# Patient Record
Sex: FEMALE | Race: WHITE | HISPANIC OR LATINO | Employment: FULL TIME | URBAN - METROPOLITAN AREA
[De-identification: names, ages, dates, MRNs, and addresses within clinical notes are randomized per-mention and may not be internally consistent; named-entity substitution may affect disease eponyms.]

---

## 2017-01-04 ENCOUNTER — TELEPHONE (OUTPATIENT)
Dept: INTERNAL MEDICINE | Facility: CLINIC | Age: 42
End: 2017-01-04

## 2017-01-04 NOTE — TELEPHONE ENCOUNTER
Panel Management Review      Patient has the following on her problem list:     Asthma review     ACT Total Scores 8/31/2016   ACT TOTAL SCORE -   ASTHMA ER VISITS -   ASTHMA HOSPITALIZATIONS -   ACT TOTAL SCORE (Goal Greater than or Equal to 20) 15   In the past 12 months, how many times did you visit the emergency room for your asthma without being admitted to the hospital? 0   In the past 12 months, how many times were you hospitalized overnight because of your asthma? 0      1. Is Asthma diagnosis on the Problem List? Yes    2. Is Asthma listed on Health Maintenance? Yes    3. Patient is due for:  ACT next month  Diabetes    ASA:     Last A1C  A1C      5.7   8/31/2016  A1C      5.6   2/25/2016  A1C      5.7   2/23/2016  A1C      6.0   7/14/2015  A1C      6.0   2/18/2015  A1C tested: Passed    Last LDL:    CHOL      134   2/23/2016  HDL       59   2/23/2016  LDL       51   2/23/2016  TRIG      122   2/23/2016  CHOLHDLRATIO      2.4   7/14/2015  NHDL       75   2/23/2016    Is the patient on a Statin? YES             Is the patient on Aspirin? NO    Medications     HMG CoA Reductase Inhibitors    atorvastatin (LIPITOR) 10 MG tablet          Last three blood pressure readings:  BP Readings from Last 3 Encounters:   08/31/16 152/88   02/26/16 149/85   02/23/16 130/80       Date of last diabetes office visit: 2/2016     Tobacco History:     History   Smoking status     Never Smoker    Smokeless tobacco     Not on file             Composite cancer screening  Chart review shows that this patient is due/due soon for the following None  Summary:    Patient is due/failing the following:   Office visit    Action needed:   Patient needs office visit for DM, Asthma.    Type of outreach:    Phone, left message for patient to call back.    Left msg to call for appt.     Questions for provider review:    None                                                                                                                                       KVNG Louise       Chart routed to None .

## 2017-03-03 ENCOUNTER — HOSPITAL ENCOUNTER (OUTPATIENT)
Dept: MAMMOGRAPHY | Facility: CLINIC | Age: 42
Discharge: HOME | End: 2017-03-03
Attending: NURSE PRACTITIONER | Admitting: NURSE PRACTITIONER
Payer: COMMERCIAL

## 2017-03-03 ENCOUNTER — OFFICE VISIT (OUTPATIENT)
Dept: INTERNAL MEDICINE | Facility: CLINIC | Age: 42
End: 2017-03-03
Payer: COMMERCIAL

## 2017-03-03 VITALS
TEMPERATURE: 98.1 F | OXYGEN SATURATION: 98 % | HEIGHT: 63 IN | WEIGHT: 283 LBS | SYSTOLIC BLOOD PRESSURE: 108 MMHG | BODY MASS INDEX: 50.14 KG/M2 | DIASTOLIC BLOOD PRESSURE: 60 MMHG | RESPIRATION RATE: 12 BRPM

## 2017-03-03 DIAGNOSIS — E78.5 HYPERLIPIDEMIA WITH TARGET LDL LESS THAN 70: ICD-10-CM

## 2017-03-03 DIAGNOSIS — I10 ESSENTIAL HYPERTENSION WITH GOAL BLOOD PRESSURE LESS THAN 140/90: ICD-10-CM

## 2017-03-03 DIAGNOSIS — E66.01 MORBID OBESITY, UNSPECIFIED OBESITY TYPE (H): ICD-10-CM

## 2017-03-03 DIAGNOSIS — Z31.9 DESIRE FOR PREGNANCY: ICD-10-CM

## 2017-03-03 DIAGNOSIS — E11.9 TYPE 2 DIABETES MELLITUS WITHOUT COMPLICATION, WITHOUT LONG-TERM CURRENT USE OF INSULIN (H): Primary | ICD-10-CM

## 2017-03-03 DIAGNOSIS — N91.2 AMENORRHEA: ICD-10-CM

## 2017-03-03 DIAGNOSIS — J45.30 MILD PERSISTENT ASTHMA WITHOUT COMPLICATION: ICD-10-CM

## 2017-03-03 DIAGNOSIS — Z12.31 VISIT FOR SCREENING MAMMOGRAM: ICD-10-CM

## 2017-03-03 LAB
BASOPHILS # BLD AUTO: 0 10E9/L (ref 0–0.2)
BASOPHILS NFR BLD AUTO: 0.5 %
BETA HCG QUAL IFA URINE: NEGATIVE
DIFFERENTIAL METHOD BLD: ABNORMAL
EOSINOPHIL # BLD AUTO: 0.3 10E9/L (ref 0–0.7)
EOSINOPHIL NFR BLD AUTO: 4.3 %
ERYTHROCYTE [DISTWIDTH] IN BLOOD BY AUTOMATED COUNT: 15.1 % (ref 10–15)
HBA1C MFR BLD: 5.8 % (ref 4.3–6)
HCT VFR BLD AUTO: 39.6 % (ref 35–47)
HGB BLD-MCNC: 12.5 G/DL (ref 11.7–15.7)
LYMPHOCYTES # BLD AUTO: 1.7 10E9/L (ref 0.8–5.3)
LYMPHOCYTES NFR BLD AUTO: 22.6 %
MCH RBC QN AUTO: 25.8 PG (ref 26.5–33)
MCHC RBC AUTO-ENTMCNC: 31.6 G/DL (ref 31.5–36.5)
MCV RBC AUTO: 82 FL (ref 78–100)
MONOCYTES # BLD AUTO: 0.5 10E9/L (ref 0–1.3)
MONOCYTES NFR BLD AUTO: 6.3 %
NEUTROPHILS # BLD AUTO: 5.1 10E9/L (ref 1.6–8.3)
NEUTROPHILS NFR BLD AUTO: 66.3 %
PLATELET # BLD AUTO: 206 10E9/L (ref 150–450)
RBC # BLD AUTO: 4.85 10E12/L (ref 3.8–5.2)
WBC # BLD AUTO: 7.7 10E9/L (ref 4–11)

## 2017-03-03 PROCEDURE — 84703 CHORIONIC GONADOTROPIN ASSAY: CPT | Performed by: NURSE PRACTITIONER

## 2017-03-03 PROCEDURE — 99207 C FOOT EXAM  NO CHARGE: CPT | Mod: 25 | Performed by: NURSE PRACTITIONER

## 2017-03-03 PROCEDURE — 99396 PREV VISIT EST AGE 40-64: CPT | Performed by: NURSE PRACTITIONER

## 2017-03-03 PROCEDURE — 83036 HEMOGLOBIN GLYCOSYLATED A1C: CPT | Performed by: NURSE PRACTITIONER

## 2017-03-03 PROCEDURE — G0202 SCR MAMMO BI INCL CAD: HCPCS

## 2017-03-03 PROCEDURE — 82043 UR ALBUMIN QUANTITATIVE: CPT | Performed by: NURSE PRACTITIONER

## 2017-03-03 PROCEDURE — 80061 LIPID PANEL: CPT | Performed by: NURSE PRACTITIONER

## 2017-03-03 PROCEDURE — 80050 GENERAL HEALTH PANEL: CPT | Performed by: NURSE PRACTITIONER

## 2017-03-03 PROCEDURE — 36415 COLL VENOUS BLD VENIPUNCTURE: CPT | Performed by: NURSE PRACTITIONER

## 2017-03-03 NOTE — MR AVS SNAPSHOT
After Visit Summary   3/3/2017    Coni Newberry    MRN: [id number]           Patient Information     Date Of Birth          1975        Visit Information        Provider Department      3/3/2017 11:00 AM Farida Palencia APRN [facility]        Today's Diagnoses     Type 2 diabetes mellitus without complication, without long-term current use of insulin (H)    -  1    Amenorrhea        Mild persistent asthma without complication        Hyperlipidemia with target LDL less than 70        Morbid obesity, unspecified obesity type (H)        Essential hypertension with goal blood pressure less than 140/90        Desire for pregnancy          Care Instructions      Labs in [address]    Stop lisinopril and lipitor while trying for pregnancy     Take a prenatal vitamin daily       FMG: [facility] [phone]   [email or web address]        Preventive Health Recommendations  Female Ages 40 to 49    Yearly exam:     See your health care provider every year in order to  1. Review health changes.   2. Discuss preventive care.    3. Review your medicines if your doctor prescribed any.      Get a Pap test every three years (unless you have an abnormal result and your provider advises testing more often).      If you get Pap tests with HPV test, you only need to test every 5 years, unless you have an abnormal result. You do not need a Pap test if your uterus was removed (hysterectomy) and you have not had cancer.      You should be tested each year for STDs (sexually transmitted diseases), if you're at risk.       Ask your doctor if you should have a mammogram.      Have a colonoscopy (test for colon cancer) if someone in your family has had colon cancer or polyps before age 50.       Have a cholesterol test every 5 years.       Have a diabetes test (fasting glucose) after age 45. If you are at risk for diabetes, you should have this  test every 3 years.    Shots: Get a flu shot each year. Get a tetanus shot every 10 years.     Nutrition:     Eat at least 5 servings of fruits and vegetables each day.    Eat whole-grain bread, whole-wheat pasta and brown rice instead of white grains and rice.    Talk to your provider about Calcium and Vitamin D.     Lifestyle    Exercise at least 150 minutes a week (an average of 30 minutes a day, 5 days a week). This will help you control your weight and prevent disease.    Limit alcohol to one drink per day.    No smoking.     Wear sunscreen to prevent skin cancer.    See your dentist every six months for an exam and cleaning.        Follow-ups after your visit        Additional Services     OB/GYN REFERRAL       Your provider has referred you to:  FMG: [facility] [phone]   [email or web address]      Please be aware that coverage of these services is subject to the terms and limitations of your health insurance plan.  Call member services at your health plan with any benefit or coverage questions.      Please bring the following with you to your appointment:    (1) Any X-Rays, CTs or MRIs which have been performed.  Contact the facility where they were done to arrange for  prior to your scheduled appointment.   (2) List of current medications   (3) This referral request   (4) Any documents/labs given to you for this referral                  Who to contact     If you have questions or need follow up information about today's clinic visit or your schedule please contact [facility] directly at [phone].  Normal or non-critical lab and imaging results will be communicated to you by MyChart, letter or phone within 4 business days after the clinic has received the results. If you do not hear from us within 7 days, please contact the clinic through MyChart or phone. If you have a critical or abnormal lab result, we will notify you by  "phone as soon as possible.  Submit refill requests through [employer] or call your pharmacy and they will forward the refill request to us. Please allow 3 business days for your refill to be completed.          Additional Information About Your Visit        [employer] Information     [employer] gives you secure access to your electronic health record. If you see a primary care provider, you can also send messages to your care team and make appointments. If you have questions, please call your primary care clinic.  If you do not have a primary care provider, please call [phone] and they will assist you.        Care EveryWhere ID     This is your Care EveryWhere ID. This could be used by other organizations to access your [facility] medical records  [id number]        Your Vitals Were     Temperature Respirations Height Last Period Pulse Oximetry BMI (Body Mass Index)    98.1  F (36.7  C) (Oral) 12 5' 3\" (1.6 m) 01/18/2017 98% 50.13 kg/m2       Blood Pressure from Last 3 Encounters:   03/03/17 108/60   08/31/16 152/88   02/26/16 149/85    Weight from Last 3 Encounters:   03/03/17 283 lb (128.4 kg)   08/31/16 277 lb 9.6 oz (125.9 kg)   02/23/16 280 lb (127 kg)              We Performed the Following     Albumin Random Urine Quantitative     Beta HCG qual IFA urine     CBC with platelets differential     Comprehensive metabolic panel     Hemoglobin A1c     Lipid panel reflex to direct LDL     OB/GYN REFERRAL     TSH with free T4 reflex          Today's Medication Changes          These changes are accurate as of: 3/3/17 12:19 PM.  If you have any questions, ask your nurse or doctor.               Start taking these medicines.        Dose/Directions    ACE/ARB NOT PRESCRIBED (INTENTIONAL)   Used for:  Type 2 diabetes mellitus without complication, without long-term current use of insulin (H), Essential hypertension with goal blood pressure less than 140/90   Started by:  Farida Palencia APRN CNP        Please choose " reason not prescribed, below   Refills:  0         Stop taking these medicines if you haven't already. Please contact your care team if you have questions.     atorvastatin 10 MG tablet   Commonly known as:  LIPITOR   Stopped by:  Farida Palencia APRN CNP           budesonide 0.5 MG/2ML neb solution   Commonly known as:  PULMICORT   Stopped by:  Farida Palencia APRN CNP           levalbuterol 0.63 MG/3ML neb solution   Commonly known as:  XOPENEX   Stopped by:  Farida Palencia APRN CNP           lisinopril 2.5 MG tablet   Commonly known as:  PRINIVIL/Zestril   Stopped by:  Farida Palencia APRN CNP                Where to get your medicines      Some of these will need a paper prescription and others can be bought over the counter.  Ask your nurse if you have questions.     You don't need a prescription for these medications     ACE/ARB NOT PRESCRIBED (INTENTIONAL)                Primary Care Provider Office Phone # Fax #    DANYELL Spencer [phone]       [facility] [address] [place] [address]        Thank you!     Thank you for choosing [facility]  for your care. Our goal is always to provide you with excellent care. Hearing back from our patients is one way we can continue to improve our services. Please take a few minutes to complete the written survey that you may receive in the mail after your visit with us. Thank you!             Your Updated Medication List - Protect others around you: Learn how to safely use, store and throw away your medicines at www.disposemymeds.org.          This list is accurate as of: 3/3/17 12:19 PM.  Always use your most recent med list.                   Brand Name Dispense Instructions for use    ACE/ARB NOT PRESCRIBED (INTENTIONAL)      Please choose reason not prescribed, below       albuterol 108 (90 BASE) MCG/ACT Inhaler    albuterol    2 Inhaler    Inhale 2 puffs into the lungs every 6  hours       beclomethasone 80 MCG/ACT Inhaler    QVAR    3 Inhaler    Inhale 2 puffs into the lungs 2 times daily       blood glucose monitoring meter device kit     1 kit    Use to test blood sugars 1 times daily or as directed.       * blood glucose monitoring test strip    no brand specified    600 strip    Use to test blood sugar 6 times daily or as directed.       * blood glucose monitoring test strip    ONE TOUCH ULTRA    3 Box    Use to test blood sugars 1 times daily or as directed.       cetirizine-psuedoePHEDrine 5-120 MG per 12 hr tablet    zyrTEC-D     Take 1 tablet by mouth daily       fluticasone 50 MCG/ACT spray    FLONASE    3 Package    Spray 2 sprays into both nostrils daily       ibuprofen 200 MG tablet    ADVIL/MOTRIN     Take 200 mg by mouth every 4 hours as needed for mild pain or fever       ipratropium - albuterol 0.5 mg/2.5 mg/3 mL 0.5-2.5 (3) MG/3ML neb solution    DUONEB    90 vial    Take 1 vial (3 mLs) by nebulization every 6 hours as needed for shortness of breath / dyspnea or wheezing       JOLIVETTE 0.35 MG per tablet   Generic drug:  norethindrone     84 tablet    Take 1 tablet (0.35 mg) by mouth daily       LANCETS ULTRA FINE Misc     300 each    1 each 6 times daily       metFORMIN 500 MG tablet    GLUCOPHAGE    360 tablet    Take 2 tablets (1,000 mg) by mouth 2 times daily (with meals)       montelukast 10 MG tablet    SINGULAIR    90 tablet    Take 1 tablet (10 mg) by mouth At Bedtime       nystatin ointment    MYCOSTATIN    60 g    Apply topically as needed       omeprazole 20 MG tablet     90 tablet    Take 1 tablet (20 mg) by mouth daily Take 30-60 minutes before a meal.       * order for DME     1 Device    Equipment being ordered: Nebulizer       * order for DME     2 Device    Equipment being ordered: neb tubing and cup       triamcinolone 0.1 % ointment    KENALOG    90 g    Apply sparingly to affected area three times daily AS NEEDED FOR ECZEMA RASH       * Notice:  This  list has 4 medication(s) that are the same as other medications prescribed for you. Read the directions carefully, and ask your doctor or other care provider to review them with you.

## 2017-03-03 NOTE — PROGRESS NOTES
SUBJECTIVE:     CC: Coni Newberry is an 42 year old woman who presents for preventive health visit.     Healthy Habits:    Do you get at least three servings of calcium containing foods daily (dairy, green leafy vegetables, etc.)? No    Amount of exercise or daily activities, outside of work: 1 day(s) per week    Problems taking medications regularly No    Medication side effects: No    Have you had an eye exam in the past two years? no    Do you see a dentist twice per year? no    Do you have sleep apnea, excessive snoring or daytime drowsiness?no            Today's PHQ-2 Score:   PHQ-2 ( 1999 [employer]) 3/3/2017 8/31/2016   Q1: Little interest or pleasure in doing things 0 0   Q2: Feeling down, depressed or hopeless 0 0   PHQ-2 Score 0 0   Little interest or pleasure in doing things - -   Feeling down, depressed or hopeless - -   PHQ-2 Score - -       Abuse: Current or Past(Physical, Sexual or Emotional)- No  Do you feel safe in your environment - Yes    Social History   Substance Use Topics     Smoking status: Never Smoker     Smokeless tobacco: Not on file     Alcohol use 0.0 oz/week     0 Standard drinks or equivalent per week      Comment: occassionally     The patient does not drink >3 drinks per day nor >7 drinks per week.    Recent Labs   Lab Test  02/23/16   0821  07/14/15   0820  02/18/15   1441   CHOL  134  130  135   HDL  59  55  59   LDL  51  57  53   TRIG  122  88  114   CHOLHDLRATIO   --   2.4  2.3   NHDL  75   --    --        Reviewed orders with patient.  Reviewed health maintenance and updated orders accordingly - Yes    Mammo Decision Support:      Pertinent mammograms are reviewed under the imaging tab.  History of abnormal Pap smear: NO - age 30- 65 PAP every 3 years recommended    Reviewed and updated as needed this visit by clinical staff  Tobacco  Allergies  Meds  Soc Hx        Reviewed and updated as needed this visit by Provider        HPI  Left foot and ankle pain- arthritis pain she  "thinks   Discussed tylenol if needed     Diabetes well controlled   Tolerating medication     Tolerating blood pressure and cholesterol medication   Think about pregnancy so will stop these medication for now     Absent period   Negative pregnancy test   She is thinking she wants to be pregnant once   She is made at her partner that he made her wait so long  We discussed at her age and with diabetes she may not have easy time   Will have her see OB GYN to discuss how to maximize her potential    Asthma well controlled     ROS:  C: NEGATIVE for fever, chills, change in weight  I: NEGATIVE for worrisome rashes, moles or lesions  E: NEGATIVE for vision changes or irritation  ENT: NEGATIVE for ear, mouth and throat problems  R: NEGATIVE for significant cough or SOB  B: NEGATIVE for masses, tenderness or discharge  CV: NEGATIVE for chest pain, palpitations or peripheral edema  GI: NEGATIVE for nausea, abdominal pain, heartburn, or change in bowel habits  : NEGATIVE for unusual urinary or vaginal symptoms. Periods are regular.  M: NEGATIVE for significant arthralgias or myalgia  N: NEGATIVE for weakness, dizziness or paresthesias  P: NEGATIVE for changes in mood or affect    Problem list, Medication list, Allergies, and Medical/Social/Surgical histories reviewed in [facility] and updated as appropriate.  OBJECTIVE:     /60 (BP Location: Left arm, Patient Position: Chair, Cuff Size: Adult Large)  Temp 98.1  F (36.7  C) (Oral)  Resp 12  Ht 5' 3\" (1.6 m)  Wt 283 lb (128.4 kg)  LMP 01/18/2017  SpO2 98%  BMI 50.13 kg/m2  EXAM:  GENERAL:  alert and emotionally distressed about pregnancy desire   EYES: Eyes grossly normal to inspection, and conjunctivae and sclerae normal  HENT: ear canals and TM's normal, nose and mouth without ulcers or lesions  NECK: no adenopathy, no asymmetry, masses, or scars and thyroid normal to palpation  RESP: lungs clear to auscultation - no rales, rhonchi or wheezes  BREAST: declined exam   CV: " "regular rate and rhythm  ABDOMEN: soft, nontender,and bowel sounds normal  MS: no gross musculoskeletal defects noted, no edema  Foot exam - normal CMS and no open areas   SKIN: no suspicious lesions or rashes  NEURO: Normal strength and tone, mentation intact and speech normal  PSYCH: mentation appears normal, affect normal/bright    ASSESSMENT/PLAN:         ICD-10-CM    1. Type 2 diabetes mellitus without complication, without long-term current use of insulin (H) E11.9 Albumin Random Urine Quantitative     Lipid panel reflex to direct LDL     Comprehensive metabolic panel     TSH with free T4 reflex     CBC with platelets differential     ACE/ARB NOT PRESCRIBED, INTENTIONAL,     Hemoglobin A1c   2. Amenorrhea N91.2 Beta HCG qual IFA urine   3. Mild persistent asthma without complication J45.30    4. Hyperlipidemia with target LDL less than 70 E78.5 Lipid panel reflex to direct LDL     Comprehensive metabolic panel   5. Morbid obesity, unspecified obesity type (H) E66.01 OB/GYN REFERRAL     Hemoglobin A1c   6. Essential hypertension with goal blood pressure less than 140/90 I10 Lipid panel reflex to direct LDL     Comprehensive metabolic panel     TSH with free T4 reflex     CBC with platelets differential     ACE/ARB NOT PRESCRIBED, INTENTIONAL,   7. Desire for pregnancy Z31.9 OB/GYN REFERRAL       COUNSELING:   Reviewed preventive health counseling, as reflected in patient instructions       Regular exercise       Healthy diet/nutrition       Family planning       Folic Acid Counseling       Osteoporosis Prevention/Bone Health         reports that she has never smoked. She does not have any smokeless tobacco history on file.    Estimated body mass index is 50.13 kg/(m^2) as calculated from the following:    Height as of this encounter: 5' 3\" (1.6 m).    Weight as of this encounter: 283 lb (128.4 kg).   Weight management plan: Discussed healthy diet and exercise guidelines and patient will follow up in 12 months " in clinic to re-evaluate.    Counseling Resources:  ATP IV Guidelines  Pooled Cohorts Equation Calculator  Breast Cancer Risk Calculator  FRAX Risk Assessment  ICSI Preventive Guidelines  Dietary Guidelines for Americans, 2010  USDA's MyPlate  ASA Prophylaxis  Lung CA Screening    DANYELL Spencer CNP  [facility]

## 2017-03-03 NOTE — NURSING NOTE
"Chief Complaint   Patient presents with     Physical       Initial /60 (BP Location: Left arm, Patient Position: Chair, Cuff Size: Adult Large)  Temp 98.1  F (36.7  C) (Oral)  Resp 12  Ht 5' 3\" (1.6 m)  Wt 283 lb (128.4 kg)  LMP 01/18/2017  SpO2 98%  BMI 50.13 kg/m2 Estimated body mass index is 50.13 kg/(m^2) as calculated from the following:    Height as of this encounter: 5' 3\" (1.6 m).    Weight as of this encounter: 283 lb (128.4 kg).  Medication Reconciliation: complete     KVNG Louise      "

## 2017-03-03 NOTE — LETTER
My Asthma Action Plan  Name: Coni Newberry   YOB: 1975  Date: 3/3/2017   My doctor: DANYELL Spencer CNP   My clinic: [facility]        My Control Medicine: flonase singulair   My Rescue Medicine: albuterol    My Asthma Severity: mild persistent  Avoid your asthma triggers:                GREEN ZONE     Good Control    I feel good    No cough or wheeze    Can work, sleep and play without asthma symptoms       Take your asthma control medicine every day.     1. If exercise triggers your asthma, take your rescue medication    15 minutes before exercise or sports, and    During exercise if you have asthma symptoms  2. Spacer to use with inhaler: If you have a spacer, make sure to use it with your inhaler             YELLOW ZONE     Getting Worse  I have ANY of these:    I do not feel good    Cough or wheeze    Chest feels tight    Wake up at night   1. Keep taking your Green Zone medications  2. Start taking your rescue medicine:    every 20 minutes for up to 1 hour. Then every 4 hours for 24-48 hours.  3. If you stay in the Yellow Zone for more than 12-24 hours, contact your doctor.  4. If you do not return to the Green Zone in 12-24 hours or you get worse, start taking your oral steroid medicine if prescribed by your provider.           RED ZONE     Medical Alert - Get Help  I have ANY of these:    I feel awful    Medicine is not helping    Breathing getting harder    Trouble walking or talking    Nose opens wide to breathe       1. Take your rescue medicine NOW  2. If your provider has prescribed an oral steroid medicine, start taking it NOW  3. Call your doctor NOW  4. If you are still in the Red Zone after 20 minutes and you have not reached your doctor:    Take your rescue medicine again and    Call 911 or go to the emergency room right away    See your regular doctor within 2 weeks of an Emergency Room or Urgent Care visit for follow-up treatment.        Electronically  signed by: Farida Palencia, March 3, 2017    Annual Reminders:  Meet with Asthma Educator,  Flu Shot in the Fall, consider Pneumonia Vaccination for patients with asthma (aged 19 and older).    Pharmacy:    [facility] [place], [place] - [address] AT PORTAL TO REGISTERED [facility] SITES  [id number] - [place], [place] - [address] [facility] [address] [place], [place] - [address] [facility]                    Asthma Triggers  How To Control Things That Make Your Asthma Worse    Triggers are things that make your asthma worse.  Look at the list below to help you find your triggers and what you can do about them.  You can help prevent asthma flare-ups by staying away from your triggers.      Trigger                                                          What you can do   Cigarette Smoke  Tobacco smoke can make asthma worse. Do not allow smoking in your home, car or around you.  Be sure no one smokes at a child s day care or school.  If you smoke, ask your health care provider for ways to help you quit.  Ask family members to quit too.  Ask your health care provider for a referral to Quit Plan to help you quit smoking, or call 5-649-810-PLAN.     Colds, Flu, Bronchitis  These are common triggers of asthma. Wash your hands often.  Don t touch your eyes, nose or mouth.  Get a flu shot every year.     Dust Mites  These are tiny bugs that live in cloth or carpet. They are too small to see. Wash sheets and blankets in hot water every week.   Encase pillows and mattress in dust mite proof covers.  Avoid having carpet if you can. If you have carpet, vacuum weekly.   Use a dust mask and HEPA vacuum.   Pollen and Outdoor Mold  Some people are allergic to trees, grass, or weed pollen, or molds. Try to keep your windows closed.  Limit time out doors when pollen count is high.   Ask you health care provider about taking medicine during allergy season.     Animal Dander  Some  people are allergic to skin flakes, urine or saliva from pets with fur or feathers. Keep pets with fur or feathers out of your home.    If you can t keep the pet outdoors, then keep the pet out of your bedroom.  Keep the bedroom door closed.  Keep pets off cloth furniture and away from stuffed toys.     Mice, Rats, and Cockroaches  Some people are allergic to the waste from these pests.   Cover food and garbage.  Clean up spills and food crumbs.  Store grease in the refrigerator.   Keep food out of the bedroom.   Indoor Mold  This can be a trigger if your home has high moisture. Fix leaking faucets, pipes, or other sources of water.   Clean moldy surfaces.  Dehumidify basement if it is damp and smelly.   Smoke, Strong Odors, and Sprays  These can reduce air quality. Stay away from strong odors and sprays, such as perfume, powder, hair spray, paints, smoke incense, paint, cleaning products, candles and new carpet.   Exercise or Sports  Some people with asthma have this trigger. Be active!  Ask your doctor about taking medicine before sports or exercise to prevent symptoms.    Warm up for 5-10 minutes before and after sports or exercise.     Other Triggers of Asthma  Cold air:  Cover your nose and mouth with a scarf.  Sometimes laughing or crying can be a trigger.  Some medicines and food can trigger asthma.

## 2017-03-03 NOTE — PATIENT INSTRUCTIONS
Labs in [address]    Stop lisinopril and lipitor while trying for pregnancy     Take a prenatal vitamin daily       FMG: [facility] [phone]   [email or web address]        Preventive Health Recommendations  Female Ages 40 to 49    Yearly exam:     See your health care provider every year in order to  1. Review health changes.   2. Discuss preventive care.    3. Review your medicines if your doctor prescribed any.      Get a Pap test every three years (unless you have an abnormal result and your provider advises testing more often).      If you get Pap tests with HPV test, you only need to test every 5 years, unless you have an abnormal result. You do not need a Pap test if your uterus was removed (hysterectomy) and you have not had cancer.      You should be tested each year for STDs (sexually transmitted diseases), if you're at risk.       Ask your doctor if you should have a mammogram.      Have a colonoscopy (test for colon cancer) if someone in your family has had colon cancer or polyps before age 50.       Have a cholesterol test every 5 years.       Have a diabetes test (fasting glucose) after age 45. If you are at risk for diabetes, you should have this test every 3 years.    Shots: Get a flu shot each year. Get a tetanus shot every 10 years.     Nutrition:     Eat at least 5 servings of fruits and vegetables each day.    Eat whole-grain bread, whole-wheat pasta and brown rice instead of white grains and rice.    Talk to your provider about Calcium and Vitamin D.     Lifestyle    Exercise at least 150 minutes a week (an average of 30 minutes a day, 5 days a week). This will help you control your weight and prevent disease.    Limit alcohol to one drink per day.    No smoking.     Wear sunscreen to prevent skin cancer.    See your dentist every six months for an exam and cleaning.

## 2017-03-04 LAB
ALBUMIN SERPL-MCNC: 3.8 G/DL (ref 3.4–5)
ALP SERPL-CCNC: 88 U/L (ref 40–150)
ALT SERPL W P-5'-P-CCNC: 17 U/L (ref 0–50)
ANION GAP SERPL CALCULATED.3IONS-SCNC: 11 MMOL/L (ref 3–14)
AST SERPL W P-5'-P-CCNC: 13 U/L (ref 0–45)
BILIRUB SERPL-MCNC: 0.5 MG/DL (ref 0.2–1.3)
BUN SERPL-MCNC: 18 MG/DL (ref 7–30)
CALCIUM SERPL-MCNC: 8.9 MG/DL (ref 8.5–10.1)
CHLORIDE SERPL-SCNC: 107 MMOL/L (ref 94–109)
CHOLEST SERPL-MCNC: 129 MG/DL
CO2 SERPL-SCNC: 25 MMOL/L (ref 20–32)
CREAT SERPL-MCNC: 0.64 MG/DL (ref 0.52–1.04)
CREAT UR-MCNC: 192 MG/DL
GFR SERPL CREATININE-BSD FRML MDRD: ABNORMAL ML/MIN/1.7M2
GLUCOSE SERPL-MCNC: 115 MG/DL (ref 70–99)
HDLC SERPL-MCNC: 50 MG/DL
LDLC SERPL CALC-MCNC: 66 MG/DL
MICROALBUMIN UR-MCNC: 24 MG/L
MICROALBUMIN/CREAT UR: 12.24 MG/G CR (ref 0–25)
NONHDLC SERPL-MCNC: 79 MG/DL
POTASSIUM SERPL-SCNC: 4.2 MMOL/L (ref 3.4–5.3)
PROT SERPL-MCNC: 7.5 G/DL (ref 6.8–8.8)
SODIUM SERPL-SCNC: 143 MMOL/L (ref 133–144)
TRIGL SERPL-MCNC: 67 MG/DL
TSH SERPL DL<=0.005 MIU/L-ACNC: 1 MU/L (ref 0.4–4)

## 2017-03-04 ASSESSMENT — ASTHMA QUESTIONNAIRES: ACT_TOTALSCORE: 21

## 2017-03-10 ENCOUNTER — HOSPITAL ENCOUNTER (OUTPATIENT)
Dept: ULTRASOUND IMAGING | Facility: CLINIC | Age: 42
Discharge: HOME | End: 2017-03-10
Attending: NURSE PRACTITIONER | Admitting: NURSE PRACTITIONER
Payer: COMMERCIAL

## 2017-03-10 DIAGNOSIS — R92.8 ABNORMAL MAMMOGRAM: ICD-10-CM

## 2017-03-10 PROCEDURE — 76642 ULTRASOUND BREAST LIMITED: CPT | Mod: LT

## 2017-03-14 DIAGNOSIS — B37.2 YEAST INFECTION OF THE SKIN: ICD-10-CM

## 2017-03-14 DIAGNOSIS — L30.0 NUMMULAR ECZEMA: ICD-10-CM

## 2017-03-14 DIAGNOSIS — E11.9 TYPE 2 DIABETES MELLITUS WITHOUT COMPLICATION (H): ICD-10-CM

## 2017-03-14 RX ORDER — TRIAMCINOLONE ACETONIDE 1 MG/G
OINTMENT TOPICAL
Qty: 90 G | Refills: 1 | Status: SHIPPED | OUTPATIENT
Start: 2017-03-14 | End: 2017-12-05

## 2017-03-14 RX ORDER — NYSTATIN 100000 U/G
OINTMENT TOPICAL PRN
Qty: 60 G | Refills: 3 | Status: SHIPPED | OUTPATIENT
Start: 2017-03-14 | End: 2018-04-27

## 2017-03-14 NOTE — TELEPHONE ENCOUNTER
Traimcinolone 0.1% ointment      Last Written Prescription Date: 3/3/16  Last Fill Quantity: 90 g,  # refills: 1   Last Office Visit with [facility], [facility] or  Health prescribing provider: 3/3/17                                         Next 5 appointments (look out 90 days)     Apr 03, 2017  6:15 PM CDT   [facility] with Edmund [facility]ajas MD   [facility]    [address]  [facility] [address]   [phone]                Nystatin ointment      Last Written Prescription Date: 11/12/15  Last Fill Quantity: 60 g,  # refills: 3   Last Office Visit with [facility], [facility] or  Health prescribing provider: 3/3/17                                         Next 5 appointments (look out 90 days)     Apr 03, 2017  6:15 PM CDT   Office Visit with Edmund Barajas MD   [facility]    [address]  [facility] [address]   [phone]

## 2017-03-30 ENCOUNTER — OFFICE VISIT (OUTPATIENT)
Dept: INTERNAL MEDICINE | Facility: CLINIC | Age: 42
End: 2017-03-30
Payer: COMMERCIAL

## 2017-03-30 VITALS
HEART RATE: 72 BPM | RESPIRATION RATE: 12 BRPM | BODY MASS INDEX: 50.14 KG/M2 | OXYGEN SATURATION: 99 % | WEIGHT: 283 LBS | SYSTOLIC BLOOD PRESSURE: 108 MMHG | TEMPERATURE: 98.5 F | DIASTOLIC BLOOD PRESSURE: 62 MMHG | HEIGHT: 63 IN

## 2017-03-30 DIAGNOSIS — E78.5 HYPERLIPIDEMIA WITH TARGET LDL LESS THAN 70: ICD-10-CM

## 2017-03-30 DIAGNOSIS — Z30.41 ENCOUNTER FOR SURVEILLANCE OF CONTRACEPTIVE PILLS: ICD-10-CM

## 2017-03-30 DIAGNOSIS — I10 ESSENTIAL HYPERTENSION WITH GOAL BLOOD PRESSURE LESS THAN 140/90: ICD-10-CM

## 2017-03-30 DIAGNOSIS — M76.60 ACHILLES TENDON PAIN: Primary | ICD-10-CM

## 2017-03-30 PROCEDURE — 99213 OFFICE O/P EST LOW 20 MIN: CPT | Performed by: NURSE PRACTITIONER

## 2017-03-30 RX ORDER — LISINOPRIL 2.5 MG/1
2.5 TABLET ORAL DAILY
Qty: 90 TABLET | Refills: 1 | Status: SHIPPED | OUTPATIENT
Start: 2017-03-30 | End: 2017-07-19

## 2017-03-30 RX ORDER — ATORVASTATIN CALCIUM 10 MG/1
10 TABLET, FILM COATED ORAL DAILY
Qty: 90 TABLET | Refills: 1 | Status: SHIPPED | OUTPATIENT
Start: 2017-03-30 | End: 2017-07-19

## 2017-03-30 RX ORDER — HYDROCODONE BITARTRATE AND ACETAMINOPHEN 5; 325 MG/1; MG/1
1 TABLET ORAL EVERY 8 HOURS PRN
Qty: 20 TABLET | Refills: 0 | Status: SHIPPED | OUTPATIENT
Start: 2017-03-30 | End: 2017-04-28

## 2017-03-30 NOTE — LETTER
[facility]  [address]  [facility] [address]  [phone]          March 30, 2017    RE:  Coni Newberry                                                                                                                                                       [address]  [facility] [address]            To whom it may concern:    Coni Newberry will need to wear tennis shoes, or support recommended by ortho,  for support of her foot until her achilles area is improved.       Sincerely,        Farida Palencia RN, CNP

## 2017-03-30 NOTE — NURSING NOTE
"Chief Complaint   Patient presents with     Pain     Left ankle pain x a week.        Initial /62 (BP Location: Left arm, Patient Position: Chair, Cuff Size: Adult Large)  Pulse 72  Temp 98.5  F (36.9  C) (Oral)  Resp 12  Ht 5' 3\" (1.6 m)  Wt 283 lb (128.4 kg)  LMP 01/18/2017  SpO2 99%  BMI 50.13 kg/m2 Estimated body mass index is 50.13 kg/(m^2) as calculated from the following:    Height as of this encounter: 5' 3\" (1.6 m).    Weight as of this encounter: 283 lb (128.4 kg).  Medication Reconciliation: complete     KVNG Louise      "

## 2017-03-30 NOTE — MR AVS SNAPSHOT
After Visit Summary   3/30/2017    Coni Newberry    MRN: [id number]           Patient Information     Date Of Birth          1975        Visit Information        Provider Department      3/30/2017 1:00 PM Farida Palencia APRN [facility]        Today's Diagnoses     Achilles tendon pain    -  1    Encounter for surveillance of contraceptive pills        Hyperlipidemia with target LDL less than 70        Essential hypertension with goal blood pressure less than 140/90          Care Instructions    Ortho referral   Dr Pantoja   9 am   Friday   4/3/2017  Specialty building [address]    Refill on medication     Ice to area     Elevation of foot     Feldene daily for pain - anti inflammatory     Vicodin for severe pain - bedtime                         Follow-ups after your visit        Additional Services     ORTHO  REFERRAL       [facility] is referring you to the Orthopedic  Services at [facility].       The  Representative will assist you in the coordination of your Orthopedic and Musculoskeletal Care as prescribed by your physician.    The  Representative will call you within 1 business day to help schedule your appointment, or you may contact the  Representative at:    All areas ~ [phone]     Type of Referral : Non Surgical   Dr Pantoja      Timeframe requested: 3 - 5 days     Coverage of these services is subject to the terms and limitations of your health insurance plan.  Please call member services at your health plan with any benefit or coverage questions.      If X-rays, CT or MRI's have been performed, please contact the facility where they were done to arrange for , prior to your scheduled appointment.  Please bring this referral request to your appointment and present it to your specialist.                  Your next 10 appointments already scheduled     Mar 31, 2017   9:00 AM CDT   New Visit with Jae Pantoja MD   [facility]THOPEDIC [facility]GERY ([facility])    [address]  [address]  [facility] [address]   [phone]            Apr 03, 2017  6:15 PM CDT   Office Visit with Edmund Barajas MD   [facility]    [address]  [facility] [address] [phone]           Bring a current list of meds and any records pertaining to this visit.  For Physicals, please bring immunization records and any forms needing to be filled out.  Please arrive 10 minutes early to complete paperwork.              Who to contact     If you have questions or need follow up information about today's clinic visit or your schedule please contact [facility] directly at [phone].  Normal or non-critical lab and imaging results will be communicated to you by MyChart, letter or phone within 4 business days after the clinic has received the results. If you do not hear from us within 7 days, please contact the clinic through [employer] or phone. If you have a critical or abnormal lab result, we will notify you by phone as soon as possible.  Submit refill requests through [facility] or call your pharmacy and they will forward the refill request to us. Please allow 3 business days for your refill to be completed.          Additional Information About Your Visit        MyChart Information     [facility] gives you secure access to your electronic health record. If you see a primary care provider, you can also send messages to your care team and make appointments. If you have questions, please call your primary care clinic.  If you do not have a primary care provider, please call [phone] and they will assist you.        Care EveryWhere ID     This is your Care EveryWhere ID. This could be used by other organizations to access your [facility] records  [id number]        Your Vitals Were  "    Pulse Temperature Respirations Height Last Period Pulse Oximetry    72 98.5  F (36.9  C) (Oral) 12 5' 3\" (1.6 m) 01/18/2017 99%    BMI (Body Mass Index)                   50.13 kg/m2            Blood Pressure from Last 3 Encounters:   03/30/17 108/62   03/03/17 108/60   08/31/16 152/88    Weight from Last 3 Encounters:   03/30/17 283 lb (128.4 kg)   03/03/17 283 lb (128.4 kg)   08/31/16 277 lb 9.6 oz (125.9 kg)              We Performed the Following     ORTHO  REFERRAL          Today's Medication Changes          These changes are accurate as of: 3/30/17  2:07 PM.  If you have any questions, ask your nurse or doctor.               Start taking these medicines.        Dose/Directions    atorvastatin 10 MG tablet   Commonly known as:  LIPITOR   Used for:  Hyperlipidemia with target LDL less than 70   Started by:  Farida Palencia APRN CNP        Dose:  10 mg   Take 1 tablet (10 mg) by mouth daily   Quantity:  90 tablet   Refills:  1       HYDROcodone-acetaminophen 5-325 MG per tablet   Commonly known as:  NORCO   Used for:  Achilles tendon pain   Started by:  Farida Palencia APRN CNP        Dose:  1 tablet   Take 1 tablet by mouth every 8 hours as needed for moderate to severe pain   Quantity:  20 tablet   Refills:  0       lisinopril 2.5 MG tablet   Commonly known as:  PRINIVIL/Zestril   Used for:  Essential hypertension with goal blood pressure less than 140/90   Started by:  Farida Palencia APRN CNP        Dose:  2.5 mg   Take 1 tablet (2.5 mg) by mouth daily   Quantity:  90 tablet   Refills:  1       piroxicam 20 MG capsule   Commonly known as:  FELDENE   Used for:  Achilles tendon pain   Started by:  Farida Palencia APRN CNP        Dose:  20 mg   Take 1 capsule (20 mg) by mouth daily (with breakfast)   Quantity:  30 capsule   Refills:  1            Where to get your medicines      These medications were sent to [facility]/pharmacy #[id number] [facility] [address] [address] [address], [place] [address]     Phone:  [phone]     JOLIVETTE 0.35 MG per tablet         These medications were sent to [facility] [address] [place], [place] - [address] [place] [address]  [address] [address]     Phone:  [phone]     atorvastatin 10 MG tablet    lisinopril 2.5 MG tablet    piroxicam 20 MG capsule         Some of these will need a paper prescription and others can be bought over the counter.  Ask your nurse if you have questions.     Bring a paper prescription for each of these medications     HYDROcodone-acetaminophen 5-325 MG per tablet                Primary Care Provider Office Phone # Fax #    DANYELL Spencer [facility] [phone]       [facility] [address] [place] [address]        Thank you!     Thank you for choosing [facility]  for your care. Our goal is always to provide you with excellent care. Hearing back from our patients is one way we can continue to improve our services. Please take a few minutes to complete the written survey that you may receive in the mail after your visit with us. Thank you!             Your Updated Medication List - Protect others around you: Learn how to safely use, store and throw away your medicines at www.disposemymeds.org.          This list is accurate as of: 3/30/17  2:07 PM.  Always use your most recent med list.                   Brand Name Dispense Instructions for use    ACE/ARB NOT PRESCRIBED (INTENTIONAL)      Please choose reason not prescribed, below       albuterol 108 (90 BASE) MCG/ACT Inhaler    albuterol    2 Inhaler    Inhale 2 puffs into the lungs every 6 hours       atorvastatin 10 MG tablet    LIPITOR    90 tablet    Take 1 tablet (10 mg) by mouth daily       beclomethasone 80 MCG/ACT Inhaler    QVAR    3 Inhaler    Inhale 2 puffs into the lungs 2 times daily       blood glucose monitoring meter device kit     1 kit    Use to test blood sugars 1  times daily or as directed.       * blood glucose monitoring test strip    no brand specified    600 strip    Use to test blood sugar 6 times daily or as directed.       * blood glucose monitoring test strip    ONE TOUCH ULTRA    3 Box    Use to test blood sugars 1 times daily or as directed.       cetirizine-psuedoePHEDrine 5-120 MG per 12 hr tablet    zyrTEC-D     Take 1 tablet by mouth daily       fluticasone 50 MCG/ACT spray    FLONASE    3 Package    Spray 2 sprays into both nostrils daily       HYDROcodone-acetaminophen 5-325 MG per tablet    NORCO    20 tablet    Take 1 tablet by mouth every 8 hours as needed for moderate to severe pain       ibuprofen 200 MG tablet    ADVIL/MOTRIN     Take 200 mg by mouth every 4 hours as needed for mild pain or fever       ipratropium - albuterol 0.5 mg/2.5 mg/3 mL 0.5-2.5 (3) MG/3ML neb solution    DUONEB    90 vial    Take 1 vial (3 mLs) by nebulization every 6 hours as needed for shortness of breath / dyspnea or wheezing       JOLIVETTE 0.35 MG per tablet   Generic drug:  norethindrone     84 tablet    Take 1 tablet (0.35 mg) by mouth daily       LANCETS ULTRA FINE Misc     300 each    1 each 6 times daily       lisinopril 2.5 MG tablet    PRINIVIL/Zestril    90 tablet    Take 1 tablet (2.5 mg) by mouth daily       metFORMIN 500 MG tablet    GLUCOPHAGE    360 tablet    Take 2 tablets (1,000 mg) by mouth 2 times daily (with meals)       montelukast 10 MG tablet    SINGULAIR    90 tablet    Take 1 tablet (10 mg) by mouth At Bedtime       nystatin ointment    MYCOSTATIN    60 g    Apply topically as needed       omeprazole 20 MG tablet     90 tablet    Take 1 tablet (20 mg) by mouth daily Take 30-60 minutes before a meal.       * order for DME     1 Device    Equipment being ordered: Nebulizer       * order for DME     2 Device    Equipment being ordered: neb tubing and cup       piroxicam 20 MG capsule    FELDENE    30 capsule    Take 1 capsule (20 mg) by mouth daily (with  breakfast)       triamcinolone 0.1 % ointment    KENALOG    90 g    Apply sparingly to affected area three times daily AS NEEDED FOR ECZEMA RASH       * Notice:  This list has 4 medication(s) that are the same as other medications prescribed for you. Read the directions carefully, and ask your doctor or other care provider to review them with you.

## 2017-03-30 NOTE — PATIENT INSTRUCTIONS
Ortho referral   Dr Pantoja   9 am   Friday   4/3/2017  Specialty building [address]    Refill on medication     Ice to area     Elevation of foot     Feldene daily for pain - anti inflammatory     Vicodin for severe pain - bedtime

## 2017-03-30 NOTE — PROGRESS NOTES
"  SUBJECTIVE:                                                    Coni Newberry is a 42 year old female who presents to clinic today for the following health issues:  Left ankle pain x one week  Refill birth control    Pain and swelling in left ankle in area of Achilles tendon for past one month, no trauma or injury triggering swelling and pain. Bearing weight is painful. Worst after gym last week, went on treadmill and stationary bike. When inflamed, also erythematous. Icing helps, taking 600mg ibuprofen 2-3 times a day and that is helping. Feels it is worsening over course of month, feeling sharp stabbing pain in back of heel \"feels like someone is constantly wacking my heel with an axe.\" Ankles and knees are also ache-y in general. Has had no imaging on ankle. Painful to stand on toes.    Wants refill on BC, BP meds, statin.     Not going to try for pregnancy so will restart lisinopril and lipitor.  Her  said no baby   She feels she may be going into menopause as periods still absent     Problem list and histories reviewed & adjusted, as indicated.  Additional history: as documented      Reviewed and updated as needed this visit by clinical staff  Tobacco  Allergies  Meds  Soc Hx      Reviewed and updated as needed this visit by Provider         ROS:  C: NEGATIVE for fever, chills, change in weight  MUSCULOSKELETAL: POSITIVE  for achilles tendon pain in left ankle.    OBJECTIVE:                                                    /62 (BP Location: Left arm, Patient Position: Chair, Cuff Size: Adult Large)  Pulse 72  Temp 98.5  F (36.9  C) (Oral)  Resp 12  Ht 5' 3\" (1.6 m)  Wt 283 lb (128.4 kg)  LMP 01/18/2017  SpO2 99%  BMI 50.13 kg/m2  Body mass index is 50.13 kg/(m^2).  GENERAL: alert and no distress  MS: tenderness to palpation of left achilles tendon and LLE exam shows no deformities and no erythema, induration, or nodules  Diabetic foot exam: no trophic changes or ulcerative lesions " and normal sensory exam of left foot    Diagnostic Test Results:  none      ASSESSMENT/PLAN:                                                      1. Achilles tendon pain    - ORTHO  REFERRAL  - piroxicam (FELDENE) 20 MG capsule; Take 1 capsule (20 mg) by mouth daily (with breakfast)  Dispense: 30 capsule; Refill: 1  - HYDROcodone-acetaminophen (NORCO) 5-325 MG per tablet; Take 1 tablet by mouth every 8 hours as needed for moderate to severe pain  Dispense: 20 tablet; Refill: 0    2. Encounter for surveillance of contraceptive pills    - JOLIVETTE 0.35 MG per tablet; Take 1 tablet (0.35 mg) by mouth daily  Dispense: 84 tablet; Refill: 1    3. Hyperlipidemia with target LDL less than 70    - atorvastatin (LIPITOR) 10 MG tablet; Take 1 tablet (10 mg) by mouth daily  Dispense: 90 tablet; Refill: 1    4. Essential hypertension with goal blood pressure less than 140/90    - lisinopril (PRINIVIL/ZESTRIL) 2.5 MG tablet; Take 1 tablet (2.5 mg) by mouth daily  Dispense: 90 tablet; Refill: 1    Patient Instructions   Ortho referral   Dr Pantoja   9 am   Friday   4/3/2017  Specialty building [address]    Refill on medication     Ice to area     Elevation of foot     Feldene daily for pain - anti inflammatory     Vicodin for severe pain - bedtime                   Ice the heel 15-20 minutes 2-3 times a day, wear comfortable supportive shoes that have a cushion in the heel. Avoid activities that cause pain. Recommended trying arm pedal bike and stretching at gym to replace treadmill and bike.     Belén DUBOSE, am serving as a scribe; to document services personally performed by DANYELL Jacobs CNP based on data collection and the provider's statements to me.     Belén Marshall, MPH RN [place]    I was present for entire exam,  and reviewed history. Additions have been made for clarification,  and I agree with above note   DANYELL Morales CNP, CNP  [facility]  [place]

## 2017-03-31 ENCOUNTER — OFFICE VISIT (OUTPATIENT)
Dept: ORTHOPEDICS | Facility: CLINIC | Age: 42
End: 2017-03-31
Payer: COMMERCIAL

## 2017-03-31 VITALS
DIASTOLIC BLOOD PRESSURE: 64 MMHG | WEIGHT: 283 LBS | SYSTOLIC BLOOD PRESSURE: 110 MMHG | HEIGHT: 63 IN | BODY MASS INDEX: 50.14 KG/M2

## 2017-03-31 DIAGNOSIS — S86.002A ACHILLES TENDON INJURY, LEFT, INITIAL ENCOUNTER: Primary | ICD-10-CM

## 2017-03-31 PROCEDURE — 99203 OFFICE O/P NEW LOW 30 MIN: CPT | Performed by: ORTHOPAEDIC SURGERY

## 2017-03-31 NOTE — LETTER
3/31/2017       RE: Coin Newberry  [address]  [place] [facility] [address]           Dear Colleague,    Thank you for referring your patient, Coni Newberry, to the [facility] ORTHOPEDIC SURGERY. Please see a copy of my visit note below.    HISTORY OF PRESENT ILLNESS:    Coni Newberry is a 42 year old female who is seen in consultation at the request of Dr. Palencia for left achilles pain    Present symptoms: Pt states pain has been present for about a month.  Pain is near the insertion point of the achilles, describes pain as stabbing.  Pt states rest will improve pain.  Pt states wearing dress shoes is worse than athletic shoes.  Pt states walking, going up and down ladders increase pain.  Treatments tried to this point: ankle braces, switching shoes, rest, ibuprofen, ice, epsom salt bath  Orthopedic PMH: no ortho surgeries     Past Medical History:   Diagnosis Date     HTN (hypertension)      Hyperlipidaemia LDL goal <100      Mild persistent asthma 7/29/2014     Nummular eczema        Past Surgical History:   Procedure Laterality Date     CYSTECTOMY OVARIAN BENIGN Left 2/23/2016    Procedure: CYSTECTOMY OVARIAN BENIGN;  Surgeon: Marisela Lopez MD;  Location: [facility] OR     NONE OF THE ABOVE CORE MEASURE DIAGNOSES       SALPINGO-OOPHORECTOMY, COMBINED Left 2/23/2016    Procedure: COMBINED SALPINGO-OOPHORECTOMY;  Surgeon: Marisela Lopez MD;  Location: [facility] OR       Family History   Problem Relation Age of Onset     DIABETES Mother      DIABETES Father      DIABETES Sister      Hypertension Father      CANCER Paternal Grandfather        Social History     Social History     Marital status:      Spouse name: N/A     Number of children: N/A     Years of education: N/A     Occupational History     Not on file.     Social History Main Topics     Smoking status: Never Smoker     Smokeless tobacco: Not on file     Alcohol use 0.0 oz/week     0 Standard drinks or equivalent per week      Comment:  occassionally     Drug use: No     Sexual activity: Yes     Partners: Male     Other Topics Concern     Not on file     Social History Narrative       Current Outpatient Prescriptions   Medication Sig Dispense Refill     JOLIVETTE 0.35 MG per tablet Take 1 tablet (0.35 mg) by mouth daily 84 tablet 1     piroxicam (FELDENE) 20 MG capsule Take 1 capsule (20 mg) by mouth daily (with breakfast) 30 capsule 1     HYDROcodone-acetaminophen (NORCO) 5-325 MG per tablet Take 1 tablet by mouth every 8 hours as needed for moderate to severe pain 20 tablet 0     nystatin (MYCOSTATIN) ointment Apply topically as needed 60 g 3     triamcinolone (KENALOG) 0.1 % ointment Apply sparingly to affected area three times daily AS NEEDED FOR ECZEMA RASH 90 g 1     metFORMIN (GLUCOPHAGE) 500 MG tablet Take 2 tablets (1,000 mg) by mouth 2 times daily (with meals) 360 tablet 1     montelukast (SINGULAIR) 10 MG tablet Take 1 tablet (10 mg) by mouth At Bedtime 90 tablet 3     atorvastatin (LIPITOR) 10 MG tablet Take 1 tablet (10 mg) by mouth daily (Patient not taking: Reported on 3/31/2017) 90 tablet 1     lisinopril (PRINIVIL/ZESTRIL) 2.5 MG tablet Take 1 tablet (2.5 mg) by mouth daily (Patient not taking: Reported on 3/31/2017) 90 tablet 1     ACE/ARB NOT PRESCRIBED, INTENTIONAL, Please choose reason not prescribed, below       omeprazole 20 MG tablet Take 1 tablet (20 mg) by mouth daily Take 30-60 minutes before a meal. (Patient not taking: Reported on 3/31/2017) 90 tablet 2     ipratropium - albuterol 0.5 mg/2.5 mg/3 mL (DUONEB) 0.5-2.5 (3) MG/3ML nebulization Take 1 vial (3 mLs) by nebulization every 6 hours as needed for shortness of breath / dyspnea or wheezing (Patient not taking: Reported on 3/31/2017) 90 vial 3     beclomethasone (QVAR) 80 MCG/ACT Inhaler Inhale 2 puffs into the lungs 2 times daily (Patient not taking: Reported on 3/31/2017) 3 Inhaler 3     order for DME Equipment being ordered: neb tubing and cup 2 Device prn      albuterol (ALBUTEROL) 108 (90 BASE) MCG/ACT inhaler Inhale 2 puffs into the lungs every 6 hours (Patient not taking: Reported on 3/31/2017) 2 Inhaler 3     blood glucose monitoring (ONE TOUCH ULTRA 2) meter device kit Use to test blood sugars 1 times daily or as directed. 1 kit 0     blood glucose monitoring (ONE TOUCH ULTRA) test strip Use to test blood sugars 1 times daily or as directed. 3 Box 1     ORDER FOR DME Equipment being ordered: Nebulizer 1 Device prn     blood glucose test strip Use to test blood sugar 6 times daily or as directed. 600 strip 3     LANCETS ULTRA FINE MISC 1 each 6 times daily 300 each 2     cetirizine-psuedoePHEDrine (ZYRTEC-D) 5-120 MG per tablet Take 1 tablet by mouth daily Reported on 3/31/2017       ibuprofen (ADVIL,MOTRIN) 200 MG tablet Take 200 mg by mouth every 4 hours as needed for mild pain or fever Reported on 3/31/2017       fluticasone (FLONASE) 50 MCG/ACT nasal spray Spray 2 sprays into both nostrils daily (Patient not taking: Reported on 3/31/2017) 3 Package 3       Allergies   Allergen Reactions     Seasonal Allergies        REVIEW OF SYSTEMS:  CONSTITUTIONAL:  NEGATIVE for fever, chills, change in weight  INTEGUMENTARY/SKIN:  NEGATIVE for worrisome rashes, moles or lesions  EYES:  NEGATIVE for vision changes or irritation  ENT/MOUTH:  NEGATIVE for ear, mouth and throat problems  RESP:  NEGATIVE for significant cough or SOB  BREAST:  NEGATIVE for masses, tenderness or discharge  CV:  NEGATIVE for chest pain, palpitations or peripheral edema  GI:  NEGATIVE for nausea, abdominal pain, heartburn, or change in bowel habits  :  Negative   MUSCULOSKELETAL:  See HPI above  NEURO:  NEGATIVE for weakness, dizziness or paresthesias  ENDOCRINE:  NEGATIVE for temperature intolerance, skin/hair changes  HEME/ALLERGY/IMMUNE:  NEGATIVE for bleeding problems  PSYCHIATRIC:  NEGATIVE for changes in mood or affect      PHYSICAL EXAM:  /64 (BP Location: Right arm, Patient Position:  "Chair, Cuff Size: Adult Large)  Ht 5' 3\" (1.6 m)  Wt 283 lb (128.4 kg)  LMP 01/18/2017  BMI 50.13 kg/m2  Body mass index is 50.13 kg/(m^2).   GENERAL APPEARANCE: healthy, alert and no distress   SKIN: no suspicious lesions or rashes  NEURO: Normal strength and tone, mentation intact and speech normal  VASCULAR: Good pulses, and capillary refill   LYMPH: no lymphadenopathy   PSYCH:  mentation appears normal and affect normal/bright    MSK:  Tenderness to palpation at the Achilles calcaneal insertion.     ASSESSMENT / PLAN: Enthesopathy tendo Achilles. She was placed in a cam walking boot with 15  of plantarflexion. She'll be on that for 4-6 weeks and then in a neutral position for another 4-6 weeks.      Imaging Interpretation:         Perico Pantoja MD  Department of Orthopedic Surgery        Disclaimer: This note consists of symbols derived from keyboarding, dictation and/or voice recognition software. As a result, there may be errors in the script that have gone undetected. Please consider this when interpreting information found in this chart.      Again, thank you for allowing me to participate in the care of your patient.        Sincerely,              Jae Pantoja MD    "

## 2017-03-31 NOTE — PROGRESS NOTES
HISTORY OF PRESENT ILLNESS:    Coni Newberry is a 42 year old female who is seen in consultation at the request of Dr. Palencia for left achilles pain    Present symptoms: Pt states pain has been present for about a month.  Pain is near the insertion point of the achilles, describes pain as stabbing.  Pt states rest will improve pain.  Pt states wearing dress shoes is worse than athletic shoes.  Pt states walking, going up and down ladders increase pain.  Treatments tried to this point: ankle braces, switching shoes, rest, ibuprofen, ice, epsom salt bath  Orthopedic PMH: no ortho surgeries     Past Medical History:   Diagnosis Date     HTN (hypertension)      Hyperlipidaemia LDL goal <100      Mild persistent asthma 7/29/2014     Nummular eczema        Past Surgical History:   Procedure Laterality Date     CYSTECTOMY OVARIAN BENIGN Left 2/23/2016    Procedure: CYSTECTOMY OVARIAN BENIGN;  Surgeon: Marisela Lopez MD;  Location: [facility] OR     NONE OF THE ABOVE CORE MEASURE DIAGNOSES       SALPINGO-OOPHORECTOMY, COMBINED Left 2/23/2016    Procedure: COMBINED SALPINGO-OOPHORECTOMY;  Surgeon: Marisela Lopez MD;  Location: [facility] OR       Family History   Problem Relation Age of Onset     DIABETES Mother      DIABETES Father      DIABETES Sister      Hypertension Father      CANCER Paternal Grandfather        Social History     Social History     Marital status:      Spouse name: N/A     Number of children: N/A     Years of education: N/A     Occupational History     Not on file.     Social History Main Topics     Smoking status: Never Smoker     Smokeless tobacco: Not on file     Alcohol use 0.0 oz/week     0 Standard drinks or equivalent per week      Comment: occassionally     Drug use: No     Sexual activity: Yes     Partners: Male     Other Topics Concern     Not on file     Social History Narrative       Current Outpatient Prescriptions   Medication Sig Dispense Refill     JOLIVETTE 0.35 MG per tablet Take 1  tablet (0.35 mg) by mouth daily 84 tablet 1     piroxicam (FELDENE) 20 MG capsule Take 1 capsule (20 mg) by mouth daily (with breakfast) 30 capsule 1     HYDROcodone-acetaminophen (NORCO) 5-325 MG per tablet Take 1 tablet by mouth every 8 hours as needed for moderate to severe pain 20 tablet 0     nystatin (MYCOSTATIN) ointment Apply topically as needed 60 g 3     triamcinolone (KENALOG) 0.1 % ointment Apply sparingly to affected area three times daily AS NEEDED FOR ECZEMA RASH 90 g 1     metFORMIN (GLUCOPHAGE) 500 MG tablet Take 2 tablets (1,000 mg) by mouth 2 times daily (with meals) 360 tablet 1     montelukast (SINGULAIR) 10 MG tablet Take 1 tablet (10 mg) by mouth At Bedtime 90 tablet 3     atorvastatin (LIPITOR) 10 MG tablet Take 1 tablet (10 mg) by mouth daily (Patient not taking: Reported on 3/31/2017) 90 tablet 1     lisinopril (PRINIVIL/ZESTRIL) 2.5 MG tablet Take 1 tablet (2.5 mg) by mouth daily (Patient not taking: Reported on 3/31/2017) 90 tablet 1     ACE/ARB NOT PRESCRIBED, INTENTIONAL, Please choose reason not prescribed, below       omeprazole 20 MG tablet Take 1 tablet (20 mg) by mouth daily Take 30-60 minutes before a meal. (Patient not taking: Reported on 3/31/2017) 90 tablet 2     ipratropium - albuterol 0.5 mg/2.5 mg/3 mL (DUONEB) 0.5-2.5 (3) MG/3ML nebulization Take 1 vial (3 mLs) by nebulization every 6 hours as needed for shortness of breath / dyspnea or wheezing (Patient not taking: Reported on 3/31/2017) 90 vial 3     beclomethasone (QVAR) 80 MCG/ACT Inhaler Inhale 2 puffs into the lungs 2 times daily (Patient not taking: Reported on 3/31/2017) 3 Inhaler 3     order for DME Equipment being ordered: neb tubing and cup 2 Device prn     albuterol (ALBUTEROL) 108 (90 BASE) MCG/ACT inhaler Inhale 2 puffs into the lungs every 6 hours (Patient not taking: Reported on 3/31/2017) 2 Inhaler 3     blood glucose monitoring (ONE TOUCH ULTRA 2) meter device kit Use to test blood sugars 1 times daily or  "as directed. 1 kit 0     blood glucose monitoring (ONE TOUCH ULTRA) test strip Use to test blood sugars 1 times daily or as directed. 3 Box 1     ORDER FOR DME Equipment being ordered: Nebulizer 1 Device prn     blood glucose test strip Use to test blood sugar 6 times daily or as directed. 600 strip 3     LANCETS ULTRA FINE MISC 1 each 6 times daily 300 each 2     cetirizine-psuedoePHEDrine (ZYRTEC-D) 5-120 MG per tablet Take 1 tablet by mouth daily Reported on 3/31/2017       ibuprofen (ADVIL,MOTRIN) 200 MG tablet Take 200 mg by mouth every 4 hours as needed for mild pain or fever Reported on 3/31/2017       fluticasone (FLONASE) 50 MCG/ACT nasal spray Spray 2 sprays into both nostrils daily (Patient not taking: Reported on 3/31/2017) 3 Package 3       Allergies   Allergen Reactions     Seasonal Allergies        REVIEW OF SYSTEMS:  CONSTITUTIONAL:  NEGATIVE for fever, chills, change in weight  INTEGUMENTARY/SKIN:  NEGATIVE for worrisome rashes, moles or lesions  EYES:  NEGATIVE for vision changes or irritation  ENT/MOUTH:  NEGATIVE for ear, mouth and throat problems  RESP:  NEGATIVE for significant cough or SOB  BREAST:  NEGATIVE for masses, tenderness or discharge  CV:  NEGATIVE for chest pain, palpitations or peripheral edema  GI:  NEGATIVE for nausea, abdominal pain, heartburn, or change in bowel habits  :  Negative   MUSCULOSKELETAL:  See HPI above  NEURO:  NEGATIVE for weakness, dizziness or paresthesias  ENDOCRINE:  NEGATIVE for temperature intolerance, skin/hair changes  HEME/ALLERGY/IMMUNE:  NEGATIVE for bleeding problems  PSYCHIATRIC:  NEGATIVE for changes in mood or affect      PHYSICAL EXAM:  /64 (BP Location: Right arm, Patient Position: Chair, Cuff Size: Adult Large)  Ht 5' 3\" (1.6 m)  Wt 283 lb (128.4 kg)  LMP 01/18/2017  BMI 50.13 kg/m2  Body mass index is 50.13 kg/(m^2).   GENERAL APPEARANCE: healthy, alert and no distress   SKIN: no suspicious lesions or rashes  NEURO: Normal strength " and tone, mentation intact and speech normal  VASCULAR: Good pulses, and capillary refill   LYMPH: no lymphadenopathy   PSYCH:  mentation appears normal and affect normal/bright    MSK:  Tenderness to palpation at the Achilles calcaneal insertion.     ASSESSMENT / PLAN: Enthesopathy tendo Achilles. She was placed in a cam walking boot with 15  of plantarflexion. She'll be on that for 4-6 weeks and then in a neutral position for another 4-6 weeks.      Imaging Interpretation:         Perico Pantoja MD  Department of Orthopedic Surgery        Disclaimer: This note consists of symbols derived from keyboarding, dictation and/or voice recognition software. As a result, there may be errors in the script that have gone undetected. Please consider this when interpreting information found in this chart.

## 2017-03-31 NOTE — NURSING NOTE
"Chief Complaint   Patient presents with     Ankle Pain     Left achilles pain       Initial /64 (BP Location: Right arm, Patient Position: Chair, Cuff Size: Adult Large)  Ht 5' 3\" (1.6 m)  Wt 283 lb (128.4 kg)  LMP 01/18/2017  BMI 50.13 kg/m2 Estimated body mass index is 50.13 kg/(m^2) as calculated from the following:    Height as of this encounter: 5' 3\" (1.6 m).    Weight as of this encounter: 283 lb (128.4 kg).  Medication Reconciliation: complete    "

## 2017-03-31 NOTE — MR AVS SNAPSHOT
After Visit Summary   3/31/2017    Coni Newberry    MRN: [id number]           Patient Information     Date Of Birth          1975        Visit Information        Provider Department      3/31/2017 9:00 AM Jae Pantoja MD [facility] ORTHOPEDIC SURGERY        Today's Diagnoses     Achilles tendon injury, left, initial encounter    -  1       Follow-ups after your visit        Your next 10 appointments already scheduled     May 01, 2017  2:00 PM CDT   Return Visit with Jae Pantoja MD   [facility] ORTHOPEDIC SURGERY ([facility])    [address] [address] [address]   [phone]            May 12, 2017  1:00 PM CDT   Return Visit with Jae Pantoja MD   [facility] ORTHOPEDIC SURGERY ([facility])    [address] [address] [address]   [phone]              Who to contact     If you have questions or need follow up information about today's clinic visit or your schedule please contact [facility] ORTHOPEDIC SURGERY directly at [phone].  Normal or non-critical lab and imaging results will be communicated to you by [employer], letter or phone within 4 business days after the clinic has received the results. If you do not hear from us within 7 days, please contact the clinic through [employer] or phone. If you have a critical or abnormal lab result, we will notify you by phone as soon as possible.  Submit refill requests through [employer] or call your pharmacy and they will forward the refill request to us. Please allow 3 business days for your refill to be completed.          Additional Information About Your Visit        [employer] Information     [employer] gives you secure access to your electronic health record. If you see a primary care provider, you can also send messages to your care team and make appointments. If you have questions, please call your primary care clinic.   "If you do not have a primary care provider, please call [phone] and they will assist you.        Care EveryWhere ID     This is your Care EveryWhere ID. This could be used by other organizations to access your [facility] medical records  [id number]        Your Vitals Were     Height Last Period BMI (Body Mass Index)             5' 3\" (1.6 m) 01/18/2017 50.13 kg/m2          Blood Pressure from Last 3 Encounters:   04/10/17 112/85   04/08/17 110/89   04/03/17 104/68    Weight from Last 3 Encounters:   04/20/17 288 lb (130.6 kg)   04/10/17 288 lb (130.6 kg)   04/03/17 288 lb 9.6 oz (130.9 kg)              Today, you had the following     No orders found for display         Today's Medication Changes          These changes are accurate as of: 3/31/17 11:59 PM.  If you have any questions, ask your nurse or doctor.               These medicines have changed or have updated prescriptions.        Dose/Directions    * order for DME   This may have changed:  Another medication with the same name was added. Make sure you understand how and when to take each.   Used for:  Mild persistent asthma   Changed by:  Farida Palencia APRN CNP        Equipment being ordered: Nebulizer   Quantity:  1 Device   Refills:  prn       * order for DME   This may have changed:  Another medication with the same name was added. Make sure you understand how and when to take each.   Used for:  Mild persistent asthma without complication   Changed by:  Farida Palencia APRN CNP        Equipment being ordered: neb tubing and cup   Quantity:  2 Device   Refills:  prn       * order for DME   This may have changed:  You were already taking a medication with the same name, and this prescription was added. Make sure you understand how and when to take each.   Used for:  Achilles tendon injury, left, initial encounter   Changed by:  Jae Pantjoa MD        Equipment being ordered: CAM boot   Quantity:  1 Device   Refills:  0       * " order for DME   This may have changed:  You were already taking a medication with the same name, and this prescription was added. Make sure you understand how and when to take each.   Used for:  Achilles tendon injury, left, initial encounter   Changed by:  Jae Pantoja MD        Equipment being ordered: crutches   Quantity:  1 Device   Refills:  0       * Notice:  This list has 4 medication(s) that are the same as other medications prescribed for you. Read the directions carefully, and ask your doctor or other care provider to review them with you.         Where to get your medicines      Some of these will need a paper prescription and others can be bought over the counter.  Ask your nurse if you have questions.     Bring a paper prescription for each of these medications     order for DME    order for DME                Primary Care Provider Office Phone # Fax #    DANYELL Spencer [facility] [phone]       [facility] [address] [place] [address]        Thank you!     Thank you for choosing [facility] ORTHOPEDIC SURGERY  for your care. Our goal is always to provide you with excellent care. Hearing back from our patients is one way we can continue to improve our services. Please take a few minutes to complete the written survey that you may receive in the mail after your visit with us. Thank you!             Your Updated Medication List - Protect others around you: Learn how to safely use, store and throw away your medicines at www.disposemymeds.org.          This list is accurate as of: 3/31/17 11:59 PM.  Always use your most recent med list.                   Brand Name Dispense Instructions for use    ACE/ARB NOT PRESCRIBED (INTENTIONAL)      Please choose reason not prescribed, below       albuterol 108 (90 BASE) MCG/ACT Inhaler    albuterol    2 Inhaler    Inhale 2 puffs into the lungs every 6 hours       atorvastatin 10 MG tablet    LIPITOR    90  tablet    Take 1 tablet (10 mg) by mouth daily       beclomethasone 80 MCG/ACT Inhaler    QVAR    3 Inhaler    Inhale 2 puffs into the lungs 2 times daily       blood glucose monitoring meter device kit     1 kit    Use to test blood sugars 1 times daily or as directed.       * blood glucose monitoring test strip    no brand specified    600 strip    Use to test blood sugar 6 times daily or as directed.       * blood glucose monitoring test strip    ONE TOUCH ULTRA    3 Box    Use to test blood sugars 1 times daily or as directed.       cetirizine-psuedoePHEDrine 5-120 MG per 12 hr tablet    zyrTEC-D     Take 1 tablet by mouth daily Reported on 3/31/2017       fluticasone 50 MCG/ACT spray    FLONASE    3 Package    Spray 2 sprays into both nostrils daily       HYDROcodone-acetaminophen 5-325 MG per tablet    NORCO    20 tablet    Take 1 tablet by mouth every 8 hours as needed for moderate to severe pain       ibuprofen 200 MG tablet    ADVIL/MOTRIN     Take 200 mg by mouth every 4 hours as needed for mild pain or fever Reported on 3/31/2017       ipratropium - albuterol 0.5 mg/2.5 mg/3 mL 0.5-2.5 (3) MG/3ML neb solution    DUONEB    90 vial    Take 1 vial (3 mLs) by nebulization every 6 hours as needed for shortness of breath / dyspnea or wheezing       JOLIVETTE 0.35 MG per tablet   Generic drug:  norethindrone     84 tablet    Take 1 tablet (0.35 mg) by mouth daily       LANCETS ULTRA FINE Misc     300 each    1 each 6 times daily       lisinopril 2.5 MG tablet    PRINIVIL/Zestril    90 tablet    Take 1 tablet (2.5 mg) by mouth daily       metFORMIN 500 MG tablet    GLUCOPHAGE    360 tablet    Take 2 tablets (1,000 mg) by mouth 2 times daily (with meals)       montelukast 10 MG tablet    SINGULAIR    90 tablet    Take 1 tablet (10 mg) by mouth At Bedtime       nystatin ointment    MYCOSTATIN    60 g    Apply topically as needed       omeprazole 20 MG tablet     90 tablet    Take 1 tablet (20 mg) by mouth daily  Take 30-60 minutes before a meal.       * order for DME     1 Device    Equipment being ordered: Nebulizer       * order for DME     2 Device    Equipment being ordered: neb tubing and cup       * order for DME     1 Device    Equipment being ordered: CAM boot       * order for DME     1 Device    Equipment being ordered: crutches       piroxicam 20 MG capsule    FELDENE    30 capsule    Take 1 capsule (20 mg) by mouth daily (with breakfast)       triamcinolone 0.1 % ointment    KENALOG    90 g    Apply sparingly to affected area three times daily AS NEEDED FOR ECZEMA RASH       * Notice:  This list has 6 medication(s) that are the same as other medications prescribed for you. Read the directions carefully, and ask your doctor or other care provider to review them with you.

## 2017-04-03 ENCOUNTER — OFFICE VISIT (OUTPATIENT)
Dept: OBGYN | Facility: CLINIC | Age: 42
End: 2017-04-03
Payer: COMMERCIAL

## 2017-04-03 VITALS
SYSTOLIC BLOOD PRESSURE: 104 MMHG | WEIGHT: 288.6 LBS | BODY MASS INDEX: 51.12 KG/M2 | DIASTOLIC BLOOD PRESSURE: 68 MMHG | TEMPERATURE: 97.8 F

## 2017-04-03 DIAGNOSIS — N91.5 OLIGOMENORRHEA: Primary | ICD-10-CM

## 2017-04-03 LAB — HCG, QUAL URINE: NEGATIVE

## 2017-04-03 PROCEDURE — 83001 ASSAY OF GONADOTROPIN (FSH): CPT | Performed by: OBSTETRICS & GYNECOLOGY

## 2017-04-03 PROCEDURE — 84703 CHORIONIC GONADOTROPIN ASSAY: CPT | Performed by: OBSTETRICS & GYNECOLOGY

## 2017-04-03 PROCEDURE — 99203 OFFICE O/P NEW LOW 30 MIN: CPT | Performed by: OBSTETRICS & GYNECOLOGY

## 2017-04-03 PROCEDURE — 84146 ASSAY OF PROLACTIN: CPT | Performed by: OBSTETRICS & GYNECOLOGY

## 2017-04-03 PROCEDURE — 84443 ASSAY THYROID STIM HORMONE: CPT | Performed by: OBSTETRICS & GYNECOLOGY

## 2017-04-03 PROCEDURE — 36415 COLL VENOUS BLD VENIPUNCTURE: CPT | Performed by: OBSTETRICS & GYNECOLOGY

## 2017-04-03 NOTE — MR AVS SNAPSHOT
After Visit Summary   4/3/2017    Coni Newberry    MRN: [id number]           Patient Information     Date Of Birth          1975        Visit Information        Provider Department      4/3/2017 6:15 PM Edmund Barajas MD [facility]        Today's Diagnoses     Oligomenorrhea    -  1       Follow-ups after your visit        Your next 10 appointments already scheduled     May 01, 2017  2:00 PM CDT   Return Visit with Jae Pantoja MD   [facility] ORTHOPEDIC SURGERY ([facility])    [address] [address] [address]   [phone]            May 12, 2017  1:00 PM CDT   Return Visit with Jae Pantoja MD   [facility] ORTHOPEDIC SURGERY ([facility] [place])    [address] [address] [address]   [phone]              Who to contact     If you have questions or need follow up information about today's clinic visit or your schedule please contact [facility] directly at [phone].  Normal or non-critical lab and imaging results will be communicated to you by [employer], letter or phone within 4 business days after the clinic has received the results. If you do not hear from us within 7 days, please contact the clinic through [employer] or phone. If you have a critical or abnormal lab result, we will notify you by phone as soon as possible.  Submit refill requests through [employer] or call your pharmacy and they will forward the refill request to us. Please allow 3 business days for your refill to be completed.          Additional Information About Your Visit        [employer] Information     [employer] gives you secure access to your electronic health record. If you see a primary care provider, you can also send messages to your care team and make appointments. If you have questions, please call your primary care clinic.  If you do not have a primary care provider, please  call [phone] and they will assist you.        Care EveryWhere ID     This is your Care EveryWhere ID. This could be used by other organizations to access your [facility] medical records  [id number]        Your Vitals Were     Temperature Last Period BMI (Body Mass Index)             97.8  F (36.6  C) (Oral) 01/18/2017 51.12 kg/m2          Blood Pressure from Last 3 Encounters:   04/10/17 112/85   04/08/17 110/89   04/03/17 104/68    Weight from Last 3 Encounters:   04/20/17 288 lb (130.6 kg)   04/10/17 288 lb (130.6 kg)   04/03/17 288 lb 9.6 oz (130.9 kg)              We Performed the Following     Follicle stimulating hormone     HCL HCG, URINE, NURSE BACKOFFICE     Prolactin     TSH with free T4 reflex        Primary Care Provider Office Phone # Fax #    DANYELL Spencer [facility] [phone]       [facility] [address] [place] [address]        Thank you!     Thank you for choosing [facility]  for your care. Our goal is always to provide you with excellent care. Hearing back from our patients is one way we can continue to improve our services. Please take a few minutes to complete the written survey that you may receive in the mail after your visit with us. Thank you!             Your Updated Medication List - Protect others around you: Learn how to safely use, store and throw away your medicines at www.disposemymeds.org.          This list is accurate as of: 4/3/17 11:59 PM.  Always use your most recent med list.                   Brand Name Dispense Instructions for use    ACE/ARB NOT PRESCRIBED (INTENTIONAL)      Please choose reason not prescribed, below       albuterol 108 (90 BASE) MCG/ACT Inhaler    albuterol    2 Inhaler    Inhale 2 puffs into the lungs every 6 hours       atorvastatin 10 MG tablet    LIPITOR    90 tablet    Take 1 tablet (10 mg) by mouth daily       beclomethasone 80 MCG/ACT Inhaler    QVAR    3 Inhaler    Inhale 2 puffs into  the lungs 2 times daily       blood glucose monitoring meter device kit     1 kit    Use to test blood sugars 1 times daily or as directed.       * blood glucose monitoring test strip    no brand specified    600 strip    Use to test blood sugar 6 times daily or as directed.       * blood glucose monitoring test strip    ONE TOUCH ULTRA    3 Box    Use to test blood sugars 1 times daily or as directed.       cetirizine-psuedoePHEDrine 5-120 MG per 12 hr tablet    zyrTEC-D     Take 1 tablet by mouth daily Reported on 3/31/2017       fluticasone 50 MCG/ACT spray    FLONASE    3 Package    Spray 2 sprays into both nostrils daily       HYDROcodone-acetaminophen 5-325 MG per tablet    NORCO    20 tablet    Take 1 tablet by mouth every 8 hours as needed for moderate to severe pain       ibuprofen 200 MG tablet    ADVIL/MOTRIN     Take 200 mg by mouth every 4 hours as needed for mild pain or fever Reported on 3/31/2017       ipratropium - albuterol 0.5 mg/2.5 mg/3 mL 0.5-2.5 (3) MG/3ML neb solution    DUONEB    90 vial    Take 1 vial (3 mLs) by nebulization every 6 hours as needed for shortness of breath / dyspnea or wheezing       JOLIVETTE 0.35 MG per tablet   Generic drug:  norethindrone     84 tablet    Take 1 tablet (0.35 mg) by mouth daily       LANCETS ULTRA FINE Misc     300 each    1 each 6 times daily       lisinopril 2.5 MG tablet    PRINIVIL/Zestril    90 tablet    Take 1 tablet (2.5 mg) by mouth daily       metFORMIN 500 MG tablet    GLUCOPHAGE    360 tablet    Take 2 tablets (1,000 mg) by mouth 2 times daily (with meals)       montelukast 10 MG tablet    SINGULAIR    90 tablet    Take 1 tablet (10 mg) by mouth At Bedtime       nystatin ointment    MYCOSTATIN    60 g    Apply topically as needed       omeprazole 20 MG tablet     90 tablet    Take 1 tablet (20 mg) by mouth daily Take 30-60 minutes before a meal.       * order for DME     1 Device    Equipment being ordered: Nebulizer       * order for DME     2  Device    Equipment being ordered: neb tubing and cup       piroxicam 20 MG capsule    FELDENE    30 capsule    Take 1 capsule (20 mg) by mouth daily (with breakfast)       triamcinolone 0.1 % ointment    KENALOG    90 g    Apply sparingly to affected area three times daily AS NEEDED FOR ECZEMA RASH       * Notice:  This list has 4 medication(s) that are the same as other medications prescribed for you. Read the directions carefully, and ask your doctor or other care provider to review them with you.

## 2017-04-03 NOTE — PROGRESS NOTES
SUBJECTIVE:  Coni Newberry is a 42 year old,  female,  P0  who presents for oligomenorhea. Periods are regular until 2017. No menses since that time. LSO 2016 for ruptured ovarian cyst. No vasomotor symptoms.  Current contraception: oral contraceptives        Past Medical History:   Diagnosis Date     HTN (hypertension)      Hyperlipidaemia LDL goal <100      Mild persistent asthma 2014     Nummular eczema                                           Past Surgical History:   Procedure Laterality Date     CYSTECTOMY OVARIAN BENIGN Left 2016    Procedure: CYSTECTOMY OVARIAN BENIGN;  Surgeon: Marisela Lopez MD;  Location:  OR     NONE OF THE ABOVE CORE MEASURE DIAGNOSES       SALPINGO-OOPHORECTOMY, COMBINED Left 2016    Procedure: COMBINED SALPINGO-OOPHORECTOMY;  Surgeon: Marisela Lopez MD;  Location:  OR       Current Outpatient Prescriptions   Medication     JOLIVETTE 0.35 MG per tablet     atorvastatin (LIPITOR) 10 MG tablet     lisinopril (PRINIVIL/ZESTRIL) 2.5 MG tablet     piroxicam (FELDENE) 20 MG capsule     HYDROcodone-acetaminophen (NORCO) 5-325 MG per tablet     nystatin (MYCOSTATIN) ointment     triamcinolone (KENALOG) 0.1 % ointment     metFORMIN (GLUCOPHAGE) 500 MG tablet     ACE/ARB NOT PRESCRIBED, INTENTIONAL,     omeprazole 20 MG tablet     ipratropium - albuterol 0.5 mg/2.5 mg/3 mL (DUONEB) 0.5-2.5 (3) MG/3ML nebulization     beclomethasone (QVAR) 80 MCG/ACT Inhaler     montelukast (SINGULAIR) 10 MG tablet     order for DME     albuterol (ALBUTEROL) 108 (90 BASE) MCG/ACT inhaler     blood glucose monitoring (ONE TOUCH ULTRA 2) meter device kit     blood glucose monitoring (ONE TOUCH ULTRA) test strip     ORDER FOR DME     blood glucose test strip     LANCETS ULTRA FINE MISC     cetirizine-psuedoePHEDrine (ZYRTEC-D) 5-120 MG per tablet     ibuprofen (ADVIL,MOTRIN) 200 MG tablet     fluticasone (FLONASE) 50 MCG/ACT nasal spray     No current facility-administered  medications for this visit.          Allergies   Allergen Reactions     Seasonal Allergies                                                    Social History   Substance Use Topics     Smoking status: Never Smoker     Smokeless tobacco: Not on file     Alcohol use 0.0 oz/week     0 Standard drinks or equivalent per week      Comment: occassionally                      Review of Systems    CONSTITUTIONAL:NEGATIVE  EYES: NEGATIVE  ENT/MOUTH: NEGATIVE  RESP: NEGATIVE  CV: NEGATIVE  GI: NEGATIVE  : NEGATIVE  MUSCULOSKELATAL: NEGATIVE  INTEGUMENTARY/SKIN: NEGATIVE  BREAST: NEGATIVE  NEURO: NEGATIVE.      OBJECTIVE:  /68 (BP Location: Other (Comment), Patient Position: Chair, Cuff Size: Adult Large)  Temp 97.8  F (36.6  C) (Oral)  Wt 288 lb 9.6 oz (130.9 kg)  LMP 01/18/2017  BMI 51.12 kg/m2  General appearance: Healthy.  Skin: Normal.  Mental Status: cooperative, normal affect, no gross thought process defects.  Thyroid: Normal to palpation, no enlargement or nodules noted.  Abdomen: BS active. Soft, non-tender, no masses or organomegaly.    Pelvis: deferred  Extremities: Normal          ASSESSMENT:  Recent onset of oligomenorrhea    PLAN:    1) See labs. If normal then will try progestin withdrawal. Total time spent was 20 minutes. 20 minutes of face to face time spent counseling and or coordination of care regarding Recent onset of oligomenorrhea .

## 2017-04-03 NOTE — NURSING NOTE
"Chief Complaint   Patient presents with     Amenorrhea       Initial /68 (BP Location: Other (Comment), Patient Position: Chair, Cuff Size: Adult Large)  Temp 97.8  F (36.6  C) (Oral)  Wt 288 lb 9.6 oz (130.9 kg)  LMP 2017  BMI 51.12 kg/m2 Estimated body mass index is 51.12 kg/(m^2) as calculated from the following:    Height as of 3/31/17: 5' 3\" (1.6 m).    Weight as of this encounter: 288 lb 9.6 oz (130.9 kg).  BP completed using cuff size: large        The following HM Due: NONE      The following patient reported/Care Every where data was sent to:  P ABSTRACT QUALITY INITIATIVES [[id number]]       n/a             "

## 2017-04-04 LAB
FSH SERPL-ACNC: 6.4 IU/L
PROLACTIN SERPL-MCNC: 11 UG/L (ref 3–27)

## 2017-04-05 LAB — TSH SERPL DL<=0.005 MIU/L-ACNC: 1.07 MU/L (ref 0.4–4)

## 2017-04-08 ENCOUNTER — OFFICE VISIT (OUTPATIENT)
Dept: URGENT CARE | Facility: URGENT CARE | Age: 42
End: 2017-04-08
Payer: COMMERCIAL

## 2017-04-08 VITALS
HEART RATE: 70 BPM | SYSTOLIC BLOOD PRESSURE: 110 MMHG | TEMPERATURE: 98.4 F | DIASTOLIC BLOOD PRESSURE: 89 MMHG | OXYGEN SATURATION: 98 %

## 2017-04-08 DIAGNOSIS — J20.9 ACUTE BRONCHITIS WITH SYMPTOMS > 10 DAYS: Primary | ICD-10-CM

## 2017-04-08 PROCEDURE — 99213 OFFICE O/P EST LOW 20 MIN: CPT | Performed by: FAMILY MEDICINE

## 2017-04-08 RX ORDER — AZITHROMYCIN 250 MG/1
TABLET, FILM COATED ORAL
Qty: 6 TABLET | Refills: 0 | Status: SHIPPED | OUTPATIENT
Start: 2017-04-08 | End: 2017-04-20

## 2017-04-08 NOTE — MR AVS SNAPSHOT
After Visit Summary   4/8/2017    Coni Newberry    MRN: [id number]           Patient Information     Date Of Birth          1975        Visit Information        Provider Department      4/8/2017 1:45 PM Kwesi Lancaster MD [facility] URGENT CARE        Today's Diagnoses     Acute bronchitis with symptoms > 10 days    -  1       Follow-ups after your visit        Your next 10 appointments already scheduled     May 12, 2017  1:00 PM CDT   Return Visit with Jae Pantoja MD   [facility] ([facility])    [address] [address] [facility] [address] [facility] [address]  [address] [place] [address]   [phone]              Who to contact     If you have questions or need follow up information about today's clinic visit or your schedule please contact [facility] URGENT CARE directly at [phone].  Normal or non-critical lab and imaging results will be communicated to you by MyChart, letter or phone within 4 business days after the clinic has received the results. If you do not hear from us within 7 days, please contact the clinic through MyChart or phone. If you have a critical or abnormal lab result, we will notify you by phone as soon as possible.  Submit refill requests through Sagoon or call your pharmacy and they will forward the refill request to us. Please allow 3 business days for your refill to be completed.          Additional Information About Your Visit        MyChart Information     Sagoon gives you secure access to your electronic health record. If you see a primary care provider, you can also send messages to your care team and make appointments. If you have questions, please call your primary care clinic.  If you do not have a primary care provider, please call [phone] and they will assist you.        Care EveryWhere ID     This is your Care EveryWhere ID. This could be used by other organizations to access your [facility]  records  [id number]        Your Vitals Were     Pulse Temperature Last Period Pulse Oximetry          70 98.4  F (36.9  C) (Oral) 01/18/2017 98%         Blood Pressure from Last 3 Encounters:   04/08/17 110/89   04/03/17 104/68   03/31/17 110/64    Weight from Last 3 Encounters:   04/03/17 288 lb 9.6 oz (130.9 kg)   03/31/17 283 lb (128.4 kg)   03/30/17 283 lb (128.4 kg)              Today, you had the following     No orders found for display         Today's Medication Changes          These changes are accurate as of: 4/8/17  3:06 PM.  If you have any questions, ask your nurse or doctor.               Start taking these medicines.        Dose/Directions    azithromycin 250 MG tablet   Commonly known as:  ZITHROMAX   Used for:  Acute bronchitis with symptoms > 10 days        Two tablets first day, then one tablet daily for four days.   Quantity:  6 tablet   Refills:  0            Where to get your medicines      These medications were sent to [facility] [address] [place], [place] [address]     Phone:  [phone]     azithromycin 250 MG tablet                Primary Care Provider Office Phone # Fax #    DANYELL Spencer [facility] [phone]       [facility] [address] [place] [address]        Thank you!     Thank you for choosing [facility] URGENT CARE  for your care. Our goal is always to provide you with excellent care. Hearing back from our patients is one way we can continue to improve our services. Please take a few minutes to complete the written survey that you may receive in the mail after your visit with us. Thank you!             Your Updated Medication List - Protect others around you: Learn how to safely use, store and throw away your medicines at www.disposemymeds.org.          This list is accurate as of: 4/8/17  3:06 PM.  Always use your most recent med list.                   Brand Name  Dispense Instructions for use    ACE/ARB NOT PRESCRIBED (INTENTIONAL)      Please choose reason not prescribed, below       albuterol 108 (90 BASE) MCG/ACT Inhaler    albuterol    2 Inhaler    Inhale 2 puffs into the lungs every 6 hours       atorvastatin 10 MG tablet    LIPITOR    90 tablet    Take 1 tablet (10 mg) by mouth daily       azithromycin 250 MG tablet    ZITHROMAX    6 tablet    Two tablets first day, then one tablet daily for four days.       beclomethasone 80 MCG/ACT Inhaler    QVAR    3 Inhaler    Inhale 2 puffs into the lungs 2 times daily       blood glucose monitoring meter device kit     1 kit    Use to test blood sugars 1 times daily or as directed.       * blood glucose monitoring test strip    no brand specified    600 strip    Use to test blood sugar 6 times daily or as directed.       * blood glucose monitoring test strip    ONE TOUCH ULTRA    3 Box    Use to test blood sugars 1 times daily or as directed.       cetirizine-psuedoePHEDrine 5-120 MG per 12 hr tablet    zyrTEC-D     Take 1 tablet by mouth daily Reported on 3/31/2017       fluticasone 50 MCG/ACT spray    FLONASE    3 Package    Spray 2 sprays into both nostrils daily       HYDROcodone-acetaminophen 5-325 MG per tablet    NORCO    20 tablet    Take 1 tablet by mouth every 8 hours as needed for moderate to severe pain       ibuprofen 200 MG tablet    ADVIL/MOTRIN     Take 200 mg by mouth every 4 hours as needed for mild pain or fever Reported on 3/31/2017       ipratropium - albuterol 0.5 mg/2.5 mg/3 mL 0.5-2.5 (3) MG/3ML neb solution    DUONEB    90 vial    Take 1 vial (3 mLs) by nebulization every 6 hours as needed for shortness of breath / dyspnea or wheezing       JOLIVETTE 0.35 MG per tablet   Generic drug:  norethindrone     84 tablet    Take 1 tablet (0.35 mg) by mouth daily       LANCETS ULTRA FINE Misc     300 each    1 each 6 times daily       lisinopril 2.5 MG tablet    PRINIVIL/Zestril    90 tablet    Take 1 tablet (2.5  mg) by mouth daily       metFORMIN 500 MG tablet    GLUCOPHAGE    360 tablet    Take 2 tablets (1,000 mg) by mouth 2 times daily (with meals)       montelukast 10 MG tablet    SINGULAIR    90 tablet    Take 1 tablet (10 mg) by mouth At Bedtime       nystatin ointment    MYCOSTATIN    60 g    Apply topically as needed       omeprazole 20 MG tablet     90 tablet    Take 1 tablet (20 mg) by mouth daily Take 30-60 minutes before a meal.       * order for DME     1 Device    Equipment being ordered: Nebulizer       * order for DME     2 Device    Equipment being ordered: neb tubing and cup       piroxicam 20 MG capsule    FELDENE    30 capsule    Take 1 capsule (20 mg) by mouth daily (with breakfast)       triamcinolone 0.1 % ointment    KENALOG    90 g    Apply sparingly to affected area three times daily AS NEEDED FOR ECZEMA RASH       * Notice:  This list has 4 medication(s) that are the same as other medications prescribed for you. Read the directions carefully, and ask your doctor or other care provider to review them with you.

## 2017-04-08 NOTE — NURSING NOTE
"Chief Complaint   Patient presents with     URI     Sore throat, Cough x1 day       Initial /89 (BP Location: Right arm, Patient Position: Chair, Cuff Size: Adult Regular)  Pulse 70  Temp 98.4  F (36.9  C) (Oral)  LMP 01/18/2017  SpO2 98% Estimated body mass index is 51.12 kg/(m^2) as calculated from the following:    Height as of 3/31/17: 5' 3\" (1.6 m).    Weight as of 4/3/17: 288 lb 9.6 oz (130.9 kg).  Medication Reconciliation: complete     Belkys Graham CMA (AAMA)        "

## 2017-04-08 NOTE — PROGRESS NOTES
SUBJECTIVE:  Coni Newberry is a 42 year old female who presents to the clinic today with a chief complaint of cough  for 2 week(s).  Her cough is described as persistent and productive of yellow sputum.    The patient's symptoms are moderate and worsening.  Associated symptoms include nasal congestion and headache. The patient's symptoms are exacerbated by no particular triggers  Patient has been using OTC cough suppressants  to improve symptoms.    Past Medical History:   Diagnosis Date     HTN (hypertension)      Hyperlipidaemia LDL goal <100      Mild persistent asthma 7/29/2014     Nummular eczema        Current Outpatient Prescriptions   Medication Sig Dispense Refill     azithromycin (ZITHROMAX) 250 MG tablet Two tablets first day, then one tablet daily for four days. 6 tablet 0     JOLIVETTE 0.35 MG per tablet Take 1 tablet (0.35 mg) by mouth daily 84 tablet 1     atorvastatin (LIPITOR) 10 MG tablet Take 1 tablet (10 mg) by mouth daily 90 tablet 1     lisinopril (PRINIVIL/ZESTRIL) 2.5 MG tablet Take 1 tablet (2.5 mg) by mouth daily 90 tablet 1     piroxicam (FELDENE) 20 MG capsule Take 1 capsule (20 mg) by mouth daily (with breakfast) 30 capsule 1     HYDROcodone-acetaminophen (NORCO) 5-325 MG per tablet Take 1 tablet by mouth every 8 hours as needed for moderate to severe pain 20 tablet 0     nystatin (MYCOSTATIN) ointment Apply topically as needed 60 g 3     triamcinolone (KENALOG) 0.1 % ointment Apply sparingly to affected area three times daily AS NEEDED FOR ECZEMA RASH 90 g 1     metFORMIN (GLUCOPHAGE) 500 MG tablet Take 2 tablets (1,000 mg) by mouth 2 times daily (with meals) 360 tablet 1     ACE/ARB NOT PRESCRIBED, INTENTIONAL, Please choose reason not prescribed, below       omeprazole 20 MG tablet Take 1 tablet (20 mg) by mouth daily Take 30-60 minutes before a meal. 90 tablet 2     ipratropium - albuterol 0.5 mg/2.5 mg/3 mL (DUONEB) 0.5-2.5 (3) MG/3ML nebulization Take 1 vial (3 mLs) by  nebulization every 6 hours as needed for shortness of breath / dyspnea or wheezing 90 vial 3     beclomethasone (QVAR) 80 MCG/ACT Inhaler Inhale 2 puffs into the lungs 2 times daily 3 Inhaler 3     montelukast (SINGULAIR) 10 MG tablet Take 1 tablet (10 mg) by mouth At Bedtime 90 tablet 3     order for DME Equipment being ordered: neb tubing and cup 2 Device prn     albuterol (ALBUTEROL) 108 (90 BASE) MCG/ACT inhaler Inhale 2 puffs into the lungs every 6 hours 2 Inhaler 3     blood glucose monitoring (ONE TOUCH ULTRA 2) meter device kit Use to test blood sugars 1 times daily or as directed. 1 kit 0     blood glucose monitoring (ONE TOUCH ULTRA) test strip Use to test blood sugars 1 times daily or as directed. 3 Box 1     ORDER FOR DME Equipment being ordered: Nebulizer 1 Device prn     blood glucose test strip Use to test blood sugar 6 times daily or as directed. 600 strip 3     LANCETS ULTRA FINE MISC 1 each 6 times daily 300 each 2     cetirizine-psuedoePHEDrine (ZYRTEC-D) 5-120 MG per tablet Take 1 tablet by mouth daily Reported on 3/31/2017       ibuprofen (ADVIL,MOTRIN) 200 MG tablet Take 200 mg by mouth every 4 hours as needed for mild pain or fever Reported on 3/31/2017       fluticasone (FLONASE) 50 MCG/ACT nasal spray Spray 2 sprays into both nostrils daily 3 Package 3       Social History   Substance Use Topics     Smoking status: Never Smoker     Smokeless tobacco: Not on file     Alcohol use 0.0 oz/week     0 Standard drinks or equivalent per week      Comment: occassionally       ROS  CONSTITUTIONAL:NEGATIVE for fever, chills, change in weight  INTEGUMENTARY/SKIN: NEGATIVE for worrisome rashes, moles or lesions    OBJECTIVE:  /89 (BP Location: Right arm, Patient Position: Chair, Cuff Size: Adult Regular)  Pulse 70  Temp 98.4  F (36.9  C) (Oral)  LMP 01/18/2017  SpO2 98%  GENERAL APPEARANCE: over weight  EYES: EOMI,  PERRL, conjunctiva clear  HENT: ear canals and TM's normal.  Nose and mouth  without ulcers, erythema or lesions  NECK: supple, nontender, no lymphadenopathy  RESP: lungs clear to auscultation - no rales, rhonchi or wheezes  CV: regular rates and rhythm, normal S1 S2, no murmur noted  NEURO: Normal strength and tone, sensory exam grossly normal,  normal speech and mentation  SKIN: no suspicious lesions or rashes    ASSESSMENT:  Acute Bronchitis  1. Acute bronchitis with symptoms > 10 days  Symptomatic cares were discussed in detail.   Pt instructed to come back to the clinic for worsening sx    - azithromycin (ZITHROMAX) 250 MG tablet; Two tablets first day, then one tablet daily for four days.  Dispense: 6 tablet; Refill: 0

## 2017-04-10 ENCOUNTER — OFFICE VISIT (OUTPATIENT)
Dept: ORTHOPEDICS | Facility: CLINIC | Age: 42
End: 2017-04-10
Payer: COMMERCIAL

## 2017-04-10 VITALS
WEIGHT: 288 LBS | DIASTOLIC BLOOD PRESSURE: 85 MMHG | SYSTOLIC BLOOD PRESSURE: 112 MMHG | BODY MASS INDEX: 51.03 KG/M2 | HEIGHT: 63 IN

## 2017-04-10 DIAGNOSIS — M77.8 ENTHESOPATHY OF HINDFOOT: Primary | ICD-10-CM

## 2017-04-10 PROCEDURE — 99213 OFFICE O/P EST LOW 20 MIN: CPT | Performed by: ORTHOPAEDIC SURGERY

## 2017-04-10 NOTE — PROGRESS NOTES
"HISTORY OF PRESENT ILLNESS:    Coni Newberry is a 42 year old female who is seen in follow up for left achilles tendon pain over the last 6 weeks.  Patient reports worsening pain over the last 10 days after working and wearing CAM boot.  Desiring work restrictions at this time - works as .    Treatments tried to this point: ankle brace, footwear change, CAM boot, WB as tolerated on crutches, IBU    PHYSICAL EXAM:  /85  Ht 5' 3\" (1.6 m)  Wt 288 lb (130.6 kg)  LMP 01/18/2017  BMI 51.02 kg/m2  Body mass index is 51.02 kg/(m^2).   GENERAL APPEARANCE: healthy, alert and no distress   SKIN: no suspicious lesions or rashes  NEURO: Normal strength and tone, mentation intact and speech normal  VASCULAR:  good pulses, and cappillary refill   LYMPH: no lymphadenopathy   PSYCH:  mentation appears normal and affect normal/bright    MSK:  Slight prominence to the Achilles tendon insertion into the calcaneus by palpation. She is  to palpation in the vicinity. There is no erythema or ecchymosis. Tendon and tendo calcaneal junction are intact.    IMAGING INTERPRETATION:       ASSESSMENT / PLAN: Enthesopathy left Achilles. She'll continue in a Cam Walker boot with plantarflexion for another 3-4 weeks and then switch the Cam Walker to neutral position for another 4-6 weeks.      Perico Pantoja MD  Dept. Orthopedic Surgery  [facility]       Disclaimer: This note consists of symbols derived from keyboarding, dictation and/or voice recognition software. As a result, there may be errors in the script that have gone undetected. Please consider this when interpreting information found in this chart.      "

## 2017-04-10 NOTE — NURSING NOTE
"Chief Complaint   Patient presents with     Musculoskeletal Problem     f/u left achilles tendon, ankle pain > 1 month       Initial /85  Ht 5' 3\" (1.6 m)  Wt 288 lb (130.6 kg)  LMP 01/18/2017  BMI 51.02 kg/m2 Estimated body mass index is 51.02 kg/(m^2) as calculated from the following:    Height as of this encounter: 5' 3\" (1.6 m).    Weight as of this encounter: 288 lb (130.6 kg).  Medication Reconciliation: complete     Froylan Black, ATC  "

## 2017-04-10 NOTE — MR AVS SNAPSHOT
After Visit Summary   4/10/2017    Coni Newberry    MRN: [id number]           Patient Information     Date Of Birth          1975        Visit Information        Provider Department      4/10/2017 1:20 PM Jae Pantoja MD [facility] ORTHOPEDIC SURGERY        Today's Diagnoses     Enthesopathy of hindfoot    -  1       Follow-ups after your visit        Your next 10 appointments already scheduled     May 01, 2017  2:00 PM CDT   Return Visit with Jae Pantoja MD   [facility] ORTHOPEDIC SURGERY ([facility])    [address] [address] [address]   [phone]            May 12, 2017  1:00 PM CDT   Return Visit with Jae Pantoja MD   [facility] ORTHOPEDIC SURGERY ([facility])    [address] [address] [address]   [phone]              Who to contact     If you have questions or need follow up information about today's clinic visit or your schedule please contact [facility] ORTHOPEDIC SURGERY directly at [phone].  Normal or non-critical lab and imaging results will be communicated to you by PureLiFihart, letter or phone within 4 business days after the clinic has received the results. If you do not hear from us within 7 days, please contact the clinic through SPEEDELOt or phone. If you have a critical or abnormal lab result, we will notify you by phone as soon as possible.  Submit refill requests through Ad Venture or call your pharmacy and they will forward the refill request to us. Please allow 3 business days for your refill to be completed.          Additional Information About Your Visit        PureLiFihart Information     [employer] gives you secure access to your electronic health record. If you see a primary care provider, you can also send messages to your care team and make appointments. If you have questions, please call your primary care clinic.  If you do not have a  "primary care provider, please call [phone] and they will assist you.        Care EveryWhere ID     This is your Care EveryWhere ID. This could be used by other organizations to access your [place] medical records  [id number]        Your Vitals Were     Height Last Period BMI (Body Mass Index)             5' 3\" (1.6 m) 01/18/2017 51.02 kg/m2          Blood Pressure from Last 3 Encounters:   04/10/17 112/85   04/08/17 110/89   04/03/17 104/68    Weight from Last 3 Encounters:   04/20/17 288 lb (130.6 kg)   04/10/17 288 lb (130.6 kg)   04/03/17 288 lb 9.6 oz (130.9 kg)              Today, you had the following     No orders found for display       Primary Care Provider Office Phone # Fax #    DANYELL Spencer [facility] [phone]       [facility] [address] [place] [address]        Thank you!     Thank you for choosing [facility] ORTHOPEDIC SURGERY  for your care. Our goal is always to provide you with excellent care. Hearing back from our patients is one way we can continue to improve our services. Please take a few minutes to complete the written survey that you may receive in the mail after your visit with us. Thank you!             Your Updated Medication List - Protect others around you: Learn how to safely use, store and throw away your medicines at www.disposemymeds.org.          This list is accurate as of: 4/10/17 11:59 PM.  Always use your most recent med list.                   Brand Name Dispense Instructions for use    ACE/ARB NOT PRESCRIBED (INTENTIONAL)      Please choose reason not prescribed, below       albuterol 108 (90 BASE) MCG/ACT Inhaler    albuterol    2 Inhaler    Inhale 2 puffs into the lungs every 6 hours       atorvastatin 10 MG tablet    LIPITOR    90 tablet    Take 1 tablet (10 mg) by mouth daily       beclomethasone 80 MCG/ACT Inhaler    QVAR    3 Inhaler    Inhale 2 puffs into the lungs 2 times daily       blood glucose monitoring " meter device kit     1 kit    Use to test blood sugars 1 times daily or as directed.       * blood glucose monitoring test strip    no brand specified    600 strip    Use to test blood sugar 6 times daily or as directed.       * blood glucose monitoring test strip    ONE TOUCH ULTRA    3 Box    Use to test blood sugars 1 times daily or as directed.       cetirizine-psuedoePHEDrine 5-120 MG per 12 hr tablet    zyrTEC-D     Take 1 tablet by mouth daily Reported on 3/31/2017       fluticasone 50 MCG/ACT spray    FLONASE    3 Package    Spray 2 sprays into both nostrils daily       HYDROcodone-acetaminophen 5-325 MG per tablet    NORCO    20 tablet    Take 1 tablet by mouth every 8 hours as needed for moderate to severe pain       ibuprofen 200 MG tablet    ADVIL/MOTRIN     Take 200 mg by mouth every 4 hours as needed for mild pain or fever Reported on 3/31/2017       ipratropium - albuterol 0.5 mg/2.5 mg/3 mL 0.5-2.5 (3) MG/3ML neb solution    DUONEB    90 vial    Take 1 vial (3 mLs) by nebulization every 6 hours as needed for shortness of breath / dyspnea or wheezing       JOLIVETTE 0.35 MG per tablet   Generic drug:  norethindrone     84 tablet    Take 1 tablet (0.35 mg) by mouth daily       LANCETS ULTRA FINE Misc     300 each    1 each 6 times daily       lisinopril 2.5 MG tablet    PRINIVIL/Zestril    90 tablet    Take 1 tablet (2.5 mg) by mouth daily       metFORMIN 500 MG tablet    GLUCOPHAGE    360 tablet    Take 2 tablets (1,000 mg) by mouth 2 times daily (with meals)       montelukast 10 MG tablet    SINGULAIR    90 tablet    Take 1 tablet (10 mg) by mouth At Bedtime       nystatin ointment    MYCOSTATIN    60 g    Apply topically as needed       omeprazole 20 MG tablet     90 tablet    Take 1 tablet (20 mg) by mouth daily Take 30-60 minutes before a meal.       * order for DME     1 Device    Equipment being ordered: Nebulizer       * order for DME     2 Device    Equipment being ordered: neb tubing and cup        * order for DME     1 Device    Equipment being ordered: CAM boot       * order for DME     1 Device    Equipment being ordered: crutches       piroxicam 20 MG capsule    FELDENE    30 capsule    Take 1 capsule (20 mg) by mouth daily (with breakfast)       triamcinolone 0.1 % ointment    KENALOG    90 g    Apply sparingly to affected area three times daily AS NEEDED FOR ECZEMA RASH       * Notice:  This list has 6 medication(s) that are the same as other medications prescribed for you. Read the directions carefully, and ask your doctor or other care provider to review them with you.

## 2017-04-10 NOTE — LETTER
[facility]  [address] [address]  [address]  [facility] [place] [address]  [phone]      April 10, 2017    Coni Newberry  [address]  [place] [address]      To Whom It May Concern,    Coni is currently under my care for a left achilles tendon injury.  She UNABLE to return to work for the next two weeks beginning today.  Following that time she will have the following restrictions for the following two weeks.    1.  Limit shift to no longer than 4 hours as tolerated by pain.  2.  She will need to wear CAM boot and use crutches as needed.  3.  All to alternate standing and sitting as needed for pain.    Please contact my clinic with any other questions or concerns that you may have.  She will follow up with me in clinic in ~ 4 weeks, updated restrictions will be provided at that time.      Sincerely,            Jae Pantoja MD  Orthopedic Surgeon

## 2017-04-20 ENCOUNTER — OFFICE VISIT (OUTPATIENT)
Dept: ORTHOPEDICS | Facility: CLINIC | Age: 42
End: 2017-04-20
Payer: COMMERCIAL

## 2017-04-20 VITALS — BODY MASS INDEX: 51.03 KG/M2 | WEIGHT: 288 LBS | HEIGHT: 63 IN

## 2017-04-20 DIAGNOSIS — M77.50 ENTHESOPATHY OF ANKLE AND TARSUS: Primary | ICD-10-CM

## 2017-04-20 PROCEDURE — 99213 OFFICE O/P EST LOW 20 MIN: CPT | Performed by: ORTHOPAEDIC SURGERY

## 2017-04-20 NOTE — PROGRESS NOTES
"HISTORY OF PRESENT ILLNESS:    Coni Newberry is a 42 year old female who is seen in follow up for left achilles tendon pain.  Present symptoms: Pt states ankle pain is improving, is wondering how long she will need to continue with the CAM boot.  Pt is requesting forms for time off work.  Treatments tried to this point: CAM boot, crutches    PHYSICAL EXAM:  Ht 5' 3\" (1.6 m)  Wt 288 lb (130.6 kg)  LMP 01/18/2017  BMI 51.02 kg/m2  Body mass index is 51.02 kg/(m^2).   GENERAL APPEARANCE: healthy, alert and no distress   SKIN: no suspicious lesions or rashes  NEURO: Normal strength and tone, mentation intact and speech normal  VASCULAR:  good pulses, and cappillary refill   LYMPH: no lymphadenopathy   PSYCH:  mentation appears normal and affect normal/bright    MSK:  Left lower extremity no erythema ecchymosis nor edema. CMS is intact to her toes. She is still slightly tender to palpation about the insertion of the tendo Achilles.    IMAGING INTERPRETATION:       ASSESSMENT / PLAN: Enthesopathy left tendo Achilles insertion into the calcaneus. We'll continue with the plantarflexed Cam Walker boot for 3 weeks and have her return for neutralization of the      Perico Pantoja MD  Dept. Orthopedic Surgery  [facility]       Disclaimer: This note consists of symbols derived from keyboarding, dictation and/or voice recognition software. As a result, there may be errors in the script that have gone undetected. Please consider this when interpreting information found in this chart.      "

## 2017-04-20 NOTE — LETTER
"  4/20/2017       RE: Coni Newberry  [address]  [facility] [address]           Dear Colleague,    Thank you for referring your patient, Coni Newberry, to the [facility] ORTHOPEDIC SURGERY. Please see a copy of my visit note below.    HISTORY OF PRESENT ILLNESS:    Coni Newberry is a 42 year old female who is seen in follow up for left achilles tendon pain.  Present symptoms: Pt states ankle pain is improving, is wondering how long she will need to continue with the CAM boot.  Pt is requesting forms for time off work.  Treatments tried to this point: CAM boot, crutches    PHYSICAL EXAM:  Ht 5' 3\" (1.6 m)  Wt 288 lb (130.6 kg)  LMP 01/18/2017  BMI 51.02 kg/m2  Body mass index is 51.02 kg/(m^2).   GENERAL APPEARANCE: healthy, alert and no distress   SKIN: no suspicious lesions or rashes  NEURO: Normal strength and tone, mentation intact and speech normal  VASCULAR:  good pulses, and cappillary refill   LYMPH: no lymphadenopathy   PSYCH:  mentation appears normal and affect normal/bright    MSK:  Left lower extremity no erythema ecchymosis nor edema. CMS is intact to her toes. She is still slightly tender to palpation about the insertion of the tendo Achilles.    IMAGING INTERPRETATION:       ASSESSMENT / PLAN: Enthesopathy left tendo Achilles insertion into the calcaneus. We'll continue with the plantarflexed Cam Walker boot for 3 weeks and have her return for neutralization of the      Perico Pantoja MD  Dept. Orthopedic Surgery  [facility]       Disclaimer: This note consists of symbols derived from keyboarding, dictation and/or voice recognition software. As a result, there may be errors in the script that have gone undetected. Please consider this when interpreting information found in this chart.        Again, thank you for allowing me to participate in the care of your patient.        Sincerely,              Jae Pantoja MD    "

## 2017-04-20 NOTE — MR AVS SNAPSHOT
After Visit Summary   4/20/2017    Coni Newberry    MRN: [id number]           Patient Information     Date Of Birth          1975        Visit Information        Provider Department      4/20/2017 2:00 PM Jae Pantoja MD [facility] ORTHOPEDIC SURGERY        Today's Diagnoses     Enthesopathy of ankle and tarsus    -  1       Follow-ups after your visit        Your next 10 appointments already scheduled     Jun 01, 2017  2:00 PM CDT   Return Visit with Jae Pantoja MD   [facility] ORTHOPEDIC SURGERY ([facility] [place])    [address] [facility]  [address] [place] [address]   [phone]              Who to contact     If you have questions or need follow up information about today's clinic visit or your schedule please contact [facility] ORTHOPEDIC SURGERY directly at [phone].  Normal or non-critical lab and imaging results will be communicated to you by [employer], letter or phone within 4 business days after the clinic has received the results. If you do not hear from us within 7 days, please contact the clinic through [employer] or phone. If you have a critical or abnormal lab result, we will notify you by phone as soon as possible.  Submit refill requests through [employer] or call your pharmacy and they will forward the refill request to us. Please allow 3 business days for your refill to be completed.          Additional Information About Your Visit        MyChart Information     [employer] gives you secure access to your electronic health record. If you see a primary care provider, you can also send messages to your care team and make appointments. If you have questions, please call your primary care clinic.  If you do not have a primary care provider, please call [phone] and they will assist you.        Care EveryWhere ID     This is your Care EveryWhere ID. This could be used by other organizations to access your [facility]  "records  [id number]        Your Vitals Were     Height Last Period BMI (Body Mass Index)             5' 3\" (1.6 m) 01/18/2017 51.02 kg/m2          Blood Pressure from Last 3 Encounters:   05/01/17 115/80   04/10/17 112/85   04/08/17 110/89    Weight from Last 3 Encounters:   05/01/17 288 lb (130.6 kg)   04/20/17 288 lb (130.6 kg)   04/10/17 288 lb (130.6 kg)              Today, you had the following     No orders found for display       Primary Care Provider Office Phone # Fax #    Farida RubioDANYELL Campos [facility] [phone]       [facility] [address] [place] [address]        Thank you!     Thank you for choosing [facility] ORTHOPEDIC SURGERY  for your care. Our goal is always to provide you with excellent care. Hearing back from our patients is one way we can continue to improve our services. Please take a few minutes to complete the written survey that you may receive in the mail after your visit with us. Thank you!             Your Updated Medication List - Protect others around you: Learn how to safely use, store and throw away your medicines at www.disposemymeds.org.          This list is accurate as of: 4/20/17 11:59 PM.  Always use your most recent med list.                   Brand Name Dispense Instructions for use    ACE/ARB NOT PRESCRIBED (INTENTIONAL)      Please choose reason not prescribed, below       albuterol 108 (90 BASE) MCG/ACT Inhaler    albuterol    2 Inhaler    Inhale 2 puffs into the lungs every 6 hours       atorvastatin 10 MG tablet    LIPITOR    90 tablet    Take 1 tablet (10 mg) by mouth daily       beclomethasone 80 MCG/ACT Inhaler    QVAR    3 Inhaler    Inhale 2 puffs into the lungs 2 times daily       blood glucose monitoring meter device kit     1 kit    Use to test blood sugars 1 times daily or as directed.       * blood glucose monitoring test strip    no brand specified    600 strip    Use to test blood sugar 6 times daily or as directed. "       * blood glucose monitoring test strip    ONE TOUCH ULTRA    3 Box    Use to test blood sugars 1 times daily or as directed.       cetirizine-psuedoePHEDrine 5-120 MG per 12 hr tablet    zyrTEC-D     Take 1 tablet by mouth daily Reported on 3/31/2017       fluticasone 50 MCG/ACT spray    FLONASE    3 Package    Spray 2 sprays into both nostrils daily       ibuprofen 200 MG tablet    ADVIL/MOTRIN     Take 200 mg by mouth every 4 hours as needed for mild pain or fever Reported on 3/31/2017       ipratropium - albuterol 0.5 mg/2.5 mg/3 mL 0.5-2.5 (3) MG/3ML neb solution    DUONEB    90 vial    Take 1 vial (3 mLs) by nebulization every 6 hours as needed for shortness of breath / dyspnea or wheezing       JOLIVETTE 0.35 MG per tablet   Generic drug:  norethindrone     84 tablet    Take 1 tablet (0.35 mg) by mouth daily       LANCETS ULTRA FINE Misc     300 each    1 each 6 times daily       lisinopril 2.5 MG tablet    PRINIVIL/Zestril    90 tablet    Take 1 tablet (2.5 mg) by mouth daily       metFORMIN 500 MG tablet    GLUCOPHAGE    360 tablet    Take 2 tablets (1,000 mg) by mouth 2 times daily (with meals)       montelukast 10 MG tablet    SINGULAIR    90 tablet    Take 1 tablet (10 mg) by mouth At Bedtime       nystatin ointment    MYCOSTATIN    60 g    Apply topically as needed       omeprazole 20 MG tablet     90 tablet    Take 1 tablet (20 mg) by mouth daily Take 30-60 minutes before a meal.       * order for DME     1 Device    Equipment being ordered: Nebulizer       * order for DME     2 Device    Equipment being ordered: neb tubing and cup       * order for DME     1 Device    Equipment being ordered: CAM boot       * order for DME     1 Device    Equipment being ordered: crutches       piroxicam 20 MG capsule    FELDENE    30 capsule    Take 1 capsule (20 mg) by mouth daily (with breakfast)       triamcinolone 0.1 % ointment    KENALOG    90 g    Apply sparingly to affected area three times daily AS  NEEDED FOR ECZEMA RASH       * Notice:  This list has 6 medication(s) that are the same as other medications prescribed for you. Read the directions carefully, and ask your doctor or other care provider to review them with you.

## 2017-04-20 NOTE — NURSING NOTE
"Chief Complaint   Patient presents with     RECHECK     Left achilles pain       Initial Ht 5' 3\" (1.6 m)  Wt 288 lb (130.6 kg)  LMP 01/18/2017  BMI 51.02 kg/m2 Estimated body mass index is 51.02 kg/(m^2) as calculated from the following:    Height as of this encounter: 5' 3\" (1.6 m).    Weight as of this encounter: 288 lb (130.6 kg).  Medication Reconciliation: complete    "

## 2017-04-20 NOTE — LETTER
[facility] [place] [facility]  [address] [address]  [address]  [facility] [address]  [phone]        2017    RE:Coni Newberry  [address]  [facility] [address]    1975              To whom it may concern:      Coni Newberry is under my professional care for left achilles tendon injury.  Patient is to be off work 4/10/2017 - 2017.  She may return to work on or about 2017 with the followin.  Limit shift to no longer than 4 hours as tolerated by pain.  2.  She will need to wear CAM boot at all times, use of crutches as needed.  3.  Allow patient to alternate standing and sitting as needed for pain.    Patient will be following up in clinic prior to return to work.        Sincerely,        Jae Pantoja MD

## 2017-04-27 ENCOUNTER — TELEPHONE (OUTPATIENT)
Dept: INTERNAL MEDICINE | Facility: CLINIC | Age: 42
End: 2017-04-27

## 2017-04-27 DIAGNOSIS — M76.60 ACHILLES TENDON PAIN: ICD-10-CM

## 2017-04-27 NOTE — TELEPHONE ENCOUNTER
Reason for Call:  Medication or medication refill:Med    Do you use a [facility]?  Name of the pharmacy and phone number for the current request:   Pharmacy or call with action plan to have script sent directly to [facility]    Name of the medication requested: Vicodin     Other request: 1 pill left    Can we leave a detailed message on this number? YES    Phone number patient can be reached at: Cell number on file:    Telephone Information:   Mobile [phone]       Best Time: anytime    Call taken on 4/27/2017 at 5:37 PM by YAMILEX CARRILLO

## 2017-04-28 RX ORDER — HYDROCODONE BITARTRATE AND ACETAMINOPHEN 5; 325 MG/1; MG/1
1 TABLET ORAL EVERY 8 HOURS PRN
Qty: 30 TABLET | Refills: 0 | Status: SHIPPED | OUTPATIENT
Start: 2017-04-28 | End: 2017-07-17

## 2017-04-28 NOTE — TELEPHONE ENCOUNTER
Spoke with ptEddi Contreras the rx be taken [facility].    Rx done per PCP and will be hand carried to [facility] after 1:00p today.

## 2017-04-28 NOTE — TELEPHONE ENCOUNTER
Vicodin--1 pill left      Last Written Prescription Date:  3/30/17  Last Fill Quantity: 20,   # refills: 0  Last Office Visit with [facility], [facility] or [facility] prescribing provider: 3/3/17  Future Office visit:    Next 5 appointments (look out 90 days)     May 01, 2017  2:00 PM CDT   Return Visit with Jae Pantoja MD   [facility] ORTHOPEDIC SURGERY ([facility] [place])    [address] [address]   [phone]            May 12, 2017  1:00 PM CDT   Return Visit with Jae Pantoja MD   [facility] ORTHOPEDIC SURGERY ([facility])    [address] [facility]  [address] [place] [address]   [phone]                   Routing refill request to provider for review/approval because:  Drug not on the [facility], [facility] or [facility] refill protocol or controlled substance    Spoke with patient, she wants to use [facility], will  rx Mon. 5/1/17.

## 2017-05-01 ENCOUNTER — OFFICE VISIT (OUTPATIENT)
Dept: ORTHOPEDICS | Facility: CLINIC | Age: 42
End: 2017-05-01
Payer: COMMERCIAL

## 2017-05-01 VITALS
HEIGHT: 63 IN | BODY MASS INDEX: 51.03 KG/M2 | SYSTOLIC BLOOD PRESSURE: 115 MMHG | DIASTOLIC BLOOD PRESSURE: 80 MMHG | WEIGHT: 288 LBS

## 2017-05-01 DIAGNOSIS — M77.8 ENTHESOPATHY OF HINDFOOT: Primary | ICD-10-CM

## 2017-05-01 PROCEDURE — 99213 OFFICE O/P EST LOW 20 MIN: CPT | Performed by: ORTHOPAEDIC SURGERY

## 2017-05-01 NOTE — LETTER
[facility] [place] [facility]  [address] [address]  [address]  [facility] [facility] [address]  [phone]        17    RE:Coni Newberry  [address]  [facility] [address]    1975      To whom it may concern:      Coni Newberry is under my professional care for left achilles tendon injury.  Patient is to be off work 4/10/2017 - 2017.  She may return to work on or about 2017 with the followin.  Limit shift to no longer than 4 hours as tolerated by pain.  2.  She will need to wear CAM boot at all times, use of cane as needed.  3.  Allow patient to alternate standing and sitting as needed for pain.    Patient will be following up in clinic prior to return to work.        Sincerely,        Jae Pantoja MD

## 2017-05-01 NOTE — MR AVS SNAPSHOT
After Visit Summary   5/1/2017    Coni Newberry    MRN: [id number]           Patient Information     Date Of Birth          1975        Visit Information        Provider Department      5/1/2017 2:00 PM Jae Pantoja MD [facility] ORTHOPEDIC SURGERY        Today's Diagnoses     Enthesopathy of hindfoot    -  1       Follow-ups after your visit        Your next 10 appointments already scheduled     Jun 01, 2017  2:00 PM CDT   Return Visit with Jae Pantoja MD   [facility] ORTHOPEDIC SURGERY ([facility] [place])    [address] [address] [address]   [phone]              Who to contact     If you have questions or need follow up information about today's clinic visit or your schedule please contact [facility] ORTHOPEDIC SURGERY directly at [phone].  Normal or non-critical lab and imaging results will be communicated to you by MyChart, letter or phone within 4 business days after the clinic has received the results. If you do not hear from us within 7 days, please contact the clinic through MyChart or phone. If you have a critical or abnormal lab result, we will notify you by phone as soon as possible.  Submit refill requests through [employer] or call your pharmacy and they will forward the refill request to us. Please allow 3 business days for your refill to be completed.          Additional Information About Your Visit        [employer] Information     [employer] gives you secure access to your electronic health record. If you see a primary care provider, you can also send messages to your care team and make appointments. If you have questions, please call your primary care clinic.  If you do not have a primary care provider, please call [phone] and they will assist you.        Care EveryWhere ID     This is your Care EveryWhere ID. This could be used by other organizations to access your [facility]  "records  [id number]        Your Vitals Were     Height Last Period BMI (Body Mass Index)             5' 3\" (1.6 m) 01/18/2017 51.02 kg/m2          Blood Pressure from Last 3 Encounters:   05/01/17 115/80   04/10/17 112/85   04/08/17 110/89    Weight from Last 3 Encounters:   05/01/17 288 lb (130.6 kg)   04/20/17 288 lb (130.6 kg)   04/10/17 288 lb (130.6 kg)              Today, you had the following     No orders found for display       Primary Care Provider Office Phone # Fax #    Farida RubioDANYELL Campos [facility] [phone]       [facility] [address] [place] [address]        Thank you!     Thank you for choosing [facility] ORTHOPEDIC SURGERY  for your care. Our goal is always to provide you with excellent care. Hearing back from our patients is one way we can continue to improve our services. Please take a few minutes to complete the written survey that you may receive in the mail after your visit with us. Thank you!             Your Updated Medication List - Protect others around you: Learn how to safely use, store and throw away your medicines at www.disposemymeds.org.          This list is accurate as of: 5/1/17 11:59 PM.  Always use your most recent med list.                   Brand Name Dispense Instructions for use    ACE/ARB NOT PRESCRIBED (INTENTIONAL)      Please choose reason not prescribed, below       albuterol 108 (90 BASE) MCG/ACT Inhaler    albuterol    2 Inhaler    Inhale 2 puffs into the lungs every 6 hours       atorvastatin 10 MG tablet    LIPITOR    90 tablet    Take 1 tablet (10 mg) by mouth daily       beclomethasone 80 MCG/ACT Inhaler    QVAR    3 Inhaler    Inhale 2 puffs into the lungs 2 times daily       blood glucose monitoring meter device kit     1 kit    Use to test blood sugars 1 times daily or as directed.       * blood glucose monitoring test strip    no brand specified    600 strip    Use to test blood sugar 6 times daily or as directed. "       * blood glucose monitoring test strip    ONE TOUCH ULTRA    3 Box    Use to test blood sugars 1 times daily or as directed.       cetirizine-psuedoePHEDrine 5-120 MG per 12 hr tablet    zyrTEC-D     Take 1 tablet by mouth daily Reported on 3/31/2017       fluticasone 50 MCG/ACT spray    FLONASE    3 Package    Spray 2 sprays into both nostrils daily       HYDROcodone-acetaminophen 5-325 MG per tablet    NORCO    30 tablet    Take 1 tablet by mouth every 8 hours as needed for moderate to severe pain       ibuprofen 200 MG tablet    ADVIL/MOTRIN     Take 200 mg by mouth every 4 hours as needed for mild pain or fever Reported on 3/31/2017       ipratropium - albuterol 0.5 mg/2.5 mg/3 mL 0.5-2.5 (3) MG/3ML neb solution    DUONEB    90 vial    Take 1 vial (3 mLs) by nebulization every 6 hours as needed for shortness of breath / dyspnea or wheezing       JOLIVETTE 0.35 MG per tablet   Generic drug:  norethindrone     84 tablet    Take 1 tablet (0.35 mg) by mouth daily       LANCETS ULTRA FINE Misc     300 each    1 each 6 times daily       lisinopril 2.5 MG tablet    PRINIVIL/Zestril    90 tablet    Take 1 tablet (2.5 mg) by mouth daily       metFORMIN 500 MG tablet    GLUCOPHAGE    360 tablet    Take 2 tablets (1,000 mg) by mouth 2 times daily (with meals)       nystatin ointment    MYCOSTATIN    60 g    Apply topically as needed       omeprazole 20 MG tablet     90 tablet    Take 1 tablet (20 mg) by mouth daily Take 30-60 minutes before a meal.       * order for DME     1 Device    Equipment being ordered: Nebulizer       * order for DME     2 Device    Equipment being ordered: neb tubing and cup       * order for DME     1 Device    Equipment being ordered: CAM boot       * order for DME     1 Device    Equipment being ordered: crutches       piroxicam 20 MG capsule    FELDENE    30 capsule    Take 1 capsule (20 mg) by mouth daily (with breakfast)       triamcinolone 0.1 % ointment    KENALOG    90 g    Apply  sparingly to affected area three times daily AS NEEDED FOR ECZEMA RASH       * Notice:  This list has 6 medication(s) that are the same as other medications prescribed for you. Read the directions carefully, and ask your doctor or other care provider to review them with you.

## 2017-05-01 NOTE — LETTER
[facility] [place] [facility]  [address] [address]  [address]  [facility] [address]  [phone]        17    RE:Coni Newberry  [address] [place] [address]  [facility] [address]    1975      To whom it may concern:      Coni Newberry is under my professional care for left achilles tendon injury.  Patient is to be off work 4/10/2017 - 2017.  She may return to work on or about 2017 with the followin.  Limit shift to no longer than 4 - 5 hours as tolerated by pain.  2.  She will need to wear CAM boot at all times, use of cane as needed.  3.  Allow patient to alternate standing and sitting as needed for pain.    Patient will be following up in clinic prior to return to work.        Sincerely,        Jae Pantoja MD

## 2017-05-01 NOTE — NURSING NOTE
"Chief Complaint   Patient presents with     Musculoskeletal Problem     f/u left achilles tendon pain > 2 months       Initial /80  Ht 5' 3\" (1.6 m)  Wt 288 lb (130.6 kg)  LMP 01/18/2017  BMI 51.02 kg/m2 Estimated body mass index is 51.02 kg/(m^2) as calculated from the following:    Height as of this encounter: 5' 3\" (1.6 m).    Weight as of this encounter: 288 lb (130.6 kg).  Medication Reconciliation: complete     Froylan Black, ATC  "

## 2017-05-01 NOTE — PROGRESS NOTES
"HISTORY OF PRESENT ILLNESS:    Coni Newberry is a 42 year old female who is seen in follow up for left achilles tendon pain.  Patient reports moderate improvement over the last 2 weeks.  She having only mild discomfort with walking in CAM boot.  Anxious to return to work.  Treatments tried to this point: CAM boot, pain medication    PHYSICAL EXAM:  /80  Ht 5' 3\" (1.6 m)  Wt 288 lb (130.6 kg)  LMP 01/18/2017  BMI 51.02 kg/m2  Body mass index is 51.02 kg/(m^2).   GENERAL APPEARANCE: healthy, alert and no distress   SKIN: no suspicious lesions or rashes  NEURO: Normal strength and tone, mentation intact and speech normal  VASCULAR:  good pulses, and cappillary refill   LYMPH: no lymphadenopathy   PSYCH:  mentation appears normal and affect normal/bright    MSK:  Examination of the left ankle reveals no erythema, ecchymosis nor edema. She is nontender to palpation about the tendo Achilles insertion. Homans sign is negative.    IMAGING INTERPRETATION:  None today     ASSESSMENT / PLAN: Resolving left tendo Achilles enthesopathy. Her cam boot was adjusted to 90 . Will allow her to weight-bear as tolerated and will wean her out of the boot in 4-6 weeks.      Perico Pantoja MD  Dept. Orthopedic Surgery  [facility]       Disclaimer: This note consists of symbols derived from keyboarding, dictation and/or voice recognition software. As a result, there may be errors in the script that have gone undetected. Please consider this when interpreting information found in this chart.      "

## 2017-05-01 NOTE — LETTER
"  5/1/2017       RE: Coni Newberry  [address]  [facility] [address]           Dear Colleague,    Thank you for referring your patient, Coni Newberry, to the [facility] ORTHOPEDIC SURGERY. Please see a copy of my visit note below.    HISTORY OF PRESENT ILLNESS:    Cnoi Newberry is a 42 year old female who is seen in follow up for left achilles tendon pain.  Patient reports moderate improvement over the last 2 weeks.  She having only mild discomfort with walking in CAM boot.  Anxious to return to work.  Treatments tried to this point: CAM boot, pain medication    PHYSICAL EXAM:  /80  Ht 5' 3\" (1.6 m)  Wt 288 lb (130.6 kg)  LMP 01/18/2017  BMI 51.02 kg/m2  Body mass index is 51.02 kg/(m^2).   GENERAL APPEARANCE: healthy, alert and no distress   SKIN: no suspicious lesions or rashes  NEURO: Normal strength and tone, mentation intact and speech normal  VASCULAR:  good pulses, and cappillary refill   LYMPH: no lymphadenopathy   PSYCH:  mentation appears normal and affect normal/bright    MSK:  Examination of the left ankle reveals no erythema, ecchymosis nor edema. She is nontender to palpation about the tendo Achilles insertion. Homans sign is negative.    IMAGING INTERPRETATION:  None today     ASSESSMENT / PLAN: Resolving left tendo Achilles enthesopathy. Her cam boot was adjusted to 90 . Will allow her to weight-bear as tolerated and will wean her out of the boot in 4-6 weeks.      Perico Pantoja MD  Dept. Orthopedic Surgery  Health system       Disclaimer: This note consists of symbols derived from keyboarding, dictation and/or voice recognition software. As a result, there may be errors in the script that have gone undetected. Please consider this when interpreting information found in this chart.        Again, thank you for allowing me to participate in the care of your patient.        Sincerely,              Jae Pantoja MD    "

## 2017-05-02 ENCOUNTER — TELEPHONE (OUTPATIENT)
Dept: INTERNAL MEDICINE | Facility: CLINIC | Age: 42
End: 2017-05-02

## 2017-05-02 DIAGNOSIS — I10 ESSENTIAL HYPERTENSION WITH GOAL BLOOD PRESSURE LESS THAN 140/90: ICD-10-CM

## 2017-05-02 DIAGNOSIS — E11.9 TYPE 2 DIABETES MELLITUS WITHOUT COMPLICATION, WITHOUT LONG-TERM CURRENT USE OF INSULIN (H): Primary | ICD-10-CM

## 2017-05-02 DIAGNOSIS — L30.0 NUMMULAR ECZEMA: ICD-10-CM

## 2017-05-02 DIAGNOSIS — J45.30 MILD PERSISTENT ASTHMA WITHOUT COMPLICATION: ICD-10-CM

## 2017-05-02 DIAGNOSIS — E66.01 MORBID OBESITY, UNSPECIFIED OBESITY TYPE (H): ICD-10-CM

## 2017-05-04 ENCOUNTER — TELEPHONE (OUTPATIENT)
Dept: ORTHOPEDICS | Facility: CLINIC | Age: 42
End: 2017-05-04

## 2017-05-04 NOTE — TELEPHONE ENCOUNTER
"Received call from [employer], stating patient was seen recently by Dr. Pantoja on 5/1/17. They received the work restriction letter and need more details before forwarding to patient's employer.     They want to know how long restrictions are to be for or when patient is going to see Dr. Pantoja again. Patient has an appointment 6/1/17.     She also states the employer is very particular regarding getting more details of what is meant \"to allow patient to alternate standing and sitting as needed for pain\". Informed it is as needed. Every person is different and there are no further specifics to be given.     She verbalized understanding.     BECK Rene RN        "

## 2017-05-04 NOTE — TELEPHONE ENCOUNTER
Consulted with Dr. Pantoja /Plan : follow up at appointment on 6/1/17. Restrictions in place until re-evaluated.     Left message for Shannon with plan as above and to call back for further questions.     BECK Rene RN

## 2017-05-12 DIAGNOSIS — J45.909 ASTHMA: ICD-10-CM

## 2017-05-12 RX ORDER — MONTELUKAST SODIUM 10 MG/1
TABLET ORAL
Qty: 90 TABLET | Refills: 0 | Status: SHIPPED | OUTPATIENT
Start: 2017-05-12 | End: 2017-07-19

## 2017-05-12 NOTE — TELEPHONE ENCOUNTER
Singulair       Last Written Prescription Date: 2-23-16  Last Fill Quantity: 90, # refills: 3    Last Office Visit with [facility], P or [facility] prescribing provider:  3-3-17   Future Office Visit:    Next 5 appointments (look out 90 days)     Jun 01, 2017  2:00 PM CDT   Return Visit with Jae Pantoja MD   [facility] ORTHOPEDIC SURGERY ([facility])    [address] [address] [address]   [phone]                   Date of Last Asthma Action Plan Letter:   Asthma Action Plan Q1 Year    Topic Date Due     Asthma Action Plan - yearly  03/03/2018      Asthma Control Test:   ACT Total Scores 3/3/2017   ACT TOTAL SCORE (Goal Greater than or Equal to 20) 21   In the past 12 months, how many times did you visit the emergency room for your asthma without being admitted to the hospital? 0   In the past 12 months, how many times were you hospitalized overnight because of your asthma? 0       Date of Last Spirometry Test:   No results found for this or any previous visit.    Prescription approved per [facility] Refill Protocol.

## 2017-05-26 ENCOUNTER — OFFICE VISIT (OUTPATIENT)
Dept: ORTHOPEDICS | Facility: CLINIC | Age: 42
End: 2017-05-26
Payer: COMMERCIAL

## 2017-05-26 VITALS — BODY MASS INDEX: 51.03 KG/M2 | HEIGHT: 63 IN | WEIGHT: 288 LBS

## 2017-05-26 DIAGNOSIS — M76.62 ACHILLES TENDINITIS OF LEFT LOWER EXTREMITY: Primary | ICD-10-CM

## 2017-05-26 PROCEDURE — 99213 OFFICE O/P EST LOW 20 MIN: CPT | Performed by: ORTHOPAEDIC SURGERY

## 2017-05-26 NOTE — MR AVS SNAPSHOT
"              After Visit Summary   5/26/2017    Coni Newberry    MRN: [id number]           Patient Information     Date Of Birth          1975        Visit Information        Provider Department      5/26/2017 3:40 PM Jae Pantoja MD [facility] ORTHOPEDIC SURGERY        Today's Diagnoses     Achilles tendinitis of left lower extremity    -  1       Follow-ups after your visit        Who to contact     If you have questions or need follow up information about today's clinic visit or your schedule please contact [facility] ORTHOPEDIC SURGERY directly at [phone].  Normal or non-critical lab and imaging results will be communicated to you by LC [email or web address], letter or phone within 4 business days after the clinic has received the results. If you do not hear from us within 7 days, please contact the clinic through EndoStimt or phone. If you have a critical or abnormal lab result, we will notify you by phone as soon as possible.  Submit refill requests through Angle or call your pharmacy and they will forward the refill request to us. Please allow 3 business days for your refill to be completed.          Additional Information About Your Visit        MyChart Information     Angle gives you secure access to your electronic health record. If you see a primary care provider, you can also send messages to your care team and make appointments. If you have questions, please call your primary care clinic.  If you do not have a primary care provider, please call [phone] and they will assist you.        Care EveryWhere ID     This is your Care EveryWhere ID. This could be used by other organizations to access your [facility] medical records  [id number]        Your Vitals Were     Height BMI (Body Mass Index)                5' 3\" (1.6 m) 51.02 kg/m2           Blood Pressure from Last 3 Encounters:   05/01/17 115/80   04/10/17 112/85   04/08/17 110/89    Weight from Last 3 Encounters:   06/22/17 284 " lb (128.8 kg)   05/26/17 288 lb (130.6 kg)   05/01/17 288 lb (130.6 kg)              Today, you had the following     No orders found for display       Primary Care Provider Office Phone # Fax #    DANYELL Spencer [phone]       [facility] [address] [facility] [address]        Equal Access to Services     SARAH BETH SALMON : Hadii aad ku hadasho Soomaali, waaxda luqadaha, qaybta kaalmada adeegyada, waxay idiin hayaan adeeg kharash labecka . So [facility] [phone].    ATENCIÓN: Si habla español, tiene a remy disposición servicios gratuitos de asistencia lingüística. Llame al [phone].    We comply with applicable federal civil rights laws and [place] laws. We do not discriminate on the basis of race, color, national origin, age, disability sex, sexual orientation or gender identity.            Thank you!     Thank you for choosing [facility] ORTHOPEDIC SURGERY  for your care. Our goal is always to provide you with excellent care. Hearing back from our patients is one way we can continue to improve our services. Please take a few minutes to complete the written survey that you may receive in the mail after your visit with us. Thank you!             Your Updated Medication List - Protect others around you: Learn how to safely use, store and throw away your medicines at www.disposemymeds.org.          This list is accurate as of: 5/26/17 11:59 PM.  Always use your most recent med list.                   Brand Name Dispense Instructions for use Diagnosis    ACE/ARB NOT PRESCRIBED (INTENTIONAL)      Please choose reason not prescribed, below    Type 2 diabetes mellitus without complication, without long-term current use of insulin (H), Essential hypertension with goal blood pressure less than 140/90       albuterol 108 (90 BASE) MCG/ACT Inhaler    albuterol    2 Inhaler    Inhale 2 puffs into the lungs every 6 hours    Moderate persistent asthma without complication        atorvastatin 10 MG tablet    LIPITOR    90 tablet    Take 1 tablet (10 mg) by mouth daily    Hyperlipidemia with target LDL less than 70       beclomethasone 80 MCG/ACT Inhaler    QVAR    3 Inhaler    Inhale 2 puffs into the lungs 2 times daily    Mild persistent asthma without complication       blood glucose monitoring meter device kit     1 kit    Use to test blood sugars 1 times daily or as directed.    DM type 2, goal A1C below 8.0       * blood glucose monitoring test strip    no brand specified    600 strip    Use to test blood sugar 6 times daily or as directed.    Diabetes mellitus, new onset (H)       * blood glucose monitoring test strip    ONE TOUCH ULTRA    3 Box    Use to test blood sugars 1 times daily or as directed.    DM type 2, goal A1C below 8.0       cetirizine-psuedoePHEDrine 5-120 MG per 12 hr tablet    zyrTEC-D     Take 1 tablet by mouth daily Reported on 3/31/2017        fluticasone 50 MCG/ACT spray    FLONASE    3 Package    Spray 2 sprays into both nostrils daily    Asthma       HYDROcodone-acetaminophen 5-325 MG per tablet    NORCO    30 tablet    Take 1 tablet by mouth every 8 hours as needed for moderate to severe pain    Achilles tendon pain       ibuprofen 200 MG tablet    ADVIL/MOTRIN     Take 200 mg by mouth every 4 hours as needed for mild pain or fever Reported on 3/31/2017        ipratropium - albuterol 0.5 mg/2.5 mg/3 mL 0.5-2.5 (3) MG/3ML neb solution    DUONEB    90 vial    Take 1 vial (3 mLs) by nebulization every 6 hours as needed for shortness of breath / dyspnea or wheezing    Mild persistent asthma without complication       JOLIVETTE 0.35 MG per tablet   Generic drug:  norethindrone     84 tablet    Take 1 tablet (0.35 mg) by mouth daily    Encounter for surveillance of contraceptive pills       LANCETS ULTRA FINE Misc     300 each    1 each 6 times daily    Diabetes mellitus, new onset (H)       lisinopril 2.5 MG tablet    PRINIVIL/Zestril    90 tablet    Take 1  tablet (2.5 mg) by mouth daily    Essential hypertension with goal blood pressure less than 140/90       metFORMIN 500 MG tablet    GLUCOPHAGE    360 tablet    Take 2 tablets (1,000 mg) by mouth 2 times daily (with meals)    Type 2 diabetes mellitus without complication (H)       montelukast 10 MG tablet    SINGULAIR    90 tablet    TAKE ONE TABLET BY MOUTH AT BEDTIME    Asthma       nystatin ointment    MYCOSTATIN    60 g    Apply topically as needed    Yeast infection of the skin       omeprazole 20 MG tablet     90 tablet    Take 1 tablet (20 mg) by mouth daily Take 30-60 minutes before a meal.    Abdominal pain, epigastric       * order for DME     1 Device    Equipment being ordered: Nebulizer    Mild persistent asthma       * order for DME     2 Device    Equipment being ordered: neb tubing and cup    Mild persistent asthma without complication       * order for DME     1 Device    Equipment being ordered: CAM boot    Achilles tendon injury, left, initial encounter       * order for DME     1 Device    Equipment being ordered: crutches    Achilles tendon injury, left, initial encounter       piroxicam 20 MG capsule    FELDENE    30 capsule    Take 1 capsule (20 mg) by mouth daily (with breakfast)    Achilles tendon pain       triamcinolone 0.1 % ointment    KENALOG    90 g    Apply sparingly to affected area three times daily AS NEEDED FOR ECZEMA RASH    Nummular eczema       * Notice:  This list has 6 medication(s) that are the same as other medications prescribed for you. Read the directions carefully, and ask your doctor or other care provider to review them with you.

## 2017-05-26 NOTE — LETTER
"      5/26/2017         RE: Coni Newberry  [address]  [facility] [address]        Dear Colleague,    Thank you for referring your patient, Coni Newberry, to the [facility] ORTHOPEDIC SURGERY. Please see a copy of my visit note below.    HISTORY OF PRESENT ILLNESS:    Coni Newberry is a 42 year old female who is seen in follow up for left achilles tendon.  Present symptoms: Pt states ankle will feel weak at times but states she does not have pain.  Pt is asking to d/c boot at this time.  Pt is ~ 12 weeks post injury.  Treatments tried to this point: walking boot    PHYSICAL EXAM:  Ht 5' 3\" (1.6 m)  Wt 288 lb (130.6 kg)  BMI 51.02 kg/m2  Body mass index is 51.02 kg/(m^2).   GENERAL APPEARANCE: healthy, alert and no distress   SKIN: no suspicious lesions or rashes  NEURO: Normal strength and tone, mentation intact and speech normal  VASCULAR:  good pulses, and cappillary refill   LYMPH: no lymphadenopathy   PSYCH:  mentation appears normal and affect normal/bright    MSK:  Examination of her left tendo Achilles area reveals no erythema, ecchymosis, nor edema. There is no defect. CMS is intact to her toes.    IMAGING INTERPRETATION:       ASSESSMENT / PLAN: Healed enthesopathy to her left tendo Achilles region. She is going to herself out of the boot over the next 2 weeks. She'll return to see me on an as-needed basis.      Perico Pantoja MD  Dept. Orthopedic Surgery  [facility]       Disclaimer: This note consists of symbols derived from keyboarding, dictation and/or voice recognition software. As a result, there may be errors in the script that have gone undetected. Please consider this when interpreting information found in this chart.        Again, thank you for allowing me to participate in the care of your patient.        Sincerely,        Jae Pantoja MD    "

## 2017-05-26 NOTE — PROGRESS NOTES
"HISTORY OF PRESENT ILLNESS:    Coni Newberry is a 42 year old female who is seen in follow up for left achilles tendon.  Present symptoms: Pt states ankle will feel weak at times but states she does not have pain.  Pt is asking to d/c boot at this time.  Pt is ~ 12 weeks post injury.  Treatments tried to this point: walking boot    PHYSICAL EXAM:  Ht 5' 3\" (1.6 m)  Wt 288 lb (130.6 kg)  BMI 51.02 kg/m2  Body mass index is 51.02 kg/(m^2).   GENERAL APPEARANCE: healthy, alert and no distress   SKIN: no suspicious lesions or rashes  NEURO: Normal strength and tone, mentation intact and speech normal  VASCULAR:  good pulses, and cappillary refill   LYMPH: no lymphadenopathy   PSYCH:  mentation appears normal and affect normal/bright    MSK:  Examination of her left tendo Achilles area reveals no erythema, ecchymosis, nor edema. There is no defect. CMS is intact to her toes.    IMAGING INTERPRETATION:       ASSESSMENT / PLAN: Healed enthesopathy to her left tendo Achilles region. She is going to herself out of the boot over the next 2 weeks. She'll return to see me on an as-needed basis.      Perico Pantoja MD  Dept. Orthopedic Surgery  [facility]       Disclaimer: This note consists of symbols derived from keyboarding, dictation and/or voice recognition software. As a result, there may be errors in the script that have gone undetected. Please consider this when interpreting information found in this chart.      "

## 2017-05-26 NOTE — NURSING NOTE
"Chief Complaint   Patient presents with     RECHECK     Left ankle       Initial Ht 5' 3\" (1.6 m)  Wt 288 lb (130.6 kg)  BMI 51.02 kg/m2 Estimated body mass index is 51.02 kg/(m^2) as calculated from the following:    Height as of this encounter: 5' 3\" (1.6 m).    Weight as of this encounter: 288 lb (130.6 kg).  Medication Reconciliation: complete    "

## 2017-06-22 ENCOUNTER — OFFICE VISIT (OUTPATIENT)
Dept: INTERNAL MEDICINE | Facility: CLINIC | Age: 42
End: 2017-06-22
Payer: COMMERCIAL

## 2017-06-22 VITALS
WEIGHT: 284 LBS | HEART RATE: 118 BPM | OXYGEN SATURATION: 96 % | HEIGHT: 64 IN | BODY MASS INDEX: 48.49 KG/M2 | TEMPERATURE: 98.5 F

## 2017-06-22 DIAGNOSIS — J01.90 ACUTE SINUSITIS WITH SYMPTOMS > 10 DAYS: Primary | ICD-10-CM

## 2017-06-22 PROCEDURE — 99213 OFFICE O/P EST LOW 20 MIN: CPT | Performed by: FAMILY MEDICINE

## 2017-06-22 NOTE — PROGRESS NOTES
"SUBJECTIVE:  Coni Newberry is a 42 year old female here with concerns about sinus infection.  She states onset of symptoms were 2 week(s) ago.  She has had maxillary pressure. Course of illness is worsening. Severity moderate  Current and Associated symptoms: chills and ear pain  Predisposing factors include HX of recurrent sinusitis and recent illness. Recent treatment has included: Antihistamine, Decongestants and OTC meds and steroid nasal spray    Past Medical History:   Diagnosis Date     HTN (hypertension)      Hyperlipidaemia LDL goal <100      Mild persistent asthma 7/29/2014     Nummular eczema      Social History   Substance Use Topics     Smoking status: Never Smoker     Smokeless tobacco: Not on file     Alcohol use 0.0 oz/week     0 Standard drinks or equivalent per week      Comment: occassionally       ROS:  INTEGUMENTARY/SKIN: NEGATIVE for worrisome rashes, moles or lesions  EYES: NEGATIVE for vision changes or irritation    OBJECTIVE:  Pulse 118  Temp 98.5  F (36.9  C) (Oral)  Ht 5' 4\" (1.626 m)  Wt 284 lb (128.8 kg)  SpO2 96%  BMI 48.75 kg/m2  Exam:GENERAL APPEARANCE: healthy, alert and no distress  EYES: EOMI,  PERRL, conjunctiva clear  HENT: ear canals and TM's normal.  Nose and mouth without ulcers, erythema or lesions  NECK: supple, nontender, no lymphadenopathy  RESP: lungs clear to auscultation - no rales, rhonchi or wheezes  CV: regular rates and rhythm, normal S1 S2, no murmur noted  NEURO: Normal strength and tone, sensory exam grossly normal,  normal speech and mentation  SKIN: no suspicious lesions or rashes    ASSESSMENT:  1. Acute sinusitis with symptoms > 10 days  Symptomatic cares were discussed in detail.   Pt instructed to come back to the clinic for worsening sx      - amoxicillin-clavulanate (AUGMENTIN) 875-125 MG per tablet; Take 1 tablet by mouth 2 times daily  Dispense: 28 tablet; Refill: 0      "

## 2017-06-22 NOTE — MR AVS SNAPSHOT
"              After Visit Summary   6/22/2017    Coni Newberry    MRN: [id number]           Patient Information     Date Of Birth          1975        Visit Information        Provider Department      6/22/2017 11:40 AM Kwesi Lancaster MD [facility]        Today's Diagnoses     Acute sinusitis with symptoms > 10 days    -  1       Follow-ups after your visit        Who to contact     If you have questions or need follow up information about today's clinic visit or your schedule please contact [facility] directly at [phone].  Normal or non-critical lab and imaging results will be communicated to you by BlackDuckhart, letter or phone within 4 business days after the clinic has received the results. If you do not hear from us within 7 days, please contact the clinic through Globoforcet or phone. If you have a critical or abnormal lab result, we will notify you by phone as soon as possible.  Submit refill requests through ThrowMotion or call your pharmacy and they will forward the refill request to us. Please allow 3 business days for your refill to be completed.          Additional Information About Your Visit        MyChart Information     ThrowMotion gives you secure access to your electronic health record. If you see a primary care provider, you can also send messages to your care team and make appointments. If you have questions, please call your primary care clinic.  If you do not have a primary care provider, please call [phone] and they will assist you.        Care EveryWhere ID     This is your Care EveryWhere ID. This could be used by other organizations to access your [facility] medical records  [id number]        Your Vitals Were     Pulse Temperature Height Pulse Oximetry BMI (Body Mass Index)       118 98.5  F (36.9  C) (Oral) 5' 4\" (1.626 m) 96% 48.75 kg/m2        Blood Pressure from Last 3 Encounters:   05/01/17 115/80   04/10/17 112/85   04/08/17 110/89    Weight from " Last 3 Encounters:   06/22/17 284 lb (128.8 kg)   05/26/17 288 lb (130.6 kg)   05/01/17 288 lb (130.6 kg)              Today, you had the following     No orders found for display         Today's Medication Changes          These changes are accurate as of: 6/22/17 11:44 AM.  If you have any questions, ask your nurse or doctor.               Start taking these medicines.        Dose/Directions    amoxicillin-clavulanate 875-125 MG per tablet   Commonly known as:  AUGMENTIN   Used for:  Acute sinusitis with symptoms > 10 days   Started by:  Kwesi Lancaster MD        Dose:  1 tablet   Take 1 tablet by mouth 2 times daily   Quantity:  28 tablet   Refills:  0            Where to get your medicines      These medications were sent to [facility] [address] [place] [address] [id number] [place] [address]     Phone:  [phone]     amoxicillin-clavulanate 875-125 MG per tablet                Primary Care Provider Office Phone # Fax #    DANYELL Spencer [facility] [phone]       [facility] [address] [place] [address]        Equal Access to Services     SARAH BETH SALMON AH: Hadii nelli church hadasho Soomaali, waaxda luqadaha, qaybta kaalmada adeegyada, benoit forrest hayjuliana dias. So [facility] [phone].    ATENCIÓN: Si habla español, tiene a remy disposición servicios gratuitos de asistencia lingüística. [facility] [phone].    We comply with applicable federal civil rights laws and [place] laws. We do not discriminate on the basis of race, color, national origin, age, disability sex, sexual orientation or gender identity.            Thank you!     Thank you for choosing [facility]  for your care. Our goal is always to provide you with excellent care. Hearing back from our patients is one way we can continue to improve our services. Please take a few minutes to complete the written survey that you may receive in the  mail after your visit with us. Thank you!             Your Updated Medication List - Protect others around you: Learn how to safely use, store and throw away your medicines at www.disposemymeds.org.          This list is accurate as of: 6/22/17 11:44 AM.  Always use your most recent med list.                   Brand Name Dispense Instructions for use Diagnosis    ACE/ARB NOT PRESCRIBED (INTENTIONAL)      Please choose reason not prescribed, below    Type 2 diabetes mellitus without complication, without long-term current use of insulin (H), Essential hypertension with goal blood pressure less than 140/90       albuterol 108 (90 BASE) MCG/ACT Inhaler    albuterol    2 Inhaler    Inhale 2 puffs into the lungs every 6 hours    Moderate persistent asthma without complication       amoxicillin-clavulanate 875-125 MG per tablet    AUGMENTIN    28 tablet    Take 1 tablet by mouth 2 times daily    Acute sinusitis with symptoms > 10 days       atorvastatin 10 MG tablet    LIPITOR    90 tablet    Take 1 tablet (10 mg) by mouth daily    Hyperlipidemia with target LDL less than 70       beclomethasone 80 MCG/ACT Inhaler    QVAR    3 Inhaler    Inhale 2 puffs into the lungs 2 times daily    Mild persistent asthma without complication       blood glucose monitoring meter device kit     1 kit    Use to test blood sugars 1 times daily or as directed.    DM type 2, goal A1C below 8.0       * blood glucose monitoring test strip    no brand specified    600 strip    Use to test blood sugar 6 times daily or as directed.    Diabetes mellitus, new onset (H)       * blood glucose monitoring test strip    ONE TOUCH ULTRA    3 Box    Use to test blood sugars 1 times daily or as directed.    DM type 2, goal A1C below 8.0       cetirizine-psuedoePHEDrine 5-120 MG per 12 hr tablet    zyrTEC-D     Take 1 tablet by mouth daily Reported on 3/31/2017        fluticasone 50 MCG/ACT spray    FLONASE    3 Package    Spray 2 sprays into both nostrils  daily    Asthma       HYDROcodone-acetaminophen 5-325 MG per tablet    NORCO    30 tablet    Take 1 tablet by mouth every 8 hours as needed for moderate to severe pain    Achilles tendon pain       ibuprofen 200 MG tablet    ADVIL/MOTRIN     Take 200 mg by mouth every 4 hours as needed for mild pain or fever Reported on 3/31/2017        ipratropium - albuterol 0.5 mg/2.5 mg/3 mL 0.5-2.5 (3) MG/3ML neb solution    DUONEB    90 vial    Take 1 vial (3 mLs) by nebulization every 6 hours as needed for shortness of breath / dyspnea or wheezing    Mild persistent asthma without complication       JOLIVETTE 0.35 MG per tablet   Generic drug:  norethindrone     84 tablet    Take 1 tablet (0.35 mg) by mouth daily    Encounter for surveillance of contraceptive pills       LANCETS ULTRA FINE Misc     300 each    1 each 6 times daily    Diabetes mellitus, new onset (H)       lisinopril 2.5 MG tablet    PRINIVIL/Zestril    90 tablet    Take 1 tablet (2.5 mg) by mouth daily    Essential hypertension with goal blood pressure less than 140/90       metFORMIN 500 MG tablet    GLUCOPHAGE    360 tablet    Take 2 tablets (1,000 mg) by mouth 2 times daily (with meals)    Type 2 diabetes mellitus without complication (H)       montelukast 10 MG tablet    SINGULAIR    90 tablet    TAKE ONE TABLET BY MOUTH AT BEDTIME    Asthma       nystatin ointment    MYCOSTATIN    60 g    Apply topically as needed    Yeast infection of the skin       omeprazole 20 MG tablet     90 tablet    Take 1 tablet (20 mg) by mouth daily Take 30-60 minutes before a meal.    Abdominal pain, epigastric       * order for DME     1 Device    Equipment being ordered: Nebulizer    Mild persistent asthma       * order for DME     2 Device    Equipment being ordered: neb tubing and cup    Mild persistent asthma without complication       * order for DME     1 Device    Equipment being ordered: CAM boot    Achilles tendon injury, left, initial encounter       * order for  DME     1 Device    Equipment being ordered: crutches    Achilles tendon injury, left, initial encounter       piroxicam 20 MG capsule    FELDENE    30 capsule    Take 1 capsule (20 mg) by mouth daily (with breakfast)    Achilles tendon pain       triamcinolone 0.1 % ointment    KENALOG    90 g    Apply sparingly to affected area three times daily AS NEEDED FOR ECZEMA RASH    Nummular eczema       * Notice:  This list has 6 medication(s) that are the same as other medications prescribed for you. Read the directions carefully, and ask your doctor or other care provider to review them with you.

## 2017-06-30 ENCOUNTER — TELEPHONE (OUTPATIENT)
Dept: ORTHOPEDICS | Facility: CLINIC | Age: 42
End: 2017-06-30

## 2017-06-30 NOTE — TELEPHONE ENCOUNTER
Received staff message from central scheduling that patient needs a refill of vicodin.     Last office visit 5/26/17 with Dr. Pantoja. Appointment needed.     Left message (consent to communicate on file) for patient that  Appointment is needed to be re-evaluated to see if it was appropriate to refill vicodin. Asked that she call central scheduling to make an appointment or to call the nurse line if more questions for the nurse. Numbers provided.     Shaista Rene RN

## 2017-07-05 ENCOUNTER — OFFICE VISIT (OUTPATIENT)
Dept: ORTHOPEDICS | Facility: CLINIC | Age: 42
End: 2017-07-05
Payer: COMMERCIAL

## 2017-07-05 VITALS — BODY MASS INDEX: 48.49 KG/M2 | WEIGHT: 284 LBS | HEIGHT: 64 IN

## 2017-07-05 DIAGNOSIS — M76.62 ACHILLES TENDINITIS, LEFT LEG: Primary | ICD-10-CM

## 2017-07-05 PROCEDURE — 99212 OFFICE O/P EST SF 10 MIN: CPT | Performed by: ORTHOPAEDIC SURGERY

## 2017-07-05 NOTE — PROGRESS NOTES
"HISTORY OF PRESENT ILLNESS:    Coni Newberry is a 42 year old female who is seen in follow up for left achilles tendon pain.  Present symptoms: Pt states she started wearing the boot last week again because she has been having increased pain.  Pt states pain is in the same location as before, pain is near the insertion.  Pt states she does not do a lot of walking for her job.    Pt is ~ 17.5 weeks post injury.  Treatments tried to this point: walking boot, ibuprofen    PHYSICAL EXAM:  Ht 5' 4\" (1.626 m)  Wt 284 lb (128.8 kg)  BMI 48.75 kg/m2  Body mass index is 48.75 kg/(m^2).   GENERAL APPEARANCE: healthy, alert and no distress   SKIN: no suspicious lesions or rashes  NEURO: Normal strength and tone, mentation intact and speech normal  VASCULAR:  good pulses, and cappillary refill   LYMPH: no lymphadenopathy   PSYCH:  mentation appears normal and affect normal/bright    MSK:  Examination of her left ankle and heel revealed no erythema, ecchymosis, nor edema. She is slightly tender to palpation at the insertion of the tendo Achilles on the calcaneus. CMS is intact to her toes.    IMAGING INTERPRETATION:       ASSESSMENT / PLAN: Enthesopathy tendo Achilles. We'll continue having her wear the boot until she is painless.      Perico Pantoja MD  Dept. Orthopedic Surgery  [facility]       Disclaimer: This note consists of symbols derived from keyboarding, dictation and/or voice recognition software. As a result, there may be errors in the script that have gone undetected. Please consider this when interpreting information found in this chart.      "

## 2017-07-05 NOTE — LETTER
"    7/5/2017         RE: Coni Newberry  [address]  [facility] [address]        Dear Colleague,    Thank you for referring your patient, Coni Newberry, to the [facility] ORTHOPEDIC SURGERY. Please see a copy of my visit note below.    HISTORY OF PRESENT ILLNESS:    Coni Newberry is a 42 year old female who is seen in follow up for left achilles tendon pain.  Present symptoms: Pt states she started wearing the boot last week again because she has been having increased pain.  Pt states pain is in the same location as before, pain is near the insertion.  Pt states she does not do a lot of walking for her job.    Pt is ~ 17.5 weeks post injury.  Treatments tried to this point: walking boot, ibuprofen    PHYSICAL EXAM:  Ht 5' 4\" (1.626 m)  Wt 284 lb (128.8 kg)  BMI 48.75 kg/m2  Body mass index is 48.75 kg/(m^2).   GENERAL APPEARANCE: healthy, alert and no distress   SKIN: no suspicious lesions or rashes  NEURO: Normal strength and tone, mentation intact and speech normal  VASCULAR:  good pulses, and cappillary refill   LYMPH: no lymphadenopathy   PSYCH:  mentation appears normal and affect normal/bright    MSK:  Examination of her left ankle and heel revealed no erythema, ecchymosis, nor edema. She is slightly tender to palpation at the insertion of the tendo Achilles on the calcaneus. CMS is intact to her toes.    IMAGING INTERPRETATION:       ASSESSMENT / PLAN: Enthesopathy tendo Achilles. We'll continue having her wear the boot until she is painless.      Perico Pantoja MD  Dept. Orthopedic Surgery  [facility]       Disclaimer: This note consists of symbols derived from keyboarding, dictation and/or voice recognition software. As a result, there may be errors in the script that have gone undetected. Please consider this when interpreting information found in this chart.        Again, thank you for allowing me to participate in the care of your patient.  "       Sincerely,        Jae Pantoja MD

## 2017-07-05 NOTE — NURSING NOTE
"Chief Complaint   Patient presents with     RECHECK     Left achilles tendon pain       Initial Ht 5' 4\" (1.626 m)  Wt 284 lb (128.8 kg)  BMI 48.75 kg/m2 Estimated body mass index is 48.75 kg/(m^2) as calculated from the following:    Height as of this encounter: 5' 4\" (1.626 m).    Weight as of this encounter: 284 lb (128.8 kg).  Medication Reconciliation: complete    "

## 2017-07-05 NOTE — MR AVS SNAPSHOT
After Visit Summary   7/5/2017    Coni Newberry    MRN: [id number]           Patient Information     Date Of Birth          1975        Visit Information        Provider Department      7/5/2017 1:40 PM Jae Pantoja MD [facility] ORTHOPEDIC SURGERY        Today's Diagnoses     Achilles tendinitis, left leg    -  1       Follow-ups after your visit        Your next 10 appointments already scheduled     Aug 03, 2017  7:00 AM CDT   ISABELLA Extremity with Fifi Paulino, PTA   [facility]LLA [facility] ([facility]  )    [address] [place] [address]   [phone]            Aug 03, 2017  8:30 AM CDT   Return Visit with Jae Pantoja MD   [facility] ORTHOPEDIC SURGERY ([facility])    [address] [place] [address]   [phone]            Aug 09, 2017 10:50 AM CDT   ISABELLA Extremity with Andrade Titus, PT   [facility]LL[facility] ([facility]  )    [address] [place] [address]   [phone]            Aug 11, 2017  1:40 PM CDT   Return Visit with Jae Pantoja MD   [facility] ORTHOPEDIC SURGERY ([facility])    [address] [place] [address]   [phone]              Who to contact     If you have questions or need follow up information about today's clinic visit or your schedule please contact [facility] ORTHOPEDIC SURGERY directly at [phone].  Normal or non-critical lab and imaging results will be communicated to you by MyChart, letter or phone within 4 business days after the clinic has received the results. If you do not hear from us within 7 days, please contact the clinic through [employer] or phone. If you have a critical or abnormal lab result, we will notify you by phone as soon as possible.  Submit refill requests through [employer] or call your pharmacy and they will forward the refill request to us. Please allow 3  "business days for your refill to be completed.          Additional Information About Your Visit        MyChart Information     [employer] gives you secure access to your electronic health record. If you see a primary care provider, you can also send messages to your care team and make appointments. If you have questions, please call your primary care clinic.  If you do not have a primary care provider, please call [phone] and they will assist you.        Care EveryWhere ID     This is your Care EveryWhere ID. This could be used by other organizations to access your [facility] medical records  [id number]        Your Vitals Were     Height BMI (Body Mass Index)                5' 4\" (1.626 m) 48.75 kg/m2           Blood Pressure from Last 3 Encounters:   07/19/17 112/72   05/01/17 115/80   04/10/17 112/85    Weight from Last 3 Encounters:   07/19/17 284 lb (128.8 kg)   07/05/17 284 lb (128.8 kg)   06/22/17 284 lb (128.8 kg)              Today, you had the following     No orders found for display       Primary Care Provider Office Phone # Fax #    DANYELL Spencer [facility] [phone]       [facility] [address] [facility] [address]        Equal Access to Services     SARAH BETH SALMON : Hadii aad ku hadasho Soomaali, waaxda luqadaha, qaybta kaalmada adeegyada, waxay idiin hayolivian dorothy khhuma labecka dias. So [facility] [phone].    ATENCIÓN: Si habla español, tiene a remy disposición servicios gratuitos de asistencia lingüística. Llame al [phone].    We comply with applicable federal civil rights laws and [place] laws. We do not discriminate on the basis of race, color, national origin, age, disability sex, sexual orientation or gender identity.            Thank you!     Thank you for choosing [facility] ORTHOPEDIC SURGERY  for your care. Our goal is always to provide you with excellent care. Hearing back from our patients is one way we can continue to improve our services. " Please take a few minutes to complete the written survey that you may receive in the mail after your visit with us. Thank you!             Your Updated Medication List - Protect others around you: Learn how to safely use, store and throw away your medicines at www.disposemymeds.org.          This list is accurate as of: 7/5/17 11:59 PM.  Always use your most recent med list.                   Brand Name Dispense Instructions for use Diagnosis    ACE/ARB NOT PRESCRIBED (INTENTIONAL)      Please choose reason not prescribed, below    Type 2 diabetes mellitus without complication, without long-term current use of insulin (H), Essential hypertension with goal blood pressure less than 140/90       beclomethasone 80 MCG/ACT Inhaler    QVAR    3 Inhaler    Inhale 2 puffs into the lungs 2 times daily    Mild persistent asthma without complication       blood glucose monitoring meter device kit     1 kit    Use to test blood sugars 1 times daily or as directed.    DM type 2, goal A1C below 8.0       * blood glucose monitoring test strip    no brand specified    600 strip    Use to test blood sugar 6 times daily or as directed.    Diabetes mellitus, new onset (H)       * blood glucose monitoring test strip    ONE TOUCH ULTRA    3 Box    Use to test blood sugars 1 times daily or as directed.    DM type 2, goal A1C below 8.0       cetirizine-psuedoePHEDrine 5-120 MG per 12 hr tablet    zyrTEC-D     Take 1 tablet by mouth daily Reported on 3/31/2017        fluticasone 50 MCG/ACT spray    FLONASE    3 Package    Spray 2 sprays into both nostrils daily    Asthma       ibuprofen 200 MG tablet    ADVIL/MOTRIN     Take 200 mg by mouth every 4 hours as needed for mild pain or fever Reported on 3/31/2017        ipratropium - albuterol 0.5 mg/2.5 mg/3 mL 0.5-2.5 (3) MG/3ML neb solution    DUONEB    90 vial    Take 1 vial (3 mLs) by nebulization every 6 hours as needed for shortness of breath / dyspnea or wheezing    Mild persistent  asthma without complication       JOLIVETTE 0.35 MG per tablet   Generic drug:  norethindrone     84 tablet    Take 1 tablet (0.35 mg) by mouth daily    Encounter for surveillance of contraceptive pills       LANCETS ULTRA FINE Misc     300 each    1 each 6 times daily    Diabetes mellitus, new onset (H)       metFORMIN 500 MG tablet    GLUCOPHAGE    360 tablet    Take 2 tablets (1,000 mg) by mouth 2 times daily (with meals)    Type 2 diabetes mellitus without complication (H)       nystatin ointment    MYCOSTATIN    60 g    Apply topically as needed    Yeast infection of the skin       omeprazole 20 MG tablet     90 tablet    Take 1 tablet (20 mg) by mouth daily Take 30-60 minutes before a meal.    Abdominal pain, epigastric       * order for DME     1 Device    Equipment being ordered: Nebulizer    Mild persistent asthma       * order for DME     2 Device    Equipment being ordered: neb tubing and cup    Mild persistent asthma without complication       * order for DME     1 Device    Equipment being ordered: CAM boot    Achilles tendon injury, left, initial encounter       piroxicam 20 MG capsule    FELDENE    30 capsule    Take 1 capsule (20 mg) by mouth daily (with breakfast)    Achilles tendon pain       triamcinolone 0.1 % ointment    KENALOG    90 g    Apply sparingly to affected area three times daily AS NEEDED FOR ECZEMA RASH    Nummular eczema       * Notice:  This list has 5 medication(s) that are the same as other medications prescribed for you. Read the directions carefully, and ask your doctor or other care provider to review them with you.

## 2017-07-17 ENCOUNTER — TELEPHONE (OUTPATIENT)
Dept: ORTHOPEDICS | Facility: CLINIC | Age: 42
End: 2017-07-17

## 2017-07-17 ENCOUNTER — TELEPHONE (OUTPATIENT)
Dept: INTERNAL MEDICINE | Facility: CLINIC | Age: 42
End: 2017-07-17

## 2017-07-17 DIAGNOSIS — M67.88 ACHILLES TENDINOSIS OF RIGHT LOWER EXTREMITY: Primary | ICD-10-CM

## 2017-07-17 DIAGNOSIS — M76.60 ACHILLES TENDON PAIN: ICD-10-CM

## 2017-07-17 NOTE — TELEPHONE ENCOUNTER
"1.   Patient asking for hydrocodone/acet refill, take rx downstairs to [facility] pharmacy.   2.    Patient asking for referral to a different MD for her L ankle pain.  States she has not had any xray or MRI done, \"he touched it just once\".  Has been seeing Dr. Pantoja since Feb.  Seemed better then was out of boot for 2 wks then pain returned at same level.    3.  Checking sugars 1-2 times a day, had a recent afternoon reading of 68, had a 78 also recently.  Feels she may need labs drawn or meds adjusted.  Fasting AM readings 109-130.  Currently taking Metformin 1000 mg twice daily.  She did schedule appt for this Wed. 7/19 to discuss further.  DEBBIE Villalobos R.N.    "

## 2017-07-17 NOTE — TELEPHONE ENCOUNTER
Patient stopped by clinic today wishing to have the fit of her CAM boot reviewed.  Reports that locking pins had fallen out of either side and she is not sure that her CAM boot is currently appropriately.  CAM boot was adjusted to allow ~ 15 degrees of PF with no DF.  Patient is also desiring to pursue physical therapy or other treatment at this time, as she is currently frustrated with her care.  Reviewed patient's chart - ok for physical therapy per protocol.  ISABELLA order placed.  Advised patient to f/u with Dr Pantoja is 2 - 4 weeks.    Froylan Black ATC

## 2017-07-18 RX ORDER — HYDROCODONE BITARTRATE AND ACETAMINOPHEN 5; 325 MG/1; MG/1
1 TABLET ORAL EVERY 8 HOURS PRN
Qty: 30 TABLET | Refills: 0 | Status: SHIPPED | OUTPATIENT
Start: 2017-07-18 | End: 2017-12-06

## 2017-07-18 NOTE — TELEPHONE ENCOUNTER
[facility] [phone]   [email or web address]   Call for a foot specialist     We can talk at appointment regarding blood sugars   Does she feel bad when she is at those levels?   We may need to decrease the dose of her medication

## 2017-07-18 NOTE — TELEPHONE ENCOUNTER
SUDHAKAR for patient to return call re: Farida's response/questions.  Norco rx has been taken down to [facility].  DEBBIE Villalobos R.N.

## 2017-07-19 ENCOUNTER — OFFICE VISIT (OUTPATIENT)
Dept: INTERNAL MEDICINE | Facility: CLINIC | Age: 42
End: 2017-07-19
Payer: COMMERCIAL

## 2017-07-19 ENCOUNTER — THERAPY VISIT (OUTPATIENT)
Dept: PHYSICAL THERAPY | Facility: CLINIC | Age: 42
End: 2017-07-19
Payer: COMMERCIAL

## 2017-07-19 VITALS
HEART RATE: 74 BPM | WEIGHT: 284 LBS | SYSTOLIC BLOOD PRESSURE: 112 MMHG | DIASTOLIC BLOOD PRESSURE: 72 MMHG | BODY MASS INDEX: 48.49 KG/M2 | OXYGEN SATURATION: 98 % | TEMPERATURE: 98.4 F | HEIGHT: 64 IN | RESPIRATION RATE: 12 BRPM

## 2017-07-19 DIAGNOSIS — J45.909 UNCOMPLICATED ASTHMA, UNSPECIFIED ASTHMA SEVERITY: ICD-10-CM

## 2017-07-19 DIAGNOSIS — M76.60 ACHILLES TENDON PAIN: ICD-10-CM

## 2017-07-19 DIAGNOSIS — J45.40 MODERATE PERSISTENT ASTHMA WITHOUT COMPLICATION: ICD-10-CM

## 2017-07-19 DIAGNOSIS — I10 ESSENTIAL HYPERTENSION WITH GOAL BLOOD PRESSURE LESS THAN 140/90: ICD-10-CM

## 2017-07-19 DIAGNOSIS — E78.5 HYPERLIPIDEMIA WITH TARGET LDL LESS THAN 70: ICD-10-CM

## 2017-07-19 DIAGNOSIS — M76.60 ACHILLES TENDON PAIN: Primary | ICD-10-CM

## 2017-07-19 DIAGNOSIS — J45.30 MILD PERSISTENT ASTHMA WITHOUT COMPLICATION: Primary | ICD-10-CM

## 2017-07-19 DIAGNOSIS — E11.9 TYPE 2 DIABETES MELLITUS WITHOUT COMPLICATION, WITHOUT LONG-TERM CURRENT USE OF INSULIN (H): ICD-10-CM

## 2017-07-19 LAB
ALBUMIN SERPL-MCNC: 3.6 G/DL (ref 3.4–5)
ALP SERPL-CCNC: 82 U/L (ref 40–150)
ALT SERPL W P-5'-P-CCNC: 18 U/L (ref 0–50)
ANION GAP SERPL CALCULATED.3IONS-SCNC: 10 MMOL/L (ref 3–14)
AST SERPL W P-5'-P-CCNC: 12 U/L (ref 0–45)
BASOPHILS # BLD AUTO: 0 10E9/L (ref 0–0.2)
BASOPHILS NFR BLD AUTO: 0.5 %
BILIRUB SERPL-MCNC: 0.4 MG/DL (ref 0.2–1.3)
BUN SERPL-MCNC: 12 MG/DL (ref 7–30)
CALCIUM SERPL-MCNC: 8.5 MG/DL (ref 8.5–10.1)
CHLORIDE SERPL-SCNC: 108 MMOL/L (ref 94–109)
CHOLEST SERPL-MCNC: 135 MG/DL
CO2 SERPL-SCNC: 25 MMOL/L (ref 20–32)
CREAT SERPL-MCNC: 0.72 MG/DL (ref 0.52–1.04)
DIFFERENTIAL METHOD BLD: NORMAL
EOSINOPHIL # BLD AUTO: 0.3 10E9/L (ref 0–0.7)
EOSINOPHIL NFR BLD AUTO: 3.1 %
ERYTHROCYTE [DISTWIDTH] IN BLOOD BY AUTOMATED COUNT: 14.1 % (ref 10–15)
GFR SERPL CREATININE-BSD FRML MDRD: 89 ML/MIN/1.7M2
GLUCOSE SERPL-MCNC: 111 MG/DL (ref 70–99)
HBA1C MFR BLD: 5.9 % (ref 4.3–6)
HCT VFR BLD AUTO: 38.2 % (ref 35–47)
HDLC SERPL-MCNC: 55 MG/DL
HGB BLD-MCNC: 12.4 G/DL (ref 11.7–15.7)
LDLC SERPL CALC-MCNC: 62 MG/DL
LYMPHOCYTES # BLD AUTO: 2.1 10E9/L (ref 0.8–5.3)
LYMPHOCYTES NFR BLD AUTO: 26 %
MCH RBC QN AUTO: 26.7 PG (ref 26.5–33)
MCHC RBC AUTO-ENTMCNC: 32.5 G/DL (ref 31.5–36.5)
MCV RBC AUTO: 82 FL (ref 78–100)
MONOCYTES # BLD AUTO: 0.6 10E9/L (ref 0–1.3)
MONOCYTES NFR BLD AUTO: 6.8 %
NEUTROPHILS # BLD AUTO: 5.2 10E9/L (ref 1.6–8.3)
NEUTROPHILS NFR BLD AUTO: 63.6 %
NONHDLC SERPL-MCNC: 80 MG/DL
PLATELET # BLD AUTO: 203 10E9/L (ref 150–450)
POTASSIUM SERPL-SCNC: 3.4 MMOL/L (ref 3.4–5.3)
PROT SERPL-MCNC: 7.5 G/DL (ref 6.8–8.8)
RBC # BLD AUTO: 4.65 10E12/L (ref 3.8–5.2)
SODIUM SERPL-SCNC: 143 MMOL/L (ref 133–144)
TRIGL SERPL-MCNC: 91 MG/DL
TSH SERPL DL<=0.005 MIU/L-ACNC: 1.08 MU/L (ref 0.4–4)
WBC # BLD AUTO: 8.1 10E9/L (ref 4–11)

## 2017-07-19 PROCEDURE — 99214 OFFICE O/P EST MOD 30 MIN: CPT | Performed by: NURSE PRACTITIONER

## 2017-07-19 PROCEDURE — 83036 HEMOGLOBIN GLYCOSYLATED A1C: CPT | Performed by: NURSE PRACTITIONER

## 2017-07-19 PROCEDURE — 36415 COLL VENOUS BLD VENIPUNCTURE: CPT | Performed by: NURSE PRACTITIONER

## 2017-07-19 PROCEDURE — 97161 PT EVAL LOW COMPLEX 20 MIN: CPT | Mod: GP | Performed by: PHYSICAL THERAPIST

## 2017-07-19 PROCEDURE — 97110 THERAPEUTIC EXERCISES: CPT | Mod: GP | Performed by: PHYSICAL THERAPIST

## 2017-07-19 PROCEDURE — 80050 GENERAL HEALTH PANEL: CPT | Performed by: NURSE PRACTITIONER

## 2017-07-19 PROCEDURE — 80061 LIPID PANEL: CPT | Performed by: NURSE PRACTITIONER

## 2017-07-19 RX ORDER — ATORVASTATIN CALCIUM 10 MG/1
10 TABLET, FILM COATED ORAL DAILY
Qty: 90 TABLET | Refills: 1 | Status: SHIPPED | OUTPATIENT
Start: 2017-07-19 | End: 2017-12-29

## 2017-07-19 RX ORDER — MONTELUKAST SODIUM 10 MG/1
TABLET ORAL
Qty: 90 TABLET | Refills: 3 | Status: SHIPPED | OUTPATIENT
Start: 2017-07-19 | End: 2018-04-27

## 2017-07-19 RX ORDER — ALBUTEROL SULFATE 90 UG/1
2 AEROSOL, METERED RESPIRATORY (INHALATION) EVERY 6 HOURS
Qty: 2 INHALER | Refills: 3 | Status: SHIPPED | OUTPATIENT
Start: 2017-07-19 | End: 2018-04-27

## 2017-07-19 RX ORDER — LISINOPRIL 2.5 MG/1
2.5 TABLET ORAL DAILY
Qty: 90 TABLET | Refills: 1 | Status: SHIPPED | OUTPATIENT
Start: 2017-07-19 | End: 2017-12-29

## 2017-07-19 NOTE — PROGRESS NOTES
SUBJECTIVE:                                                    Coni Newberry is a 42 year old female who presents to clinic today for the following health issues:      Diabetes Follow-up      Patient is checking blood sugars: 2 times     Diabetic concerns: None and other - low blood sugar ongoing - hard to eat well      Symptoms of hypoglycemia (low blood sugar): shaky, dizzy, weak     Paresthesias (numbness or burning in feet) or sores: Yes  Foot falling asleep at times on right foot      Date of last diabetic eye exam: due   Driving has Aldo light effect on eyes       Amount of exercise or physical activity: 2-3 days/week for an average of a few minutes related to boot on left foot     Problems taking medications regularly: No    Medication side effects: light headed and vision issues with low blood pressure   Diet: diabetic    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  Additional history: as documented    Patient Active Problem List   Diagnosis     Mild persistent asthma     Nummular eczema     Hyperglycemia     Health Care Home     Type 2 diabetes mellitus without complication (H)     Hyperlipidemia with target LDL less than 70     Lumbago     Thoracic or lumbosacral neuritis or radiculitis, unspecified     Nonallopathic lesion of lumbar region     Nonallopathic lesion of thoracic region     Obesity     History of left salpingo-oophorectomy     Hemorrhagic cyst of ovary     Essential hypertension with goal blood pressure less than 140/90     Achilles tendon pain     Past Surgical History:   Procedure Laterality Date     CYSTECTOMY OVARIAN BENIGN Left 2/23/2016    Procedure: CYSTECTOMY OVARIAN BENIGN;  Surgeon: Marisela Lopez MD;  Location: [facility] OR     NONE OF THE ABOVE CORE MEASURE DIAGNOSES        "SALPINGO-OOPHORECTOMY, COMBINED Left 2/23/2016    Procedure: COMBINED SALPINGO-OOPHORECTOMY;  Surgeon: Marisela Lopez MD;  Location:  OR       Social History   Substance Use Topics     Smoking status: Never Smoker     Smokeless tobacco: Never Used     Alcohol use 0.0 oz/week     0 Standard drinks or equivalent per week      Comment: occassionally     Family History   Problem Relation Age of Onset     DIABETES Mother      DIABETES Father      Hypertension Father      DIABETES Sister      CANCER Paternal Grandfather              Reviewed and updated as needed this visit by clinical staffTobacco  Allergies  Meds  Soc Hx      Reviewed and updated as needed this visit by Provider  Allergies         ROS:  Constitutional, HEENT, cardiovascular, pulmonary, GI, , musculoskeletal, neuro, skin, endocrine and psych systems are negative, except as otherwise noted.      OBJECTIVE:   /72 (BP Location: Left arm, Patient Position: Chair, Cuff Size: Adult Regular)  Pulse 74  Temp 98.4  F (36.9  C) (Oral)  Resp 12  Ht 5' 4\" (1.626 m)  Wt 284 lb (128.8 kg)  LMP 07/05/2017  SpO2 98%  BMI 48.75 kg/m2  Body mass index is 48.75 kg/(m^2).  GENERAL: alert and no distress  RESP: lungs clear to auscultation - no rales, rhonchi or wheezes  CV: regular rate and rhythm, normal S1 S2, no S3 or S4, no murmur, click or rub, no peripheral edema  ABDOMEN: soft, nontender, and bowel sounds normal  MS: wearing boot on left leg   NEURO: Normal strength and tone, mentation intact and speech normal  PSYCH: mentation appears normal, affect normal/bright    Diagnostic Test Results:  Labs     ASSESSMENT/PLAN:             1. Type 2 diabetes mellitus without complication, without long-term current use of insulin (H)  Too well controlled and symptomatic with low blood sugars     2. Mild persistent asthma without complication  No change     3. Hyperlipidemia with target LDL less than 70  No change     4. Essential hypertension with goal " blood pressure less than 140/90  In good control and tolerating medication     5. Achilles tendon pain  [facility] referrral       Patient Instructions   Lab in [address]    [facility] [phone]   [email or web address]   Call for a foot specialist     Metformin 500 mg twice daily     FHN: [facility]  [phone]  [email or web address]        DANYELL Spencer [facility]

## 2017-07-19 NOTE — NURSING NOTE
"Chief Complaint   Patient presents with     Diabetes       Initial /72 (BP Location: Left arm, Patient Position: Chair, Cuff Size: Adult Regular)  Pulse 74  Temp 98.4  F (36.9  C) (Oral)  Resp 12  Ht 5' 4\" (1.626 m)  Wt 284 lb (128.8 kg)  LMP 07/05/2017  SpO2 98%  BMI 48.75 kg/m2 Estimated body mass index is 48.75 kg/(m^2) as calculated from the following:    Height as of this encounter: 5' 4\" (1.626 m).    Weight as of this encounter: 284 lb (128.8 kg).  Medication Reconciliation: complete     KVNG Louise      "

## 2017-07-19 NOTE — MR AVS SNAPSHOT
After Visit Summary   7/19/2017    Coni Newberry    MRN: [id number]           Patient Information     Date Of Birth          1975        Visit Information        Provider Department      7/19/2017 8:20 AM Farida Palencia APRN [facility]        Today's Diagnoses     Mild persistent asthma without complication    -  1    Type 2 diabetes mellitus without complication, without long-term current use of insulin (H)        Hyperlipidemia with target LDL less than 70        Essential hypertension with goal blood pressure less than 140/90        Achilles tendon pain        Uncomplicated asthma, unspecified asthma severity        Moderate persistent asthma without complication          Care Instructions    Lab in [address]    [facility] [phone]   [email or web address]   Call for a foot specialist     Metformin 500 mg twice daily     N: [facility]  [phone]  [email or web address]            Follow-ups after your visit        Additional Services     OPHTHALMOLOGY ADULT REFERRAL       Your provider has referred you to: N: [facility]  [phone]  [email or web address]    Please be aware that coverage of these services is subject to the terms and limitations of your health insurance plan.  Call member services at your health plan with any benefit or coverage questions.      Please bring the following with you to your appointment:    (1) Any X-Rays, CTs or MRIs which have been performed.  Contact the facility where they were done to arrange for  prior to your scheduled appointment.    (2) List of current medications  (3) This referral request   (4) Any documents/labs given to you for this referral                  Your next 10 appointments already scheduled     Jul 19, 2017 10:10 AM CDT   [facility] Extremity with Andrade Titus, PT    [facility] [place] [facility] ([facility] [place]  )    [address]  [address]  [facility] [place] [address]   [phone]            Aug 03, 2017  8:30 AM CDT   Return Visit with Jae Pantoja MD   [facility] ORTHOPEDIC SURGERY ([facility] [place])    [address]  [facility] [place] [address]   [phone]            Aug 11, 2017  1:40 PM CDT   Return Visit with Jae Pantoja MD   [facility] ORTHOPEDIC SURGERY ([facility] [place])    [address]  [facility] [place] [address]   [phone]              Who to contact     If you have questions or need follow up information about today's clinic visit or your schedule please contact [facility] directly at [phone].  Normal or non-critical lab and imaging results will be communicated to you by MyChart, letter or phone within 4 business days after the clinic has received the results. If you do not hear from us within 7 days, please contact the clinic through [employer] or phone. If you have a critical or abnormal lab result, we will notify you by phone as soon as possible.  Submit refill requests through [employer] or call your pharmacy and they will forward the refill request to us. Please allow 3 business days for your refill to be completed.          Additional Information About Your Visit        [employer] Information     [employer] gives you secure access to your electronic health record. If you see a primary care provider, you can also send messages to your care team and make appointments. If you have questions, please call your primary care clinic.  If you do not have a primary care provider, please call [phone] and they will assist you.        Care EveryWhere ID     This is your Care EveryWhere ID. This could be used by other organizations to access your [place] medical records  [id number]        Your Vitals Were     Pulse Temperature Respirations Height Last Period Pulse  "Oximetry    74 98.4  F (36.9  C) (Oral) 12 5' 4\" (1.626 m) 07/05/2017 98%    BMI (Body Mass Index)                   48.75 kg/m2            Blood Pressure from Last 3 Encounters:   07/19/17 112/72   05/01/17 115/80   04/10/17 112/85    Weight from Last 3 Encounters:   07/19/17 284 lb (128.8 kg)   07/05/17 284 lb (128.8 kg)   06/22/17 284 lb (128.8 kg)              We Performed the Following     CBC with platelets differential     Comprehensive metabolic panel     Hemoglobin A1c     Lipid panel reflex to direct LDL     OPHTHALMOLOGY ADULT REFERRAL     TSH with free T4 reflex          Today's Medication Changes          These changes are accurate as of: 7/19/17  8:44 AM.  If you have any questions, ask your nurse or doctor.               These medicines have changed or have updated prescriptions.        Dose/Directions    metFORMIN 500 MG tablet   Commonly known as:  GLUCOPHAGE   This may have changed:  how much to take   Used for:  Type 2 diabetes mellitus without complication, without long-term current use of insulin (H)   Changed by:  Farida Palencia APRN CNP        Dose:  500 mg   Take 1 tablet (500 mg) by mouth 2 times daily (with meals)   Quantity:  180 tablet   Refills:  1       montelukast 10 MG tablet   Commonly known as:  SINGULAIR   This may have changed:  See the new instructions.   Used for:  Uncomplicated asthma, unspecified asthma severity   Changed by:  Farida Palencia APRN CNP        TAKE ONE TABLET BY MOUTH AT BEDTIME   Quantity:  90 tablet   Refills:  3            Where to get your medicines      These medications were sent to [facility] [address] [place] [address]     Phone:  [phone]     albuterol 108 (90 BASE) MCG/ACT Inhaler    atorvastatin 10 MG tablet    lisinopril 2.5 MG tablet    metFORMIN 500 MG tablet    montelukast 10 MG tablet                Primary Care Provider Office Phone # Fax #    Farida Valle " DANYELL Palencia -[phone]       [facility] [place] [address]  [facility] [address]        Equal Access to Services     SARAH BETH SALMON : Jaya meza Somary beth, waaxda luqadaha, qaybta kaalmada adepan, benoit forrest kamrynshahla alexandercarlos aselena dias. So [facility] [phone].    ATENCIÓN: Si habla español, tiene a remy disposición servicios gratuitos de asistencia lingüística. Llame al [phone].    We comply with applicable federal civil rights laws and [place] laws. We do not discriminate on the basis of race, color, national origin, age, disability sex, sexual orientation or gender identity.            Thank you!     Thank you for choosing [facility]  for your care. Our goal is always to provide you with excellent care. Hearing back from our patients is one way we can continue to improve our services. Please take a few minutes to complete the written survey that you may receive in the mail after your visit with us. Thank you!             Your Updated Medication List - Protect others around you: Learn how to safely use, store and throw away your medicines at www.disposemymeds.org.          This list is accurate as of: 7/19/17  8:44 AM.  Always use your most recent med list.                   Brand Name Dispense Instructions for use Diagnosis    ACE/ARB NOT PRESCRIBED (INTENTIONAL)      Please choose reason not prescribed, below    Type 2 diabetes mellitus without complication, without long-term current use of insulin (H), Essential hypertension with goal blood pressure less than 140/90       albuterol 108 (90 BASE) MCG/ACT Inhaler    albuterol    2 Inhaler    Inhale 2 puffs into the lungs every 6 hours    Moderate persistent asthma without complication       atorvastatin 10 MG tablet    LIPITOR    90 tablet    Take 1 tablet (10 mg) by mouth daily    Hyperlipidemia with target LDL less than 70       beclomethasone 80 MCG/ACT Inhaler    QVAR    3 Inhaler    Inhale 2  puffs into the lungs 2 times daily    Mild persistent asthma without complication       blood glucose monitoring meter device kit     1 kit    Use to test blood sugars 1 times daily or as directed.    DM type 2, goal A1C below 8.0       * blood glucose monitoring test strip    no brand specified    600 strip    Use to test blood sugar 6 times daily or as directed.    Diabetes mellitus, new onset (H)       * blood glucose monitoring test strip    ONE TOUCH ULTRA    3 Box    Use to test blood sugars 1 times daily or as directed.    DM type 2, goal A1C below 8.0       cetirizine-psuedoePHEDrine 5-120 MG per 12 hr tablet    zyrTEC-D     Take 1 tablet by mouth daily Reported on 3/31/2017        fluticasone 50 MCG/ACT spray    FLONASE    3 Package    Spray 2 sprays into both nostrils daily    Asthma       HYDROcodone-acetaminophen 5-325 MG per tablet    NORCO    30 tablet    Take 1 tablet by mouth every 8 hours as needed for moderate to severe pain    Achilles tendon pain       ibuprofen 200 MG tablet    ADVIL/MOTRIN     Take 200 mg by mouth every 4 hours as needed for mild pain or fever Reported on 3/31/2017        ipratropium - albuterol 0.5 mg/2.5 mg/3 mL 0.5-2.5 (3) MG/3ML neb solution    DUONEB    90 vial    Take 1 vial (3 mLs) by nebulization every 6 hours as needed for shortness of breath / dyspnea or wheezing    Mild persistent asthma without complication       JOLIVETTE 0.35 MG per tablet   Generic drug:  norethindrone     84 tablet    Take 1 tablet (0.35 mg) by mouth daily    Encounter for surveillance of contraceptive pills       LANCETS ULTRA FINE Misc     300 each    1 each 6 times daily    Diabetes mellitus, new onset (H)       lisinopril 2.5 MG tablet    PRINIVIL/Zestril    90 tablet    Take 1 tablet (2.5 mg) by mouth daily    Essential hypertension with goal blood pressure less than 140/90       metFORMIN 500 MG tablet    GLUCOPHAGE    180 tablet    Take 1 tablet (500 mg) by mouth 2 times daily (with  meals)    Type 2 diabetes mellitus without complication, without long-term current use of insulin (H)       montelukast 10 MG tablet    SINGULAIR    90 tablet    TAKE ONE TABLET BY MOUTH AT BEDTIME    Uncomplicated asthma, unspecified asthma severity       nystatin ointment    MYCOSTATIN    60 g    Apply topically as needed    Yeast infection of the skin       omeprazole 20 MG tablet     90 tablet    Take 1 tablet (20 mg) by mouth daily Take 30-60 minutes before a meal.    Abdominal pain, epigastric       * order for DME     1 Device    Equipment being ordered: Nebulizer    Mild persistent asthma       * order for DME     2 Device    Equipment being ordered: neb tubing and cup    Mild persistent asthma without complication       * order for DME     1 Device    Equipment being ordered: CAM boot    Achilles tendon injury, left, initial encounter       piroxicam 20 MG capsule    FELDENE    30 capsule    Take 1 capsule (20 mg) by mouth daily (with breakfast)    Achilles tendon pain       triamcinolone 0.1 % ointment    KENALOG    90 g    Apply sparingly to affected area three times daily AS NEEDED FOR ECZEMA RASH    Nummular eczema       * Notice:  This list has 5 medication(s) that are the same as other medications prescribed for you. Read the directions carefully, and ask your doctor or other care provider to review them with you.

## 2017-07-19 NOTE — PATIENT INSTRUCTIONS
Lab in [address]    [facility] [phone]   [email or web address]   Call for a foot specialist     Metformin 500 mg twice daily     FH[facility]: [facility]  [phone]  [email or web address]

## 2017-07-19 NOTE — PROGRESS NOTES
Subjective:    Patient is a 42 year old female presenting with rehab left ankle/foot hpi.   Coni Newberry is a 42 year old female with a left ankle condition.  Condition occurred with:  Repetition/overuse.  Condition occurred: during recreation/sport.  This is a chronic condition  2-17-17 onset of left achilles tendon pain secondary to walking on a treadmill. Pt was put in a boot. Boot was removed for 2 weeks in March but put back on secondary to increased symptoms. Pt referred by MD for therapy on 7-17-17.        Pain is described as sharp, stabbing, aching and burning  and reported as 8/10.  Associated symptoms:  Loss of motion/stiffness and loss of strength. Pain is worse in the P.M..  Symptoms are exacerbated by standing, walking, ascending stairs and descending stairs and relieved by NSAID's, rest, ice and bracing/immobilizing.  Since onset symptoms are gradually improving.  Special testing: none.      General health as reported by patient is fair.  Pertinent medical history includes:  High blood pressure, overweight, asthma and diabetes.  Medical allergies: no.  Other surgeries include:  Other (left ovary removed).  Current medications:  Steroids and high blood pressure medication (diabetes medication).  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  Patient is working in normal job without restrictions.          Red flags:  None as reported by patient.                        Objective:      Gait:  Per boot        Flexibility/Screens:       Lower Extremity:  Decreased left lower extremity flexibility:Gastroc and Soleus            Ankle/Foot Evaluation  ROM:    AROM:    Dorsiflexion: Left:   2  Right:    Plantarflexion: Left:  55    Right:           PROM:    Dorsiflexion: Left:    5     Right:     Plantarflexion: Left:    60     Right:               Strength:    Dorsiflexion:  Left: 4/5     Pain:     Plantarflexion: Left: 4-/5   Weak/painful  Pain:     Inversion:Left: 4/5  Pain:       Eversion:Left: 4-/5   Weak/painful  Pain:                        PALPATION:   Left ankle tenderness present at:  gastroc/soleus and achilles tendon                                                          General     ROS    Assessment/Plan:      Patient is a 42 year old female with left side ankle complaints.    Patient has the following significant findings with corresponding treatment plan.                Diagnosis 1:  Left Achilles tendonitis  Pain -  hot/cold therapy, manual therapy, self management, education and home program  Decreased ROM/flexibility - manual therapy, therapeutic exercise, therapeutic activity and home program  Decreased strength - therapeutic exercise, therapeutic activities and home program    Therapy Evaluation Codes:   1) History comprised of:   Personal factors that impact the plan of care:      Time since onset of symptoms.    Comorbidity factors that impact the plan of care are:      Asthma, Diabetes, High blood pressure, Overweight and Weakness.     Medications impacting care: High blood pressure, Steroids and diabetes medication.  2) Examination of Body Systems comprised of:   Body structures and functions that impact the plan of care:      Ankle.   Activity limitations that impact the plan of care are:      Squatting/kneeling, Stairs, Standing and Walking.  3) Clinical presentation characteristics are:   Evolving/Changing.  4) Decision-Making    Moderate complexity using standardized patient assessment instrument and/or measureable assessment of functional outcome.  Cumulative Therapy Evaluation is: Moderate complexity.    Previous and current functional limitations:  (See Goal Flow Sheet for this information)    Short term and Long term goals: (See Goal Flow Sheet for this information)     Communication ability:  Patient appears to be able to clearly communicate and understand verbal and written communication and follow directions correctly.  Treatment Explanation - The following has been discussed with  the patient:   RX ordered/plan of care  Anticipated outcomes  Possible risks and side effects  This patient would benefit from PT intervention to resume normal activities.   Rehab potential is good.    Frequency:  1 X week, once daily  Duration:  for 6 weeks  Discharge Plan:  Achieve all LTG.  Independent in home treatment program.  Reach maximal therapeutic benefit.    Please refer to the daily flowsheet for treatment today, total treatment time and time spent performing 1:1 timed codes.

## 2017-07-19 NOTE — MR AVS SNAPSHOT
After Visit Summary   7/19/2017    Coni Newberry    MRN: [id number]           Patient Information     Date Of Birth          1975        Visit Information        Provider Department      7/19/2017 10:10 AM Andrade Titus, PT [facility]        Today's Diagnoses     Achilles tendon pain    -  1       Follow-ups after your visit        Your next 10 appointments already scheduled     Aug 03, 2017  7:00 AM CDT   ISABELLA Extremity with Fifi Paulino PTA   [facility]  [facility]    [id number] [place] [address]   [phone]            Aug 03, 2017  8:30 AM CDT   Return Visit with Jae Pantoja MD   [facility]THOPEDIC SURGERY ([employer])    [address] [place] [address]   [phone]            Aug 09, 2017 10:50 AM CDT   ISABELLA Extremity with Andrade Titus PT   [facility] ([facility]  )    [address] [place] [address]   [phone]            Aug 11, 2017  1:40 PM CDT   Return Visit with Jae Pantoja MD   [facility]OP[facility]IC SURGERY ([employer] [facility])    [address] [place] [address]   [phone]              Who to contact     If you have questions or need follow up information about today's clinic visit or your schedule please contact [facility] directly at [phone].  Normal or non-critical lab and imaging results will be communicated to you by MyChart, letter or phone within 4 business days after the clinic has received the results. If you do not hear from us within 7 days, please contact the clinic through MyChart or phone. If you have a critical or abnormal lab result, we will notify you by phone as soon as possible.  Submit refill requests through EV Connect or call your pharmacy and they will forward the refill request to us. Please allow 3 business days for your refill to be  completed.          Additional Information About Your Visit        Show de Ingressoshart Information     LK FREEMAN gives you secure access to your electronic health record. If you see a primary care provider, you can also send messages to your care team and make appointments. If you have questions, please call your primary care clinic.  If you do not have a primary care provider, please call [phone] and they will assist you.        Care EveryWhere ID     This is your Care EveryWhere ID. This could be used by other organizations to access your [place] medical records  [id number]        Your Vitals Were     Last Period                   07/05/2017            Blood Pressure from Last 3 Encounters:   07/19/17 112/72   05/01/17 115/80   04/10/17 112/85    Weight from Last 3 Encounters:   07/19/17 128.8 kg (284 lb)   07/05/17 128.8 kg (284 lb)   06/22/17 128.8 kg (284 lb)              We Performed the Following     ISABELLA Inital Eval Report     PT Eval, Low Complexity (77781)     Therapeutic Exercises          Today's Medication Changes          These changes are accurate as of: 7/19/17  2:52 PM.  If you have any questions, ask your nurse or doctor.               These medicines have changed or have updated prescriptions.        Dose/Directions    metFORMIN 500 MG tablet   Commonly known as:  GLUCOPHAGE   This may have changed:  how much to take   Used for:  Type 2 diabetes mellitus without complication, without long-term current use of insulin (H)   Changed by:  Farida Palencia APRN CNP        Dose:  500 mg   Take 1 tablet (500 mg) by mouth 2 times daily (with meals)   Quantity:  180 tablet   Refills:  1       montelukast 10 MG tablet   Commonly known as:  SINGULAIR   This may have changed:  See the new instructions.   Used for:  Uncomplicated asthma, unspecified asthma severity   Changed by:  Farida Palencia APRN CNP        TAKE ONE TABLET BY MOUTH AT BEDTIME   Quantity:  90 tablet   Refills:  3            Where to get  your medicines      These medications were sent to [id number] - [place], [place] - [address] [facility] [place]  [address] [facility] [place] [address]     Phone:  [phone]     albuterol 108 (90 BASE) MCG/ACT Inhaler    atorvastatin 10 MG tablet    lisinopril 2.5 MG tablet    metFORMIN 500 MG tablet    montelukast 10 MG tablet                Primary Care Provider Office Phone # Fax #    Farida Palencia, APRN [facility] [phone]       [facility] [address] [facility] [address]        Equal Access to Services     [facility]: Hadii aad ku hadasho Soomaali, waaxda luqadaha, qaybta kaalmada adeegyada, benoit forrest hayaashahla adecaitlin sinha . So [facility] [phone].    ATENCIÓN: Si habla español, tiene a remy disposición servicios gratuitos de asistencia lingüística. [facility] [phone].    We comply with applicable federal civil rights laws and [place] laws. We do not discriminate on the basis of race, color, national origin, age, disability sex, sexual orientation or gender identity.            Thank you!     Thank you for choosing [facility]  for your care. Our goal is always to provide you with excellent care. Hearing back from our patients is one way we can continue to improve our services. Please take a few minutes to complete the written survey that you may receive in the mail after your visit with us. Thank you!             Your Updated Medication List - Protect others around you: Learn how to safely use, store and throw away your medicines at www.disposemymeds.org.          This list is accurate as of: 7/19/17  2:52 PM.  Always use your most recent med list.                   Brand Name Dispense Instructions for use Diagnosis    ACE/ARB NOT PRESCRIBED (INTENTIONAL)      Please choose reason not prescribed, below    Type 2 diabetes mellitus without complication, without long-term current use of insulin (H), Essential hypertension with goal blood  pressure less than 140/90       albuterol 108 (90 BASE) MCG/ACT Inhaler    albuterol    2 Inhaler    Inhale 2 puffs into the lungs every 6 hours    Moderate persistent asthma without complication       atorvastatin 10 MG tablet    LIPITOR    90 tablet    Take 1 tablet (10 mg) by mouth daily    Hyperlipidemia with target LDL less than 70       beclomethasone 80 MCG/ACT Inhaler    QVAR    3 Inhaler    Inhale 2 puffs into the lungs 2 times daily    Mild persistent asthma without complication       blood glucose monitoring meter device kit     1 kit    Use to test blood sugars 1 times daily or as directed.    DM type 2, goal A1C below 8.0       * blood glucose monitoring test strip    no brand specified    600 strip    Use to test blood sugar 6 times daily or as directed.    Diabetes mellitus, new onset (H)       * blood glucose monitoring test strip    ONE TOUCH ULTRA    3 Box    Use to test blood sugars 1 times daily or as directed.    DM type 2, goal A1C below 8.0       cetirizine-psuedoePHEDrine 5-120 MG per 12 hr tablet    zyrTEC-D     Take 1 tablet by mouth daily Reported on 3/31/2017        fluticasone 50 MCG/ACT spray    FLONASE    3 Package    Spray 2 sprays into both nostrils daily    Asthma       HYDROcodone-acetaminophen 5-325 MG per tablet    NORCO    30 tablet    Take 1 tablet by mouth every 8 hours as needed for moderate to severe pain    Achilles tendon pain       ibuprofen 200 MG tablet    ADVIL/MOTRIN     Take 200 mg by mouth every 4 hours as needed for mild pain or fever Reported on 3/31/2017        ipratropium - albuterol 0.5 mg/2.5 mg/3 mL 0.5-2.5 (3) MG/3ML neb solution    DUONEB    90 vial    Take 1 vial (3 mLs) by nebulization every 6 hours as needed for shortness of breath / dyspnea or wheezing    Mild persistent asthma without complication       JOLIVETTE 0.35 MG per tablet   Generic drug:  norethindrone     84 tablet    Take 1 tablet (0.35 mg) by mouth daily    Encounter for surveillance of  contraceptive pills       LANCETS ULTRA FINE Misc     300 each    1 each 6 times daily    Diabetes mellitus, new onset (H)       lisinopril 2.5 MG tablet    PRINIVIL/Zestril    90 tablet    Take 1 tablet (2.5 mg) by mouth daily    Essential hypertension with goal blood pressure less than 140/90       metFORMIN 500 MG tablet    GLUCOPHAGE    180 tablet    Take 1 tablet (500 mg) by mouth 2 times daily (with meals)    Type 2 diabetes mellitus without complication, without long-term current use of insulin (H)       montelukast 10 MG tablet    SINGULAIR    90 tablet    TAKE ONE TABLET BY MOUTH AT BEDTIME    Uncomplicated asthma, unspecified asthma severity       nystatin ointment    MYCOSTATIN    60 g    Apply topically as needed    Yeast infection of the skin       omeprazole 20 MG tablet     90 tablet    Take 1 tablet (20 mg) by mouth daily Take 30-60 minutes before a meal.    Abdominal pain, epigastric       * order for DME     1 Device    Equipment being ordered: Nebulizer    Mild persistent asthma       * order for DME     2 Device    Equipment being ordered: neb tubing and cup    Mild persistent asthma without complication       * order for DME     1 Device    Equipment being ordered: CAM boot    Achilles tendon injury, left, initial encounter       piroxicam 20 MG capsule    FELDENE    30 capsule    Take 1 capsule (20 mg) by mouth daily (with breakfast)    Achilles tendon pain       triamcinolone 0.1 % ointment    KENALOG    90 g    Apply sparingly to affected area three times daily AS NEEDED FOR ECZEMA RASH    Nummular eczema       * Notice:  This list has 5 medication(s) that are the same as other medications prescribed for you. Read the directions carefully, and ask your doctor or other care provider to review them with you.

## 2017-08-03 ENCOUNTER — OFFICE VISIT (OUTPATIENT)
Dept: ORTHOPEDICS | Facility: CLINIC | Age: 42
End: 2017-08-03
Payer: COMMERCIAL

## 2017-08-03 ENCOUNTER — THERAPY VISIT (OUTPATIENT)
Dept: PHYSICAL THERAPY | Facility: CLINIC | Age: 42
End: 2017-08-03
Payer: COMMERCIAL

## 2017-08-03 VITALS — WEIGHT: 284 LBS | BODY MASS INDEX: 48.49 KG/M2 | HEIGHT: 64 IN

## 2017-08-03 DIAGNOSIS — M76.60 ACHILLES TENDINITIS, UNSPECIFIED LATERALITY: Primary | ICD-10-CM

## 2017-08-03 DIAGNOSIS — M76.60 ACHILLES TENDON PAIN: ICD-10-CM

## 2017-08-03 PROCEDURE — 97110 THERAPEUTIC EXERCISES: CPT | Mod: GP | Performed by: PHYSICAL THERAPY ASSISTANT

## 2017-08-03 PROCEDURE — 99212 OFFICE O/P EST SF 10 MIN: CPT | Performed by: ORTHOPAEDIC SURGERY

## 2017-08-03 NOTE — LETTER
"    8/3/2017         RE: Coni Newberry  [address]  [facility] [address]        Dear Colleague,    Thank you for referring your patient, Coni Newberry, to the [facility] ORTHOPEDIC SURGERY. Please see a copy of my visit note below.    HISTORY OF PRESENT ILLNESS:    Coni Newberry is a 42 year old female who is seen in follow up for left achilles injury. She is wearing the walking boot when she leaves the house. She is continuing with PT. She notes pain is worse when out of the boot but she doesn't want to wear it while at home. .  Present symptoms: pain (4/10 currently)  Treatments tried to this point: PT, rest, walking boot    PHYSICAL EXAM:  Ht 5' 4\" (1.626 m)  Wt 284 lb (128.8 kg)  LMP 07/05/2017  BMI 48.75 kg/m2  Body mass index is 48.75 kg/(m^2).   GENERAL APPEARANCE: healthy, alert and no distress   SKIN: no suspicious lesions or rashes  NEURO: Normal strength and tone, mentation intact and speech normal  VASCULAR:  good pulses, and cappillary refill   LYMPH: no lymphadenopathy   PSYCH:  mentation appears normal and affect normal/bright    MSK:   No erythema, ecchymosis, nor edema. There is no prominence to the tendo Achillesat its insertin on the calcaneus.    IMAGING INTERPRETATION:       ASSESSMENT / PLAN:resolving Achilles enthesopathy. She's going to very gradually wean herself out of the cam Walker boot.      Perico Pantoja MD  Dept. Orthopedic Surgery  [facility]       Again, thank you for allowing me to participate in the care of your patient.        Sincerely,        Jae Pantoja MD    "

## 2017-08-03 NOTE — MR AVS SNAPSHOT
After Visit Summary   8/3/2017    Coni Newberry    MRN: [id number]           Patient Information     Date Of Birth          1975        Visit Information        Provider Department      8/3/2017 8:30 AM Jae Pantoja MD [facility] ORTHOPEDIC SURGERY        Today's Diagnoses     Achilles tendinitis, unspecified laterality    -  1       Follow-ups after your visit        Your next 10 appointments already scheduled     Aug 19, 2017 10:00 AM CDT   IS[facility]ELLA Extremity with Andrade Titus, PT   [facility] ([facility]  )    [address] [place] [address]   [phone]            Aug 23, 2017  7:30 AM CDT   IS[facility] Extremity with Andrade Titus, PT   [facility]    [address] [place] [address]   [phone]            Aug 29, 2017  7:30 AM CDT   ISABELLA Extremity with Andrade Titus, PT   [facility] ([facility]  )    [address] [place] [address]   [phone]              Who to contact     If you have questions or need follow up information about today's clinic visit or your schedule please contact [facility] ORTHOPEDIC SURGERY directly at [phone].  Normal or non-critical lab and imaging results will be communicated to you by Bonoboshart, letter or phone within 4 business days after the clinic has received the results. If you do not hear from us within 7 days, please contact the clinic through Bonoboshart or phone. If you have a critical or abnormal lab result, we will notify you by phone as soon as possible.  Submit refill requests through TicketsNow or call your pharmacy and they will forward the refill request to us. Please allow 3 business days for your refill to be completed.          Additional Information About Your Visit        BonobosharProofpoint Information     TicketsNow gives you secure access to your electronic health record. If you see a primary care provider, you can also send  "messages to your care team and make appointments. If you have questions, please call your primary care clinic.  If you do not have a primary care provider, please call [phone] and they will assist you.        Care EveryWhere ID     This is your Care EveryWhere ID. This could be used by other organizations to access your [place] medical records  [id number]        Your Vitals Were     Height Last Period BMI (Body Mass Index)             5' 4\" (1.626 m) 07/05/2017 48.75 kg/m2          Blood Pressure from Last 3 Encounters:   07/19/17 112/72   05/01/17 115/80   04/10/17 112/85    Weight from Last 3 Encounters:   08/03/17 284 lb (128.8 kg)   07/19/17 284 lb (128.8 kg)   07/05/17 284 lb (128.8 kg)              Today, you had the following     No orders found for display       Primary Care Provider Office Phone # Fax #    Farida Palencia, DANYELL [facility] [phone]       [address] [facility] [address]        Equal Access to Services     [facility]: Hadii aad ku hadasho Soomaali, waaxda luqadaha, qaybta kaalmada adecaitlinyada, benoit sinha . So [facility] [phone].    ATENCIÓN: Si habla español, tiene a remy disposición servicios gratuitos de asistencia lingüística. Yossi al [phone].    We comply with applicable federal civil rights laws and [place] laws. We do not discriminate on the basis of race, color, national origin, age, disability sex, sexual orientation or gender identity.            Thank you!     Thank you for choosing [facility] ORTHOPEDIC SURGERY  for your care. Our goal is always to provide you with excellent care. Hearing back from our patients is one way we can continue to improve our services. Please take a few minutes to complete the written survey that you may receive in the mail after your visit with us. Thank you!             Your Updated Medication List - Protect others around you: Learn how to safely use, store and throw away your " medicines at www.disposemymeds.org.          This list is accurate as of: 8/3/17 11:59 PM.  Always use your most recent med list.                   Brand Name Dispense Instructions for use Diagnosis    ACE/ARB NOT PRESCRIBED (INTENTIONAL)      Please choose reason not prescribed, below    Type 2 diabetes mellitus without complication, without long-term current use of insulin (H), Essential hypertension with goal blood pressure less than 140/90       albuterol 108 (90 BASE) MCG/ACT Inhaler    albuterol    2 Inhaler    Inhale 2 puffs into the lungs every 6 hours    Moderate persistent asthma without complication       atorvastatin 10 MG tablet    LIPITOR    90 tablet    Take 1 tablet (10 mg) by mouth daily    Hyperlipidemia with target LDL less than 70       beclomethasone 80 MCG/ACT Inhaler    QVAR    3 Inhaler    Inhale 2 puffs into the lungs 2 times daily    Mild persistent asthma without complication       blood glucose monitoring meter device kit     1 kit    Use to test blood sugars 1 times daily or as directed.    DM type 2, goal A1C below 8.0       * blood glucose monitoring test strip    no brand specified    600 strip    Use to test blood sugar 6 times daily or as directed.    Diabetes mellitus, new onset (H)       * blood glucose monitoring test strip    ONE TOUCH ULTRA    3 Box    Use to test blood sugars 1 times daily or as directed.    DM type 2, goal A1C below 8.0       cetirizine-psuedoePHEDrine 5-120 MG per 12 hr tablet    zyrTEC-D     Take 1 tablet by mouth daily Reported on 3/31/2017        fluticasone 50 MCG/ACT spray    FLONASE    3 Package    Spray 2 sprays into both nostrils daily    Asthma       HYDROcodone-acetaminophen 5-325 MG per tablet    NORCO    30 tablet    Take 1 tablet by mouth every 8 hours as needed for moderate to severe pain    Achilles tendon pain       ibuprofen 200 MG tablet    ADVIL/MOTRIN     Take 200 mg by mouth every 4 hours as needed for mild pain or fever Reported on  3/31/2017        ipratropium - albuterol 0.5 mg/2.5 mg/3 mL 0.5-2.5 (3) MG/3ML neb solution    DUONEB    90 vial    Take 1 vial (3 mLs) by nebulization every 6 hours as needed for shortness of breath / dyspnea or wheezing    Mild persistent asthma without complication       JOLIVETTE 0.35 MG per tablet   Generic drug:  norethindrone     84 tablet    Take 1 tablet (0.35 mg) by mouth daily    Encounter for surveillance of contraceptive pills       LANCETS ULTRA FINE Misc     300 each    1 each 6 times daily    Diabetes mellitus, new onset (H)       lisinopril 2.5 MG tablet    PRINIVIL/Zestril    90 tablet    Take 1 tablet (2.5 mg) by mouth daily    Essential hypertension with goal blood pressure less than 140/90       metFORMIN 500 MG tablet    GLUCOPHAGE    180 tablet    Take 1 tablet (500 mg) by mouth 2 times daily (with meals)    Type 2 diabetes mellitus without complication, without long-term current use of insulin (H)       montelukast 10 MG tablet    SINGULAIR    90 tablet    TAKE ONE TABLET BY MOUTH AT BEDTIME    Uncomplicated asthma, unspecified asthma severity       nystatin ointment    MYCOSTATIN    60 g    Apply topically as needed    Yeast infection of the skin       omeprazole 20 MG tablet     90 tablet    Take 1 tablet (20 mg) by mouth daily Take 30-60 minutes before a meal.    Abdominal pain, epigastric       * order for DME     1 Device    Equipment being ordered: Nebulizer    Mild persistent asthma       * order for DME     2 Device    Equipment being ordered: neb tubing and cup    Mild persistent asthma without complication       * order for DME     1 Device    Equipment being ordered: CAM boot    Achilles tendon injury, left, initial encounter       piroxicam 20 MG capsule    FELDENE    30 capsule    Take 1 capsule (20 mg) by mouth daily (with breakfast)    Achilles tendon pain       triamcinolone 0.1 % ointment    KENALOG    90 g    Apply sparingly to affected area three times daily AS NEEDED FOR  ECZEMA RASH    Nummular eczema       * Notice:  This list has 5 medication(s) that are the same as other medications prescribed for you. Read the directions carefully, and ask your doctor or other care provider to review them with you.

## 2017-08-03 NOTE — PROGRESS NOTES
"HISTORY OF PRESENT ILLNESS:    Coni Newberry is a 42 year old female who is seen in follow up for left achilles injury. She is wearing the walking boot when she leaves the house. She is continuing with PT. She notes pain is worse when out of the boot but she doesn't want to wear it while at home. .  Present symptoms: pain (4/10 currently)  Treatments tried to this point: PT, rest, walking boot    PHYSICAL EXAM:  Ht 5' 4\" (1.626 m)  Wt 284 lb (128.8 kg)  LMP 07/05/2017  BMI 48.75 kg/m2  Body mass index is 48.75 kg/(m^2).   GENERAL APPEARANCE: healthy, alert and no distress   SKIN: no suspicious lesions or rashes  NEURO: Normal strength and tone, mentation intact and speech normal  VASCULAR:  good pulses, and cappillary refill   LYMPH: no lymphadenopathy   PSYCH:  mentation appears normal and affect normal/bright    MSK:   No erythema, ecchymosis, nor edema. There is no prominence to the tendo Achillesat its insertin on the calcaneus.    IMAGING INTERPRETATION:       ASSESSMENT / PLAN:resolving Achilles enthesopathy. She's going to very gradually wean herself out of the cam Walker boot.      Perico Pantoja MD  Dept. Orthopedic Surgery  [facility]     "

## 2017-08-03 NOTE — NURSING NOTE
"No chief complaint on file.      Initial Ht 5' 4\" (1.626 m)  Wt 284 lb (128.8 kg)  LMP 07/05/2017  BMI 48.75 kg/m2 Estimated body mass index is 48.75 kg/(m^2) as calculated from the following:    Height as of this encounter: 5' 4\" (1.626 m).    Weight as of this encounter: 284 lb (128.8 kg).  Medication Reconciliation: complete  "

## 2017-08-09 ENCOUNTER — THERAPY VISIT (OUTPATIENT)
Dept: PHYSICAL THERAPY | Facility: CLINIC | Age: 42
End: 2017-08-09
Payer: COMMERCIAL

## 2017-08-09 DIAGNOSIS — M76.60 ACHILLES TENDON PAIN: ICD-10-CM

## 2017-08-09 PROCEDURE — 97110 THERAPEUTIC EXERCISES: CPT | Mod: GP | Performed by: PHYSICAL THERAPIST

## 2017-08-09 PROCEDURE — 97530 THERAPEUTIC ACTIVITIES: CPT | Mod: GP | Performed by: PHYSICAL THERAPIST

## 2017-08-09 NOTE — PROGRESS NOTES
Subjective:    HPI                    Objective:    System    Physical Exam    General     ROS    Assessment/Plan:      SUBJECTIVE  Subjective changes as noted by pt:  Pt notes decreased pain despite being busy at work.      Current pain level: 5/10     Changes in function:  Pt notes increased ease with climbing ladders and squatting.     Adverse reaction to treatment or activity:  None    OBJECTIVE  Changes in objective findings:  Left ankle AROM DF 8, PROM 12. Pt demonstrates improved strength left GS musculature        ASSESSMENT  Coni Brar continues to require intervention to meet STG and LTG's: PT  Patient's symptoms are resolving.  Response to therapy has shown an improvement in  pain level, ROM , strength and function  Progress made towards STG/LTG?  Yes,     PLAN  Current treatment program is being advanced to more complex exercises.    PTA/ATC plan:  N/A    Please refer to the daily flowsheet for treatment today, total treatment time and time spent performing 1:1 timed codes.

## 2017-08-09 NOTE — MR AVS SNAPSHOT
After Visit Summary   8/9/2017    Coni Newberry    MRN: [id number]           Patient Information     Date Of Birth          1975        Visit Information        Provider Department      8/9/2017 10:50 AM Andrade Titus, PT [facility]        Today's Diagnoses     Achilles tendon pain           Follow-ups after your visit        Your next 10 appointments already scheduled     Aug 19, 2017 10:00 AM CDT   [facility] Extremity with Andrade Titus, PT   [facility] ([facility]  )    [id number] [place] [address]   [phone]            Aug 23, 2017  7:30 AM CDT   ISABELLA Extremity with Andrade Titus, PT   [facility] ([facility]LLA Rives  )    [id number] [place] [address]   [phone]            Aug 29, 2017  7:30 AM CDT   ISABELLA Extremity with Andrade Titus, PT   [facility] ([facility] Rives  )    [address] [place] [address]   [phone]              Who to contact     If you have questions or need follow up information about today's clinic visit or your schedule please contact [facility] directly at [phone].  Normal or non-critical lab and imaging results will be communicated to you by [employer] Parking[employer], letter or phone within 4 business days after the clinic has received the results. If you do not hear from us within 7 days, please contact the clinic through [employer] Parking[employer] or phone. If you have a critical or abnormal lab result, we will notify you by phone as soon as possible.  Submit refill requests through [employer] [address] or call your pharmacy and they will forward the refill request to us. Please allow 3 business days for your refill to be completed.          Additional Information About Your Visit        VIP Parkinghart Information     [employer] [address] gives you secure access to your electronic health record. If you see a primary care provider, you can also send messages to your care team and make appointments. If you have  questions, please call your primary care clinic.  If you do not have a primary care provider, please call [phone] and they will assist you.        Care EveryWhere ID     This is your Care EveryWhere ID. This could be used by other organizations to access your [place] medical records  [id number]        Your Vitals Were     Last Period                   07/05/2017            Blood Pressure from Last 3 Encounters:   07/19/17 112/72   05/01/17 115/80   04/10/17 112/85    Weight from Last 3 Encounters:   08/03/17 128.8 kg (284 lb)   07/19/17 128.8 kg (284 lb)   07/05/17 128.8 kg (284 lb)              We Performed the Following     Therapeutic Activities     Therapeutic Exercises        Primary Care Provider Office Phone # Fax #    DANYELL Spencer [facility] [phone]       [address]  [facility] [address]        Equal Access to Services     CONSTANTIN SALMON : Hadii aad ku hadasho Soomaali, waaxda luqadaha, qaybta kaalmada adeegyada, waxay idiin hayolivian dorothy sinha . So [facility] [phone].    ATENCIÓN: Si habla español, tiene a remy disposición servicios gratuitos de asistencia lingüística. Llame al [phone].    We comply with applicable federal civil rights laws and [place] laws. We do not discriminate on the basis of race, color, national origin, age, disability sex, sexual orientation or gender identity.            Thank you!     Thank you for choosing [facility]  for your care. Our goal is always to provide you with excellent care. Hearing back from our patients is one way we can continue to improve our services. Please take a few minutes to complete the written survey that you may receive in the mail after your visit with us. Thank you!             Your Updated Medication List - Protect others around you: Learn how to safely use, store and throw away your medicines at www.disposemymeds.org.          This list is accurate as of: 8/9/17 11:42 AM.  Always use your most  recent med list.                   Brand Name Dispense Instructions for use Diagnosis    ACE/ARB NOT PRESCRIBED (INTENTIONAL)      Please choose reason not prescribed, below    Type 2 diabetes mellitus without complication, without long-term current use of insulin (H), Essential hypertension with goal blood pressure less than 140/90       albuterol 108 (90 BASE) MCG/ACT Inhaler    albuterol    2 Inhaler    Inhale 2 puffs into the lungs every 6 hours    Moderate persistent asthma without complication       atorvastatin 10 MG tablet    LIPITOR    90 tablet    Take 1 tablet (10 mg) by mouth daily    Hyperlipidemia with target LDL less than 70       beclomethasone 80 MCG/ACT Inhaler    QVAR    3 Inhaler    Inhale 2 puffs into the lungs 2 times daily    Mild persistent asthma without complication       blood glucose monitoring meter device kit     1 kit    Use to test blood sugars 1 times daily or as directed.    DM type 2, goal A1C below 8.0       * blood glucose monitoring test strip    no brand specified    600 strip    Use to test blood sugar 6 times daily or as directed.    Diabetes mellitus, new onset (H)       * blood glucose monitoring test strip    ONE TOUCH ULTRA    3 Box    Use to test blood sugars 1 times daily or as directed.    DM type 2, goal A1C below 8.0       cetirizine-psuedoePHEDrine 5-120 MG per 12 hr tablet    zyrTEC-D     Take 1 tablet by mouth daily Reported on 3/31/2017        fluticasone 50 MCG/ACT spray    FLONASE    3 Package    Spray 2 sprays into both nostrils daily    Asthma       HYDROcodone-acetaminophen 5-325 MG per tablet    NORCO    30 tablet    Take 1 tablet by mouth every 8 hours as needed for moderate to severe pain    Achilles tendon pain       ibuprofen 200 MG tablet    ADVIL/MOTRIN     Take 200 mg by mouth every 4 hours as needed for mild pain or fever Reported on 3/31/2017        ipratropium - albuterol 0.5 mg/2.5 mg/3 mL 0.5-2.5 (3) MG/3ML neb solution    DUONEB    90 vial     Take 1 vial (3 mLs) by nebulization every 6 hours as needed for shortness of breath / dyspnea or wheezing    Mild persistent asthma without complication       JOLIVETTE 0.35 MG per tablet   Generic drug:  norethindrone     84 tablet    Take 1 tablet (0.35 mg) by mouth daily    Encounter for surveillance of contraceptive pills       LANCETS ULTRA FINE Misc     300 each    1 each 6 times daily    Diabetes mellitus, new onset (H)       lisinopril 2.5 MG tablet    PRINIVIL/Zestril    90 tablet    Take 1 tablet (2.5 mg) by mouth daily    Essential hypertension with goal blood pressure less than 140/90       metFORMIN 500 MG tablet    GLUCOPHAGE    180 tablet    Take 1 tablet (500 mg) by mouth 2 times daily (with meals)    Type 2 diabetes mellitus without complication, without long-term current use of insulin (H)       montelukast 10 MG tablet    SINGULAIR    90 tablet    TAKE ONE TABLET BY MOUTH AT BEDTIME    Uncomplicated asthma, unspecified asthma severity       nystatin ointment    MYCOSTATIN    60 g    Apply topically as needed    Yeast infection of the skin       omeprazole 20 MG tablet     90 tablet    Take 1 tablet (20 mg) by mouth daily Take 30-60 minutes before a meal.    Abdominal pain, epigastric       * order for DME     1 Device    Equipment being ordered: Nebulizer    Mild persistent asthma       * order for DME     2 Device    Equipment being ordered: neb tubing and cup    Mild persistent asthma without complication       * order for DME     1 Device    Equipment being ordered: CAM boot    Achilles tendon injury, left, initial encounter       piroxicam 20 MG capsule    FELDENE    30 capsule    Take 1 capsule (20 mg) by mouth daily (with breakfast)    Achilles tendon pain       triamcinolone 0.1 % ointment    KENALOG    90 g    Apply sparingly to affected area three times daily AS NEEDED FOR ECZEMA RASH    Nummular eczema       * Notice:  This list has 5 medication(s) that are the same as other medications  prescribed for you. Read the directions carefully, and ask your doctor or other care provider to review them with you.

## 2017-08-18 ENCOUNTER — THERAPY VISIT (OUTPATIENT)
Dept: PHYSICAL THERAPY | Facility: CLINIC | Age: 42
End: 2017-08-18
Payer: COMMERCIAL

## 2017-08-18 DIAGNOSIS — M76.60 ACHILLES TENDON PAIN: ICD-10-CM

## 2017-08-18 PROCEDURE — 97530 THERAPEUTIC ACTIVITIES: CPT | Mod: GP | Performed by: PHYSICAL THERAPIST

## 2017-08-18 PROCEDURE — 97110 THERAPEUTIC EXERCISES: CPT | Mod: GP | Performed by: PHYSICAL THERAPIST

## 2017-08-18 NOTE — MR AVS SNAPSHOT
After Visit Summary   8/18/2017    Coni Newberry    MRN: [id number]           Patient Information     Date Of Birth          1975        Visit Information        Provider Department      8/18/2017 8:50 AM Andrade Titus, PT [facility]        Today's Diagnoses     Achilles tendon pain           Follow-ups after your visit        Your next 10 appointments already scheduled     Aug 25, 2017  8:40 AM CDT   New Visit with Jae Pantoja MD   [facility] ORTHOPEDIC SURGERY ([facility] [place])    [address]  [address]  [facility] [address]   [phone]            Aug 29, 2017  7:30 AM CDT   ISABELLA Extremity with ANGELA Dee PT ([facility] [place]  )    [address]  [address]  [facility] [address] [phone]              Who to contact     If you have questions or need follow up information about today's clinic visit or your schedule please contact [facility] directly at [phone].  Normal or non-critical lab and imaging results will be communicated to you by Happlinkhart, letter or phone within 4 business days after the clinic has received the results. If you do not hear from us within 7 days, please contact the clinic through [employer] or phone. If you have a critical or abnormal lab result, we will notify you by phone as soon as possible.  Submit refill requests through [employer] or call your pharmacy and they will forward the refill request to us. Please allow 3 business days for your refill to be completed.          Additional Information About Your Visit        Happlinkhart Information     [employer] gives you secure access to your electronic health record. If you see a primary care provider, you can also send messages to your care team and make appointments. If you have questions, please call your primary care clinic.  If you do not have a primary care provider, please call [phone] and they will assist you.        Care  EveryWhere ID     This is your Care EveryWhere ID. This could be used by other organizations to access your [facility] medical records  [id number]        Your Vitals Were     Last Period                   07/05/2017            Blood Pressure from Last 3 Encounters:   07/19/17 112/72   05/01/17 115/80   04/10/17 112/85    Weight from Last 3 Encounters:   08/03/17 128.8 kg (284 lb)   07/19/17 128.8 kg (284 lb)   07/05/17 128.8 kg (284 lb)              We Performed the Following     Therapeutic Activities     Therapeutic Exercises        Primary Care Provider Office Phone # Fax #    Farida Rubiosarah Palencia, APRN [facility] [phone]       [address] [facility] [address]        Equal Access to Services     SARAH BETH SALMON : Hadii nelli church hadasho Soomaali, waaxda luqadaha, qaybta kaalmada adeegyada, benoit daltonin hayjuliana sinha . So [facility] [phone].    ATENCIÓN: Si habla español, tiene a remy disposición servicios gratuitos de asistencia lingüística. Llame al [phone].    We comply with applicable federal civil rights laws and [place] laws. We do not discriminate on the basis of race, color, national origin, age, disability sex, sexual orientation or gender identity.            Thank you!     Thank you for choosing [facility]   for your care. Our goal is always to provide you with excellent care. Hearing back from our patients is one way we can continue to improve our services. Please take a few minutes to complete the written survey that you may receive in the mail after your visit with us. Thank you!             Your Updated Medication List - Protect others around you: Learn how to safely use, store and throw away your medicines at www.disposemymeds.org.          This list is accurate as of: 8/18/17  9:24 AM.  Always use your most recent med list.                   Brand Name Dispense Instructions for use Diagnosis    ACE/ARB NOT PRESCRIBED (INTENTIONAL)      Please choose reason not  prescribed, below    Type 2 diabetes mellitus without complication, without long-term current use of insulin (H), Essential hypertension with goal blood pressure less than 140/90       albuterol 108 (90 BASE) MCG/ACT Inhaler    albuterol    2 Inhaler    Inhale 2 puffs into the lungs every 6 hours    Moderate persistent asthma without complication       atorvastatin 10 MG tablet    LIPITOR    90 tablet    Take 1 tablet (10 mg) by mouth daily    Hyperlipidemia with target LDL less than 70       beclomethasone 80 MCG/ACT Inhaler    QVAR    3 Inhaler    Inhale 2 puffs into the lungs 2 times daily    Mild persistent asthma without complication       blood glucose monitoring meter device kit     1 kit    Use to test blood sugars 1 times daily or as directed.    DM type 2, goal A1C below 8.0       * blood glucose monitoring test strip    no brand specified    600 strip    Use to test blood sugar 6 times daily or as directed.    Diabetes mellitus, new onset (H)       * blood glucose monitoring test strip    ONE TOUCH ULTRA    3 Box    Use to test blood sugars 1 times daily or as directed.    DM type 2, goal A1C below 8.0       cetirizine-psuedoePHEDrine 5-120 MG per 12 hr tablet    zyrTEC-D     Take 1 tablet by mouth daily Reported on 3/31/2017        fluticasone 50 MCG/ACT spray    FLONASE    3 Package    Spray 2 sprays into both nostrils daily    Asthma       HYDROcodone-acetaminophen 5-325 MG per tablet    NORCO    30 tablet    Take 1 tablet by mouth every 8 hours as needed for moderate to severe pain    Achilles tendon pain       ibuprofen 200 MG tablet    ADVIL/MOTRIN     Take 200 mg by mouth every 4 hours as needed for mild pain or fever Reported on 3/31/2017        ipratropium - albuterol 0.5 mg/2.5 mg/3 mL 0.5-2.5 (3) MG/3ML neb solution    DUONEB    90 vial    Take 1 vial (3 mLs) by nebulization every 6 hours as needed for shortness of breath / dyspnea or wheezing    Mild persistent asthma without complication        JOLIVETTE 0.35 MG per tablet   Generic drug:  norethindrone     84 tablet    Take 1 tablet (0.35 mg) by mouth daily    Encounter for surveillance of contraceptive pills       LANCETS ULTRA FINE Misc     300 each    1 each 6 times daily    Diabetes mellitus, new onset (H)       lisinopril 2.5 MG tablet    PRINIVIL/Zestril    90 tablet    Take 1 tablet (2.5 mg) by mouth daily    Essential hypertension with goal blood pressure less than 140/90       metFORMIN 500 MG tablet    GLUCOPHAGE    180 tablet    Take 1 tablet (500 mg) by mouth 2 times daily (with meals)    Type 2 diabetes mellitus without complication, without long-term current use of insulin (H)       montelukast 10 MG tablet    SINGULAIR    90 tablet    TAKE ONE TABLET BY MOUTH AT BEDTIME    Uncomplicated asthma, unspecified asthma severity       nystatin ointment    MYCOSTATIN    60 g    Apply topically as needed    Yeast infection of the skin       omeprazole 20 MG tablet     90 tablet    Take 1 tablet (20 mg) by mouth daily Take 30-60 minutes before a meal.    Abdominal pain, epigastric       * order for DME     1 Device    Equipment being ordered: Nebulizer    Mild persistent asthma       * order for DME     2 Device    Equipment being ordered: neb tubing and cup    Mild persistent asthma without complication       * order for DME     1 Device    Equipment being ordered: CAM boot    Achilles tendon injury, left, initial encounter       piroxicam 20 MG capsule    FELDENE    30 capsule    Take 1 capsule (20 mg) by mouth daily (with breakfast)    Achilles tendon pain       triamcinolone 0.1 % ointment    KENALOG    90 g    Apply sparingly to affected area three times daily AS NEEDED FOR ECZEMA RASH    Nummular eczema       * Notice:  This list has 5 medication(s) that are the same as other medications prescribed for you. Read the directions carefully, and ask your doctor or other care provider to review them with you.

## 2017-08-18 NOTE — PROGRESS NOTES
Subjective:    HPI                    Objective:    System    Physical Exam    General     ROS    Assessment/Plan:      SUBJECTIVE  Subjective changes as noted by pt:  Pt notes increased strength and mobility     Current pain level: 4/10 Current Pain level: 4/10   Changes in function:  Pt notes increased ease with ambulation     Adverse reaction to treatment or activity:  None    OBJECTIVE  Changes in objective findings:  Increased functional dorsiflexion with ambulation. AROM DF 13, PROM DF 15        ASSESSMENT  Coni Brar continues to require intervention to meet STG and LTG's: PT  Patient's symptoms are resolving.  Response to therapy has shown an improvement in  pain level, strength and function  Progress made towards STG/LTG?  Yes,     PLAN  Current treatment program is being advanced to more complex exercises.    PTA/ATC plan:  N/A    Please refer to the daily flowsheet for treatment today, total treatment time and time spent performing 1:1 timed codes.

## 2017-08-25 ENCOUNTER — RADIANT APPOINTMENT (OUTPATIENT)
Dept: GENERAL RADIOLOGY | Facility: CLINIC | Age: 42
End: 2017-08-25
Attending: ORTHOPAEDIC SURGERY
Payer: COMMERCIAL

## 2017-08-25 ENCOUNTER — OFFICE VISIT (OUTPATIENT)
Dept: ORTHOPEDICS | Facility: CLINIC | Age: 42
End: 2017-08-25
Payer: COMMERCIAL

## 2017-08-25 VITALS — BODY MASS INDEX: 48.49 KG/M2 | HEIGHT: 64 IN | WEIGHT: 284 LBS | HEART RATE: 70 BPM

## 2017-08-25 DIAGNOSIS — M25.561 RIGHT KNEE PAIN, UNSPECIFIED CHRONICITY: Primary | ICD-10-CM

## 2017-08-25 PROCEDURE — 73562 X-RAY EXAM OF KNEE 3: CPT | Mod: RT

## 2017-08-25 PROCEDURE — 20610 DRAIN/INJ JOINT/BURSA W/O US: CPT | Mod: RT | Performed by: ORTHOPAEDIC SURGERY

## 2017-08-25 PROCEDURE — 99213 OFFICE O/P EST LOW 20 MIN: CPT | Mod: 25 | Performed by: ORTHOPAEDIC SURGERY

## 2017-08-25 NOTE — NURSING NOTE
"Chief Complaint   Patient presents with     Musculoskeletal Problem     right knee       Initial Pulse 70  Ht 5' 4\" (1.626 m)  Wt 284 lb (128.8 kg)  BMI 48.75 kg/m2 Estimated body mass index is 48.75 kg/(m^2) as calculated from the following:    Height as of this encounter: 5' 4\" (1.626 m).    Weight as of this encounter: 284 lb (128.8 kg).  Medication Reconciliation: complete     Trevor White MS, ATC      "

## 2017-08-25 NOTE — MR AVS SNAPSHOT
"              After Visit Summary   8/25/2017    Coni Newberry    MRN: [id number]           Patient Information     Date Of Birth          1975        Visit Information        Provider Department      8/25/2017 8:40 AM Jae Pantoja MD [facility] ORTHOPEDIC SURGERY        Today's Diagnoses     Right knee pain, unspecified chronicity    -  1       Follow-ups after your visit        Who to contact     If you have questions or need follow up information about today's clinic visit or your schedule please contact [facility] ORTHOPEDIC SURGERY directly at [phone].  Normal or non-critical lab and imaging results will be communicated to you by Enecsyshart, letter or phone within 4 business days after the clinic has received the results. If you do not hear from us within 7 days, please contact the clinic through [employer] or phone. If you have a critical or abnormal lab result, we will notify you by phone as soon as possible.  Submit refill requests through [employer]weetworks or call your pharmacy and they will forward the refill request to us. Please allow 3 business days for your refill to be completed.          Additional Information About Your Visit        MyChart Information     [employer] gives you secure access to your electronic health record. If you see a primary care provider, you can also send messages to your care team and make appointments. If you have questions, please call your primary care clinic.  If you do not have a primary care provider, please call [phone] and they will assist you.        Care EveryWhere ID     This is your Care EveryWhere ID. This could be used by other organizations to access your [facility] medical records  [id number]        Your Vitals Were     Pulse Height BMI (Body Mass Index)             70 5' 4\" (1.626 m) 48.75 kg/m2          Blood Pressure from Last 3 Encounters:   07/19/17 112/72   05/01/17 115/80   04/10/17 112/85    Weight from Last 3 Encounters:   08/25/17 " 284 lb (128.8 kg)   08/03/17 284 lb (128.8 kg)   07/19/17 284 lb (128.8 kg)              We Performed the Following     Depo Medrol 10 MG,  20 MG , or 40 MG    [] (Same charge for all MG)     DEXAMETHASONE SODIUM PHOS PER 1 MG     Large Joint/Bursa injection and/or drainage (Shoulder, Knee)     XR Knee Right 3 Views          Today's Medication Changes          These changes are accurate as of: 8/25/17 11:59 PM.  If you have any questions, ask your nurse or doctor.               Start taking these medicines.        Dose/Directions    dexamethasone 4 MG/ML injection   Commonly known as:  DECADRON   Used for:  Right knee pain, unspecified chronicity   Started by:  Jae Pantoja MD        Dose:  4 mg   Inject 1 mL (4 mg) as directed once for 1 dose Use 4 mg or dose determined by provider for iontophoresis.   Quantity:  1 mL   Refills:  0       methylPREDNISolone acetate 40 MG/ML injection   Commonly known as:  DEPO-MEDROL   Used for:  Right knee pain, unspecified chronicity   Started by:  Jae Pantoja MD        Dose:  40 mg   1 mL (40 mg) by INTRA-ARTICULAR route once for 1 dose   Quantity:  1 mL   Refills:  0            Where to get your medicines      Some of these will need a paper prescription and others can be bought over the counter.  Ask your nurse if you have questions.     You don't need a prescription for these medications     dexamethasone 4 MG/ML injection    methylPREDNISolone acetate 40 MG/ML injection                Primary Care Provider Office Phone # Fax #    DANYELL [facility] [phone]       [address] [facility] [address]        Equal Access to Services     [facility] AH: Hadii nelli church hadasho Soomaali, waaxda luqadaha, qaybta kaalmada adeegyada, benoit dias. So [facility] [phone].    ATENCIÓN: Si habla español, tiene a remy disposición servicios gratuitos de asistencia lingüística. Llame al [phone].    We  comply with applicable federal civil rights laws and [place] laws. We do not discriminate on the basis of race, color, national origin, age, disability sex, sexual orientation or gender identity.            Thank you!     Thank you for choosing [facility] ORTHOPEDIC SURGERY  for your care. Our goal is always to provide you with excellent care. Hearing back from our patients is one way we can continue to improve our services. Please take a few minutes to complete the written survey that you may receive in the mail after your visit with us. Thank you!             Your Updated Medication List - Protect others around you: Learn how to safely use, store and throw away your medicines at www.disposemymeds.org.          This list is accurate as of: 8/25/17 11:59 PM.  Always use your most recent med list.                   Brand Name Dispense Instructions for use Diagnosis    ACE/ARB NOT PRESCRIBED (INTENTIONAL)      Please choose reason not prescribed, below    Type 2 diabetes mellitus without complication, without long-term current use of insulin (H), Essential hypertension with goal blood pressure less than 140/90       albuterol 108 (90 BASE) MCG/ACT Inhaler    PROAIR HFA    2 Inhaler    Inhale 2 puffs into the lungs every 6 hours    Moderate persistent asthma without complication       atorvastatin 10 MG tablet    LIPITOR    90 tablet    Take 1 tablet (10 mg) by mouth daily    Hyperlipidemia with target LDL less than 70       beclomethasone 80 MCG/ACT Inhaler    QVAR    3 Inhaler    Inhale 2 puffs into the lungs 2 times daily    Mild persistent asthma without complication       blood glucose monitoring meter device kit     1 kit    Use to test blood sugars 1 times daily or as directed.    DM type 2, goal A1C below 8.0       * blood glucose monitoring test strip    no brand specified    600 strip    Use to test blood sugar 6 times daily or as directed.    Diabetes mellitus, new onset (H)       * blood glucose  monitoring test strip    ONE TOUCH ULTRA    3 Box    Use to test blood sugars 1 times daily or as directed.    DM type 2, goal A1C below 8.0       cetirizine-psuedoePHEDrine 5-120 MG per 12 hr tablet    zyrTEC-D     Take 1 tablet by mouth daily Reported on 3/31/2017        dexamethasone 4 MG/ML injection    DECADRON    1 mL    Inject 1 mL (4 mg) as directed once for 1 dose Use 4 mg or dose determined by provider for iontophoresis.    Right knee pain, unspecified chronicity       fluticasone 50 MCG/ACT spray    FLONASE    3 Package    Spray 2 sprays into both nostrils daily    Asthma       HYDROcodone-acetaminophen 5-325 MG per tablet    NORCO    30 tablet    Take 1 tablet by mouth every 8 hours as needed for moderate to severe pain    Achilles tendon pain       ibuprofen 200 MG tablet    ADVIL/MOTRIN     Take 200 mg by mouth every 4 hours as needed for mild pain or fever Reported on 3/31/2017        ipratropium - albuterol 0.5 mg/2.5 mg/3 mL 0.5-2.5 (3) MG/3ML neb solution    DUONEB    90 vial    Take 1 vial (3 mLs) by nebulization every 6 hours as needed for shortness of breath / dyspnea or wheezing    Mild persistent asthma without complication       JOLIVETTE 0.35 MG per tablet   Generic drug:  norethindrone     84 tablet    Take 1 tablet (0.35 mg) by mouth daily    Encounter for surveillance of contraceptive pills       LANCETS ULTRA FINE Misc     300 each    1 each 6 times daily    Diabetes mellitus, new onset (H)       lisinopril 2.5 MG tablet    PRINIVIL/Zestril    90 tablet    Take 1 tablet (2.5 mg) by mouth daily    Essential hypertension with goal blood pressure less than 140/90       metFORMIN 500 MG tablet    GLUCOPHAGE    180 tablet    Take 1 tablet (500 mg) by mouth 2 times daily (with meals)    Type 2 diabetes mellitus without complication, without long-term current use of insulin (H)       methylPREDNISolone acetate 40 MG/ML injection    DEPO-MEDROL    1 mL    1 mL (40 mg) by INTRA-ARTICULAR route  once for 1 dose    Right knee pain, unspecified chronicity       montelukast 10 MG tablet    SINGULAIR    90 tablet    TAKE ONE TABLET BY MOUTH AT BEDTIME    Uncomplicated asthma, unspecified asthma severity       nystatin ointment    MYCOSTATIN    60 g    Apply topically as needed    Yeast infection of the skin       omeprazole 20 MG tablet     90 tablet    Take 1 tablet (20 mg) by mouth daily Take 30-60 minutes before a meal.    Abdominal pain, epigastric       * order for DME     1 Device    Equipment being ordered: Nebulizer    Mild persistent asthma       * order for DME     2 Device    Equipment being ordered: neb tubing and cup    Mild persistent asthma without complication       * order for DME     1 Device    Equipment being ordered: CAM boot    Achilles tendon injury, left, initial encounter       piroxicam 20 MG capsule    FELDENE    30 capsule    Take 1 capsule (20 mg) by mouth daily (with breakfast)    Achilles tendon pain       triamcinolone 0.1 % ointment    KENALOG    90 g    Apply sparingly to affected area three times daily AS NEEDED FOR ECZEMA RASH    Nummular eczema       * Notice:  This list has 5 medication(s) that are the same as other medications prescribed for you. Read the directions carefully, and ask your doctor or other care provider to review them with you.

## 2017-08-25 NOTE — PROGRESS NOTES
HISTORY OF PRESENT ILLNESS:    Coni Newberry is a 42 year old female who is seen as self referral for right knee pain    Present symptoms: felt pop and pain after squatting about ten days ago.  Feels like kneecap is shifting out of place.  Has had some swelling.  Pain with up and down stairs, feels unstable on knee  Treatments tried to this point: ice, IBU, [place]  Orthopedic PMH: no previous knee issues     Past Medical History:   Diagnosis Date     HTN (hypertension)      Hyperlipidaemia LDL goal <100      Mild persistent asthma 7/29/2014     Nummular eczema        Past Surgical History:   Procedure Laterality Date     CYSTECTOMY OVARIAN BENIGN Left 2/23/2016    Procedure: CYSTECTOMY OVARIAN BENIGN;  Surgeon: Marisela Lopez MD;  Location: [facility] OR     NONE OF THE ABOVE CORE MEASURE DIAGNOSES       SALPINGO-OOPHORECTOMY, COMBINED Left 2/23/2016    Procedure: COMBINED SALPINGO-OOPHORECTOMY;  Surgeon: Marisela Lopez MD;  Location: [facility] OR       Family History   Problem Relation Age of Onset     DIABETES Mother      DIABETES Father      Hypertension Father      DIABETES Sister      CANCER Paternal Grandfather        Social History     Social History     Marital status:      Spouse name: N/A     Number of children: N/A     Years of education: N/A     Occupational History     Not on file.     Social History Main Topics     Smoking status: Never Smoker     Smokeless tobacco: Never Used     Alcohol use 0.0 oz/week     0 Standard drinks or equivalent per week      Comment: occassionally     Drug use: No     Sexual activity: Yes     Partners: Male     Other Topics Concern     Not on file     Social History Narrative       Current Outpatient Prescriptions   Medication Sig Dispense Refill     metFORMIN (GLUCOPHAGE) 500 MG tablet Take 1 tablet (500 mg) by mouth 2 times daily (with meals) 180 tablet 1     atorvastatin (LIPITOR) 10 MG tablet Take 1 tablet (10 mg) by mouth daily 90 tablet 1     lisinopril  (PRINIVIL/ZESTRIL) 2.5 MG tablet Take 1 tablet (2.5 mg) by mouth daily 90 tablet 1     montelukast (SINGULAIR) 10 MG tablet TAKE ONE TABLET BY MOUTH AT BEDTIME 90 tablet 3     albuterol (ALBUTEROL) 108 (90 BASE) MCG/ACT Inhaler Inhale 2 puffs into the lungs every 6 hours 2 Inhaler 3     HYDROcodone-acetaminophen (NORCO) 5-325 MG per tablet Take 1 tablet by mouth every 8 hours as needed for moderate to severe pain 30 tablet 0     order for DME Equipment being ordered: CAM boot 1 Device 0     JOLIVETTE 0.35 MG per tablet Take 1 tablet (0.35 mg) by mouth daily 84 tablet 1     piroxicam (FELDENE) 20 MG capsule Take 1 capsule (20 mg) by mouth daily (with breakfast) 30 capsule 1     nystatin (MYCOSTATIN) ointment Apply topically as needed 60 g 3     triamcinolone (KENALOG) 0.1 % ointment Apply sparingly to affected area three times daily AS NEEDED FOR ECZEMA RASH 90 g 1     ACE/ARB NOT PRESCRIBED, INTENTIONAL, Please choose reason not prescribed, below       omeprazole 20 MG tablet Take 1 tablet (20 mg) by mouth daily Take 30-60 minutes before a meal. (Patient not taking: Reported on 8/3/2017) 90 tablet 2     ipratropium - albuterol 0.5 mg/2.5 mg/3 mL (DUONEB) 0.5-2.5 (3) MG/3ML nebulization Take 1 vial (3 mLs) by nebulization every 6 hours as needed for shortness of breath / dyspnea or wheezing 90 vial 3     beclomethasone (QVAR) 80 MCG/ACT Inhaler Inhale 2 puffs into the lungs 2 times daily 3 Inhaler 3     order for DME Equipment being ordered: neb tubing and cup 2 Device prn     blood glucose monitoring (ONE TOUCH ULTRA 2) meter device kit Use to test blood sugars 1 times daily or as directed. 1 kit 0     blood glucose monitoring (ONE TOUCH ULTRA) test strip Use to test blood sugars 1 times daily or as directed. 3 Box 1     ORDER FOR DME Equipment being ordered: Nebulizer 1 Device prn     blood glucose test strip Use to test blood sugar 6 times daily or as directed. 600 strip 3     LANCETS ULTRA FINE MISC 1 each 6  "times daily 300 each 2     cetirizine-psuedoePHEDrine (ZYRTEC-D) 5-120 MG per tablet Take 1 tablet by mouth daily Reported on 3/31/2017       ibuprofen (ADVIL,MOTRIN) 200 MG tablet Take 200 mg by mouth every 4 hours as needed for mild pain or fever Reported on 3/31/2017       fluticasone (FLONASE) 50 MCG/ACT nasal spray Spray 2 sprays into both nostrils daily 3 Package 3       Allergies   Allergen Reactions     Seasonal Allergies        REVIEW OF SYSTEMS:  CONSTITUTIONAL:  NEGATIVE for fever, chills, change in weight  INTEGUMENTARY/SKIN:  NEGATIVE for worrisome rashes, moles or lesions  EYES:  NEGATIVE for vision changes or irritation  ENT/MOUTH:  NEGATIVE for ear, mouth and throat problems  RESP:  NEGATIVE for significant cough or SOB  BREAST:  NEGATIVE for masses, tenderness or discharge  CV:  NEGATIVE for chest pain, palpitations or peripheral edema  GI:  NEGATIVE for nausea, abdominal pain, heartburn, or change in bowel habits  :  Negative   MUSCULOSKELETAL:  See HPI above  NEURO:  NEGATIVE for weakness, dizziness or paresthesias  ENDOCRINE:  NEGATIVE for temperature intolerance, skin/hair changes  HEME/ALLERGY/IMMUNE:  NEGATIVE for bleeding problems  PSYCHIATRIC:  NEGATIVE for changes in mood or affect      PHYSICAL EXAM:  Pulse 70  Ht 5' 4\" (1.626 m)  Wt 284 lb (128.8 kg)  BMI 48.75 kg/m2  Body mass index is 48.75 kg/(m^2).   GENERAL APPEARANCE: healthy, alert and no distress   SKIN: no suspicious lesions or rashes  NEURO: Normal strength and tone, mentation intact and speech normal  VASCULAR: Good pulses, and capillary refill   LYMPH: no lymphadenopathy   PSYCH:  mentation appears normal and affect normal/bright    MSK:  A&OX3, NAD  Neck supple, no lymphadenopathy  The patient ambulates without an antalgic gait.  The patient is able to get on and off the exam table without difficulty.      Examination of the spine reveals a normal lordosis to the cervical and lumbar spine, and a normal kyphosis to the " thoracic spine.  There is no clinical evidence of scoliosis.    The pelvis is clinically level.  Trendelenberg is negative.  The patient is non-tender to palpation over the greater trochanteric bursal region or piriformis fossa.  With the hip flexed to 90 degrees, internal and external rotation is 30/60 respectively,  with no pain .      KNEE: Examination of the right knee reveals the lower extremity to have a Normal anatomic alignment.  There is no erythema, ecchymosis nor edema.  There is an unclear effusion present clinically.  There is no significant warmth.  Range of motion is 0 to 120 degrees, without crepitus.  There is no tenderness to palpation at the both medial and lateral joint line.  Alondra's is negative without crepitus. The knee is ligamentously stable. Patello-femoral grind test is positive.      The calves and thighs are symmetric, without atrophy and non-tender to palpation. Jefe's sign is negative, bilaterally.   CMS is intact to the toes.        ASSESSMENT / PLAN: Clinical presentation of chondromalacia patella. Of concern no was the pop that she felt at the time of the injury. For both its diagnostic as well as therapeutic value going to offer her corticosteroid injection today. If she doesn't get significant symptomatic relief we'll get an MRI to look for meniscus pathology.    Imaging Interpretation:         Perico Pantoja MD  Department of Orthopedic Surgery        Disclaimer: This note consists of symbols derived from keyboarding, dictation and/or voice recognition software. As a result, there may be errors in the script that have gone undetected. Please consider this when interpreting information found in this chart.

## 2017-08-29 ENCOUNTER — THERAPY VISIT (OUTPATIENT)
Dept: PHYSICAL THERAPY | Facility: CLINIC | Age: 42
End: 2017-08-29
Payer: COMMERCIAL

## 2017-08-29 DIAGNOSIS — M76.60 ACHILLES TENDON PAIN: ICD-10-CM

## 2017-08-29 PROCEDURE — 97110 THERAPEUTIC EXERCISES: CPT | Mod: GP | Performed by: PHYSICAL THERAPIST

## 2017-08-29 PROCEDURE — 97530 THERAPEUTIC ACTIVITIES: CPT | Mod: GP | Performed by: PHYSICAL THERAPIST

## 2017-08-29 NOTE — MR AVS SNAPSHOT
After Visit Summary   8/29/2017    Coni Newberry    MRN: [id number]           Patient Information     Date Of Birth          1975        Visit Information        Provider Department      8/29/2017 7:30 AM Andrade Titus, PT [facility]        Today's Diagnoses     Achilles tendon pain           Follow-ups after your visit        Who to contact     If you have questions or need follow up information about today's clinic visit or your schedule please contact [facility] directly at [phone].  Normal or non-critical lab and imaging results will be communicated to you by [employer], letter or phone within 4 business days after the clinic has received the results. If you do not hear from us within 7 days, please contact the clinic through [employer] or phone. If you have a critical or abnormal lab result, we will notify you by phone as soon as possible.  Submit refill requests through [employer] or call your pharmacy and they will forward the refill request to us. Please allow 3 business days for your refill to be completed.          Additional Information About Your Visit        MyChart Information     [employer] gives you secure access to your electronic health record. If you see a primary care provider, you can also send messages to your care team and make appointments. If you have questions, please call your primary care clinic.  If you do not have a primary care provider, please call [phone] and they will assist you.        Care EveryWhere ID     This is your Care EveryWhere ID. This could be used by other organizations to access your [place] medical records  [id number]         Blood Pressure from Last 3 Encounters:   07/19/17 112/72   05/01/17 115/80   04/10/17 112/85    Weight from Last 3 Encounters:   08/25/17 128.8 kg (284 lb)   08/03/17 128.8 kg (284 lb)   07/19/17 128.8 kg (284 lb)              We Performed the Following     Therapeutic Activities     Therapeutic Exercises         Primary Care Provider Office Phone # Fax #    DANYELL Spencer [facility] [phone]       [address] [facility] [address]        Equal Access to Services     SARAH BETH SALMON : Hadii aad ku hadneidao Soomaali, waaxda luqadaha, qaybta kaalmada adeegyada, benoit harryn carmelitacaitlin razo bharath dias. So [facility] [place] [facility] [phone].    ATENCIÓN: Si habla español, tiene a remy disposición servicios gratuitos de asistencia lingüística. Llame al [phone].    We comply with applicable federal civil rights laws and [place] laws. We do not discriminate on the basis of race, color, national origin, age, disability sex, sexual orientation or gender identity.            Thank you!     Thank you for choosing [facility]  for your care. Our goal is always to provide you with excellent care. Hearing back from our patients is one way we can continue to improve our services. Please take a few minutes to complete the written survey that you may receive in the mail after your visit with us. Thank you!             Your Updated Medication List - Protect others around you: Learn how to safely use, store and throw away your medicines at www.disposemymeds.org.          This list is accurate as of: 8/29/17  8:05 AM.  Always use your most recent med list.                   Brand Name Dispense Instructions for use Diagnosis    ACE/ARB NOT PRESCRIBED (INTENTIONAL)      Please choose reason not prescribed, below    Type 2 diabetes mellitus without complication, without long-term current use of insulin (H), Essential hypertension with goal blood pressure less than 140/90       albuterol 108 (90 BASE) MCG/ACT Inhaler    PROAIR HFA    2 Inhaler    Inhale 2 puffs into the lungs every 6 hours    Moderate persistent asthma without complication       atorvastatin 10 MG tablet    LIPITOR    90 tablet    Take 1 tablet (10 mg) by mouth daily    Hyperlipidemia with target LDL less than 70       beclomethasone 80 MCG/ACT Inhaler     QVAR    3 Inhaler    Inhale 2 puffs into the lungs 2 times daily    Mild persistent asthma without complication       blood glucose monitoring meter device kit     1 kit    Use to test blood sugars 1 times daily or as directed.    DM type 2, goal A1C below 8.0       * blood glucose monitoring test strip    no brand specified    600 strip    Use to test blood sugar 6 times daily or as directed.    Diabetes mellitus, new onset (H)       * blood glucose monitoring test strip    ONE TOUCH ULTRA    3 Box    Use to test blood sugars 1 times daily or as directed.    DM type 2, goal A1C below 8.0       cetirizine-psuedoePHEDrine 5-120 MG per 12 hr tablet    zyrTEC-D     Take 1 tablet by mouth daily Reported on 3/31/2017        fluticasone 50 MCG/ACT spray    FLONASE    3 Package    Spray 2 sprays into both nostrils daily    Asthma       HYDROcodone-acetaminophen 5-325 MG per tablet    NORCO    30 tablet    Take 1 tablet by mouth every 8 hours as needed for moderate to severe pain    Achilles tendon pain       ibuprofen 200 MG tablet    ADVIL/MOTRIN     Take 200 mg by mouth every 4 hours as needed for mild pain or fever Reported on 3/31/2017        ipratropium - albuterol 0.5 mg/2.5 mg/3 mL 0.5-2.5 (3) MG/3ML neb solution    DUONEB    90 vial    Take 1 vial (3 mLs) by nebulization every 6 hours as needed for shortness of breath / dyspnea or wheezing    Mild persistent asthma without complication       JOLIVETTE 0.35 MG per tablet   Generic drug:  norethindrone     84 tablet    Take 1 tablet (0.35 mg) by mouth daily    Encounter for surveillance of contraceptive pills       LANCETS ULTRA FINE Misc     300 each    1 each 6 times daily    Diabetes mellitus, new onset (H)       lisinopril 2.5 MG tablet    PRINIVIL/Zestril    90 tablet    Take 1 tablet (2.5 mg) by mouth daily    Essential hypertension with goal blood pressure less than 140/90       metFORMIN 500 MG tablet    GLUCOPHAGE    180 tablet    Take 1 tablet (500 mg)  by mouth 2 times daily (with meals)    Type 2 diabetes mellitus without complication, without long-term current use of insulin (H)       montelukast 10 MG tablet    SINGULAIR    90 tablet    TAKE ONE TABLET BY MOUTH AT BEDTIME    Uncomplicated asthma, unspecified asthma severity       nystatin ointment    MYCOSTATIN    60 g    Apply topically as needed    Yeast infection of the skin       omeprazole 20 MG tablet     90 tablet    Take 1 tablet (20 mg) by mouth daily Take 30-60 minutes before a meal.    Abdominal pain, epigastric       * order for DME     1 Device    Equipment being ordered: Nebulizer    Mild persistent asthma       * order for DME     2 Device    Equipment being ordered: neb tubing and cup    Mild persistent asthma without complication       * order for DME     1 Device    Equipment being ordered: CAM boot    Achilles tendon injury, left, initial encounter       piroxicam 20 MG capsule    FELDENE    30 capsule    Take 1 capsule (20 mg) by mouth daily (with breakfast)    Achilles tendon pain       triamcinolone 0.1 % ointment    KENALOG    90 g    Apply sparingly to affected area three times daily AS NEEDED FOR ECZEMA RASH    Nummular eczema       * Notice:  This list has 5 medication(s) that are the same as other medications prescribed for you. Read the directions carefully, and ask your doctor or other care provider to review them with you.

## 2017-08-29 NOTE — PROGRESS NOTES
Subjective:    HPI                    Objective:    System    Physical Exam    General     ROS    Assessment/Plan:      SUBJECTIVE  Subjective changes as noted by pt:  Pt notes decreased pain and increased strength     Current pain level: 2/10     Changes in function:  Pt still notes difficulty ascending stairs. Pt feels she can stand and walk for 2 hours without difficulty     Adverse reaction to treatment or activity:  None    OBJECTIVE  Changes in objective findings:  Pt denies pain with resisted testing of the right ankle. Balance improved. AROM left ankle WNL      ASSESSMENT  Coni Brar continues to require intervention to meet STG and LTG's: PT  Patient's symptoms are resolving.  Response to therapy has shown an improvement in  pain level, strength and function  Progress made towards STG/LTG?  Yes,     PLAN  Current treatment program is being advanced to more complex exercises.    PTA/ATC plan:  N/A    Please refer to the daily flowsheet for treatment today, total treatment time and time spent performing 1:1 timed codes.

## 2017-09-14 RX ORDER — METHYLPREDNISOLONE ACETATE 40 MG/ML
40 INJECTION, SUSPENSION INTRA-ARTICULAR; INTRALESIONAL; INTRAMUSCULAR; SOFT TISSUE ONCE
Qty: 1 ML | Refills: 0 | OUTPATIENT
Start: 2017-09-14 | End: 2017-09-14

## 2017-09-14 RX ORDER — DEXAMETHASONE SODIUM PHOSPHATE 4 MG/ML
4 INJECTION, SOLUTION INTRA-ARTICULAR; INTRALESIONAL; INTRAMUSCULAR; INTRAVENOUS; SOFT TISSUE ONCE
Qty: 1 ML | Refills: 0 | OUTPATIENT
Start: 2017-09-14 | End: 2017-12-26

## 2017-09-19 DIAGNOSIS — Z30.41 ENCOUNTER FOR SURVEILLANCE OF CONTRACEPTIVE PILLS: ICD-10-CM

## 2017-09-19 NOTE — TELEPHONE ENCOUNTER
Renea      Last Written Prescription Date: 03/30/17  Last Fill Quantity: 84,  # refills: 1   Last Office Visit with G, P or [facility] prescribing provider: 07/19/17

## 2017-10-23 ENCOUNTER — ALLIED HEALTH/NURSE VISIT (OUTPATIENT)
Dept: NURSING | Facility: CLINIC | Age: 42
End: 2017-10-23
Payer: COMMERCIAL

## 2017-10-23 DIAGNOSIS — Z23 NEED FOR PROPHYLACTIC VACCINATION AND INOCULATION AGAINST INFLUENZA: Primary | ICD-10-CM

## 2017-10-23 PROCEDURE — 90686 IIV4 VACC NO PRSV 0.5 ML IM: CPT

## 2017-10-23 PROCEDURE — 90471 IMMUNIZATION ADMIN: CPT

## 2017-10-23 PROCEDURE — 99207 ZZC NO CHARGE NURSE ONLY: CPT | Mod: 25

## 2017-10-23 NOTE — MR AVS SNAPSHOT
After Visit Summary   10/23/2017    Coni Newberry    MRN: [id number]           Patient Information     Date Of Birth          1975        Visit Information        Provider Department      10/23/2017 9:30 AM RI IM NURSE [facility]        Today's Diagnoses     Need for prophylactic vaccination and inoculation against influenza    -  1       Follow-ups after your visit        Who to contact     If you have questions or need follow up information about today's clinic visit or your schedule please contact [facility] directly at [phone].  Normal or non-critical lab and imaging results will be communicated to you by [employer], letter or phone within 4 business days after the clinic has received the results. If you do not hear from us within 7 days, please contact the clinic through [employer] or phone. If you have a critical or abnormal lab result, we will notify you by phone as soon as possible.  Submit refill requests through [employer] or call your pharmacy and they will forward the refill request to us. Please allow 3 business days for your refill to be completed.          Additional Information About Your Visit        MyChart Information     [employer] gives you secure access to your electronic health record. If you see a primary care provider, you can also send messages to your care team and make appointments. If you have questions, please call your primary care clinic.  If you do not have a primary care provider, please call [phone] and they will assist you.        Care EveryWhere ID     This is your Care EveryWhere ID. This could be used by other organizations to access your [place] medical records  [id number]         Blood Pressure from Last 3 Encounters:   07/19/17 112/72   05/01/17 115/80   04/10/17 112/85    Weight from Last 3 Encounters:   08/25/17 284 lb (128.8 kg)   08/03/17 284 lb (128.8 kg)   07/19/17 284 lb (128.8 kg)              We Performed the  Following     FLU VAC, SPLIT VIRUS IM > 3 YO (QUADRIVALENT) [16147]     Vaccine Administration, Initial [70986]        Primary Care Provider Office Phone # Fax #    DANYELL Spencer [facility] [phone]       [address] [facility] [address]        Equal Access to Services     SARAH BETH SALMON : Hadii aad ku hadasho Soomaali, waaxda luqadaha, qaybta kaalmada adeegyada, waxay idiin hayaan adeeg kharash la'aan . So [facility] [phone].    ATENCIÓN: Si habla español, tiene a remy disposición servicios gratuitos de asistencia lingüística. Coryame al [phone].    We comply with applicable federal civil rights laws and [place] laws. We do not discriminate on the basis of race, color, national origin, age, disability, sex, sexual orientation, or gender identity.            Thank you!     Thank you for choosing [facility]  for your care. Our goal is always to provide you with excellent care. Hearing back from our patients is one way we can continue to improve our services. Please take a few minutes to complete the written survey that you may receive in the mail after your visit with us. Thank you!             Your Updated Medication List - Protect others around you: Learn how to safely use, store and throw away your medicines at www.disposemymeds.org.          This list is accurate as of: 10/23/17  9:51 AM.  Always use your most recent med list.                   Brand Name Dispense Instructions for use Diagnosis    ACE/ARB/ARNI NOT PRESCRIBED (INTENTIONAL)      Please choose reason not prescribed, below    Type 2 diabetes mellitus without complication, without long-term current use of insulin (H), Essential hypertension with goal blood pressure less than 140/90       albuterol 108 (90 BASE) MCG/ACT Inhaler    PROAIR HFA    2 Inhaler    Inhale 2 puffs into the lungs every 6 hours    Moderate persistent asthma without complication       atorvastatin 10 MG tablet    LIPITOR    90 tablet     Take 1 tablet (10 mg) by mouth daily    Hyperlipidemia with target LDL less than 70       beclomethasone 80 MCG/ACT Inhaler    QVAR    3 Inhaler    Inhale 2 puffs into the lungs 2 times daily    Mild persistent asthma without complication       blood glucose monitoring meter device kit     1 kit    Use to test blood sugars 1 times daily or as directed.    DM type 2, goal A1C below 8.0       * blood glucose monitoring test strip    no brand specified    600 strip    Use to test blood sugar 6 times daily or as directed.    Diabetes mellitus, new onset (H)       * blood glucose monitoring test strip    ONE TOUCH ULTRA    3 Box    Use to test blood sugars 1 times daily or as directed.    DM type 2, goal A1C below 8.0       cetirizine-psuedoePHEDrine 5-120 MG per 12 hr tablet    zyrTEC-D     Take 1 tablet by mouth daily Reported on 3/31/2017        dexamethasone 4 MG/ML injection    DECADRON    1 mL    Inject 1 mL (4 mg) as directed once for 1 dose Use 4 mg or dose determined by provider for iontophoresis.    Right knee pain, unspecified chronicity       fluticasone 50 MCG/ACT spray    FLONASE    3 Package    Spray 2 sprays into both nostrils daily    Asthma       HYDROcodone-acetaminophen 5-325 MG per tablet    NORCO    30 tablet    Take 1 tablet by mouth every 8 hours as needed for moderate to severe pain    Achilles tendon pain       ibuprofen 200 MG tablet    ADVIL/MOTRIN     Take 200 mg by mouth every 4 hours as needed for mild pain or fever Reported on 3/31/2017        ipratropium - albuterol 0.5 mg/2.5 mg/3 mL 0.5-2.5 (3) MG/3ML neb solution    DUONEB    90 vial    Take 1 vial (3 mLs) by nebulization every 6 hours as needed for shortness of breath / dyspnea or wheezing    Mild persistent asthma without complication       JOLIVETTE 0.35 MG per tablet   Generic drug:  norethindrone     84 tablet    TAKE 1 TABLET (0.35 MG) BY MOUTH DAILY    Encounter for surveillance of contraceptive pills       LANCETS ULTRA FINE  Misc     300 each    1 each 6 times daily    Diabetes mellitus, new onset (H)       lisinopril 2.5 MG tablet    PRINIVIL/Zestril    90 tablet    Take 1 tablet (2.5 mg) by mouth daily    Essential hypertension with goal blood pressure less than 140/90       metFORMIN 500 MG tablet    GLUCOPHAGE    180 tablet    Take 1 tablet (500 mg) by mouth 2 times daily (with meals)    Type 2 diabetes mellitus without complication, without long-term current use of insulin (H)       montelukast 10 MG tablet    SINGULAIR    90 tablet    TAKE ONE TABLET BY MOUTH AT BEDTIME    Uncomplicated asthma, unspecified asthma severity       nystatin ointment    MYCOSTATIN    60 g    Apply topically as needed    Yeast infection of the skin       omeprazole 20 MG tablet     90 tablet    Take 1 tablet (20 mg) by mouth daily Take 30-60 minutes before a meal.    Abdominal pain, epigastric       * order for DME     1 Device    Equipment being ordered: Nebulizer    Mild persistent asthma       * order for DME     2 Device    Equipment being ordered: neb tubing and cup    Mild persistent asthma without complication       * order for DME     1 Device    Equipment being ordered: CAM boot    Achilles tendon injury, left, initial encounter       piroxicam 20 MG capsule    FELDENE    30 capsule    Take 1 capsule (20 mg) by mouth daily (with breakfast)    Achilles tendon pain       triamcinolone 0.1 % ointment    KENALOG    90 g    Apply sparingly to affected area three times daily AS NEEDED FOR ECZEMA RASH    Nummular eczema       * Notice:  This list has 5 medication(s) that are the same as other medications prescribed for you. Read the directions carefully, and ask your doctor or other care provider to review them with you.

## 2017-10-23 NOTE — PROGRESS NOTES

## 2017-10-25 PROBLEM — M76.60 ACHILLES TENDON PAIN: Status: RESOLVED | Noted: 2017-03-30 | Resolved: 2017-10-25

## 2017-10-25 NOTE — PROGRESS NOTES
Subjective:SUBJECTIVE  Subjective changes as noted by pt:  Pt notes decreased pain and increased strength     Current pain level: 2/10     Changes in function:  Pt still notes difficulty ascending stairs. Pt feels she can stand and walk for 2 hours without difficulty     Adverse reaction to treatment or activity:  None     OBJECTIVE  Changes in objective findings:  Pt denies pain with resisted testing of the right ankle. Balance improved. AROM left ankle WNL      HPI                    Objective:    System    Physical Exam    General     ROS    Assessment/Plan:      ASSESSMENT/PLAN  Updated problem list and treatment plan: Diagnosis 1:  Left achilles tendonitis  Pain -  hot/cold therapy, self management, education and home program  Decreased ROM/flexibility - therapeutic exercise, therapeutic activity and home program  Decreased strength - therapeutic exercise, therapeutic activities and home program  Progress toward STG/LTGs have been made:  Yes,   Assessment of Progress: The patient's condition is improving.  Self Management Plans:  Patient has been instructed in a home treatment program.  I have re-evaluated this patient and find that the nature, scope, duration and intensity of the therapy is appropriate for the medical condition of the patient.  Coni Brar continues to require the following intervention to meet STG and LT's:  PT    Recommendations:  Pt has not returned for treatment since 8-29-17. Pt discharged at this time.      Please refer to the daily flowsheet for treatment today, total treatment time and time spent performing 1:1 timed codes.

## 2017-11-27 DIAGNOSIS — J45.30 MILD PERSISTENT ASTHMA WITHOUT COMPLICATION: ICD-10-CM

## 2017-11-27 DIAGNOSIS — E11.9 TYPE 2 DIABETES MELLITUS WITHOUT COMPLICATION, WITHOUT LONG-TERM CURRENT USE OF INSULIN (H): ICD-10-CM

## 2017-11-27 NOTE — TELEPHONE ENCOUNTER
"Last OV: 07/19/17, per OV note: \"Metformin 500 mg twice daily\". Sent Metformin denial d/t not due until Jan.        Date of Last Asthma Action Plan Letter:   Asthma Action Plan Q1 Year    Topic Date Due     Asthma Action Plan - yearly  03/03/2018      Asthma Control Test:   ACT Total Scores 3/3/2017   ACT TOTAL SCORE -   ASTHMA ER VISITS -   ASTHMA HOSPITALIZATIONS -   ACT TOTAL SCORE (Goal Greater than or Equal to 20) 21   In the past 12 months, how many times did you visit the emergency room for your asthma without being admitted to the hospital? 0   In the past 12 months, how many times were you hospitalized overnight because of your asthma? 0       Routing refill request to provider for review/approval because:  ACT >6 months ago    Please advise, thanks.  "

## 2017-11-30 DIAGNOSIS — J45.30 MILD PERSISTENT ASTHMA WITHOUT COMPLICATION: ICD-10-CM

## 2017-12-04 RX ORDER — IPRATROPIUM BROMIDE AND ALBUTEROL SULFATE 2.5; .5 MG/3ML; MG/3ML
SOLUTION RESPIRATORY (INHALATION)
Qty: 90 VIAL | Refills: 0 | Status: SHIPPED | OUTPATIENT
Start: 2017-12-04 | End: 2018-04-27

## 2017-12-05 ENCOUNTER — TELEPHONE (OUTPATIENT)
Dept: INTERNAL MEDICINE | Facility: CLINIC | Age: 42
End: 2017-12-05

## 2017-12-05 DIAGNOSIS — L30.0 NUMMULAR ECZEMA: ICD-10-CM

## 2017-12-05 NOTE — TELEPHONE ENCOUNTER
Patient calling:  Having an average of 2 periods a month. Last month had 3. X 1 year.  Had left ovary removed.   Flow averages from light to heavy, very emotional.  Wanting a stronger OCP, or IUD, something to stop the bleeding.  [phone]  Please advise.  Yvonne Rucker RN

## 2017-12-06 ENCOUNTER — OFFICE VISIT (OUTPATIENT)
Dept: OBGYN | Facility: CLINIC | Age: 42
End: 2017-12-06
Payer: COMMERCIAL

## 2017-12-06 VITALS — BODY MASS INDEX: 51.84 KG/M2 | WEIGHT: 293 LBS | DIASTOLIC BLOOD PRESSURE: 80 MMHG | SYSTOLIC BLOOD PRESSURE: 130 MMHG

## 2017-12-06 DIAGNOSIS — N92.0 EXCESSIVE AND FREQUENT MENSTRUATION: ICD-10-CM

## 2017-12-06 DIAGNOSIS — N93.9 ABNORMAL UTERINE BLEEDING (AUB): Primary | ICD-10-CM

## 2017-12-06 DIAGNOSIS — E66.01 OBESITY, CLASS III, BMI 40-49.9 (MORBID OBESITY) (H): ICD-10-CM

## 2017-12-06 PROCEDURE — 99214 OFFICE O/P EST MOD 30 MIN: CPT | Performed by: OBSTETRICS & GYNECOLOGY

## 2017-12-06 NOTE — MR AVS SNAPSHOT
After Visit Summary   12/6/2017    Coni Newberry    MRN: [id number]           Patient Information     Date Of Birth          1975        Visit Information        Provider Department      12/6/2017 3:15 PM Schirm, Lissy, DO [facility]        Today's Diagnoses     Abnormal uterine bleeding (AUB)    -  1    Excessive and frequent menstruation        Obesity, Class III, BMI 40-49.9 (morbid obesity) (H)           Follow-ups after your visit        Your next 10 appointments already scheduled     Dec 13, 2017  2:00 PM CST   Office Visit with DO Mona Oneill ([facility])    [address]  [facility] [id number] [phone]           Bring a current list of meds and any records pertaining to this visit. For Physicals, please bring immunization records and any forms needing to be filled out. Please arrive 10 minutes early to complete paperwork.              Who to contact     If you have questions or need follow up information about today's clinic visit or your schedule please [facility] at [phone].  Normal or non-critical lab and imaging results will be communicated to you by WhipCarhart, letter or phone within 4 business days after the clinic has received the results. If you do not hear from us within 7 days, please contact the clinic through SOLOt or phone. If you have a critical or abnormal lab result, we will notify you by phone as soon as possible.  Submit refill requests through Dinnr or call your pharmacy and they will forward the refill request to us. Please allow 3 business days for your refill to be completed.          Additional Information About Your Visit        WhipCarhart Information     Dinnr gives you secure access to your electronic health record. If you see a primary care provider, you can also send messages to your care team and make appointments. If you have questions, please call your  primary care clinic.  If you do not have a primary care provider, please call [phone] and they will assist you.        Care EveryWhere ID     This is your Care EveryWhere ID. This could be used by other organizations to access your [place] medical records  [id number]        Your Vitals Were     Last Period BMI (Body Mass Index)                11/30/2017 (Exact Date) 51.84 kg/m2           Blood Pressure from Last 3 Encounters:   12/06/17 130/80   07/19/17 112/72   05/01/17 115/80    Weight from Last 3 Encounters:   12/06/17 (!) 302 lb (137 kg)   08/25/17 284 lb (128.8 kg)   08/03/17 284 lb (128.8 kg)              Today, you had the following     No orders found for display         Today's Medication Changes          These changes are accurate as of: 12/6/17  4:41 PM.  If you have any questions, ask your nurse or doctor.               These medicines have changed or have updated prescriptions.        Dose/Directions    * order for DME   This may have changed:  Another medication with the same name was removed. Continue taking this medication, and follow the directions you see here.   Used for:  Mild persistent asthma   Changed by:  Farida Palencia APRN CNP        Equipment being ordered: Nebulizer   Quantity:  1 Device   Refills:  prn       * order for DME   This may have changed:  Another medication with the same name was removed. Continue taking this medication, and follow the directions you see here.   Used for:  Mild persistent asthma without complication   Changed by:  Farida Palencia APRN CNP        Equipment being ordered: neb tubing and cup   Quantity:  2 Device   Refills:  prn       * Notice:  This list has 2 medication(s) that are the same as other medications prescribed for you. Read the directions carefully, and ask your doctor or other care provider to review them with you.      Stop taking these medicines if you haven't already. Please contact your care team if you have questions.      ACE/ARB/ARNI NOT PRESCRIBED (INTENTIONAL)   Stopped by:  Lissy Maya DO           HYDROcodone-acetaminophen 5-325 MG per tablet   Commonly known as:  NORCO   Stopped by:  Lissy Maya DO           piroxicam 20 MG capsule   Commonly known as:  FELDENE   Stopped by:  Lissy Maya DO                    Primary Care Provider Office Phone # Fax #    Farida Palencia, DANYELL [facility] [phone]       [address] [facility] [address]        Equal Access to Services     SARAH BETH SALMON : Hadii aad ku hadasho Soomaali, waaxda luqadaha, qaybta kaalmada adeegyada, waxay idiin hayaan adeeg kharash bharath . So [facility] [phone].    ATENCIÓN: Si habla español, tiene a remy disposición servicios gratuitos de asistencia lingüística. [facility] [phone].    We comply with applicable federal civil rights laws and [place] laws. We do not discriminate on the basis of race, color, national origin, age, disability, sex, sexual orientation, or gender identity.            Thank you!     Thank you for choosing [facility]  [facility] your [facility]. Our goal is always to provide you with excellent care. Hearing back from our patients is one way we can continue to improve our services. Please take a few minutes to complete the written survey that you may receive in the mail after your visit with us. Thank you!             Your Updated Medication List - Protect others around you: Learn how to safely use, store and throw away your medicines at www.disposemymeds.org.          This list is accurate as of: 12/6/17  4:41 PM.  Always use your most recent med list.                   Brand Name Dispense Instructions for use Diagnosis    albuterol 108 (90 BASE) MCG/ACT Inhaler    PROAIR HFA    2 Inhaler    Inhale 2 puffs into the lungs every 6 hours    Moderate persistent asthma without complication       atorvastatin 10 MG tablet    LIPITOR    90 tablet    Take 1 tablet (10 mg) by mouth daily    Hyperlipidemia with  target LDL less than 70       blood glucose monitoring meter device kit     1 kit    Use to test blood sugars 1 times daily or as directed.    DM type 2, goal A1C below 8.0       * blood glucose monitoring test strip    no brand specified    600 strip    Use to test blood sugar 6 times daily or as directed.    Diabetes mellitus, new onset (H)       * blood glucose monitoring test strip    ONETOUCH ULTRA    3 Box    Use to test blood sugars 1 times daily or as directed.    DM type 2, goal A1C below 8.0       cetirizine-psuedoePHEDrine 5-120 MG per 12 hr tablet    zyrTEC-D     Take 1 tablet by mouth daily Reported on 3/31/2017        dexamethasone 4 MG/ML injection    DECADRON    1 mL    Inject 1 mL (4 mg) as directed once for 1 dose Use 4 mg or dose determined by provider for iontophoresis.    Right knee pain, unspecified chronicity       fluticasone 50 MCG/ACT spray    FLONASE    3 Package    Spray 2 sprays into both nostrils daily    Asthma       ibuprofen 200 MG tablet    ADVIL/MOTRIN     Take 200 mg by mouth every 4 hours as needed for mild pain or fever Reported on 3/31/2017        ipratropium - albuterol 0.5 mg/2.5 mg/3 mL 0.5-2.5 (3) MG/3ML neb solution    DUONEB    90 vial    USE ONE AMPULE IN NEBULIZER EVERY 6 HOURS AS NEEDED FOR  SHORTNESS  OF  BREATH/DYSPNEA  OR  WHEEZING    Mild persistent asthma without complication       JOLIVETTE 0.35 MG per tablet   Generic drug:  norethindrone     84 tablet    TAKE 1 TABLET (0.35 MG) BY MOUTH DAILY    Encounter for surveillance of contraceptive pills       LANCETS ULTRA FINE Misc     300 each    1 each 6 times daily    Diabetes mellitus, new onset (H)       lisinopril 2.5 MG tablet    PRINIVIL/Zestril    90 tablet    Take 1 tablet (2.5 mg) by mouth daily    Essential hypertension with goal blood pressure less than 140/90       metFORMIN 500 MG tablet    GLUCOPHAGE    180 tablet    Take 1 tablet (500 mg) by mouth 2 times daily (with meals)    Type 2 diabetes mellitus  without complication, without long-term current use of insulin (H)       montelukast 10 MG tablet    SINGULAIR    90 tablet    TAKE ONE TABLET BY MOUTH AT BEDTIME    Uncomplicated asthma, unspecified asthma severity       nystatin ointment    MYCOSTATIN    60 g    Apply topically as needed    Yeast infection of the skin       omeprazole 20 MG tablet     90 tablet    Take 1 tablet (20 mg) by mouth daily Take 30-60 minutes before a meal.    Abdominal pain, epigastric       * order for DME     1 Device    Equipment being ordered: Nebulizer    Mild persistent asthma       * order for DME     2 Device    Equipment being ordered: neb tubing and cup    Mild persistent asthma without complication       QVAR 80 MCG/ACT Inhaler   Generic drug:  beclomethasone     3 Inhaler    INHALE 2 PUFFS INTO THE LUNGS TWICE A DAY    Mild persistent asthma without complication       triamcinolone 0.1 % ointment    KENALOG    90 g    Apply sparingly to affected area three times daily AS NEEDED FOR ECZEMA RASH    Nummular eczema       * Notice:  This list has 4 medication(s) that are the same as other medications prescribed for you. Read the directions carefully, and ask your doctor or other care provider to review them with you.

## 2017-12-06 NOTE — NURSING NOTE
"Chief Complaint   Patient presents with     Vaginal Bleeding     irregular menses       Initial /80  Wt (!) 302 lb (137 kg)  LMP 11/30/2017 (Exact Date)  BMI 51.84 kg/m2 Estimated body mass index is 51.84 kg/(m^2) as calculated from the following:    Height as of 8/25/17: 5' 4\" (1.626 m).    Weight as of this encounter: 302 lb (137 kg).  Medication Reconciliation: complete     Rosmery Amos CMA      "

## 2017-12-06 NOTE — PROGRESS NOTES
SUBJECTIVE:                                                   Coni Newberry is a 42 year old female who presents to clinic today for the following health issue(s):  Patient presents with:  Vaginal Bleeding: irregular menses      HPI:  Patient has a history of amenorrhea and oligomenorrhea. She has had heavy and frequent periods. She is currently having 2-3 periods per month. Uses a super plus tampon and changes 4-5x/day. Passes golf ball size clots.   In 2016 had a left salpingoophorectomy for ovarian cyst.   Hysterectomy not desired as not ready to completely eliminate possibility of pregnancy.     Patient's last menstrual period was 2017 (exact date)..   Patient is sexually active, .  Using oral contraceptives for contraception and period irregularity.     Problem list and histories reviewed & adjusted, as indicated.  Additional history: as documented.    Patient Active Problem List   Diagnosis     Mild persistent asthma     Nummular eczema     Hyperglycemia     Health Care Home     Type 2 diabetes mellitus without complication (H)     Hyperlipidemia with target LDL less than 70     Lumbago     Thoracic or lumbosacral neuritis or radiculitis, unspecified     Nonallopathic lesion of lumbar region     Nonallopathic lesion of thoracic region     Obesity     History of left salpingo-oophorectomy     Hemorrhagic cyst of ovary     Essential hypertension with goal blood pressure less than 140/90     Past Surgical History:   Procedure Laterality Date     CYSTECTOMY OVARIAN BENIGN Left 2016    Procedure: CYSTECTOMY OVARIAN BENIGN;  Surgeon: Marisela Lopez MD;  Location:  OR     NONE OF THE ABOVE CORE MEASURE DIAGNOSES       SALPINGO-OOPHORECTOMY, COMBINED Left 2016    Procedure: COMBINED SALPINGO-OOPHORECTOMY;  Surgeon: Marisela Lopez MD;  Location:  OR      Social History   Substance Use Topics     Smoking status: Never Smoker     Smokeless tobacco: Never Used     Alcohol use 0.0  oz/week     0 Standard drinks or equivalent per week      Comment: occassionally      Problem (# of Occurrences) Relation (Name,Age of Onset)    CANCER (1) Paternal Grandfather    DIABETES (3) Mother, Father, Sister    Hypertension (1) Father              Current Outpatient Prescriptions on File Prior to Visit:  ipratropium - albuterol 0.5 mg/2.5 mg/3 mL (DUONEB) 0.5-2.5 (3) MG/3ML neb solution USE ONE AMPULE IN NEBULIZER EVERY 6 HOURS AS NEEDED FOR  SHORTNESS  OF  BREATH/DYSPNEA  OR  WHEEZING   QVAR 80 MCG/ACT Inhaler INHALE 2 PUFFS INTO THE LUNGS TWICE A DAY   JOLIVETTE 0.35 MG per tablet TAKE 1 TABLET (0.35 MG) BY MOUTH DAILY   metFORMIN (GLUCOPHAGE) 500 MG tablet Take 1 tablet (500 mg) by mouth 2 times daily (with meals)   atorvastatin (LIPITOR) 10 MG tablet Take 1 tablet (10 mg) by mouth daily   lisinopril (PRINIVIL/ZESTRIL) 2.5 MG tablet Take 1 tablet (2.5 mg) by mouth daily   montelukast (SINGULAIR) 10 MG tablet TAKE ONE TABLET BY MOUTH AT BEDTIME   albuterol (ALBUTEROL) 108 (90 BASE) MCG/ACT Inhaler Inhale 2 puffs into the lungs every 6 hours   nystatin (MYCOSTATIN) ointment Apply topically as needed   triamcinolone (KENALOG) 0.1 % ointment Apply sparingly to affected area three times daily AS NEEDED FOR ECZEMA RASH   omeprazole 20 MG tablet Take 1 tablet (20 mg) by mouth daily Take 30-60 minutes before a meal.   blood glucose monitoring (ONE TOUCH ULTRA 2) meter device kit Use to test blood sugars 1 times daily or as directed.   blood glucose monitoring (ONE TOUCH ULTRA) test strip Use to test blood sugars 1 times daily or as directed.   blood glucose test strip Use to test blood sugar 6 times daily or as directed.   LANCETS ULTRA FINE MISC 1 each 6 times daily   cetirizine-psuedoePHEDrine (ZYRTEC-D) 5-120 MG per tablet Take 1 tablet by mouth daily Reported on 3/31/2017   fluticasone (FLONASE) 50 MCG/ACT nasal spray Spray 2 sprays into both nostrils daily   dexamethasone (DECADRON) 4 MG/ML injection Inject  1 mL (4 mg) as directed once for 1 dose Use 4 mg or dose determined by provider for iontophoresis.   order for DME Equipment being ordered: neb tubing and cup   ORDER FOR DME Equipment being ordered: Nebulizer   ibuprofen (ADVIL,MOTRIN) 200 MG tablet Take 200 mg by mouth every 4 hours as needed for mild pain or fever Reported on 3/31/2017     No current facility-administered medications on file prior to visit.   Allergies   Allergen Reactions     Seasonal Allergies        ROS:  12 point ROS negative except as noted above in HPI, including Gen., Resp., CV, GI &  system review.    OBJECTIVE:     /80  Wt (!) 302 lb (137 kg)  LMP 11/30/2017 (Exact Date)  BMI 51.84 kg/m2   BMI: Body mass index is 51.84 kg/(m^2).  General: Alert and oriented, no distress.  Psychiatric: Mood and affect within normal limits.  Skin: Warm and dry, no lesions, rashes or discolorations.  Abdomen: Soft, nontender, no hepatosplenomegaly, no rebound or guarding, no masses, no hernias.   Musculoskeletal: extremities normal    In-Clinic Test Results:  No results found for this or any previous visit (from the past 24 hour(s)).    ASSESSMENT/PLAN:                                                        ICD-10-CM    1. Abnormal uterine bleeding (AUB) N93.9    2. Excessive and frequent menstruation N92.0    3. Obesity, Class III, BMI 40-49.9 (morbid obesity) (H) E66.01        Ultrasound in 2016 reviewed with patient. Patient counselled on etiologies of abnormal bleeding, evaluation for etiology, options for treatment including hormonal management, progestin IUD, endometrial ablation, hysterectomy. She is currently on an oral contraceptive pills (OCPs) which is not successfully treating her bleeding. We discussed her risk of uterine hyperplasia and cancer due to obesity.  We discussed treatments of hyperplasia. Because of this risk factor, I do not recommend Novasure ablation due to possible difficulty obtaining an endometrial biopsy (EMB) in  the future. She does not desire hysterectomy at this time. All questions answered.  Patient will review and follow-up for further treatment.     Plan: endometrial biopsy (EMB) with Mirena IUD placement.    Total face to face time 25 minutes, with > 50% spent counseling.      Lissy Maya,   [facility] TIFFANIE

## 2017-12-09 RX ORDER — TRIAMCINOLONE ACETONIDE 1 MG/G
OINTMENT TOPICAL
Qty: 90 G | Refills: 0 | Status: SHIPPED | OUTPATIENT
Start: 2017-12-09 | End: 2018-04-27

## 2017-12-13 ENCOUNTER — OFFICE VISIT (OUTPATIENT)
Dept: OBGYN | Facility: CLINIC | Age: 42
End: 2017-12-13
Payer: COMMERCIAL

## 2017-12-13 VITALS — SYSTOLIC BLOOD PRESSURE: 126 MMHG | BODY MASS INDEX: 53.04 KG/M2 | DIASTOLIC BLOOD PRESSURE: 70 MMHG | WEIGHT: 293 LBS

## 2017-12-13 DIAGNOSIS — N93.9 ABNORMAL UTERINE BLEEDING (AUB): ICD-10-CM

## 2017-12-13 DIAGNOSIS — Z01.812 PRE-PROCEDURE LAB EXAM: Primary | ICD-10-CM

## 2017-12-13 DIAGNOSIS — E66.01 OBESITY, CLASS III, BMI 40-49.9 (MORBID OBESITY) (H): ICD-10-CM

## 2017-12-13 DIAGNOSIS — N92.0 EXCESSIVE OR FREQUENT MENSTRUATION: ICD-10-CM

## 2017-12-13 DIAGNOSIS — Z30.430 ENCOUNTER FOR INSERTION OF INTRAUTERINE CONTRACEPTIVE DEVICE: ICD-10-CM

## 2017-12-13 PROBLEM — E66.813 OBESITY, CLASS III, BMI 40-49.9 (MORBID OBESITY) (H): Status: ACTIVE | Noted: 2017-12-13

## 2017-12-13 LAB — BETA HCG QUAL IFA URINE: NEGATIVE

## 2017-12-13 PROCEDURE — 99214 OFFICE O/P EST MOD 30 MIN: CPT | Mod: 25 | Performed by: OBSTETRICS & GYNECOLOGY

## 2017-12-13 PROCEDURE — 84703 CHORIONIC GONADOTROPIN ASSAY: CPT | Performed by: OBSTETRICS & GYNECOLOGY

## 2017-12-13 NOTE — PROGRESS NOTES
History and Physical      SUBJECTIVE:                                                   Coni Newberry is a 42 year old female who presents to clinic today for the following health issue(s):  Heavy bleeding    HPI:  Patient has had heavy and frequent periods. She is currently having 2-3 periods per month. Uses a super plus tampon and changes 4-5x/day. Passes golf ball size clots.   In 2016 had a left salpingoophorectomy for ovarian cyst.   Hysterectomy not desired as not ready to completely eliminate possibility of pregnancy. Partner () not desiring pregnancy at this time.    Patient's last menstrual period was 2017 (exact date)..   Patient is sexually active, .  Using oral contraceptives for contraception and period irregularity.     Problem list and histories reviewed & adjusted, as indicated.  Additional history: as documented.    Patient Active Problem List   Diagnosis     Mild persistent asthma     Nummular eczema     Hyperglycemia     Health Care Home     Type 2 diabetes mellitus without complication (H)     Hyperlipidemia with target LDL less than 70     Lumbago     Thoracic or lumbosacral neuritis or radiculitis, unspecified     Nonallopathic lesion of lumbar region     Nonallopathic lesion of thoracic region     Obesity     History of left salpingo-oophorectomy     Hemorrhagic cyst of ovary     Essential hypertension with goal blood pressure less than 140/90     Past Surgical History:   Procedure Laterality Date     CYSTECTOMY OVARIAN BENIGN Left 2016    Procedure: CYSTECTOMY OVARIAN BENIGN;  Surgeon: Marisela Lopez MD;  Location:  OR     NONE OF THE ABOVE CORE MEASURE DIAGNOSES       SALPINGO-OOPHORECTOMY, COMBINED Left 2016    Procedure: COMBINED SALPINGO-OOPHORECTOMY;  Surgeon: Marisela Lopez MD;  Location:  OR      Social History   Substance Use Topics     Smoking status: Never Smoker     Smokeless tobacco: Never Used     Alcohol use 0.0 oz/week     0 Standard  drinks or equivalent per week      Comment: occassionally      Problem (# of Occurrences) Relation (Name,Age of Onset)    CANCER (1) Paternal Grandfather    DIABETES (3) Mother, Father, Sister    Hypertension (1) Father              Current Outpatient Prescriptions on File Prior to Visit:  triamcinolone (KENALOG) 0.1 % ointment Apply sparingly to affected area three times daily AS NEEDED FOR ECZEMA RASH   ipratropium - albuterol 0.5 mg/2.5 mg/3 mL (DUONEB) 0.5-2.5 (3) MG/3ML neb solution USE ONE AMPULE IN NEBULIZER EVERY 6 HOURS AS NEEDED FOR  SHORTNESS  OF  BREATH/DYSPNEA  OR  WHEEZING   QVAR 80 MCG/ACT Inhaler INHALE 2 PUFFS INTO THE LUNGS TWICE A DAY   JOLIVETTE 0.35 MG per tablet TAKE 1 TABLET (0.35 MG) BY MOUTH DAILY   metFORMIN (GLUCOPHAGE) 500 MG tablet Take 1 tablet (500 mg) by mouth 2 times daily (with meals)   atorvastatin (LIPITOR) 10 MG tablet Take 1 tablet (10 mg) by mouth daily   lisinopril (PRINIVIL/ZESTRIL) 2.5 MG tablet Take 1 tablet (2.5 mg) by mouth daily   montelukast (SINGULAIR) 10 MG tablet TAKE ONE TABLET BY MOUTH AT BEDTIME   albuterol (ALBUTEROL) 108 (90 BASE) MCG/ACT Inhaler Inhale 2 puffs into the lungs every 6 hours   nystatin (MYCOSTATIN) ointment Apply topically as needed   omeprazole 20 MG tablet Take 1 tablet (20 mg) by mouth daily Take 30-60 minutes before a meal.   order for DME Equipment being ordered: neb tubing and cup   blood glucose monitoring (ONE TOUCH ULTRA 2) meter device kit Use to test blood sugars 1 times daily or as directed.   blood glucose monitoring (ONE TOUCH ULTRA) test strip Use to test blood sugars 1 times daily or as directed.   ORDER FOR DME Equipment being ordered: Nebulizer   blood glucose test strip Use to test blood sugar 6 times daily or as directed.   LANCETS ULTRA FINE MISC 1 each 6 times daily   cetirizine-psuedoePHEDrine (ZYRTEC-D) 5-120 MG per tablet Take 1 tablet by mouth daily Reported on 3/31/2017   ibuprofen (ADVIL,MOTRIN) 200 MG tablet Take 200  mg by mouth every 4 hours as needed for mild pain or fever Reported on 3/31/2017   fluticasone (FLONASE) 50 MCG/ACT nasal spray Spray 2 sprays into both nostrils daily   dexamethasone (DECADRON) 4 MG/ML injection Inject 1 mL (4 mg) as directed once for 1 dose Use 4 mg or dose determined by provider for iontophoresis.     No current facility-administered medications on file prior to visit.   Allergies   Allergen Reactions     Seasonal Allergies        ROS:  12 point ROS negative except as noted above in HPI, including Gen., Resp., CV, GI &  system review.    OBJECTIVE:     /70 (BP Location: Right arm, Patient Position: Chair, Cuff Size: Adult Large)  Wt (!) 309 lb (140.2 kg)  LMP 11/30/2017 (Exact Date)  BMI 53.04 kg/m2   BMI: Body mass index is 53.04 kg/(m^2).  General: Alert and oriented, no distress.  Psychiatric: Mood and affect within normal limits.  Skin: Warm and dry, no lesions, rashes or discolorations.  Abdomen: Soft, nontender, no hepatosplenomegaly, no rebound or guarding, no masses, no hernias.   Musculoskeletal: extremities normal    In-Clinic Test Results:  Results for orders placed or performed in visit on 12/13/17 (from the past 24 hour(s))   Beta HCG qual IFA urine - [facility] and [place]   Result Value Ref Range    Beta HCG Qual IFA Urine Negative NEG^Negative          ASSESSMENT/PLAN:                                                        ICD-10-CM    1. Pre-procedure lab exam Z01.812 Beta HCG qual IFA urine - [facility] and [place]   2. Encounter for insertion of intrauterine contraceptive device Z30.430    3. Excessive or frequent menstruation N92.0    4. Abnormal uterine bleeding (AUB) N93.9    5. Excessive and frequent menstruation N92.0    6. Obesity, Class III, BMI 40-49.9 (morbid obesity) (H) E66.01        Ultrasound in 2016 reviewed with patient. She is currently on an oral contraceptive pills (OCPs) which is not successfully treating her bleeding. We discussed her risk of uterine  hyperplasia and cancer due to obesity.  We discussed treatments of hyperplasia. Because of this risk factor, I do not recommend Novasure ablation due to possible difficulty obtaining an endometrial biopsy (EMB) in the future and considering pregnancy in the future. Patient is now 43 yo and is tearful during this conversation as she would like to have children but  not in agreement.  She does not desire hysterectomy at this time.     S/p failed in office endometrial biopsy (EMB) with Mirena IUD placement secondary to cervical stenosis.    We discussed the risks of surgery including, but not limited to, risks of anesthesia, risk of bleeding with possibility of transfusion, infection, injury to abdominal/pelvic organs, blood vessels, or nerves and possible need for second surgery for unrecognized injury. She stated her understanding and was encouraged to contact me if she has any other questions prior to the day of her scheduled procedure.    She is electing to proceed with Hysteroscopy, Cervical Dilation and Endometrial Curettage, and Mirena IUD insertion. She will be contacted by the  to arrange. She knows she will need H&P with her primary care provider.     Total face to face time 25 minutes, with > 50% spent counseling.      Lissy Maya,   [facility] TIFFANIE

## 2017-12-13 NOTE — MR AVS SNAPSHOT
After Visit Summary   12/13/2017    Coni Newberry    MRN: [id number]           Patient Information     Date Of Birth          1975        Visit Information        Provider Department      12/13/2017 2:00 PM Lissy Maya DO [facility]        Today's Diagnoses     Pre-procedure lab exam    -  1    Encounter for insertion of intrauterine contraceptive device        Excessive or frequent menstruation        Abnormal uterine bleeding (AUB)        Obesity, Class III, BMI 40-49.9 (morbid obesity) (H)           Follow-ups after your visit        Who to contact     If you have questions or need follow up information about today's clinic visit or your schedule please contact [facility] directly at [phone].  Normal or non-critical lab and imaging results will be communicated to you by [employer] St[employer]art, letter or phone within 4 business days after the clinic has received the results. If you do not hear from us within 7 days, please contact the clinic through [employer] St[employer] or phone. If you have a critical or abnormal lab result, we will notify you by phone as soon as possible.  Submit refill requests through [employer] or call your pharmacy and they will forward the refill request to us. Please allow 3 business days for your refill to be completed.          Additional Information About Your Visit        MyChart Information     [employer]EL gives you secure access to your electronic health record. If you see a primary care provider, you can also send messages to your care team and make appointments. If you have questions, please call your primary care clinic.  If you do not have a primary care provider, please call [phone] and they will assist you.        Care EveryWhere ID     This is your Care EveryWhere ID. This could be used by other organizations to access your [place] medical records  [id number]        Your Vitals Were     Last Period BMI (Body Mass Index)                11/30/2017  (Exact Date) 53.04 kg/m2           Blood Pressure from Last 3 Encounters:   12/13/17 126/70   12/06/17 130/80   07/19/17 112/72    Weight from Last 3 Encounters:   12/13/17 (!) 309 lb (140.2 kg)   12/06/17 (!) 302 lb (137 kg)   08/25/17 284 lb (128.8 kg)              We Performed the Following     Beta HCG qual [facility] urine - [facility] and [place]        Primary Care Provider Office Phone # Fax #    Farida Rubiosarah Palencia, APRN [phone]       [address]  [facility] [address]        Equal Access to Services     [facility] : Hadii nelli carrilloo Somary beth, waaxda luqadaha, qaybta kaalmada adeegyada, benoit sinha . So [facility] [phone].    ATENCIÓN: Si habla español, tiene a remy disposición servicios gratuitos de asistencia lingüística. Llame al [phone].    We comply with applicable federal civil rights laws and [place] laws. We do not discriminate on the basis of race, color, national origin, age, disability, sex, sexual orientation, or gender identity.            Thank you!     [facility]  [facility]. Our goal is always to provide you with excellent care. Hearing back from our patients is one way we can continue to improve our services. Please take a few minutes to complete the written survey that you may receive in the mail after your visit with us. Thank you!             Your Updated Medication List - Protect others around you: Learn how to safely use, store and throw away your medicines at www.disposemymeds.org.          This list is accurate as of: 12/13/17  2:41 PM.  Always use your most recent med list.                   Brand Name Dispense Instructions for use Diagnosis    albuterol 108 (90 BASE) MCG/ACT Inhaler    PROAIR HFA    2 Inhaler    Inhale 2 puffs into the lungs every 6 hours    Moderate persistent asthma without complication       atorvastatin 10 MG tablet    LIPITOR    90 tablet    Take 1 tablet (10 mg) by  mouth daily    Hyperlipidemia with target LDL less than 70       blood glucose monitoring meter device kit     1 kit    Use to test blood sugars 1 times daily or as directed.    DM type 2, goal A1C below 8.0       * blood glucose monitoring test strip    no brand specified    600 strip    Use to test blood sugar 6 times daily or as directed.    Diabetes mellitus, new onset (H)       * blood glucose monitoring test strip    ONETOUCH ULTRA    3 Box    Use to test blood sugars 1 times daily or as directed.    DM type 2, goal A1C below 8.0       cetirizine-psuedoePHEDrine 5-120 MG per 12 hr tablet    zyrTEC-D     Take 1 tablet by mouth daily Reported on 3/31/2017        dexamethasone 4 MG/ML injection    DECADRON    1 mL    Inject 1 mL (4 mg) as directed once for 1 dose Use 4 mg or dose determined by provider for iontophoresis.    Right knee pain, unspecified chronicity       fluticasone 50 MCG/ACT spray    FLONASE    3 Package    Spray 2 sprays into both nostrils daily    Asthma       ibuprofen 200 MG tablet    ADVIL/MOTRIN     Take 200 mg by mouth every 4 hours as needed for mild pain or fever Reported on 3/31/2017        ipratropium - albuterol 0.5 mg/2.5 mg/3 mL 0.5-2.5 (3) MG/3ML neb solution    DUONEB    90 vial    USE ONE AMPULE IN NEBULIZER EVERY 6 HOURS AS NEEDED FOR  SHORTNESS  OF  BREATH/DYSPNEA  OR  WHEEZING    Mild persistent asthma without complication       JOLIVETTE 0.35 MG per tablet   Generic drug:  norethindrone     84 tablet    TAKE 1 TABLET (0.35 MG) BY MOUTH DAILY    Encounter for surveillance of contraceptive pills       LANCETS ULTRA FINE Misc     300 each    1 each 6 times daily    Diabetes mellitus, new onset (H)       lisinopril 2.5 MG tablet    PRINIVIL/Zestril    90 tablet    Take 1 tablet (2.5 mg) by mouth daily    Essential hypertension with goal blood pressure less than 140/90       metFORMIN 500 MG tablet    GLUCOPHAGE    180 tablet    Take 1 tablet (500 mg) by mouth 2 times daily (with  meals)    Type 2 diabetes mellitus without complication, without long-term current use of insulin (H)       montelukast 10 MG tablet    SINGULAIR    90 tablet    TAKE ONE TABLET BY MOUTH AT BEDTIME    Uncomplicated asthma, unspecified asthma severity       nystatin ointment    MYCOSTATIN    60 g    Apply topically as needed    Yeast infection of the skin       omeprazole 20 MG tablet     90 tablet    Take 1 tablet (20 mg) by mouth daily Take 30-60 minutes before a meal.    Abdominal pain, epigastric       * order for DME     1 Device    Equipment being ordered: Nebulizer    Mild persistent asthma       * order for DME     2 Device    Equipment being ordered: neb tubing and cup    Mild persistent asthma without complication       QVAR 80 MCG/ACT Inhaler   Generic drug:  beclomethasone     3 Inhaler    INHALE 2 PUFFS INTO THE LUNGS TWICE A DAY    Mild persistent asthma without complication       triamcinolone 0.1 % ointment    KENALOG    90 g    Apply sparingly to affected area three times daily AS NEEDED FOR ECZEMA RASH    Nummular eczema       * Notice:  This list has 4 medication(s) that are the same as other medications prescribed for you. Read the directions carefully, and ask your doctor or other care provider to review them with you.

## 2017-12-13 NOTE — PROGRESS NOTES
INDICATIONS:                                                      Is a pregnancy test required: Yes.  Was it positive or negative?  Negative  Was a consent obtained?  Yes    Coni Newberry is a 42 year old female,   who presents for endometrial biopsy (EMB) & insertion of an IUD. The risks, benefits and alternatives of IUD insertion were discussed in detail previously. She has elected to go ahead with the insertion today and her questions were answered. Consent was signed. Her LMP began on 17 and was normal in duration and amount of flow. A pregnancy test was performed today:  Yes    Today's PHQ-2 Score:   PHQ-2 (  [employer]) 2017   Q1: Little interest or pleasure in doing things 0   Q2: Feeling down, depressed or hopeless 0   PHQ-2 Score 0   Q1: Little interest or pleasure in doing things -   Q2: Feeling down, depressed or hopeless -   PHQ-2 Score -       PROCEDURE:                                                      The pelvic exam revealed normal external genitalia. A speculum was inserted into the vagina and the cervix was visualized. The cervix was prepped with Betadine . The anterior lip of the cervix was grasped with a single toothed tenaculum. The internal cervical os was stenotic and could not be entered with the Pipelle. Hegar cervical dilators were used and entry into the cavity was not successful. The endometrial biopsy (EMB) and placement of Mirena IUD could not be performed.     POST PROCEDURE:                                                      The endometrial biopsy (EMB) and placement of Mirena IUD could not be performed due to cervical stenosis.     She experienced some mild to moderate cramping but  tolerated the procedure well. There were no complications. Patient was discharged in stable condition.    Call if severe cramping, fever, abnormal bleeding (fills a pad in an hour), abnormal discharge or pelvic pain develop.      We discussed the risks of surgery including, but  not limited to, risks of anesthesia, risk of bleeding with possibility of transfusion, infection, injury to abdominal/pelvic organs, blood vessels, or nerves and possible need for second surgery for unrecognized injury. She stated her understanding and was encouraged to contact me if she has any other questions prior to the day of her scheduled procedure.    She is electing to proceed with Hysteroscopy, Cervical Dilation and Endometrial Curettage, and Mirena IUD insertion. She will be contacted by the  to arrange. She knows she will need H&P with her primary care provider.     Lissy Maya, DO

## 2017-12-14 ENCOUNTER — TELEPHONE (OUTPATIENT)
Dept: OBGYN | Facility: CLINIC | Age: 42
End: 2017-12-14

## 2017-12-14 NOTE — TELEPHONE ENCOUNTER
Left message for patient to return the call to inform of surgery details listed below.    Surgery:  HYSTEROSCOPY, CERVICAL DILATION AND ENDOMETRIAL CURETTAGE, AND MIRENA IUD INSERTION  Date:  1/3/18  Time:  7:20 AM  Hospital:  [facility]    Patient advised of the following:  The hospital will contact you 24-48 hours prior to surgery to discuss any pre op instructions.  Contact your insurance company to see if a prior authorization or second opinion is needed.  Please schedule a pre op appointment with your primary physician within 7 days of surgery.  No aspirin or Ibuprofen 10 days prior to surgery.  Make arrangements to have someone drive you home from the hospital.    Surgery specific and hospital information mailed to patient.    Information was placed on the surgery calendar in [place].

## 2017-12-14 NOTE — TELEPHONE ENCOUNTER
Surgeon:Lissy Maya,    Assist:  No   Location: [facility] or SURGERY CENTER   Date/time preference:  ASAP (patient preference, not medically urgent)     Surgery:  Hysteroscopy, Cervical Dilation and Endometrial Curettage, and Mirena IUD insertion   Length of Surgery:  1 hour   Diagnosis:  Cervical Stenosis, Abnormal Uterine Bleeding, Contraception Management   Anesthesia type:  GENERAL     Special instructions / equipment:  Mirena IUD   Am admit or same day: SAME DAY   Bowel prep: No   Pre op: PCP   Office visit with surgeon prior to surgery: No   Office visit with surgeon after surgery: Yes, 1-2 weeks after surgery

## 2017-12-29 ENCOUNTER — OFFICE VISIT (OUTPATIENT)
Dept: INTERNAL MEDICINE | Facility: CLINIC | Age: 42
End: 2017-12-29
Payer: COMMERCIAL

## 2017-12-29 VITALS
HEIGHT: 64 IN | BODY MASS INDEX: 50.02 KG/M2 | TEMPERATURE: 98.3 F | HEART RATE: 93 BPM | DIASTOLIC BLOOD PRESSURE: 62 MMHG | WEIGHT: 293 LBS | OXYGEN SATURATION: 98 % | SYSTOLIC BLOOD PRESSURE: 118 MMHG

## 2017-12-29 DIAGNOSIS — N92.0 EXCESSIVE OR FREQUENT MENSTRUATION: ICD-10-CM

## 2017-12-29 DIAGNOSIS — N92.6 IRREGULAR MENSES: ICD-10-CM

## 2017-12-29 DIAGNOSIS — E66.01 MORBID OBESITY (H): ICD-10-CM

## 2017-12-29 DIAGNOSIS — I10 ESSENTIAL HYPERTENSION WITH GOAL BLOOD PRESSURE LESS THAN 140/90: ICD-10-CM

## 2017-12-29 DIAGNOSIS — E11.9 TYPE 2 DIABETES MELLITUS WITHOUT COMPLICATION, WITHOUT LONG-TERM CURRENT USE OF INSULIN (H): ICD-10-CM

## 2017-12-29 DIAGNOSIS — Z01.818 PREOP GENERAL PHYSICAL EXAM: Primary | ICD-10-CM

## 2017-12-29 DIAGNOSIS — E78.5 HYPERLIPIDEMIA WITH TARGET LDL LESS THAN 70: ICD-10-CM

## 2017-12-29 DIAGNOSIS — J45.30 MILD PERSISTENT ASTHMA WITHOUT COMPLICATION: ICD-10-CM

## 2017-12-29 LAB — HBA1C MFR BLD: 6 % (ref 4.3–6)

## 2017-12-29 PROCEDURE — 80053 COMPREHEN METABOLIC PANEL: CPT | Performed by: NURSE PRACTITIONER

## 2017-12-29 PROCEDURE — 83036 HEMOGLOBIN GLYCOSYLATED A1C: CPT | Performed by: NURSE PRACTITIONER

## 2017-12-29 PROCEDURE — 99214 OFFICE O/P EST MOD 30 MIN: CPT | Performed by: NURSE PRACTITIONER

## 2017-12-29 PROCEDURE — 36415 COLL VENOUS BLD VENIPUNCTURE: CPT | Performed by: NURSE PRACTITIONER

## 2017-12-29 RX ORDER — ATORVASTATIN CALCIUM 10 MG/1
10 TABLET, FILM COATED ORAL DAILY
Qty: 90 TABLET | Refills: 1 | Status: SHIPPED | OUTPATIENT
Start: 2017-12-29 | End: 2018-04-27

## 2017-12-29 RX ORDER — LISINOPRIL 2.5 MG/1
2.5 TABLET ORAL DAILY
Qty: 90 TABLET | Refills: 1 | Status: SHIPPED | OUTPATIENT
Start: 2017-12-29 | End: 2018-04-27

## 2017-12-29 NOTE — NURSING NOTE
"Chief Complaint   Patient presents with     Pre-Op Exam       Initial /62 (BP Location: Left arm, Patient Position: Sitting, Cuff Size: Adult Large)  Pulse 93  Temp 98.3  F (36.8  C) (Oral)  Ht 5' 4\" (1.626 m)  Wt 296 lb (134.3 kg)  LMP 12/19/2017  SpO2 98%  BMI 50.81 kg/m2 Estimated body mass index is 50.81 kg/(m^2) as calculated from the following:    Height as of this encounter: 5' 4\" (1.626 m).    Weight as of this encounter: 296 lb (134.3 kg).  Medication Reconciliation: complete    "

## 2017-12-29 NOTE — MR AVS SNAPSHOT
After Visit Summary   12/29/2017    Coni Newberry    MRN: [id number]           Patient Information     Date Of Birth          1975        Visit Information        Provider Department      12/29/2017 1:20 PM Farida Palencia APRN [facility]        Today's Diagnoses     Preop general physical exam    -  1    Excessive or frequent menstruation        Irregular menses        Type 2 diabetes mellitus without complication, without long-term current use of insulin (H)        Mild persistent asthma without complication        Hyperlipidemia with target LDL less than 70        Obesity        Essential hypertension with goal blood pressure less than 140/90          Care Instructions    Morning of surgery do your inhaler and albuterol neb or inhaler     Do not take metformin or lisinopril morning of surgery     No aspirin ibuprofen or aleve products prior to surgery   May use tylenol products       Before Your Surgery      Call your surgeon if there is any change in your health. This includes signs of a cold or flu (such as a sore throat, runny nose, cough, rash or fever).    Do not smoke, drink alcohol or take over the counter medicine (unless your surgeon or primary care doctor tells you to) for the 24 hours before and after surgery.    If you take prescribed drugs: Follow your doctor s orders about which medicines to take and which to stop until after surgery.    Eating and drinking prior to surgery: follow the instructions from your surgeon    Take a shower or bath the night before surgery. Use the soap your surgeon gave you to gently clean your skin. If you do not have soap from your surgeon, use your regular soap. Do not shave or scrub the surgery site.  Wear clean pajamas and have clean sheets on your bed.           Follow-ups after your visit        Your next 10 appointments already scheduled     Jan 03, 2018   Procedure with DO Mona Oneill  "Services (--)    [address]  [facility] [id number] [phone]              Who to contact     If you have questions or need follow up information about today's clinic visit or your schedule please contact [facility] directly at [phone].  Normal or non-critical lab and imaging results will be communicated to you by MyChart, letter or phone within 4 business days after the clinic has received the results. If you do not hear from us within 7 days, please contact the clinic through PushToTesthart or phone. If you have a critical or abnormal lab result, we will notify you by phone as soon as possible.  Submit refill requests through [employer] or call your pharmacy and they will forward the refill request to us. Please allow 3 business days for your refill to be completed.          Additional Information About Your Visit        MyChart Information     [employer] gives you secure access to your electronic health record. If you see a primary care provider, you can also send messages to your care team and make appointments. If you have questions, please call your primary care clinic.  If you do not have a primary care provider, please call [phone] and they will assist you.        Care EveryWhere ID     This is your Care EveryWhere ID. This could be used by other organizations to access your [place] medical records  [id number]        Your Vitals Were     Pulse Temperature Height Last Period Pulse Oximetry BMI (Body Mass Index)    93 98.3  F (36.8  C) (Oral) 5' 4\" (1.626 m) 12/19/2017 98% 50.81 kg/m2       Blood Pressure from Last 3 Encounters:   12/29/17 118/62   12/13/17 126/70   12/06/17 130/80    Weight from Last 3 Encounters:   12/29/17 296 lb (134.3 kg)   12/26/17 300 lb (136.1 kg)   12/13/17 (!) 309 lb (140.2 kg)              Today, you had the following     No orders found for display       Primary Care Provider Office Phone # Fax #    DANYELL Spencer [phone] " [phone]       [address] [facility] [address]        Equal Access to Services     SARAH BETH SALMON : Hadii aad ku hadneidao Sonilsali, waaxda luqadaha, qaybta kaalmada adepan, benoit shain hayaashahla maecaitlin razo bharath dias. So [facility] [phone].    ATENCIÓN: Si habla español, tiene a remy disposición servicios gratuitos de asistencia lingüística. Llame al [phone].    We comply with applicable federal civil rights laws and [place] laws. We do not discriminate on the basis of race, color, national origin, age, disability, sex, sexual orientation, or gender identity.            Thank you!     Thank you for choosing [facility]  for your care. Our goal is always to provide you with excellent care. Hearing back from our patients is one way we can continue to improve our services. Please take a few minutes to complete the written survey that you may receive in the mail after your visit with us. Thank you!             Your Updated Medication List - Protect others around you: Learn how to safely use, store and throw away your medicines at www.disposemymeds.org.          This list is accurate as of: 12/29/17  1:27 PM.  Always use your most recent med list.                   Brand Name Dispense Instructions for use Diagnosis    albuterol 108 (90 BASE) MCG/ACT Inhaler    PROAIR HFA    2 Inhaler    Inhale 2 puffs into the lungs every 6 hours    Moderate persistent asthma without complication       atorvastatin 10 MG tablet    LIPITOR    90 tablet    Take 1 tablet (10 mg) by mouth daily    Hyperlipidemia with target LDL less than 70       blood glucose monitoring meter device kit     1 kit    Use to test blood sugars 1 times daily or as directed.    DM type 2, goal A1C below 8.0       * blood glucose monitoring test strip    no brand specified    600 strip    Use to test blood sugar 6 times daily or as directed.    Diabetes mellitus, new onset (H)       * blood glucose monitoring test strip    ONETOUCH  ULTRA    3 Box    Use to test blood sugars 1 times daily or as directed.    DM type 2, goal A1C below 8.0       cetirizine-psuedoePHEDrine 5-120 MG per 12 hr tablet    zyrTEC-D     Take 1 tablet by mouth daily Reported on 3/31/2017        fluticasone 50 MCG/ACT spray    FLONASE    3 Package    Spray 2 sprays into both nostrils daily    Asthma       ibuprofen 200 MG tablet    ADVIL/MOTRIN     Take 200 mg by mouth every 4 hours as needed for mild pain or fever Reported on 3/31/2017        ipratropium - albuterol 0.5 mg/2.5 mg/3 mL 0.5-2.5 (3) MG/3ML neb solution    DUONEB    90 vial    USE ONE AMPULE IN NEBULIZER EVERY 6 HOURS AS NEEDED FOR  SHORTNESS  OF  BREATH/DYSPNEA  OR  WHEEZING    Mild persistent asthma without complication       JOLIVETTE 0.35 MG per tablet   Generic drug:  norethindrone     84 tablet    TAKE 1 TABLET (0.35 MG) BY MOUTH DAILY    Encounter for surveillance of contraceptive pills       LANCETS ULTRA FINE Misc     300 each    1 each 6 times daily    Diabetes mellitus, new onset (H)       lisinopril 2.5 MG tablet    PRINIVIL/Zestril    90 tablet    Take 1 tablet (2.5 mg) by mouth daily    Essential hypertension with goal blood pressure less than 140/90       metFORMIN 500 MG tablet    GLUCOPHAGE    180 tablet    Take 1 tablet (500 mg) by mouth 2 times daily (with meals)    Type 2 diabetes mellitus without complication, without long-term current use of insulin (H)       montelukast 10 MG tablet    SINGULAIR    90 tablet    TAKE ONE TABLET BY MOUTH AT BEDTIME    Uncomplicated asthma, unspecified asthma severity       nystatin ointment    MYCOSTATIN    60 g    Apply topically as needed    Yeast infection of the skin       omeprazole 20 MG tablet     90 tablet    Take 1 tablet (20 mg) by mouth daily Take 30-60 minutes before a meal.    Abdominal pain, epigastric       * order for DME     1 Device    Equipment being ordered: Nebulizer    Mild persistent asthma       * order for DME     2 Device     Equipment being ordered: neb tubing and cup    Mild persistent asthma without complication       QVAR 80 MCG/ACT Inhaler   Generic drug:  beclomethasone     3 Inhaler    INHALE 2 PUFFS INTO THE LUNGS TWICE A DAY    Mild persistent asthma without complication       triamcinolone 0.1 % ointment    KENALOG    90 g    Apply sparingly to affected area three times daily AS NEEDED FOR ECZEMA RASH    Nummular eczema       * Notice:  This list has 4 medication(s) that are the same as other medications prescribed for you. Read the directions carefully, and ask your doctor or other care provider to review them with you.

## 2017-12-29 NOTE — LETTER
[facility]  [address], [address]  [place] [address]  [phone]        January 2, 2018    Beatriz Newberry  [address]  [facility] [address]            Dear Ms. Beatriz Rohith:      The results of your recent lab testosterone are as follows: Ok for surgery   Need to increase calcium intake- either by food or supplement: . Enclosed is some information about calcium containing foods and supplements. If you have any further questions or problems, please contact our office.    Sincerely,        ANT Jacobs

## 2017-12-29 NOTE — PATIENT INSTRUCTIONS
Morning of surgery do your inhaler and albuterol neb or inhaler     Do not take metformin or lisinopril morning of surgery     No aspirin ibuprofen or aleve products prior to surgery   May use tylenol products       Before Your Surgery      Call your surgeon if there is any change in your health. This includes signs of a cold or flu (such as a sore throat, runny nose, cough, rash or fever).    Do not smoke, drink alcohol or take over the counter medicine (unless your surgeon or primary care doctor tells you to) for the 24 hours before and after surgery.    If you take prescribed drugs: Follow your doctor s orders about which medicines to take and which to stop until after surgery.    Eating and drinking prior to surgery: follow the instructions from your surgeon    Take a shower or bath the night before surgery. Use the soap your surgeon gave you to gently clean your skin. If you do not have soap from your surgeon, use your regular soap. Do not shave or scrub the surgery site.  Wear clean pajamas and have clean sheets on your bed.

## 2017-12-30 LAB
ALBUMIN SERPL-MCNC: 3.9 G/DL (ref 3.4–5)
ALP SERPL-CCNC: 84 U/L (ref 40–150)
ALT SERPL W P-5'-P-CCNC: 19 U/L (ref 0–50)
ANION GAP SERPL CALCULATED.3IONS-SCNC: 7 MMOL/L (ref 3–14)
AST SERPL W P-5'-P-CCNC: 17 U/L (ref 0–45)
BILIRUB SERPL-MCNC: 0.6 MG/DL (ref 0.2–1.3)
BUN SERPL-MCNC: 16 MG/DL (ref 7–30)
CALCIUM SERPL-MCNC: 8.3 MG/DL (ref 8.5–10.1)
CHLORIDE SERPL-SCNC: 107 MMOL/L (ref 94–109)
CO2 SERPL-SCNC: 26 MMOL/L (ref 20–32)
CREAT SERPL-MCNC: 0.59 MG/DL (ref 0.52–1.04)
GFR SERPL CREATININE-BSD FRML MDRD: >90 ML/MIN/1.7M2
GLUCOSE SERPL-MCNC: 98 MG/DL (ref 70–99)
POTASSIUM SERPL-SCNC: 3.5 MMOL/L (ref 3.4–5.3)
PROT SERPL-MCNC: 7.6 G/DL (ref 6.8–8.8)
SODIUM SERPL-SCNC: 140 MMOL/L (ref 133–144)

## 2018-01-02 NOTE — H&P (VIEW-ONLY)
[place] [address]  [facility], [place] [address]  [phone]  Dept: [phone]    PRE-OP EVALUATION:  Today's date: 2017    Coni Newberry (: 1975) presents for pre-operative evaluation assessment as requested by Dr. Maya.  She requires evaluation and anesthesia risk assessment prior to undergoing surgery/procedure for treatment of AUB .  Proposed procedure:   COMBINED DILATE CERVIX, HYSTEROSCOPY, ABLATE ENDOMETRIUM N/A General   Hysteroscopy, cervical dilation, endometreal curettage, mirena IUD insertion     INSERT INTRAUTERINE DEVICE           Date of Surgery/ Procedure: 1-3-2018  Time of Surgery/ Procedure: 7;20am  Hospital/Surgical Facility: [facility]    Primary Physician: Farida Palencia  Type of Anesthesia Anticipated: General    Patient has a Health Care Directive or Living Will:  NO    Preop Questions 2017   1.  Do you have a history of heart attack, stroke, stent, bypass or surgery on an artery in the head, neck, heart or legs? No   2.  Do you ever have any pain or discomfort in your chest? No   3.  Do you have a history of  Heart Failure? No   4.   Are you troubled by shortness of breath when:  walking on a level surface, or up a slight hill, or at night? No   5.  Do you currently have a cold, bronchitis or other respiratory infection? No   6.  Do you have a cough, shortness of breath, or wheezing? No   7.  Do you sometimes get pains in the calves of your legs when you walk? No   8. Do you or anyone in your family have previous history of blood clots? No   9.  Do you or does anyone in your family have a serious bleeding problem such as prolonged bleeding following surgeries or cuts? No   10. Have you ever had problems with anemia or been told to take iron pills? No   11. Have you had any abnormal blood loss such as black, tarry or bloody stools, or abnormal vaginal bleeding? No   12. Have you ever had a blood transfusion? No   13. Have you or any of  your relatives ever had problems with anesthesia? No   14. Do you have sleep apnea, excessive snoring or daytime drowsiness? No   15. Do you have any prosthetic heart valves? No   16. Do you have prosthetic joints? No   17. Is there any chance that you may be pregnant? No           HPI:                                                      Brief HPI related to upcoming procedure:   Needs to have hysteroscopy to check for bleeding and cause and needs to have IUD placed again       Diabetes in good range - well controlled   Blood pressure stable     See problem list for active medical problems.  Problems all longstanding and stable, except as noted/documented.  See ROS for pertinent symptoms related to these conditions.                                                                                                  .    MEDICAL HISTORY:                                                    Patient Active Problem List    Diagnosis Date Noted     Obesity, Class III, BMI 40-49.9 (morbid obesity) (H) 12/13/2017     Priority: Medium     Abnormal uterine bleeding (AUB) 12/13/2017     Priority: Medium     Essential hypertension with goal blood pressure less than 140/90 08/04/2016     Priority: Medium     Hemorrhagic cyst of ovary 02/25/2016     Priority: Medium     History of left salpingo-oophorectomy 02/24/2016     Priority: Medium     Obesity 10/02/2015     Priority: Medium     Lumbago 11/18/2014     Priority: Medium     Thoracic or lumbosacral neuritis or radiculitis, unspecified 11/18/2014     Priority: Medium     Nonallopathic lesion of lumbar region 11/18/2014     Priority: Medium     Problem list name updated by automated process. Provider to review       Nonallopathic lesion of thoracic region 11/18/2014     Priority: Medium     Problem list name updated by automated process. Provider to review       Type 2 diabetes mellitus without complication (H) 09/02/2014     Priority: Medium     Problem list name updated by  automated process. Provider to review       Hyperlipidemia with target LDL less than 70      Priority: Medium     Diagnosis updated by automated process. Provider to review and confirm.       Health Care Home 08/28/2014     Priority: Medium     Pt declined care coordination at this time 8/28/14    Suresh Madrid RN/CC  Care Coordinator [facility]  [phone]         Hyperglycemia 08/22/2014     Priority: Medium     Mild persistent asthma 07/29/2014     Priority: Medium     Nummular eczema      Priority: Medium      Past Medical History:   Diagnosis Date     Diabetes (H)      Heart murmur      HTN (hypertension)     No cardiologist     Hyperlipidaemia LDL goal <100      Mild persistent asthma 7/29/2014     Nummular eczema      Past Surgical History:   Procedure Laterality Date     CYSTECTOMY OVARIAN BENIGN Left 2/23/2016    Procedure: CYSTECTOMY OVARIAN BENIGN;  Surgeon: Marisela Lopez MD;  Location: [facility] OR     NONE OF THE ABOVE CORE MEASURE DIAGNOSES       SALPINGO-OOPHORECTOMY, COMBINED Left 2/23/2016    Procedure: COMBINED SALPINGO-OOPHORECTOMY;  Surgeon: Marisela Lopez MD;  Location: [facility] OR     Current Outpatient Prescriptions   Medication Sig Dispense Refill     metFORMIN (GLUCOPHAGE) 500 MG tablet Take 1 tablet (500 mg) by mouth 2 times daily (with meals) 180 tablet 1     atorvastatin (LIPITOR) 10 MG tablet Take 1 tablet (10 mg) by mouth daily 90 tablet 1     lisinopril (PRINIVIL/ZESTRIL) 2.5 MG tablet Take 1 tablet (2.5 mg) by mouth daily 90 tablet 1     triamcinolone (KENALOG) 0.1 % ointment Apply sparingly to affected area three times daily AS NEEDED FOR ECZEMA RASH 90 g 0     QVAR 80 MCG/ACT Inhaler INHALE 2 PUFFS INTO THE LUNGS TWICE A DAY 3 Inhaler 3     JOLIVETTE 0.35 MG per tablet TAKE 1 TABLET (0.35 MG) BY MOUTH DAILY 84 tablet 1     montelukast (SINGULAIR) 10 MG tablet TAKE ONE TABLET BY MOUTH AT BEDTIME 90 tablet 3     albuterol (ALBUTEROL) 108 (90 BASE) MCG/ACT Inhaler Inhale 2 puffs  into the lungs every 6 hours 2 Inhaler 3     nystatin (MYCOSTATIN) ointment Apply topically as needed 60 g 3     blood glucose monitoring (ONE TOUCH ULTRA 2) meter device kit Use to test blood sugars 1 times daily or as directed. 1 kit 0     blood glucose monitoring (ONE TOUCH ULTRA) test strip Use to test blood sugars 1 times daily or as directed. 3 Box 1     blood glucose test strip Use to test blood sugar 6 times daily or as directed. 600 strip 3     LANCETS ULTRA FINE MISC 1 each 6 times daily 300 each 2     cetirizine-psuedoePHEDrine (ZYRTEC-D) 5-120 MG per tablet Take 1 tablet by mouth daily Reported on 3/31/2017       fluticasone (FLONASE) 50 MCG/ACT nasal spray Spray 2 sprays into both nostrils daily 3 Package 3     ipratropium - albuterol 0.5 mg/2.5 mg/3 mL (DUONEB) 0.5-2.5 (3) MG/3ML neb solution USE ONE AMPULE IN NEBULIZER EVERY 6 HOURS AS NEEDED FOR  SHORTNESS  OF  BREATH/DYSPNEA  OR  WHEEZING (Patient not taking: Reported on 12/29/2017) 90 vial 0     [DISCONTINUED] metFORMIN (GLUCOPHAGE) 500 MG tablet Take 1 tablet (500 mg) by mouth 2 times daily (with meals) 180 tablet 1     [DISCONTINUED] atorvastatin (LIPITOR) 10 MG tablet Take 1 tablet (10 mg) by mouth daily 90 tablet 1     [DISCONTINUED] lisinopril (PRINIVIL/ZESTRIL) 2.5 MG tablet Take 1 tablet (2.5 mg) by mouth daily 90 tablet 1     omeprazole 20 MG tablet Take 1 tablet (20 mg) by mouth daily Take 30-60 minutes before a meal. (Patient not taking: Reported on 12/29/2017) 90 tablet 2     order for DME Equipment being ordered: neb tubing and cup 2 Device prn     ORDER FOR DME Equipment being ordered: Nebulizer 1 Device prn     ibuprofen (ADVIL,MOTRIN) 200 MG tablet Take 200 mg by mouth every 4 hours as needed for mild pain or fever Reported on 3/31/2017       OTC products: None, except as noted above    Allergies   Allergen Reactions     Seasonal Allergies       Latex Allergy: NO    Social History   Substance Use Topics     Smoking status: Never  "Smoker     Smokeless tobacco: Never Used     Alcohol use 0.0 oz/week     0 Standard drinks or equivalent per week      Comment: 1-2 drinks yearly     History   Drug Use No       REVIEW OF SYSTEMS:                                                    C: NEGATIVE for fever, chills, change in weight  I: NEGATIVE for worrisome rashes, moles or lesions  E: NEGATIVE for vision changes or irritation  E/M: NEGATIVE for ear, mouth and throat problems  R: NEGATIVE for significant cough or SOB  B: NEGATIVE for masses, tenderness or discharge  CV: NEGATIVE for chest pain, palpitations or peripheral edema  GI: NEGATIVE for nausea, abdominal pain, heartburn, or change in bowel habits  : NEGATIVE for frequency, dysuria, or hematuria  Has irregular periods and bleeding heavy   M: NEGATIVE for significant arthralgias or myalgia  N: NEGATIVE for weakness, dizziness or paresthesias  E: NEGATIVE for temperature intolerance, skin/hair changes  H: NEGATIVE for bleeding problems  P: NEGATIVE for changes in mood or affect    EXAM:                                                    /62 (BP Location: Left arm, Patient Position: Sitting, Cuff Size: Adult Large)  Pulse 93  Temp 98.3  F (36.8  C) (Oral)  Ht 5' 4\" (1.626 m)  Wt 296 lb (134.3 kg)  LMP 12/19/2017  SpO2 98%  BMI 50.81 kg/m2    GENERAL APPEARANCE:  alert and no distress     HENT: ear canals and TM's normal and nose and mouth without ulcers or lesions     RESP: lungs clear to auscultation - no rales, rhonchi or wheezes     CV: regular rates and rhythm, normal S1 S2, no S3 or S4 and no murmur, click or rub     ABDOMEN:  soft, obese,  nontender, no HSM or masses and bowel sounds normal     MS: extremities normal- no gross deformities noted, no evidence of inflammation in joints, FROM in all extremities.     SKIN: no suspicious lesions or rashes     NEURO: Normal strength and tone, sensory exam grossly normal, mentation intact and speech normal     PSYCH: mentation appears " normal. and affect normal/bright    DIAGNOSTICS:                                                    Serum Potassium  Serum Creatinine  Hemoglobin A1C    Recent Labs   Lab Test  07/19/17   0846  03/03/17   1233   HGB  12.4  12.5   PLT  203  206   NA  143  143   POTASSIUM  3.4  4.2   CR  0.72  0.64   A1C  5.9  5.8        IMPRESSION:                                                    Reason for surgery/procedure: need for exam under anesthesia and IUD implant  Diagnosis/reason for consult: preop     The proposed surgical procedure is considered INTERMEDIATE risk.    REVISED CARDIAC RISK INDEX  The patient has the following serious cardiovascular risks for perioperative complications such as (MI, PE, VFib and 3  AV Block):  No serious cardiac risks  INTERPRETATION: 1 risks: Class II (low risk - 0.9% complication rate)    The patient has the following additional risks for perioperative complications:  No identified additional risks      ICD-10-CM    1. Preop general physical exam Z01.818 Comprehensive metabolic panel     Hemoglobin A1c   2. Excessive or frequent menstruation N92.0    3. Irregular menses N92.6    4. Type 2 diabetes mellitus without complication, without long-term current use of insulin (H) E11.9 Comprehensive metabolic panel     Hemoglobin A1c     metFORMIN (GLUCOPHAGE) 500 MG tablet   5. Mild persistent asthma without complication J45.30 Comprehensive metabolic panel     Hemoglobin A1c   6. Hyperlipidemia with target LDL less than 70 E78.5 Comprehensive metabolic panel     Hemoglobin A1c     atorvastatin (LIPITOR) 10 MG tablet   7. Obesity E66.01 Comprehensive metabolic panel     Hemoglobin A1c   8. Essential hypertension with goal blood pressure less than 140/90 I10 Comprehensive metabolic panel     Hemoglobin A1c     lisinopril (PRINIVIL/ZESTRIL) 2.5 MG tablet       RECOMMENDATIONS:                                                          --Patient is to take all scheduled medications on the day of  surgery  Inhalers only   Hold metformin and lisinopril morning of surgery     APPROVAL GIVEN to proceed with proposed procedure, without further diagnostic evaluation as long as labs are reasonable      Signed Electronically by: DANYELL Spencer CNP    Copy of this evaluation report is provided to requesting physician.    [place] Preop Guidelines

## 2018-01-03 ENCOUNTER — ANESTHESIA (OUTPATIENT)
Dept: SURGERY | Facility: CLINIC | Age: 43
End: 2018-01-03
Payer: COMMERCIAL

## 2018-01-03 ENCOUNTER — ANESTHESIA EVENT (OUTPATIENT)
Dept: SURGERY | Facility: CLINIC | Age: 43
End: 2018-01-03
Payer: COMMERCIAL

## 2018-01-03 ENCOUNTER — HOSPITAL ENCOUNTER (OUTPATIENT)
Facility: CLINIC | Age: 43
Discharge: HOME | End: 2018-01-03
Attending: OBSTETRICS & GYNECOLOGY | Admitting: OBSTETRICS & GYNECOLOGY
Payer: COMMERCIAL

## 2018-01-03 VITALS
HEIGHT: 64 IN | RESPIRATION RATE: 16 BRPM | SYSTOLIC BLOOD PRESSURE: 150 MMHG | HEART RATE: 83 BPM | DIASTOLIC BLOOD PRESSURE: 91 MMHG | BODY MASS INDEX: 50.02 KG/M2 | WEIGHT: 293 LBS | TEMPERATURE: 97.6 F | OXYGEN SATURATION: 99 %

## 2018-01-03 DIAGNOSIS — G89.18 POST-OPERATIVE PAIN: Primary | ICD-10-CM

## 2018-01-03 LAB
GLUCOSE BLDC GLUCOMTR-MCNC: 114 MG/DL (ref 70–99)
GLUCOSE BLDC GLUCOMTR-MCNC: 130 MG/DL (ref 70–99)
HCG UR QL: NEGATIVE

## 2018-01-03 PROCEDURE — 36000056 ZZH SURGERY LEVEL 3 1ST 30 MIN: Performed by: OBSTETRICS & GYNECOLOGY

## 2018-01-03 PROCEDURE — 25000132 ZZH RX MED GY IP 250 OP 250 PS 637: Performed by: OBSTETRICS & GYNECOLOGY

## 2018-01-03 PROCEDURE — 25000128 H RX IP 250 OP 636: Performed by: ANESTHESIOLOGY

## 2018-01-03 PROCEDURE — 71000012 ZZH RECOVERY PHASE 1 LEVEL 1 FIRST HR: Performed by: OBSTETRICS & GYNECOLOGY

## 2018-01-03 PROCEDURE — 93010 ELECTROCARDIOGRAM REPORT: CPT | Performed by: INTERNAL MEDICINE

## 2018-01-03 PROCEDURE — 25000125 ZZHC RX 250: Performed by: NURSE ANESTHETIST, CERTIFIED REGISTERED

## 2018-01-03 PROCEDURE — 25800025 ZZH RX 258: Performed by: OBSTETRICS & GYNECOLOGY

## 2018-01-03 PROCEDURE — 36000058 ZZH SURGERY LEVEL 3 EA 15 ADDTL MIN: Performed by: OBSTETRICS & GYNECOLOGY

## 2018-01-03 PROCEDURE — 71000027 ZZH RECOVERY PHASE 2 EACH 15 MINS: Performed by: OBSTETRICS & GYNECOLOGY

## 2018-01-03 PROCEDURE — 40000305 ZZH STATISTIC PRE PROC ASSESS I: Performed by: OBSTETRICS & GYNECOLOGY

## 2018-01-03 PROCEDURE — 25000128 H RX IP 250 OP 636: Performed by: NURSE ANESTHETIST, CERTIFIED REGISTERED

## 2018-01-03 PROCEDURE — 27210794 ZZH OR GENERAL SUPPLY STERILE: Performed by: OBSTETRICS & GYNECOLOGY

## 2018-01-03 PROCEDURE — 37000008 ZZH ANESTHESIA TECHNICAL FEE, 1ST 30 MIN: Performed by: OBSTETRICS & GYNECOLOGY

## 2018-01-03 PROCEDURE — 25000566 ZZH SEVOFLURANE, EA 15 MIN: Performed by: OBSTETRICS & GYNECOLOGY

## 2018-01-03 PROCEDURE — 82962 GLUCOSE BLOOD TEST: CPT | Mod: 91

## 2018-01-03 PROCEDURE — 81025 URINE PREGNANCY TEST: CPT | Performed by: ANESTHESIOLOGY

## 2018-01-03 PROCEDURE — 37000009 ZZH ANESTHESIA TECHNICAL FEE, EACH ADDTL 15 MIN: Performed by: OBSTETRICS & GYNECOLOGY

## 2018-01-03 PROCEDURE — 58558 HYSTEROSCOPY BIOPSY: CPT | Mod: 52 | Performed by: OBSTETRICS & GYNECOLOGY

## 2018-01-03 RX ORDER — PROMETHAZINE HYDROCHLORIDE 25 MG/ML
6.25 INJECTION, SOLUTION INTRAMUSCULAR; INTRAVENOUS
Status: DISCONTINUED | OUTPATIENT
Start: 2018-01-03 | End: 2018-01-03 | Stop reason: HOSPADM

## 2018-01-03 RX ORDER — ONDANSETRON 4 MG/1
4 TABLET, ORALLY DISINTEGRATING ORAL EVERY 30 MIN PRN
Status: DISCONTINUED | OUTPATIENT
Start: 2018-01-03 | End: 2018-01-03 | Stop reason: HOSPADM

## 2018-01-03 RX ORDER — PROPOFOL 10 MG/ML
INJECTION, EMULSION INTRAVENOUS PRN
Status: DISCONTINUED | OUTPATIENT
Start: 2018-01-03 | End: 2018-01-03

## 2018-01-03 RX ORDER — MEPERIDINE HYDROCHLORIDE 25 MG/ML
12.5 INJECTION INTRAMUSCULAR; INTRAVENOUS; SUBCUTANEOUS
Status: DISCONTINUED | OUTPATIENT
Start: 2018-01-03 | End: 2018-01-03 | Stop reason: HOSPADM

## 2018-01-03 RX ORDER — HYDROCODONE BITARTRATE AND ACETAMINOPHEN 5; 325 MG/1; MG/1
1-2 TABLET ORAL EVERY 4 HOURS PRN
Qty: 20 TABLET | Refills: 0 | Status: SHIPPED | OUTPATIENT
Start: 2018-01-03 | End: 2018-04-27

## 2018-01-03 RX ORDER — FENTANYL CITRATE 50 UG/ML
25-50 INJECTION, SOLUTION INTRAMUSCULAR; INTRAVENOUS
Status: DISCONTINUED | OUTPATIENT
Start: 2018-01-03 | End: 2018-01-03 | Stop reason: HOSPADM

## 2018-01-03 RX ORDER — HYDROCODONE BITARTRATE AND ACETAMINOPHEN 5; 325 MG/1; MG/1
1 TABLET ORAL
Status: COMPLETED | OUTPATIENT
Start: 2018-01-03 | End: 2018-01-03

## 2018-01-03 RX ORDER — SODIUM CHLORIDE, SODIUM LACTATE, POTASSIUM CHLORIDE, CALCIUM CHLORIDE 600; 310; 30; 20 MG/100ML; MG/100ML; MG/100ML; MG/100ML
INJECTION, SOLUTION INTRAVENOUS CONTINUOUS
Status: DISCONTINUED | OUTPATIENT
Start: 2018-01-03 | End: 2018-01-03 | Stop reason: HOSPADM

## 2018-01-03 RX ORDER — ONDANSETRON 2 MG/ML
4 INJECTION INTRAMUSCULAR; INTRAVENOUS EVERY 30 MIN PRN
Status: DISCONTINUED | OUTPATIENT
Start: 2018-01-03 | End: 2018-01-03 | Stop reason: HOSPADM

## 2018-01-03 RX ORDER — NALOXONE HYDROCHLORIDE 0.4 MG/ML
.1-.4 INJECTION, SOLUTION INTRAMUSCULAR; INTRAVENOUS; SUBCUTANEOUS
Status: DISCONTINUED | OUTPATIENT
Start: 2018-01-03 | End: 2018-01-03 | Stop reason: HOSPADM

## 2018-01-03 RX ORDER — FENTANYL CITRATE 50 UG/ML
INJECTION, SOLUTION INTRAMUSCULAR; INTRAVENOUS PRN
Status: DISCONTINUED | OUTPATIENT
Start: 2018-01-03 | End: 2018-01-03

## 2018-01-03 RX ORDER — LIDOCAINE HYDROCHLORIDE 10 MG/ML
INJECTION, SOLUTION INFILTRATION; PERINEURAL PRN
Status: DISCONTINUED | OUTPATIENT
Start: 2018-01-03 | End: 2018-01-03

## 2018-01-03 RX ORDER — GLYCOPYRROLATE 0.2 MG/ML
INJECTION, SOLUTION INTRAMUSCULAR; INTRAVENOUS PRN
Status: DISCONTINUED | OUTPATIENT
Start: 2018-01-03 | End: 2018-01-03

## 2018-01-03 RX ORDER — ONDANSETRON 2 MG/ML
INJECTION INTRAMUSCULAR; INTRAVENOUS PRN
Status: DISCONTINUED | OUTPATIENT
Start: 2018-01-03 | End: 2018-01-03

## 2018-01-03 RX ORDER — KETOROLAC TROMETHAMINE 30 MG/ML
INJECTION, SOLUTION INTRAMUSCULAR; INTRAVENOUS PRN
Status: DISCONTINUED | OUTPATIENT
Start: 2018-01-03 | End: 2018-01-03

## 2018-01-03 RX ORDER — HYDROMORPHONE HYDROCHLORIDE 1 MG/ML
.3-.5 INJECTION, SOLUTION INTRAMUSCULAR; INTRAVENOUS; SUBCUTANEOUS EVERY 10 MIN PRN
Status: DISCONTINUED | OUTPATIENT
Start: 2018-01-03 | End: 2018-01-03 | Stop reason: HOSPADM

## 2018-01-03 RX ORDER — METOPROLOL TARTRATE 1 MG/ML
1-2 INJECTION, SOLUTION INTRAVENOUS EVERY 5 MIN PRN
Status: DISCONTINUED | OUTPATIENT
Start: 2018-01-03 | End: 2018-01-03 | Stop reason: HOSPADM

## 2018-01-03 RX ORDER — LIDOCAINE 40 MG/G
CREAM TOPICAL
Status: DISCONTINUED | OUTPATIENT
Start: 2018-01-03 | End: 2018-01-03 | Stop reason: HOSPADM

## 2018-01-03 RX ORDER — ACETAMINOPHEN 325 MG/1
650 TABLET ORAL
Status: DISCONTINUED | OUTPATIENT
Start: 2018-01-03 | End: 2018-01-03 | Stop reason: HOSPADM

## 2018-01-03 RX ORDER — ALBUTEROL SULFATE 0.83 MG/ML
2.5 SOLUTION RESPIRATORY (INHALATION) EVERY 4 HOURS PRN
Status: DISCONTINUED | OUTPATIENT
Start: 2018-01-03 | End: 2018-01-03 | Stop reason: HOSPADM

## 2018-01-03 RX ORDER — IBUPROFEN 800 MG/1
800 TABLET, FILM COATED ORAL EVERY 6 HOURS PRN
Qty: 30 TABLET | Refills: 0 | Status: SHIPPED | OUTPATIENT
Start: 2018-01-03 | End: 2021-04-27

## 2018-01-03 RX ADMIN — KETOROLAC TROMETHAMINE 30 MG: 30 INJECTION, SOLUTION INTRAMUSCULAR at 07:39

## 2018-01-03 RX ADMIN — ROCURONIUM BROMIDE 3 MG: 10 INJECTION INTRAVENOUS at 07:31

## 2018-01-03 RX ADMIN — LIDOCAINE HYDROCHLORIDE 30 MG: 10 INJECTION, SOLUTION INFILTRATION; PERINEURAL at 07:31

## 2018-01-03 RX ADMIN — MIDAZOLAM 2 MG: 1 INJECTION INTRAMUSCULAR; INTRAVENOUS at 07:25

## 2018-01-03 RX ADMIN — SODIUM CHLORIDE, POTASSIUM CHLORIDE, SODIUM LACTATE AND CALCIUM CHLORIDE: 600; 310; 30; 20 INJECTION, SOLUTION INTRAVENOUS at 07:40

## 2018-01-03 RX ADMIN — GLYCOPYRROLATE 0.2 MG: 0.2 INJECTION, SOLUTION INTRAMUSCULAR; INTRAVENOUS at 07:31

## 2018-01-03 RX ADMIN — PROPOFOL 300 MG: 10 INJECTION, EMULSION INTRAVENOUS at 07:31

## 2018-01-03 RX ADMIN — HYDROCODONE BITARTRATE AND ACETAMINOPHEN 1 TABLET: 5; 325 TABLET ORAL at 09:35

## 2018-01-03 RX ADMIN — SODIUM CHLORIDE, POTASSIUM CHLORIDE, SODIUM LACTATE AND CALCIUM CHLORIDE: 600; 310; 30; 20 INJECTION, SOLUTION INTRAVENOUS at 09:03

## 2018-01-03 RX ADMIN — FENTANYL CITRATE 100 MCG: 50 INJECTION, SOLUTION INTRAMUSCULAR; INTRAVENOUS at 07:31

## 2018-01-03 RX ADMIN — ONDANSETRON 4 MG: 2 INJECTION INTRAMUSCULAR; INTRAVENOUS at 07:39

## 2018-01-03 NOTE — ANESTHESIA PREPROCEDURE EVALUATION
Anesthesia Evaluation     .             ROS/MED HX    ENT/Pulmonary:     (+)asthma , . .    Neurologic:  - neg neurologic ROS     Cardiovascular:  - neg cardiovascular ROS   (+) hypertension----. : . . . :. .       METS/Exercise Tolerance:     Hematologic:  - neg hematologic  ROS       Musculoskeletal:  - neg musculoskeletal ROS       GI/Hepatic:         Renal/Genitourinary:  - ROS Renal section negative       Endo: Comment: .Body mass index is 53.73 kg/(m^2).      (+) type II DM Obesity, .      Psychiatric:  - neg psychiatric ROS       Infectious Disease:  - neg infectious disease ROS       Malignancy:         Other: Comment: .Lab Test        07/19/17 03/03/17 02/25/16      --          02/23/16                       0846          1233          0630           --           0821          WBC          8.1          7.7           --           --          10.7          HGB          12.4         12.5         10.5*          < >        11.7          MCV          82           82            --           --          82            PLT          203          206           --           --          202            < > = values in this interval not displayed.                  Lab Test        12/29/17 07/19/17 03/03/17                       1334          0846          1233          NA           140          143          143           POTASSIUM    3.5          3.4          4.2           CHLORIDE     107          108          107           CO2          26           25           25            BUN          16           12           18            CR           0.59         0.72         0.64          ANIONGAP     7            10           11            HALEIGH          8.3*         8.5          8.9           GLC          98           111*         115*                              Physical Exam  Normal systems: cardiovascular and pulmonary    Airway   Mallampati: II    Dental     Cardiovascular   Rhythm and rate: regular and  normal      Pulmonary    breath sounds clear to auscultation                    Anesthesia Plan      History & Physical Review  History and physical reviewed and following examination; no interval change.    ASA Status:  3 .        Plan for General with Intravenous induction. Maintenance will be Inhalation and Balanced.    PONV prophylaxis:  Ondansetron (or other 5HT-3) and Dexamethasone or Solumedrol       Postoperative Care      Consents  Anesthetic plan, risks, benefits and alternatives discussed with:  Patient or representative..                          .

## 2018-01-03 NOTE — DISCHARGE INSTRUCTIONS
HYSTEROSCOPY OR PELVISCOPY DISCHARGE INSTRUCTIONS      DO NOT DRIVE A CAR, DRINK ALCOHOL OR USE MACHINERY FOR THE NEXT 24 HOURS.  YOU SHOULD WAIT UNTIL YOU HAVE RECOVERED BEFORE MAKING ANY IMPORTANT DECISIONS.    PAIN  YOU MAY HAVE CRAMPS, SHOULDER PAIN OR A LOW BACKACHE FOR 24 TO 48 HOURS.  TYLENOL (ACETAMINOPHEN) OR MOTRIN (IBUPROFEN) MAY HELP, OR YOUR DOCTOR MAY GIVE YOU PAIN MEDICINE.  CALL YOUR DOCTOR IF PAIN CANNOT BE CONTROLLED.    BLEEDING OR VAGINAL DISCHARGE  YOU MAY HAVE SOME BLEEDING OR DISCHARGE FOR UP TO A WEEK OR LONGER.  DO NOT DOUCHE, USE TAMPONS OR HAVE SEX (INTERCOURSE) FOR 7 DAYS.  CALL YOUR DOCTOR IF YOU SOAK MORE THAN ONE MAXI PAD (SANITARY [place]) PER HOUR, OR IF YOU PASS LARGE BLOOD CLOTS.    FEVER  YOU MAY HAVE A LOW FEVER FOR THE FIRST TWO DAYS.  CALL YOUR DOCTOR IF IT GOES OVER 101 DEGREES.    NAUSEA  IF YOU HAVE NAUSEA (FEEL SICK TO YOUR STOMACH), STAY IN BED.  TRY DRINKING A SMALL AMOUNT OF 7-UP, TEA OR SOUP.    SWOLLEN BELLY  IF YOUR ABDOMEN (BELLY AREA) FEELS FIRM OR SWOLLEN, CALL YOUR DOCTOR.    DIZZINESS AND WEAKNESS  YOU MAY FEEL DIZZY OR WEAK FOR A FEW DAYS.  IF SO, YOU SHOULD REST OFTEN, STAND UP SLOWLY AND USE CARE WHEN CLIMBING STAIRS.    DIET AND ACTIVITY  EAT LIGHT MEALS AND DRINK PLENTY OF FLUIDS FOR THE FIRST 24 HOURS (OR LONGER, IF YOU HAVE NAUSEA).  WAIT 5 DAYS BEFORE BATHING.  SHOWERS ARE OKAY.  MOST WOMEN CAN RETURN TO WORK AFTER 24 HOURS.  YOU MAY GO BACK TO YOUR OTHER ACTIVITIES AFTER YOUR PAIN GOES AWAY.        CYSTOSCOPY DISCHARGE INSTRUCTIONS    YOU MAY GO BACK TO YOUR NORMAL DIET AND ACTIVITY, UNLESS YOUR DOCTOR TELLS YOU NOT TO.    FOR THE NEXT TWO DAYS, YOU MAY NOTICE:    SOME BLOOD IN YOUR URINE.  SOME BURNING WHEN YOU URINATE (USE THE TOILET).  AN URGE TO URINATE MORE OFTEN.  BLADDER SPASMS.    THESE ARE NORMAL AFTER THE PROCEDURE.  THEY SHOULD GO AWAY AFTER A DAY OR TWO.  TO RELIEVE THESE PROBLEMS:     DRINK 6 TO 8 LARGE GLASSES OF WATER EACH DAY (INCLUDES  DRINKS AT MEALS).  THIS WILL HELP CLEAR THE URINE.    TAKE WARM BATHS TO RELIEVE PAIN AND BLADDER SPASMS.  DO NOT ADD ANYTHING TO THE BATH WATER.    YOUR DOCTOR MAY PRESCRIBE PAIN MEDICINE.  YOU MAY ALSO TAKE TYLENOL (ACETAMINOPHEN) FOR PAIN.    CALL YOUR SURGEON IF YOU HAVE:    A FEVER OVER 100 DEGREES FOR MORE THAN A DAY.  CHECK YOUR TEMPERATURE UNDER YOUR TONGUE.    CHILLS.    FAILURE TO URINATE (NO URINE COMES OUT WHEN YOU TRY TO USE THE TOILET).  TRY SOAKING IN A BATHTUB FULL OF WARM WATER.  IF STILL NO URINE, CALL YOUR DOCTOR.    A LOT OF BLOOD IN THE URINE, OR BLOOD CLOTS LARGER THAN A NICKEL.      PAIN IN THE BACK OR BELLY AREA (ABDOMEN).    PAIN OR SPASMS THAT ARE NOT RELIEVED BY WARM TUB BATHS AND PAIN MEDICINE.      SEVERE PAIN, BURNING OR OTHER PROBLEMS WHILE PASSING URINE.    PAIN THAT GETS WORSE AFTER TWO DAYS.      GENERAL ANESTHESIA OR SEDATION ADULT DISCHARGE INSTRUCTIONS   SPECIAL PRECAUTIONS FOR 24 HOURS AFTER SURGERY    IT IS NOT UNUSUAL TO FEEL LIGHT-HEADED OR FAINT, UP TO 24 HOURS AFTER SURGERY OR WHILE TAKING PAIN MEDICATION.  IF YOU HAVE THESE SYMPTOMS; SIT FOR A FEW MINUTES BEFORE STANDING AND HAVE SOMEONE ASSIST YOU WHEN YOU GET UP TO WALK OR USE THE BATHROOM.    YOU SHOULD REST AND RELAX FOR THE NEXT 24 HOURS AND YOU MUST MAKE ARRANGEMENTS TO HAVE SOMEONE STAY WITH YOU FOR AT LEAST 24 HOURS AFTER YOUR DISCHARGE.  AVOID HAZARDOUS AND STRENUOUS ACTIVITIES.  DO NOT MAKE IMPORTANT DECISIONS FOR 24 HOURS.    DO NOT DRIVE ANY VEHICLE OR OPERATE MECHANICAL EQUIPMENT FOR 24 HOURS FOLLOWING THE END OF YOUR SURGERY.  EVEN THOUGH YOU MAY FEEL NORMAL, YOUR REACTIONS MAY BE AFFECTED BY THE MEDICATION YOU HAVE RECEIVED.    DO NOT DRINK ALCOHOLIC BEVERAGES FOR 24 HOURS FOLLOWING YOUR SURGERY.    DRINK CLEAR LIQUIDS (APPLE JUICE, GINGER ALE, 7-UP, BROTH, ETC.).  PROGRESS TO YOUR REGULAR DIET AS YOU FEEL ABLE.    YOU MAY HAVE A DRY MOUTH, A SORE THROAT, MUSCLES ACHES OR TROUBLE SLEEPING.  THESE SHOULD GO  AWAY AFTER 24 HOURS.    CALL YOUR DOCTOR FOR ANY OF THE FOLLOWING:  SIGNS OF INFECTION (FEVER, GROWING TENDERNESS AT THE SURGERY SITE, A LARGE AMOUNT OF DRAINAGE OR BLEEDING, SEVERE PAIN, FOUL-SMELLING DRAINAGE, REDNESS OR SWELLING.    IT HAS BEEN OVER 8 TO 10 HOURS SINCE SURGERY AND YOU ARE STILL NOT ABLE TO URINATE (PASS WATER).   You received Toradol, an IV form of ibuprofen (Motrin) at 07:39 am.  Do not take any ibuprofen products until 01:39 pm.    You were given 1 pain pill, hydrocodone with acetaminophen at 0935 am today

## 2018-01-03 NOTE — OR NURSING
Pt's  Genaro, has been called 6 times already and voice mail left for him to come into surgery area to get dc instructions on wife and take her home.

## 2018-01-03 NOTE — ANESTHESIA CARE TRANSFER NOTE
Patient: Coni Newberry    Procedure(s):  Hysteroscopy, cervical dilation, endometreal curettage, mirena IUD insertion - Wound Class: II-Clean Contaminated   - Wound Class: II-Clean Contaminated    Diagnosis: Cervical stenosis, abnormal uterine bleeding, contreception management  Diagnosis Additional Information: No value filed.    Anesthesia Type:   General     Note:  Airway :Face Mask  Patient transferred to:PACU  Comments: Pt SV good tidal volume, op sxn cuff down extubated.  Transfer to pacu.  Report to PACU RN transfer care. Handoff Report: Identifed the Patient, Identified the Reponsible Provider, Reviewed the pertinent medical history, Discussed the surgical course, Reviewed Intra-OP anesthesia mangement and issues during anesthesia, Set expectations for post-procedure period and Allowed opportunity for questions and acknowledgement of understanding      Vitals: (Last set prior to Anesthesia Care Transfer)    CRNA VITALS  1/3/2018 0803 - 1/3/2018 0840      1/3/2018             SpO2: 96 %    Resp Rate (observed): 9                Electronically Signed By: DANYELL Miguel CRNA  January 3, 2018  8:40 AM

## 2018-01-03 NOTE — IP AVS SNAPSHOT
[facility] PreOP/PostOP    [address]    [facility] [address]    Phone:  [phone]    Fax:  [phone]                                       After Visit Summary   1/3/2018    Coni Newberry    MRN: [id number]           After Visit Summary Signature Page     I have received my discharge instructions, and my questions have been answered. I have discussed any challenges I see with this plan with the nurse or doctor.    ..........................................................................................................................................  Patient/Patient Representative Signature      ..........................................................................................................................................  Patient Representative Print Name and Relationship to Patient    ..................................................               ................................................  Date                                            Time    ..........................................................................................................................................  Reviewed by Signature/Title    ...................................................              ..............................................  Date                                                            Time

## 2018-01-03 NOTE — OP NOTE
Hysteroscopy, Dilation & Curettage (D&C) Operative Note    Pre-operative diagnosis: Cervical stenosis, abnormal uterine bleeding, contraception management, class III obesity   Post-operative diagnosis Same, Uterine perforation, Normal cystoscopy   Procedure: Procedure(s):  Hysteroscopy, cervical dilation, and cystoscopy - Wound Class: II-Clean Contaminated   - Wound Class: II-Clean Contaminated   Surgeon: Lissy Maya DO   Assistants(s): None   Anesthesia: General      Estimated blood loss: 20 mL    Total IV fluids: (See anesthesia record)       Total urine output: (See anesthesia record)   Drains: None   Specimens: None       Complications: Uterine perforation   Condition: Stable     Indications:   Risks, benefits and alternative treatments have been discussed with the patient. Informed consent obtained.       Findings: On pelvic exam, the uterus was noted to be midline with no adnexal masses appreciated.  Hysteroscopic findings revealed a normal endometrial lining. There was no evidence of fibroids, polyps or septums. A uterine perforation was noted as described below. Both tubal ostia were visualized and noted to be visibly patent. Cystoscopy revealed normal findings.    Description of Operative Procedure: The patient was taken to the operating room and placed under geeral anesthesia in the dorsal supine position. She was then transferred to the dorsal lithotomy position and the perineum was prepped and draped in a sterile fashion.     A sterile speculum was placed in the vagina with inadequate visualization of the cervix. Due to redundant vaginal tissue and long length of the vagina, a longer longer speculum was then placed with good view of the cervix. The anterior lip of the cervix was grasped with a vulsellum teneculum and drawn forth. The cervical canal could not successfully dilated due to stenosis. The single tooth tenaculum was moved to the posterior lip of the cervix and traction placed. The smallest  Hegar cervical dilated could not be passed. The smallest plastic dilator was able to be passed through the internal os.  The cervical canal was then serially dilated to accomodate the hysteroscope. The hysteroscope was inserted into the intrauterine cavity without difficulty and the cavity was surveyed using saline for the medium. A small uterine perforation was noted at the fundus at the far right side of the patient's uterus. The hysterscope and speculum was removed. Endometrial curettings were not obtained due to risk of injury with the curette. A cystoscopy was performed to ensure no injury was made to the bladder. All aspects of the bladder walls were without injury.  Pictures were taken.    All instruments were removed. The patient tolerated the procedure well. Sponge, lap and instruments counts were correct times two. The patient was taken to the PACU, awake and in stable condition for recovery.     Plan in office endometrial biopsy (EMB) with [facility] after 2 weeks of post operative recovery. Patient may have Mirena IUD placed at another time.      Signed:  Lissy Maya  1/3/2018  9:07 AM

## 2018-01-03 NOTE — DISCHARGE SUMMARY
Physician Discharge Summary     Patient ID:  Coni Newberry  [id number]  42 year old  1975    Admit date: 1/3/2018    Discharge date and time: 1/3/2018    Admitting Physician: Lissy Maya DO     Discharge Physician: Same    Admission Diagnoses: Cervical stenosis, abnormal uterine bleeding, contraception management, Obesity Class III    Discharge Diagnoses: S/p Cervical dilation, Hysteroscopy, Cystoscopy     Admission Condition: good    Discharged Condition: good    Disposition: home    Patient Instructions:   Current Discharge Medication List      START taking these medications    Details   HYDROcodone-acetaminophen (NORCO) 5-325 MG per tablet Take 1-2 tablets by mouth every 4 hours as needed for other (Moderate to Severe Pain)  Qty: 20 tablet, Refills: 0    Associated Diagnoses: Post-operative pain         CONTINUE these medications which have CHANGED    Details   ibuprofen (ADVIL/MOTRIN) 800 MG tablet Take 1 tablet (800 mg) by mouth every 6 hours as needed for moderate pain  Qty: 30 tablet, Refills: 0    Associated Diagnoses: Post-operative pain         CONTINUE these medications which have NOT CHANGED    Details   metFORMIN (GLUCOPHAGE) 500 MG tablet Take 1 tablet (500 mg) by mouth 2 times daily (with meals)  Qty: 180 tablet, Refills: 1    Comments: ### DO NOT FILL NOW.  Please update patient's profile to reflect additional refills.  ####  Associated Diagnoses: Type 2 diabetes mellitus without complication, without long-term current use of insulin (H)      atorvastatin (LIPITOR) 10 MG tablet Take 1 tablet (10 mg) by mouth daily  Qty: 90 tablet, Refills: 1    Comments: ### DO NOT FILL NOW.  Please update patient's profile to reflect additional refills.  ####  Associated Diagnoses: Hyperlipidemia with target LDL less than 70      lisinopril (PRINIVIL/ZESTRIL) 2.5 MG tablet Take 1 tablet (2.5 mg) by mouth daily  Qty: 90 tablet, Refills: 1    Comments: ### DO NOT FILL NOW.  Please update patient's profile  to reflect additional refills.  ####  Associated Diagnoses: Essential hypertension with goal blood pressure less than 140/90      triamcinolone (KENALOG) 0.1 % ointment Apply sparingly to affected area three times daily AS NEEDED FOR ECZEMA RASH  Qty: 90 g, Refills: 0    Comments: Pt due for appt/labs in jan. Needs to call our office  Associated Diagnoses: Nummular eczema      ipratropium - albuterol 0.5 mg/2.5 mg/3 mL (DUONEB) 0.5-2.5 (3) MG/3ML neb solution USE ONE AMPULE IN NEBULIZER EVERY 6 HOURS AS NEEDED FOR  SHORTNESS  OF  BREATH/DYSPNEA  OR  WHEEZING  Qty: 90 vial, Refills: 0    Comments: Pt due for Jan appt/labs. Needs to call our office.  Associated Diagnoses: Mild persistent asthma without complication      QVAR 80 MCG/ACT Inhaler INHALE 2 PUFFS INTO THE LUNGS TWICE A DAY  Qty: 3 Inhaler, Refills: 3    Associated Diagnoses: Mild persistent asthma without complication      JOLIVETTE 0.35 MG per tablet TAKE 1 TABLET (0.35 MG) BY MOUTH DAILY  Qty: 84 tablet, Refills: 1    Associated Diagnoses: Encounter for surveillance of contraceptive pills      montelukast (SINGULAIR) 10 MG tablet TAKE ONE TABLET BY MOUTH AT BEDTIME  Qty: 90 tablet, Refills: 3    Comments: ### DO NOT FILL NOW.  Please update patient's profile to reflect additional refills.  ####  Associated Diagnoses: Uncomplicated asthma, unspecified asthma severity      albuterol (ALBUTEROL) 108 (90 BASE) MCG/ACT Inhaler Inhale 2 puffs into the lungs every 6 hours  Qty: 2 Inhaler, Refills: 3    Comments: ### DO NOT FILL NOW.  Please update patient's profile to reflect additional refills.  ####  Associated Diagnoses: Moderate persistent asthma without complication      nystatin (MYCOSTATIN) ointment Apply topically as needed  Qty: 60 g, Refills: 3    Associated Diagnoses: Yeast infection of the skin      omeprazole 20 MG tablet Take 1 tablet (20 mg) by mouth daily Take 30-60 minutes before a meal.  Qty: 90 tablet, Refills: 2    Associated Diagnoses:  Abdominal pain, epigastric      cetirizine-psuedoePHEDrine (ZYRTEC-D) 5-120 MG per tablet Take 1 tablet by mouth daily Reported on 3/31/2017      fluticasone (FLONASE) 50 MCG/ACT nasal spray Spray 2 sprays into both nostrils daily  Qty: 3 Package, Refills: 3    Associated Diagnoses: Asthma      !! order for DME Equipment being ordered: neb tubing and cup  Qty: 2 Device, Refills: prn    Associated Diagnoses: Mild persistent asthma without complication      blood glucose monitoring (ONE TOUCH ULTRA 2) meter device kit Use to test blood sugars 1 times daily or as directed.  Qty: 1 kit, Refills: 0    Associated Diagnoses: DM type 2, goal A1C below 8.0      !! blood glucose monitoring (ONE TOUCH ULTRA) test strip Use to test blood sugars 1 times daily or as directed.  Qty: 3 Box, Refills: 1    Associated Diagnoses: DM type 2, goal A1C below 8.0      !! ORDER FOR DME Equipment being ordered: Nebulizer  Qty: 1 Device, Refills: prn    Associated Diagnoses: Mild persistent asthma      !! blood glucose test strip Use to test blood sugar 6 times daily or as directed.  Qty: 600 strip, Refills: 3    Associated Diagnoses: Diabetes mellitus, new onset (H)      LANCETS ULTRA FINE MISC 1 each 6 times daily  Qty: 300 each, Refills: 2    Comments: true track meter  Associated Diagnoses: Diabetes mellitus, new onset (H)       !! - Potential duplicate medications found. Please discuss with provider.        Activity: see discharge instructions  Diet: as tolerated, advanced slowly  Wound Care: no intercourse for one week    Follow-up with Dr Maya in 1-2 weeks.    Signed:  Lissy Maya  1/3/2018  9:19 AM

## 2018-01-03 NOTE — IP AVS SNAPSHOT
MRN:[id number]                      After Visit Summary   1/3/2018    Coni Newberry    MRN: [id number]           Thank you!     Thank you for choosing [facility] for your care. Our goal is always to provide you with excellent care. Hearing back from our patients is one way we can continue to improve our services. Please take a few minutes to complete the written survey that you may receive in the mail after you visit. If you would like to speak to someone directly about your visit please contact Patient Relations at [phone]. Thank you!          Patient Information     Date Of Birth          1975        About your hospital stay     You were admitted on:  January 3, 2018 You last received care in the:  [facility] PreOP/PostOP    You were discharged on:  January 3, 2018       Who to Call     For medical emergencies, please call 911.  For non-urgent questions about your medical care, please call your primary care provider or clinic, [phone]  For questions related to your surgery, please call your surgery clinic        Attending Provider     Provider Lissy Harden DO OB/Gyn       Primary Care Provider Office Phone # Fax #    Farida DANYELL [facility] [phone]      Further instructions from your care team        HYSTEROSCOPY OR PELVISCOPY DISCHARGE INSTRUCTIONS      DO NOT DRIVE A CAR, DRINK ALCOHOL OR USE MACHINERY FOR THE NEXT 24 HOURS.  YOU SHOULD WAIT UNTIL YOU HAVE RECOVERED BEFORE MAKING ANY IMPORTANT DECISIONS.    PAIN  YOU MAY HAVE CRAMPS, SHOULDER PAIN OR A LOW BACKACHE FOR 24 TO 48 HOURS.  TYLENOL (ACETAMINOPHEN) OR MOTRIN (IBUPROFEN) MAY HELP, OR YOUR DOCTOR MAY GIVE YOU PAIN MEDICINE.  CALL YOUR DOCTOR IF PAIN CANNOT BE CONTROLLED.    BLEEDING OR VAGINAL DISCHARGE  YOU MAY HAVE SOME BLEEDING OR DISCHARGE FOR UP TO A WEEK OR LONGER.  DO NOT DOUCHE, USE TAMPONS OR HAVE SEX (INTERCOURSE) FOR 7 DAYS.  CALL YOUR DOCTOR IF YOU  SOAK MORE THAN ONE MAXI PAD (SANITARY [place]APKIN) PER HOUR, OR IF YOU PASS LARGE BLOOD CLOTS.    FEVER  YOU MAY HAVE A LOW FEVER FOR THE FIRST TWO DAYS.  CALL YOUR DOCTOR IF IT GOES OVER 101 DEGREES.    NAUSEA  IF YOU HAVE NAUSEA (FEEL SICK TO YOUR STOMACH), STAY IN BED.  TRY DRINKING A SMALL AMOUNT OF 7-UP, TEA OR SOUP.    SWOLLEN BELLY  IF YOUR ABDOMEN (BELLY AREA) FEELS FIRM OR SWOLLEN, CALL YOUR DOCTOR.    DIZZINESS AND WEAKNESS  YOU MAY FEEL DIZZY OR WEAK FOR A FEW DAYS.  IF SO, YOU SHOULD REST OFTEN, STAND UP SLOWLY AND USE CARE WHEN CLIMBING STAIRS.    DIET AND ACTIVITY  EAT LIGHT MEALS AND DRINK PLENTY OF FLUIDS FOR THE FIRST 24 HOURS (OR LONGER, IF YOU HAVE NAUSEA).  WAIT 5 DAYS BEFORE BATHING.  SHOWERS ARE OKAY.  MOST WOMEN CAN RETURN TO WORK AFTER 24 HOURS.  YOU MAY GO BACK TO YOUR OTHER ACTIVITIES AFTER YOUR PAIN GOES AWAY.        CYSTOSCOPY DISCHARGE INSTRUCTIONS    YOU MAY GO BACK TO YOUR NORMAL DIET AND ACTIVITY, UNLESS YOUR DOCTOR TELLS YOU NOT TO.    FOR THE NEXT TWO DAYS, YOU MAY NOTICE:    SOME BLOOD IN YOUR URINE.  SOME BURNING WHEN YOU URINATE (USE THE TOILET).  AN URGE TO URINATE MORE OFTEN.  BLADDER SPASMS.    THESE ARE NORMAL AFTER THE PROCEDURE.  THEY SHOULD GO AWAY AFTER A DAY OR TWO.  TO RELIEVE THESE PROBLEMS:     DRINK 6 TO 8 LARGE GLASSES OF WATER EACH DAY (INCLUDES DRINKS AT MEALS).  THIS WILL HELP CLEAR THE URINE.    TAKE WARM BATHS TO RELIEVE PAIN AND BLADDER SPASMS.  DO NOT ADD ANYTHING TO THE BATH WATER.    YOUR DOCTOR MAY PRESCRIBE PAIN MEDICINE.  YOU MAY ALSO TAKE TYLENOL (ACETAMINOPHEN) FOR PAIN.    CALL YOUR SURGEON IF YOU HAVE:    A FEVER OVER 100 DEGREES FOR MORE THAN A DAY.  CHECK YOUR TEMPERATURE UNDER YOUR TONGUE.    CHILLS.    FAILURE TO URINATE (NO URINE COMES OUT WHEN YOU TRY TO USE THE TOILET).  TRY SOAKING IN A BATHTUB FULL OF WARM WATER.  IF STILL NO URINE, CALL YOUR DOCTOR.    A LOT OF BLOOD IN THE URINE, OR BLOOD CLOTS LARGER THAN A NICKEL.      PAIN IN THE BACK OR  BELLY AREA (ABDOMEN).    PAIN OR SPASMS THAT ARE NOT RELIEVED BY WARM TUB BATHS AND PAIN MEDICINE.      SEVERE PAIN, BURNING OR OTHER PROBLEMS WHILE PASSING URINE.    PAIN THAT GETS WORSE AFTER TWO DAYS.      GENERAL ANESTHESIA OR SEDATION ADULT DISCHARGE INSTRUCTIONS   SPECIAL PRECAUTIONS FOR 24 HOURS AFTER SURGERY    IT IS NOT UNUSUAL TO FEEL LIGHT-HEADED OR FAINT, UP TO 24 HOURS AFTER SURGERY OR WHILE TAKING PAIN MEDICATION.  IF YOU HAVE THESE SYMPTOMS; SIT FOR A FEW MINUTES BEFORE STANDING AND HAVE SOMEONE ASSIST YOU WHEN YOU GET UP TO WALK OR USE THE BATHROOM.    YOU SHOULD REST AND RELAX FOR THE NEXT 24 HOURS AND YOU MUST MAKE ARRANGEMENTS TO HAVE SOMEONE STAY WITH YOU FOR AT LEAST 24 HOURS AFTER YOUR DISCHARGE.  AVOID HAZARDOUS AND STRENUOUS ACTIVITIES.  DO NOT MAKE IMPORTANT DECISIONS FOR 24 HOURS.    DO NOT DRIVE ANY VEHICLE OR OPERATE MECHANICAL EQUIPMENT FOR 24 HOURS FOLLOWING THE END OF YOUR SURGERY.  EVEN THOUGH YOU MAY FEEL NORMAL, YOUR REACTIONS MAY BE AFFECTED BY THE MEDICATION YOU HAVE RECEIVED.    DO NOT DRINK ALCOHOLIC BEVERAGES FOR 24 HOURS FOLLOWING YOUR SURGERY.    DRINK CLEAR LIQUIDS (APPLE JUICE, GINGER ALE, 7-UP, BROTH, ETC.).  PROGRESS TO YOUR REGULAR DIET AS YOU FEEL ABLE.    YOU MAY HAVE A DRY MOUTH, A SORE THROAT, MUSCLES ACHES OR TROUBLE SLEEPING.  THESE SHOULD GO AWAY AFTER 24 HOURS.    CALL YOUR DOCTOR FOR ANY OF THE FOLLOWING:  SIGNS OF INFECTION (FEVER, GROWING TENDERNESS AT THE SURGERY SITE, A LARGE AMOUNT OF DRAINAGE OR BLEEDING, SEVERE PAIN, FOUL-SMELLING DRAINAGE, REDNESS OR SWELLING.    IT HAS BEEN OVER 8 TO 10 HOURS SINCE SURGERY AND YOU ARE STILL NOT ABLE TO URINATE (PASS WATER).   You received Toradol, an IV form of ibuprofen (Motrin) at 07:39 am.  Do not take any ibuprofen products until 01:39 pm.    You were given 1 pain pill, hydrocodone with acetaminophen at 0935 am today    Pending Results     Date and Time Order Name Status Description    1/3/2018 0608 EKG 12-lead,  "tracing only Preliminary             Admission Information     Date & Time Provider Department Dept. Phone    1/3/2018 Lissy Maya DO [facility] PreOP/PostOP [phone]      Your Vitals Were     Blood Pressure Pulse Temperature Respirations Height Weight    145/92 83 97.6  F (36.4  C) (Temporal) 16 1.626 m (5' 4\") 142 kg (313 lb)    Last Period Pulse Oximetry BMI (Body Mass Index)             12/19/2017 99% 53.73 kg/m2         [facility] Information     [facility] gives you secure access to your electronic health record. If you see a primary care provider, you can also send messages to your care team and make appointments. If you have questions, please call your primary care clinic.  If you do not have a primary care provider, please call [phone] and they will assist you.        Care EveryWhere ID     This is your Care EveryWhere ID. This could be used by other organizations to access your [place] medical records  [id number]        Equal Access to Services     SARAH BETH SALMON : Hadii nelli carrilloo Somary beth, waaxda luqadaha, qaybta kaalmada adeegyakeo, benoit sinha . So [facility] [place] [facility] [phone].    ATENCIÓN: Si habla español, tiene a remy disposición servicios gratuitos de asistencia lingüística. Yossi al [phone].    We comply with applicable federal civil rights laws and [place] laws. We do not discriminate on the basis of race, color, national origin, age, disability, sex, sexual orientation, or gender identity.               Review of your medicines      START taking        Dose / Directions    HYDROcodone-acetaminophen 5-325 MG per tablet   Commonly known as:  NORCO   Used for:  Post-operative pain        Dose:  1-2 tablet   Take 1-2 tablets by mouth every 4 hours as needed for other (Moderate to Severe Pain)   Quantity:  20 tablet   Refills:  0         CONTINUE these medicines which may have CHANGED, or have new prescriptions. If we are uncertain of the size of " tablets/capsules you have at home, strength may be listed as something that might have changed.        Dose / Directions    ibuprofen 800 MG tablet   Commonly known as:  ADVIL/MOTRIN   This may have changed:    - medication strength  - how much to take  - when to take this  - reasons to take this  - additional instructions   Used for:  Post-operative pain        Dose:  800 mg   Take 1 tablet (800 mg) by mouth every 6 hours as needed for moderate pain   Quantity:  30 tablet   Refills:  0         CONTINUE these medicines which have NOT CHANGED        Dose / Directions    albuterol 108 (90 BASE) MCG/ACT Inhaler   Commonly known as:  PROAIR HFA   Used for:  Moderate persistent asthma without complication        Dose:  2 puff   Inhale 2 puffs into the lungs every 6 hours   Quantity:  2 Inhaler   Refills:  3       atorvastatin 10 MG tablet   Commonly known as:  LIPITOR   Used for:  Hyperlipidemia with target LDL less than 70        Dose:  10 mg   Take 1 tablet (10 mg) by mouth daily   Quantity:  90 tablet   Refills:  1       blood glucose monitoring meter device kit   Used for:  DM type 2, goal A1C below 8.0        Use to test blood sugars 1 times daily or as directed.   Quantity:  1 kit   Refills:  0       * blood glucose monitoring test strip   Commonly known as:  no brand specified   Used for:  Diabetes mellitus, new onset (H)        Use to test blood sugar 6 times daily or as directed.   Quantity:  600 strip   Refills:  3       * blood glucose monitoring test strip   Commonly known as:  ONETOUCH ULTRA   Used for:  DM type 2, goal A1C below 8.0        Use to test blood sugars 1 times daily or as directed.   Quantity:  3 Box   Refills:  1       cetirizine-psuedoePHEDrine 5-120 MG per 12 hr tablet   Commonly known as:  zyrTEC-D        Dose:  1 tablet   Take 1 tablet by mouth daily Reported on 3/31/2017   Refills:  0       fluticasone 50 MCG/ACT spray   Commonly known as:  FLONASE   Used for:  Asthma        Dose:  2 spray    Spray 2 sprays into both nostrils daily   Quantity:  3 Package   Refills:  3       ipratropium - albuterol 0.5 mg/2.5 mg/3 mL 0.5-2.5 (3) MG/3ML neb solution   Commonly known as:  DUONEB   Used for:  Mild persistent asthma without complication        USE ONE AMPULE IN NEBULIZER EVERY 6 HOURS AS NEEDED FOR  SHORTNESS  OF  BREATH/DYSPNEA  OR  WHEEZING   Quantity:  90 vial   Refills:  0       JOLIVETTE 0.35 MG per tablet   Used for:  Encounter for surveillance of contraceptive pills   Generic drug:  norethindrone        TAKE 1 TABLET (0.35 MG) BY MOUTH DAILY   Quantity:  84 tablet   Refills:  1       LANCETS ULTRA FINE Misc   Used for:  Diabetes mellitus, new onset (H)        Dose:  1 each   1 each 6 times daily   Quantity:  300 each   Refills:  2       lisinopril 2.5 MG tablet   Commonly known as:  PRINIVIL/Zestril   Used for:  Essential hypertension with goal blood pressure less than 140/90        Dose:  2.5 mg   Take 1 tablet (2.5 mg) by mouth daily   Quantity:  90 tablet   Refills:  1       metFORMIN 500 MG tablet   Commonly known as:  GLUCOPHAGE   Used for:  Type 2 diabetes mellitus without complication, without long-term current use of insulin (H)        Dose:  500 mg   Take 1 tablet (500 mg) by mouth 2 times daily (with meals)   Quantity:  180 tablet   Refills:  1       montelukast 10 MG tablet   Commonly known as:  SINGULAIR   Used for:  Uncomplicated asthma, unspecified asthma severity        TAKE ONE TABLET BY MOUTH AT BEDTIME   Quantity:  90 tablet   Refills:  3       nystatin ointment   Commonly known as:  MYCOSTATIN   Used for:  Yeast infection of the skin        Apply topically as needed   Quantity:  60 g   Refills:  3       omeprazole 20 MG tablet   Used for:  Abdominal pain, epigastric        Dose:  20 mg   Take 1 tablet (20 mg) by mouth daily Take 30-60 minutes before a meal.   Quantity:  90 tablet   Refills:  2       * order for DME   Used for:  Mild persistent asthma        Equipment being ordered:  Nebulizer   Quantity:  1 Device   Refills:  prn       * order for DME   Used for:  Mild persistent asthma without complication        Equipment being ordered: neb tubing and cup   Quantity:  2 Device   Refills:  prn       QVAR 80 MCG/ACT Inhaler   Used for:  Mild persistent asthma without complication   Generic drug:  beclomethasone        INHALE 2 PUFFS INTO THE LUNGS TWICE A DAY   Quantity:  3 Inhaler   Refills:  3       triamcinolone 0.1 % ointment   Commonly known as:  KENALOG   Used for:  Nummular eczema        Apply sparingly to affected area three times daily AS NEEDED FOR ECZEMA RASH   Quantity:  90 g   Refills:  0       * Notice:  This list has 4 medication(s) that are the same as other medications prescribed for you. Read the directions carefully, and ask your doctor or other care provider to review them with you.         Where to get your medicines      Some of these will need a paper prescription and others can be bought over the counter. Ask your nurse if you have questions.     Bring a paper prescription for each of these medications     HYDROcodone-acetaminophen 5-325 MG per tablet    ibuprofen 800 MG tablet                Protect others around you: Learn how to safely use, store and throw away your medicines at www.disposemymeds.org.             Medication List: This is a list of all your medications and when to take them. Check marks below indicate your daily home schedule. Keep this list as a reference.      Medications           Morning Afternoon Evening Bedtime As Needed    albuterol 108 (90 BASE) MCG/ACT Inhaler   Commonly known as:  PROAIR HFA   Inhale 2 puffs into the lungs every 6 hours                                atorvastatin 10 MG tablet   Commonly known as:  LIPITOR   Take 1 tablet (10 mg) by mouth daily                                blood glucose monitoring meter device kit   Use to test blood sugars 1 times daily or as directed.                                * blood glucose  monitoring test strip   Commonly known as:  no brand specified   Use to test blood sugar 6 times daily or as directed.                                * blood glucose monitoring test strip   Commonly known as:  ONETOUCH ULTRA   Use to test blood sugars 1 times daily or as directed.                                cetirizine-psuedoePHEDrine 5-120 MG per 12 hr tablet   Commonly known as:  zyrTEC-D   Take 1 tablet by mouth daily Reported on 3/31/2017                                fluticasone 50 MCG/ACT spray   Commonly known as:  FLONASE   Spray 2 sprays into both nostrils daily                                HYDROcodone-acetaminophen 5-325 MG per tablet   Commonly known as:  NORCO   Take 1-2 tablets by mouth every 4 hours as needed for other (Moderate to Severe Pain)   Last time this was given:  1 tablet on 1/3/2018  9:35 AM                                ibuprofen 800 MG tablet   Commonly known as:  ADVIL/MOTRIN   Take 1 tablet (800 mg) by mouth every 6 hours as needed for moderate pain                                ipratropium - albuterol 0.5 mg/2.5 mg/3 mL 0.5-2.5 (3) MG/3ML neb solution   Commonly known as:  DUONEB   USE ONE AMPULE IN NEBULIZER EVERY 6 HOURS AS NEEDED FOR  SHORTNESS  OF  BREATH/DYSPNEA  OR  WHEEZING                                JOLIVETTE 0.35 MG per tablet   TAKE 1 TABLET (0.35 MG) BY MOUTH DAILY   Generic drug:  norethindrone                                LANCETS ULTRA FINE Misc   1 each 6 times daily                                lisinopril 2.5 MG tablet   Commonly known as:  PRINIVIL/Zestril   Take 1 tablet (2.5 mg) by mouth daily                                metFORMIN 500 MG tablet   Commonly known as:  GLUCOPHAGE   Take 1 tablet (500 mg) by mouth 2 times daily (with meals)                                montelukast 10 MG tablet   Commonly known as:  SINGULAIR   TAKE ONE TABLET BY MOUTH AT BEDTIME                                nystatin ointment   Commonly known as:  MYCOSTATIN   Apply  topically as needed                                omeprazole 20 MG tablet   Take 1 tablet (20 mg) by mouth daily Take 30-60 minutes before a meal.                                * order for DME   Equipment being ordered: Nebulizer                                * order for DME   Equipment being ordered: neb tubing and cup                                QVAR 80 MCG/ACT Inhaler   INHALE 2 PUFFS INTO THE LUNGS TWICE A DAY   Generic drug:  beclomethasone                                triamcinolone 0.1 % ointment   Commonly known as:  KENALOG   Apply sparingly to affected area three times daily AS NEEDED FOR ECZEMA RASH                                * Notice:  This list has 4 medication(s) that are the same as other medications prescribed for you. Read the directions carefully, and ask your doctor or other care provider to review them with you.

## 2018-01-04 ENCOUNTER — TELEPHONE (OUTPATIENT)
Dept: OBGYN | Facility: CLINIC | Age: 43
End: 2018-01-04

## 2018-01-04 LAB — INTERPRETATION ECG - MUSE: NORMAL

## 2018-01-04 NOTE — TELEPHONE ENCOUNTER
Patient calling wondering what went wrong in her surgery yesterday that the IUD was unable to get placed. Patient is very frustrated and wants to know what is going on. Please call and discuss with her. She says you can leave a message if you don't reach her. [phone].      Trina Guerra RN

## 2018-01-05 NOTE — TELEPHONE ENCOUNTER
Called and left message for patient. Gave a general explanation to patient. Will discuss further at post operative appointment. Significant other was not present after surgery. I attempted to reach her  after the case with no answer - VM left. A tried the number again approximately two hours later and again nobody answered - 2nd VM was left.     Lissy Maya, DO  OB/GYN

## 2018-01-11 ENCOUNTER — OFFICE VISIT (OUTPATIENT)
Dept: OBGYN | Facility: CLINIC | Age: 43
End: 2018-01-11
Payer: COMMERCIAL

## 2018-01-11 VITALS — WEIGHT: 293 LBS | BODY MASS INDEX: 53.21 KG/M2

## 2018-01-11 DIAGNOSIS — N93.9 ABNORMAL UTERINE BLEEDING (AUB): Primary | ICD-10-CM

## 2018-01-11 DIAGNOSIS — E66.01 OBESITY, CLASS III, BMI 40-49.9 (MORBID OBESITY) (H): ICD-10-CM

## 2018-01-11 DIAGNOSIS — Z98.890 STATUS POST HYSTEROSCOPY: ICD-10-CM

## 2018-01-11 PROCEDURE — 99213 OFFICE O/P EST LOW 20 MIN: CPT | Performed by: OBSTETRICS & GYNECOLOGY

## 2018-01-11 RX ORDER — MISOPROSTOL 200 UG/1
400 TABLET ORAL ONCE
Qty: 2 TABLET | Refills: 0 | Status: SHIPPED | OUTPATIENT
Start: 2018-01-11 | End: 2018-01-11

## 2018-01-11 RX ORDER — MEDROXYPROGESTERONE ACETATE 2.5 MG/1
2.5 TABLET ORAL DAILY
Qty: 30 TABLET | Refills: 2 | Status: SHIPPED | OUTPATIENT
Start: 2018-01-11 | End: 2018-04-09

## 2018-01-11 NOTE — MR AVS SNAPSHOT
After Visit Summary   1/11/2018    Coni Newberry    MRN: [id number]           Patient Information     Date Of Birth          1975        Visit Information        Provider Department      1/11/2018 2:15 PM Lissy Maya DO [facility]        Today's Diagnoses     Abnormal uterine bleeding (AUB)    -  1    Obesity, Class III, BMI 40-49.9 (morbid obesity) (H)        Status post hysteroscopy           Follow-ups after your visit        Your next 10 appointments already scheduled     Mar 09, 2018  1:30 PM CST   Office Visit with Lissy Maya DO   [facility] ([facility]    [address]  [facility] [id number] [phone]           Bring a current list of meds and any records pertaining to this visit. For Physicals, please bring immunization records and any forms needing to be filled out. Please arrive 10 minutes early to complete paperwork.              Who to contact     If you have questions or need follow up information about today's clinic visit or your schedule please contact [facility] directly at [phone].  Normal or non-critical lab and imaging results will be communicated to you by Estechhart, letter or phone within 4 business days after the clinic has received the results. If you do not hear from us within 7 days, please contact the clinic through Inductlyt or phone. If you have a critical or abnormal lab result, we will notify you by phone as soon as possible.  Submit refill requests through [employer] or call your pharmacy and they will forward the refill request to us. Please allow 3 business days for your refill to be completed.          Additional Information About Your Visit        EstechharSimilar Pages Information     [employer] gives you secure access to your electronic health record. If you see a primary care provider, you can also send messages to your care team and make appointments. If you have  questions, please call your primary care clinic.  If you do not have a primary care provider, please call [phone] and they will assist you.        Care EveryWhere ID     This is your Care EveryWhere ID. This could be used by other organizations to access your [place] medical records  [id number]        Your Vitals Were     Last Period BMI (Body Mass Index)                12/19/2017 53.21 kg/m2           Blood Pressure from Last 3 Encounters:   01/03/18 (!) 150/91   12/29/17 118/62   12/13/17 126/70    Weight from Last 3 Encounters:   01/11/18 (!) 310 lb (140.6 kg)   01/03/18 (!) 313 lb (142 kg)   12/29/17 296 lb (134.3 kg)              Today, you had the following     No orders found for display         Today's Medication Changes          These changes are accurate as of: 1/11/18  4:46 PM.  If you have any questions, ask your nurse or doctor.               Start taking these medicines.        Dose/Directions    medroxyPROGESTERone 2.5 MG tablet   Commonly known as:  PROVERA   Used for:  Abnormal uterine bleeding (AUB)   Started by:  Lissy Maya DO        Dose:  2.5 mg   Take 1 tablet (2.5 mg) by mouth daily   Quantity:  30 tablet   Refills:  2       misoprostol 200 MCG tablet   Commonly known as:  CYTOTEC   Used for:  Abnormal uterine bleeding (AUB), Obesity, Class III, BMI 40-49.9 (morbid obesity) (H)   Started by:  Lissy Maya DO        Dose:  400 mcg   Take 2 tablets (400 mcg) by mouth once for 1 dose   Quantity:  2 tablet   Refills:  0            Where to get your medicines      These medications were sent to [facility] [address] [address]     Phone:  [phone]     medroxyPROGESTERone 2.5 MG tablet    misoprostol 200 MCG tablet                Primary Care Provider Office Phone # Fax #    DANYELL Spencer [phone]       [address] [facility] [address]        Equal Access to Services      SARAH BETH SALMON : Hadii aad ku derrick Sonilsali, waaxda luqadaha, qaybta kaalmada adepan, benoit lon kamrynshahla alexandercarlos aselena sinha . So [facility] [phone].    ATENCIÓN: Si habla español, tiene a remy disposición servicios gratuitos de asistencia lingüística. Llame al [phone].    We comply with applicable federal civil rights laws and [place] laws. We do not discriminate on the basis of race, color, national origin, age, disability, sex, sexual orientation, or gender identity.            Thank you!     Thank you for choosing [facility]  for your care. Our goal is always to provide you with excellent care. Hearing back from our patients is one way we can continue to improve our services. Please take a few minutes to complete the written survey that you may receive in the mail after your visit with us. Thank you!             Your Updated Medication List - Protect others around you: Learn how to safely use, store and throw away your medicines at www.disposemymeds.org.          This list is accurate as of: 1/11/18  4:46 PM.  Always use your most recent med list.                   Brand Name Dispense Instructions for use Diagnosis    albuterol 108 (90 BASE) MCG/ACT Inhaler    PROAIR HFA    2 Inhaler    Inhale 2 puffs into the lungs every 6 hours    Moderate persistent asthma without complication       atorvastatin 10 MG tablet    LIPITOR    90 tablet    Take 1 tablet (10 mg) by mouth daily    Hyperlipidemia with target LDL less than 70       blood glucose monitoring meter device kit     1 kit    Use to test blood sugars 1 times daily or as directed.    DM type 2, goal A1C below 8.0       * blood glucose monitoring test strip    no brand specified    600 strip    Use to test blood sugar 6 times daily or as directed.    Diabetes mellitus, new onset (H)       * blood glucose monitoring test strip    ONETOUCH ULTRA    3 Box    Use to test blood sugars 1 times daily or as directed.    DM type  2, goal A1C below 8.0       cetirizine-psuedoePHEDrine 5-120 MG per 12 hr tablet    zyrTEC-D     Take 1 tablet by mouth daily Reported on 3/31/2017        fluticasone 50 MCG/ACT spray    FLONASE    3 Package    Spray 2 sprays into both nostrils daily    Asthma       HYDROcodone-acetaminophen 5-325 MG per tablet    NORCO    20 tablet    Take 1-2 tablets by mouth every 4 hours as needed for other (Moderate to Severe Pain)    Post-operative pain       ibuprofen 800 MG tablet    ADVIL/MOTRIN    30 tablet    Take 1 tablet (800 mg) by mouth every 6 hours as needed for moderate pain    Post-operative pain       ipratropium - albuterol 0.5 mg/2.5 mg/3 mL 0.5-2.5 (3) MG/3ML neb solution    DUONEB    90 vial    USE ONE AMPULE IN NEBULIZER EVERY 6 HOURS AS NEEDED FOR  SHORTNESS  OF  BREATH/DYSPNEA  OR  WHEEZING    Mild persistent asthma without complication       JOLIVETTE 0.35 MG per tablet   Generic drug:  norethindrone     84 tablet    TAKE 1 TABLET (0.35 MG) BY MOUTH DAILY    Encounter for surveillance of contraceptive pills       LANCETS ULTRA FINE Misc     300 each    1 each 6 times daily    Diabetes mellitus, new onset (H)       lisinopril 2.5 MG tablet    PRINIVIL/Zestril    90 tablet    Take 1 tablet (2.5 mg) by mouth daily    Essential hypertension with goal blood pressure less than 140/90       medroxyPROGESTERone 2.5 MG tablet    PROVERA    30 tablet    Take 1 tablet (2.5 mg) by mouth daily    Abnormal uterine bleeding (AUB)       metFORMIN 500 MG tablet    GLUCOPHAGE    180 tablet    Take 1 tablet (500 mg) by mouth 2 times daily (with meals)    Type 2 diabetes mellitus without complication, without long-term current use of insulin (H)       misoprostol 200 MCG tablet    CYTOTEC    2 tablet    Take 2 tablets (400 mcg) by mouth once for 1 dose    Abnormal uterine bleeding (AUB), Obesity, Class III, BMI 40-49.9 (morbid obesity) (H)       montelukast 10 MG tablet    SINGULAIR    90 tablet    TAKE ONE TABLET BY MOUTH  AT BEDTIME    Uncomplicated asthma, unspecified asthma severity       nystatin ointment    MYCOSTATIN    60 g    Apply topically as needed    Yeast infection of the skin       omeprazole 20 MG tablet     90 tablet    Take 1 tablet (20 mg) by mouth daily Take 30-60 minutes before a meal.    Abdominal pain, epigastric       * order for DME     1 Device    Equipment being ordered: Nebulizer    Mild persistent asthma       * order for DME     2 Device    Equipment being ordered: neb tubing and cup    Mild persistent asthma without complication       QVAR 80 MCG/ACT Inhaler   Generic drug:  beclomethasone     3 Inhaler    INHALE 2 PUFFS INTO THE LUNGS TWICE A DAY    Mild persistent asthma without complication       triamcinolone 0.1 % ointment    KENALOG    90 g    Apply sparingly to affected area three times daily AS NEEDED FOR ECZEMA RASH    Nummular eczema       * Notice:  This list has 4 medication(s) that are the same as other medications prescribed for you. Read the directions carefully, and ask your doctor or other care provider to review them with you.

## 2018-01-11 NOTE — PROGRESS NOTES
SUBJECTIVE:                                                   Coni Newberry is a 42 year old female who presents to clinic today for the following health issue(s):  Patient presents with:  Surgical Followup: 1/3/17 Hysteroscopy      HPI:  Patient states she had considerable cramping the day after surgery. This has since resolved. Not requiring pain medications.   No heavy bleeding after procedure. No other complaints.       Problem list and histories reviewed & adjusted, as indicated.  Additional history: as documented.    Patient Active Problem List   Diagnosis     Mild persistent asthma     Nummular eczema     Hyperglycemia     Health Care Home     Type 2 diabetes mellitus without complication (H)     Hyperlipidemia with target LDL less than 70     Lumbago     Thoracic or lumbosacral neuritis or radiculitis, unspecified     Nonallopathic lesion of lumbar region     Nonallopathic lesion of thoracic region     Obesity     History of left salpingo-oophorectomy     Hemorrhagic cyst of ovary     Essential hypertension with goal blood pressure less than 140/90     Obesity, Class III, BMI 40-49.9 (morbid obesity) (H)     Abnormal uterine bleeding (AUB)     Past Surgical History:   Procedure Laterality Date     CYSTECTOMY OVARIAN BENIGN Left 2/23/2016    Procedure: CYSTECTOMY OVARIAN BENIGN;  Surgeon: Marisela Lopez MD;  Location: [facility] OR     CYSTOSCOPY N/A 1/3/2018    Procedure: CYSTOSCOPY;;  Surgeon: Lissy Maya DO;  Location: [facility] OR     DILATE CERVIX, HYSTEROSCOPY, ABLATE ENDOMETRIUM, COMBINED N/A 1/3/2018    Procedure: COMBINED DILATE CERVIX, HYSTEROSCOPY, ABLATE ENDOMETRIUM;  Hysteroscopy, cervical dilation, and cystoscopy;  Surgeon: Lissy Maya DO;  Location: [facility] OR     NONE OF THE ABOVE CORE MEASURE DIAGNOSES       SALPINGO-OOPHORECTOMY, COMBINED Left 2/23/2016    Procedure: COMBINED SALPINGO-OOPHORECTOMY;  Surgeon: Marisela Lopez MD;  Location: [facility] OR      Social History   Substance Use Topics      Smoking status: Never Smoker     Smokeless tobacco: Never Used     Alcohol use 0.0 oz/week     0 Standard drinks or equivalent per week      Comment: 1-2 drinks yearly      Problem (# of Occurrences) Relation (Name,Age of Onset)    CANCER (1) Paternal Grandfather    DIABETES (3) Mother, Father, Sister    Hypertension (1) Father              Current Outpatient Prescriptions on File Prior to Visit:  HYDROcodone-acetaminophen (NORCO) 5-325 MG per tablet Take 1-2 tablets by mouth every 4 hours as needed for other (Moderate to Severe Pain)   ibuprofen (ADVIL/MOTRIN) 800 MG tablet Take 1 tablet (800 mg) by mouth every 6 hours as needed for moderate pain   metFORMIN (GLUCOPHAGE) 500 MG tablet Take 1 tablet (500 mg) by mouth 2 times daily (with meals)   atorvastatin (LIPITOR) 10 MG tablet Take 1 tablet (10 mg) by mouth daily   lisinopril (PRINIVIL/ZESTRIL) 2.5 MG tablet Take 1 tablet (2.5 mg) by mouth daily   triamcinolone (KENALOG) 0.1 % ointment Apply sparingly to affected area three times daily AS NEEDED FOR ECZEMA RASH   ipratropium - albuterol 0.5 mg/2.5 mg/3 mL (DUONEB) 0.5-2.5 (3) MG/3ML neb solution USE ONE AMPULE IN NEBULIZER EVERY 6 HOURS AS NEEDED FOR  SHORTNESS  OF  BREATH/DYSPNEA  OR  WHEEZING   QVAR 80 MCG/ACT Inhaler INHALE 2 PUFFS INTO THE LUNGS TWICE A DAY   JOLIVETTE 0.35 MG per tablet TAKE 1 TABLET (0.35 MG) BY MOUTH DAILY   montelukast (SINGULAIR) 10 MG tablet TAKE ONE TABLET BY MOUTH AT BEDTIME   albuterol (ALBUTEROL) 108 (90 BASE) MCG/ACT Inhaler Inhale 2 puffs into the lungs every 6 hours   nystatin (MYCOSTATIN) ointment Apply topically as needed   omeprazole 20 MG tablet Take 1 tablet (20 mg) by mouth daily Take 30-60 minutes before a meal.   order for DME Equipment being ordered: neb tubing and cup   blood glucose monitoring (ONE TOUCH ULTRA 2) meter device kit Use to test blood sugars 1 times daily or as directed.   blood glucose monitoring (ONE TOUCH ULTRA) test strip Use to test blood sugars  1 times daily or as directed.   ORDER FOR DME Equipment being ordered: Nebulizer   blood glucose test strip Use to test blood sugar 6 times daily or as directed.   LANCETS ULTRA FINE MISC 1 each 6 times daily   cetirizine-psuedoePHEDrine (ZYRTEC-D) 5-120 MG per tablet Take 1 tablet by mouth daily Reported on 3/31/2017   fluticasone (FLONASE) 50 MCG/ACT nasal spray Spray 2 sprays into both nostrils daily     No current facility-administered medications on file prior to visit.   Allergies   Allergen Reactions     Seasonal Allergies        ROS:  5 point ROS negative except as noted above in HPI, including Gen., Resp., CV, GI &  system review.    OBJECTIVE:     Wt (!) 310 lb (140.6 kg)  LMP 12/19/2017  BMI 53.21 kg/m2   BMI: Body mass index is 53.21 kg/(m^2).  General: Alert and oriented, no distress.  Musculoskeletal: extremities normal    In-Clinic Test Results:  No results found for this or any previous visit (from the past 24 hour(s)).    ASSESSMENT/PLAN:                                                        ICD-10-CM    1. Abnormal uterine bleeding (AUB) N93.9 medroxyPROGESTERone (PROVERA) 2.5 MG tablet     misoprostol (CYTOTEC) 200 MCG tablet   2. Obesity, Class III, BMI 40-49.9 (morbid obesity) (H) E66.01 misoprostol (CYTOTEC) 200 MCG tablet   3. Status post hysteroscopy Z98.890          S/p Cervical dilation, Hysteroscopy with no evidence of polyps, fibroids or thickened endometrium. A cystoscopy was performed due to uterine perforation with blunt dilator with normal findings.     Will plan to wait 8+ weeks prior to repeat office biopsy and Mirena IUD placement. Will start Provera for abnormal uterine bleeding (AUB) prior to Mirena placement. Due to previous significant cervical stenosis, plan Cytotec 400mcg the night prior to office procedure.      Lissy Maya, [facility]

## 2018-01-11 NOTE — NURSING NOTE
"Chief Complaint   Patient presents with     Surgical Followup     1/3/17 Hysteroscopy       Initial Wt (!) 310 lb (140.6 kg)  LMP 12/19/2017  BMI 53.21 kg/m2 Estimated body mass index is 53.21 kg/(m^2) as calculated from the following:    Height as of 1/3/18: 5' 4\" (1.626 m).    Weight as of this encounter: 310 lb (140.6 kg).  Medication Reconciliation: complete    BP not taken, cuff not large enough    Rosmery Amos CMA      "

## 2018-02-19 ENCOUNTER — OFFICE VISIT (OUTPATIENT)
Dept: URGENT CARE | Facility: URGENT CARE | Age: 43
End: 2018-02-19
Payer: COMMERCIAL

## 2018-02-19 VITALS
BODY MASS INDEX: 53.31 KG/M2 | SYSTOLIC BLOOD PRESSURE: 140 MMHG | DIASTOLIC BLOOD PRESSURE: 80 MMHG | HEART RATE: 92 BPM | TEMPERATURE: 98 F | WEIGHT: 293 LBS | OXYGEN SATURATION: 100 % | RESPIRATION RATE: 20 BRPM

## 2018-02-19 DIAGNOSIS — R09.81 NASAL CONGESTION: ICD-10-CM

## 2018-02-19 DIAGNOSIS — R07.0 THROAT PAIN: Primary | ICD-10-CM

## 2018-02-19 LAB
DEPRECATED S PYO AG THROAT QL EIA: NORMAL
SPECIMEN SOURCE: NORMAL

## 2018-02-19 PROCEDURE — 99213 OFFICE O/P EST LOW 20 MIN: CPT | Performed by: FAMILY MEDICINE

## 2018-02-19 PROCEDURE — 87081 CULTURE SCREEN ONLY: CPT | Performed by: FAMILY MEDICINE

## 2018-02-19 PROCEDURE — 87880 STREP A ASSAY W/OPTIC: CPT | Performed by: FAMILY MEDICINE

## 2018-02-19 NOTE — NURSING NOTE
"Chief Complaint   Patient presents with     Pharyngitis     st started last night,head congestion       Initial /80 (Cuff Size: Adult Large)  Pulse 92  Temp 98  F (36.7  C) (Oral)  Resp 20  Wt (!) 310 lb 9 oz (140.9 kg)  SpO2 100%  BMI 53.31 kg/m2 Estimated body mass index is 53.31 kg/(m^2) as calculated from the following:    Height as of 1/3/18: 5' 4\" (1.626 m).    Weight as of this encounter: 310 lb 9 oz (140.9 kg).  Medication Reconciliation: complete Tay CALDERON    "

## 2018-02-19 NOTE — MR AVS SNAPSHOT
After Visit Summary   2/19/2018    Coni Newberry    MRN: [id number]           Patient Information     Date Of Birth          1975        Visit Information        Provider Department      2/19/2018 2:55 PM Jaylen Khoury, DO [facility]        Today's Diagnoses     Throat pain    -  1    Nasal congestion           Follow-ups after your visit        Your next 10 appointments already scheduled     Mar 09, 2018  1:30 PM CST   Office Visit with Lissy Maya,    [facility] ([facility])    [address]  [facility] [id number] [phone]           Bring a current list of meds and any records pertaining to this visit. For Physicals, please bring immunization records and any forms needing to be filled out. Please arrive 10 minutes early to complete paperwork.              Who to contact     If you have questions or need follow up information about today's clinic visit or your schedule please contact [facility] directly at [phone].  Normal or non-critical lab and imaging results will be communicated to you by Medical Talents Porthart, letter or phone within 4 business days after the clinic has received the results. If you do not hear from us within 7 days, please contact the clinic through 115 network diskst or phone. If you have a critical or abnormal lab result, we will notify you by phone as soon as possible.  Submit refill requests through [employer] or call your pharmacy and they will forward the refill request to us. Please allow 3 business days for your refill to be completed.          Additional Information About Your Visit        Medical Talents PortharCompliance Assurance Information     [employer] gives you secure access to your electronic health record. If you see a primary care provider, you can also send messages to your care team and make appointments. If you have questions, please call your primary care clinic.  If you do not have a primary  care provider, please call [phone] and they will assist you.        Care EveryWhere ID     This is your Care EveryWhere ID. This could be used by other organizations to access your [place] medical records  [id number]        Your Vitals Were     Pulse Temperature Respirations Pulse Oximetry BMI (Body Mass Index)       92 98  F (36.7  C) (Oral) 20 100% 53.31 kg/m2        Blood Pressure from Last 3 Encounters:   02/19/18 140/80   01/03/18 (!) 150/91   12/29/17 118/62    Weight from Last 3 Encounters:   02/19/18 (!) 310 lb 9 oz (140.9 kg)   01/11/18 (!) 310 lb (140.6 kg)   01/03/18 (!) 313 lb (142 kg)              We Performed the Following     Beta strep group A culture     Rapid strep screen        Primary Care Provider Office Phone # Fax #    Farida Tori Palencia, APRN [facility] [phone]       [address] [facility] [address]        Equal Access to Services     CONSTANTIN SALMON : Hadii aad ku hadasho Soomaali, waaxda luqadaha, qaybta kaalmada adeegyada, waxay idiin hayjuliana sinha . So [facility] [phone].    ATENCIÓN: Si habla español, tiene a remy disposición servicios gratuitos de asistencia lingüística. Llame al [phone].    We comply with applicable federal civil rights laws and [place] laws. We do not discriminate on the basis of race, color, national origin, age, disability, sex, sexual orientation, or gender identity.            Thank you!     Thank you for choosing [facility]  for your care. Our goal is always to provide you with excellent care. Hearing back from our patients is one way we can continue to improve our services. Please take a few minutes to complete the written survey that you may receive in the mail after your visit with us. Thank you!             Your Updated Medication List - Protect others around you: Learn how to safely use, store and throw away your medicines at www.disposemymeds.org.          This list is accurate as of  2/19/18  5:12 PM.  Always use your most recent med list.                   Brand Name Dispense Instructions for use Diagnosis    albuterol 108 (90 BASE) MCG/ACT Inhaler    PROAIR HFA    2 Inhaler    Inhale 2 puffs into the lungs every 6 hours    Moderate persistent asthma without complication       atorvastatin 10 MG tablet    LIPITOR    90 tablet    Take 1 tablet (10 mg) by mouth daily    Hyperlipidemia with target LDL less than 70       blood glucose monitoring meter device kit     1 kit    Use to test blood sugars 1 times daily or as directed.    DM type 2, goal A1C below 8.0       * blood glucose monitoring test strip    no brand specified    600 strip    Use to test blood sugar 6 times daily or as directed.    Diabetes mellitus, new onset (H)       * blood glucose monitoring test strip    ONETOUCH ULTRA    3 Box    Use to test blood sugars 1 times daily or as directed.    DM type 2, goal A1C below 8.0       cetirizine-psuedoePHEDrine 5-120 MG per 12 hr tablet    zyrTEC-D     Take 1 tablet by mouth daily Reported on 3/31/2017        fluticasone 50 MCG/ACT spray    FLONASE    3 Package    Spray 2 sprays into both nostrils daily    Asthma       HYDROcodone-acetaminophen 5-325 MG per tablet    NORCO    20 tablet    Take 1-2 tablets by mouth every 4 hours as needed for other (Moderate to Severe Pain)    Post-operative pain       ibuprofen 800 MG tablet    ADVIL/MOTRIN    30 tablet    Take 1 tablet (800 mg) by mouth every 6 hours as needed for moderate pain    Post-operative pain       ipratropium - albuterol 0.5 mg/2.5 mg/3 mL 0.5-2.5 (3) MG/3ML neb solution    DUONEB    90 vial    USE ONE AMPULE IN NEBULIZER EVERY 6 HOURS AS NEEDED FOR  SHORTNESS  OF  BREATH/DYSPNEA  OR  WHEEZING    Mild persistent asthma without complication       JOLIVETTE 0.35 MG per tablet   Generic drug:  norethindrone     84 tablet    TAKE 1 TABLET (0.35 MG) BY MOUTH DAILY    Encounter for surveillance of contraceptive pills       LANCETS  ULTRA FINE Misc     300 each    1 each 6 times daily    Diabetes mellitus, new onset (H)       lisinopril 2.5 MG tablet    PRINIVIL/Zestril    90 tablet    Take 1 tablet (2.5 mg) by mouth daily    Essential hypertension with goal blood pressure less than 140/90       medroxyPROGESTERone 2.5 MG tablet    PROVERA    30 tablet    Take 1 tablet (2.5 mg) by mouth daily    Abnormal uterine bleeding (AUB)       metFORMIN 500 MG tablet    GLUCOPHAGE    180 tablet    Take 1 tablet (500 mg) by mouth 2 times daily (with meals)    Type 2 diabetes mellitus without complication, without long-term current use of insulin (H)       montelukast 10 MG tablet    SINGULAIR    90 tablet    TAKE ONE TABLET BY MOUTH AT BEDTIME    Uncomplicated asthma, unspecified asthma severity       nystatin ointment    MYCOSTATIN    60 g    Apply topically as needed    Yeast infection of the skin       omeprazole 20 MG tablet     90 tablet    Take 1 tablet (20 mg) by mouth daily Take 30-60 minutes before a meal.    Abdominal pain, epigastric       * order for DME     1 Device    Equipment being ordered: Nebulizer    Mild persistent asthma       * order for DME     2 Device    Equipment being ordered: neb tubing and cup    Mild persistent asthma without complication       QVAR 80 MCG/ACT Inhaler   Generic drug:  beclomethasone     3 Inhaler    INHALE 2 PUFFS INTO THE LUNGS TWICE A DAY    Mild persistent asthma without complication       triamcinolone 0.1 % ointment    KENALOG    90 g    Apply sparingly to affected area three times daily AS NEEDED FOR ECZEMA RASH    Nummular eczema       * Notice:  This list has 4 medication(s) that are the same as other medications prescribed for you. Read the directions carefully, and ask your doctor or other care provider to review them with you.

## 2018-02-20 LAB
BACTERIA SPEC CULT: NORMAL
SPECIMEN SOURCE: NORMAL

## 2018-03-01 ENCOUNTER — TELEPHONE (OUTPATIENT)
Dept: OBGYN | Facility: CLINIC | Age: 43
End: 2018-03-01

## 2018-03-01 DIAGNOSIS — Z30.014 ENCOUNTER FOR INITIAL PRESCRIPTION OF INTRAUTERINE CONTRACEPTIVE DEVICE (IUD): Primary | ICD-10-CM

## 2018-03-01 RX ORDER — MISOPROSTOL 200 UG/1
200 TABLET ORAL 4 TIMES DAILY
Qty: 1 TABLET | Refills: 0 | Status: SHIPPED | OUTPATIENT
Start: 2018-03-01 | End: 2018-03-05 | Stop reason: DRUGHIGH

## 2018-03-01 NOTE — TELEPHONE ENCOUNTER
matthieu  approved today pharmacy calling wanting clarification on the amount being dispensed; pls call [facility] [phone]

## 2018-03-01 NOTE — TELEPHONE ENCOUNTER
Please write rx for cytotec for this patient's upcoming IUD insertion procedure. See notes attached to upcoming appointment.      Trina Guerra RN

## 2018-03-02 NOTE — TELEPHONE ENCOUNTER
Dr Barajas, please review cytotec order. Do you want 200mcg vaginally the night before the procedure? Or orally 4 times daily as was ordered? Please advise      Trina Guerra RN

## 2018-03-03 RX ORDER — MISOPROSTOL 200 UG/1
400 TABLET ORAL ONCE
Qty: 2 TABLET | Refills: 0 | Status: SHIPPED | OUTPATIENT
Start: 2018-03-03 | End: 2018-03-03

## 2018-03-03 NOTE — TELEPHONE ENCOUNTER
No, new order written for cytotec 200mg, take 2 tablets , 2 hours before the procedure. Please notify patient.

## 2018-03-05 NOTE — TELEPHONE ENCOUNTER
Patient notified. To take Cytotec 4 hrs prior to IUD insertion. (clarified med/dose/sig with pharmacy)  Yvonne Rucker RN

## 2018-03-05 NOTE — TELEPHONE ENCOUNTER
LM for pt to cb.  Called pharmacy but they aren't open yet.  Cintia GIFFORD R.N.  [facility]

## 2018-03-07 DIAGNOSIS — Z30.41 ENCOUNTER FOR SURVEILLANCE OF CONTRACEPTIVE PILLS: ICD-10-CM

## 2018-03-09 NOTE — TELEPHONE ENCOUNTER
"Tomasa with [facility] calls to check on refill request.     Requested Prescriptions   Pending Prescriptions Disp Refills     JOLIVETTE 0.35 MG per tablet [Pharmacy Med Name: JOLIVETTE TABLET]  Last Written Prescription Date:  9/19/17  Last Fill Quantity: 84,  # refills: 1   Last office visit: 12/29/2017 with prescribing provider:     Future Office Visit:   Next 5 appointments (look out 90 days)     Mar 12, 2018  1:40 PM CDT   Return Visit with Jae Pantoja MD   [facility] ([facility])    [address]  [address]  [place] [facility] [address]   [phone]            Apr 19, 2018  2:00 PM CDT   Office Visit with Edmund Barajas MD   [facility] ([facility])    [address] [id number] [phone]                 84 tablet 1     Sig: TAKE 1 TABLET (0.35 MG) BY MOUTH DAILY    Contraceptives Protocol Passed    3/7/2018  2:14 AM       Passed - Patient is not a current smoker if age is 35 or older       Passed - Recent (12 mo) or future (30 days) visit within the authorizing provider's specialty    Patient had office visit in the last year or has a visit in the next 30 days with authorizing provider.  See \"Patient Info\" tab in inbasket, or \"Choose Columns\" in Meds & Orders section of the refill encounter.            Passed - No active pregnancy on record       Passed - No positive pregnancy test in past 12 months     Prescription approved per [facility] Refill Protocol.        "

## 2018-03-12 ENCOUNTER — OFFICE VISIT (OUTPATIENT)
Dept: ORTHOPEDICS | Facility: CLINIC | Age: 43
End: 2018-03-12
Payer: COMMERCIAL

## 2018-03-12 VITALS
BODY MASS INDEX: 50.02 KG/M2 | DIASTOLIC BLOOD PRESSURE: 82 MMHG | SYSTOLIC BLOOD PRESSURE: 140 MMHG | WEIGHT: 293 LBS | HEIGHT: 64 IN

## 2018-03-12 DIAGNOSIS — M25.561 RIGHT KNEE PAIN, UNSPECIFIED CHRONICITY: Primary | ICD-10-CM

## 2018-03-12 PROCEDURE — 20610 DRAIN/INJ JOINT/BURSA W/O US: CPT | Mod: RT | Performed by: ORTHOPAEDIC SURGERY

## 2018-03-12 PROCEDURE — 99213 OFFICE O/P EST LOW 20 MIN: CPT | Mod: 25 | Performed by: ORTHOPAEDIC SURGERY

## 2018-03-12 NOTE — LETTER
"    3/12/2018         RE: Coni Newberry  [address]  [place] [address]        Dear Colleague,    Thank you for referring your patient, Coni Newberry, to the [facility] ORTHOPEDIC SURGERY. Please see a copy of my visit note below.    HISTORY OF PRESENT ILLNESS:    Coni Newberry is a 43 year old female who is seen in follow up for right knee pain. She was last seen 8/25/18 where a corticosteroid injection was performed.  She reports approximately 70% relief for approximately 4 weeks from that previous injection.  Present symptoms: Pain, clicking, grinding,  Treatments tried to this point: Ice, ibuprofen,    PHYSICAL EXAM:  /82  Ht 5' 4\" (1.626 m)  Wt (!) 310 lb (140.6 kg)  BMI 53.21 kg/m2  Body mass index is 53.21 kg/(m^2).   GENERAL APPEARANCE: healthy, alert and no distress   SKIN: no suspicious lesions or rashes  NEURO: Normal strength and tone, mentation intact and speech normal  VASCULAR:  good pulses, and cappillary refill   LYMPH: no lymphadenopathy   PSYCH:  mentation appears normal and affect normal/bright    MSK:  The patient ambulates without an antalgic gait.  The patient is able to get on and off the exam table without difficulty.        KNEE: Examination of the right knee reveals the lower extremity to have a Normal anatomic alignment.  There is no erythema, ecchymosis nor edema.  There is an unclear effusion present clinically.  There is no significant warmth.  Range of motion is 0 to 120 degrees, without crepitus.  There is mild tenderness to palpation at the medial joint line.  Alondra's is negative without crepitus. The knee is ligamentously stable. Patello-femoral grind test is positive.      The calves and thighs are symmetric, without atrophy and non-tender to palpation. Jefe's sign is negative, bilaterally.   CMS is intact to the toes.       IMAGING INTERPRETATION:       ASSESSMENT / PLAN:  Primary osteoarthritis right knee.  I offered her another " corticosteroid injection, which she accepted.    Procedure Note:  After risks and benefits were explained and questions answered, pt agreed to undergo a right knee injection. Using aseptic technique and a inferolateral parapatellar tendon approach, the joint space was infiltrated with 4 mg of Dexamethasone, 40 mg of Depo Medrol, and 3 cc of 1% Lidocaine.  There were no complications and the patient tolerated the procedure well.        Perico Pantoja MD  Dept. Orthopedic Surgery  [facility]           Again, thank you for allowing me to participate in the care of your patient.        Sincerely,        Jae Pantoja MD

## 2018-03-12 NOTE — PROGRESS NOTES
"HISTORY OF PRESENT ILLNESS:    Coni Newberry is a 43 year old female who is seen in follow up for right knee pain. She was last seen 8/25/18 where a corticosteroid injection was performed.  She reports approximately 70% relief for approximately 4 weeks from that previous injection.  Present symptoms: Pain, clicking, grinding,  Treatments tried to this point: Ice, ibuprofen,    PHYSICAL EXAM:  /82  Ht 5' 4\" (1.626 m)  Wt (!) 310 lb (140.6 kg)  BMI 53.21 kg/m2  Body mass index is 53.21 kg/(m^2).   GENERAL APPEARANCE: healthy, alert and no distress   SKIN: no suspicious lesions or rashes  NEURO: Normal strength and tone, mentation intact and speech normal  VASCULAR:  good pulses, and cappillary refill   LYMPH: no lymphadenopathy   PSYCH:  mentation appears normal and affect normal/bright    MSK:  The patient ambulates without an antalgic gait.  The patient is able to get on and off the exam table without difficulty.        KNEE: Examination of the right knee reveals the lower extremity to have a Normal anatomic alignment.  There is no erythema, ecchymosis nor edema.  There is an unclear effusion present clinically.  There is no significant warmth.  Range of motion is 0 to 120 degrees, without crepitus.  There is mild tenderness to palpation at the medial joint line.  Alondra's is negative without crepitus. The knee is ligamentously stable. Patello-femoral grind test is positive.      The calves and thighs are symmetric, without atrophy and non-tender to palpation. Jefe's sign is negative, bilaterally.   CMS is intact to the toes.       IMAGING INTERPRETATION:       ASSESSMENT / PLAN:  Primary osteoarthritis right knee.  I offered her another corticosteroid injection, which she accepted.    Procedure Note:  After risks and benefits were explained and questions answered, pt agreed to undergo a right knee injection. Using aseptic technique and a inferolateral parapatellar tendon approach, the joint space was " infiltrated with 4 mg of Dexamethasone, 40 mg of Depo Medrol, and 3 cc of 1% Lidocaine.  There were no complications and the patient tolerated the procedure well.        Perico Pantoja MD  Dept. Orthopedic Surgery  [facility]

## 2018-03-12 NOTE — MR AVS SNAPSHOT
After Visit Summary   3/12/2018    Coni Newberry    MRN: [id number]           Patient Information     Date Of Birth          1975        Visit Information        Provider Department      3/12/2018 1:40 PM Jae Pantoja MD [facility] ORTHOPEDIC SURGERY        Today's Diagnoses     Right knee pain, unspecified chronicity    -  1       Follow-ups after your visit        Your next 10 appointments already scheduled     Apr 19, 2018  2:00 PM CDT   [facility]    [address] [id number] [phone]           Bring a current list of meds and any records pertaining to this visit. For Physicals, please bring immunization records and any forms needing to be filled out. Please arrive 10 minutes early to complete paperwork.              Who to contact     If you have questions or need follow up information about today's clinic visit or your schedule please contact [facility] ORTHOPEDIC SURGERY directly at [phone].  Normal or non-critical lab and imaging results will be communicated to you by [employer], letter or phone within 4 business days after the clinic has received the results. If you do not hear from us within 7 days, please contact the clinic through [employer] or phone. If you have a critical or abnormal lab result, we will notify you by phone as soon as possible.  Submit refill requests through [employer] or call your pharmacy and they will forward the refill request to us. Please allow 3 business days for your refill to be completed.          Additional Information About Your Visit        [employer] Information     [employer] gives you secure access to your electronic health record. If you see a primary care provider, you can also send messages to your care team and make appointments. If you have questions, please call your primary care clinic.  If you do not  "have a primary care provider, please call [phone] and they will assist you.        Care EveryWhere ID     This is your Care EveryWhere ID. This could be used by other organizations to access your [facility] medical records  [id number]        Your Vitals Were     Height BMI (Body Mass Index)                5' 4\" (1.626 m) 53.21 kg/m2           Blood Pressure from Last 3 Encounters:   03/12/18 140/82   02/19/18 140/80   01/03/18 (!) 150/91    Weight from Last 3 Encounters:   03/12/18 (!) 310 lb (140.6 kg)   02/19/18 (!) 310 lb 9 oz (140.9 kg)   01/11/18 (!) 310 lb (140.6 kg)              We Performed the Following     DEXAMETHASONE SODIUM PHOS PER 1 MG     DRAIN/INJECT LARGE JOINT/BURSA     METHYLPREDNISOLONE 40 MG INJ          Today's Medication Changes          These changes are accurate as of 3/12/18 11:59 PM.  If you have any questions, ask your nurse or doctor.               Start taking these medicines.        Dose/Directions    dexamethasone 4 MG/ML injection   Commonly known as:  DECADRON   Used for:  Right knee pain, unspecified chronicity   Started by:  Jae Pantoja MD        Dose:  4 mg   Inject 1 mL (4 mg) as directed once for 1 dose   Quantity:  1 mL   Refills:  0       methylPREDNISolone acetate 40 MG/ML injection   Commonly known as:  DEPO-MEDROL   Used for:  Right knee pain, unspecified chronicity   Started by:  Jae Pantoja MD        Dose:  40 mg   1 mL (40 mg) by INTRA-ARTICULAR route once for 1 dose   Quantity:  1 mL   Refills:  0            Where to get your medicines      Some of these will need a paper prescription and others can be bought over the counter.  Ask your nurse if you have questions.     You don't need a prescription for these medications     dexamethasone 4 MG/ML injection    methylPREDNISolone acetate 40 MG/ML injection                Primary Care Provider Office Phone # Fax #    DANYELL Spencer [phone]       [address] " [facility] [address]        Equal Access to Services     [facility] VIKY : Hadii nelli church lorenafunmilayo Alexali, waluanada luqadaha, qaybta sanjivcarlozbenoit florescarlos aselena dias. So [facility] [phone].    ATENCIÓN: Si habla español, tiene a remy disposición servicios gratuitos de asistencia lingüística. Llame al [phone].    We comply with applicable federal civil rights laws and [place] laws. We do not discriminate on the basis of race, color, national origin, age, disability, sex, sexual orientation, or gender identity.            Thank you!     Thank you for choosing [facility] ORTHOPEDIC SURGERY  for your care. Our goal is always to provide you with excellent care. Hearing back from our patients is one way we can continue to improve our services. Please take a few minutes to complete the written survey that you may receive in the mail after your visit with us. Thank you!             Your Updated Medication List - Protect others around you: Learn how to safely use, store and throw away your medicines at www.disposemymeds.org.          This list is accurate as of 3/12/18 11:59 PM.  Always use your most recent med list.                   Brand Name Dispense Instructions for use Diagnosis    albuterol 108 (90 BASE) MCG/ACT Inhaler    PROAIR HFA    2 Inhaler    Inhale 2 puffs into the lungs every 6 hours    Moderate persistent asthma without complication       atorvastatin 10 MG tablet    LIPITOR    90 tablet    Take 1 tablet (10 mg) by mouth daily    Hyperlipidemia with target LDL less than 70       blood glucose monitoring meter device kit     1 kit    Use to test blood sugars 1 times daily or as directed.    DM type 2, goal A1C below 8.0       * blood glucose monitoring test strip    no brand specified    600 strip    Use to test blood sugar 6 times daily or as directed.    Diabetes mellitus, new onset (H)       * blood glucose monitoring test strip    ONETOUCH ULTRA    3 Box    Use to test  blood sugars 1 times daily or as directed.    DM type 2, goal A1C below 8.0       cetirizine-psuedoePHEDrine 5-120 MG per 12 hr tablet    zyrTEC-D     Take 1 tablet by mouth daily Reported on 3/31/2017        dexamethasone 4 MG/ML injection    DECADRON    1 mL    Inject 1 mL (4 mg) as directed once for 1 dose    Right knee pain, unspecified chronicity       fluticasone 50 MCG/ACT spray    FLONASE    3 Package    Spray 2 sprays into both nostrils daily    Asthma       HYDROcodone-acetaminophen 5-325 MG per tablet    NORCO    20 tablet    Take 1-2 tablets by mouth every 4 hours as needed for other (Moderate to Severe Pain)    Post-operative pain       ibuprofen 800 MG tablet    ADVIL/MOTRIN    30 tablet    Take 1 tablet (800 mg) by mouth every 6 hours as needed for moderate pain    Post-operative pain       ipratropium - albuterol 0.5 mg/2.5 mg/3 mL 0.5-2.5 (3) MG/3ML neb solution    DUONEB    90 vial    USE ONE AMPULE IN NEBULIZER EVERY 6 HOURS AS NEEDED FOR  SHORTNESS  OF  BREATH/DYSPNEA  OR  WHEEZING    Mild persistent asthma without complication       JOLIVETTE 0.35 MG per tablet   Generic drug:  norethindrone     84 tablet    TAKE 1 TABLET (0.35 MG) BY MOUTH DAILY    Encounter for surveillance of contraceptive pills       LANCETS ULTRA FINE Misc     300 each    1 each 6 times daily    Diabetes mellitus, new onset (H)       lisinopril 2.5 MG tablet    PRINIVIL/Zestril    90 tablet    Take 1 tablet (2.5 mg) by mouth daily    Essential hypertension with goal blood pressure less than 140/90       medroxyPROGESTERone 2.5 MG tablet    PROVERA    30 tablet    Take 1 tablet (2.5 mg) by mouth daily    Abnormal uterine bleeding (AUB)       metFORMIN 500 MG tablet    GLUCOPHAGE    180 tablet    Take 1 tablet (500 mg) by mouth 2 times daily (with meals)    Type 2 diabetes mellitus without complication, without long-term current use of insulin (H)       methylPREDNISolone acetate 40 MG/ML injection    DEPO-MEDROL    1 mL    1  mL (40 mg) by INTRA-ARTICULAR route once for 1 dose    Right knee pain, unspecified chronicity       montelukast 10 MG tablet    SINGULAIR    90 tablet    TAKE ONE TABLET BY MOUTH AT BEDTIME    Uncomplicated asthma, unspecified asthma severity       nystatin ointment    MYCOSTATIN    60 g    Apply topically as needed    Yeast infection of the skin       omeprazole 20 MG tablet     90 tablet    Take 1 tablet (20 mg) by mouth daily Take 30-60 minutes before a meal.    Abdominal pain, epigastric       * order for DME     1 Device    Equipment being ordered: Nebulizer    Mild persistent asthma       * order for DME     2 Device    Equipment being ordered: neb tubing and cup    Mild persistent asthma without complication       QVAR 80 MCG/ACT Inhaler   Generic drug:  beclomethasone     3 Inhaler    INHALE 2 PUFFS INTO THE LUNGS TWICE A DAY    Mild persistent asthma without complication       triamcinolone 0.1 % ointment    KENALOG    90 g    Apply sparingly to affected area three times daily AS NEEDED FOR ECZEMA RASH    Nummular eczema       * Notice:  This list has 4 medication(s) that are the same as other medications prescribed for you. Read the directions carefully, and ask your doctor or other care provider to review them with you.

## 2018-03-14 ENCOUNTER — TELEPHONE (OUTPATIENT)
Dept: INTERNAL MEDICINE | Facility: CLINIC | Age: 43
End: 2018-03-14

## 2018-03-14 RX ORDER — METHYLPREDNISOLONE ACETATE 40 MG/ML
40 INJECTION, SUSPENSION INTRA-ARTICULAR; INTRALESIONAL; INTRAMUSCULAR; SOFT TISSUE ONCE
Qty: 1 ML | Refills: 0 | OUTPATIENT
Start: 2018-03-14 | End: 2018-03-14

## 2018-03-14 RX ORDER — DEXAMETHASONE SODIUM PHOSPHATE 4 MG/ML
4 INJECTION, SOLUTION INTRA-ARTICULAR; INTRALESIONAL; INTRAMUSCULAR; INTRAVENOUS; SOFT TISSUE ONCE
Qty: 1 ML | Refills: 0 | OUTPATIENT
Start: 2018-03-14 | End: 2018-04-27

## 2018-03-14 NOTE — TELEPHONE ENCOUNTER
Can increase to 2 tab twice daily until glucose improves and then can go back to her 1 tab twice daily   Effect usually is for 1-2 weeks after injection

## 2018-04-09 DIAGNOSIS — N93.9 ABNORMAL UTERINE BLEEDING (AUB): ICD-10-CM

## 2018-04-09 NOTE — TELEPHONE ENCOUNTER
provera      Last Written Prescription Date:  1/11/18  Last Fill Quantity: 30,   # refills: 2  Last Office Visit: 11/18/18  Future Office visit:    Next 5 appointments (look out 90 days)     Apr 19, 2018  2:00 PM CDT   [facility]    [address] [address] [phone]

## 2018-04-11 RX ORDER — MEDROXYPROGESTERONE ACETATE 2.5 MG/1
2.5 TABLET ORAL DAILY
Qty: 30 TABLET | Refills: 0 | Status: SHIPPED | OUTPATIENT
Start: 2018-04-11 | End: 2018-06-02

## 2018-04-19 ENCOUNTER — OFFICE VISIT (OUTPATIENT)
Dept: OBGYN | Facility: CLINIC | Age: 43
End: 2018-04-19
Payer: COMMERCIAL

## 2018-04-19 VITALS — WEIGHT: 293 LBS | BODY MASS INDEX: 53.45 KG/M2 | SYSTOLIC BLOOD PRESSURE: 122 MMHG | DIASTOLIC BLOOD PRESSURE: 74 MMHG

## 2018-04-19 DIAGNOSIS — N93.8 DUB (DYSFUNCTIONAL UTERINE BLEEDING): Primary | ICD-10-CM

## 2018-04-19 PROCEDURE — 99213 OFFICE O/P EST LOW 20 MIN: CPT | Performed by: OBSTETRICS & GYNECOLOGY

## 2018-04-19 RX ORDER — MEDROXYPROGESTERONE ACETATE 150 MG/ML
150 INJECTION, SUSPENSION INTRAMUSCULAR
Qty: 1 ML | Refills: 0 | OUTPATIENT
Start: 2018-04-19 | End: 2019-10-16

## 2018-04-19 NOTE — NURSING NOTE
"Chief Complaint   Patient presents with     IUD   Pam Romero MA      Initial /74 (BP Location: Left arm, Patient Position: Chair, Cuff Size: Adult Large)  Wt 311 lb 6.4 oz (141.3 kg)  LMP 04/05/2018  BMI 53.45 kg/m2 Estimated body mass index is 53.45 kg/(m^2) as calculated from the following:    Height as of 3/12/18: 5' 4\" (1.626 m).    Weight as of this encounter: 311 lb 6.4 oz (141.3 kg).  Medication Reconciliation: complete    "

## 2018-04-19 NOTE — MR AVS SNAPSHOT
After Visit Summary   4/19/2018    Coni Newberry    MRN: [id number]           Patient Information     Date Of Birth          1975        Visit Information        Provider Department      4/19/2018 2:00 PM Edmund Barajas MD [facility]        Today's Diagnoses     DUB (dysfunctional uterine bleeding)    -  1       Follow-ups after your visit        Your next 10 appointments already scheduled     Jul 09, 2018 10:45 AM CDT   Nurse Only with [facility] OB - NURSE   [facility]    [address] [id number] [phone]              Who to contact     If you have questions or need follow up information about today's clinic visit or your schedule please contact [facility] directly at [phone].  Normal or non-critical lab and imaging results will be communicated to you by MyChart, letter or phone within 4 business days after the clinic has received the results. If you do not hear from us within 7 days, please contact the clinic through WorkingPointhart or phone. If you have a critical or abnormal lab result, we will notify you by phone as soon as possible.  Submit refill requests through Pixtr or call your pharmacy and they will forward the refill request to us. Please allow 3 business days for your refill to be completed.          Additional Information About Your Visit        MyChart Information     Pixtr gives you secure access to your electronic health record. If you see a primary care provider, you can also send messages to your care team and make appointments. If you have questions, please call your primary care clinic.  If you do not have a primary care provider, please call [phone] and they will assist you.        Care EveryWhere ID     This is your Care EveryWhere ID. This could be used by other organizations to access your [facility]  records  [id number]        Your Vitals Were     Last Period BMI (Body Mass Index)                04/05/2018 53.45 kg/m2           Blood Pressure from Last 3 Encounters:   05/02/18 124/82   04/27/18 110/62   04/19/18 122/74    Weight from Last 3 Encounters:   05/02/18 307 lb (139.3 kg)   04/27/18 307 lb (139.3 kg)   04/19/18 311 lb 6.4 oz (141.3 kg)              Today, you had the following     No orders found for display         Today's Medication Changes          These changes are accurate as of 4/19/18 11:59 PM.  If you have any questions, ask your nurse or doctor.               Start taking these medicines.        Dose/Directions    medroxyPROGESTERone 150 MG/ML injection   Commonly known as:  DEPO-PROVERA   Used for:  DUB (dysfunctional uterine bleeding)   Started by:  Edmund Barajas MD        Dose:  150 mg   Inject 1 mL (150 mg) into the muscle every 3 months   Quantity:  1 mL   Refills:  0            Where to get your medicines      Some of these will need a paper prescription and others can be bought over the counter.  Ask your nurse if you have questions.     You don't need a prescription for these medications     medroxyPROGESTERone 150 MG/ML injection                Primary Care Provider Office Phone # Fax #    DANYELL Spencer [facility] [phone]       [address] [facility] [address]        Equal Access to Services     [facility] AH: Hadii nelli church hadasho Somary beth, waaxda luqadaha, qaybta kaalmada adecaitlinyada, wax lon sinha . So [facility] [phone].    ATENCIÓN: Si habla español, tiene a remy disposición servicios gratuitos de asistencia lingüística. Llame al [phone].    We comply with applicable federal civil rights laws and [place] laws. We do not discriminate on the basis of race, color, national origin, age, disability, sex, sexual orientation, or gender identity.            Thank you!     Thank you for choosing [facility]  [facility]  for your care. Our goal is always to provide you with excellent care. Hearing back from our patients is one way we can continue to improve our services. Please take a few minutes to complete the written survey that you may receive in the mail after your visit with us. Thank you!             Your Updated Medication List - Protect others around you: Learn how to safely use, store and throw away your medicines at www.disposemymeds.org.          This list is accurate as of 4/19/18 11:59 PM.  Always use your most recent med list.                   Brand Name Dispense Instructions for use Diagnosis    blood glucose monitoring meter device kit     1 kit    Use to test blood sugars 1 times daily or as directed.    DM type 2, goal A1C below 8.0       * blood glucose monitoring test strip    no brand specified    600 strip    Use to test blood sugar 6 times daily or as directed.    Diabetes mellitus, new onset (H)       * blood glucose monitoring test strip    ONETOUCH ULTRA    3 Box    Use to test blood sugars 1 times daily or as directed.    DM type 2, goal A1C below 8.0       cetirizine-psuedoePHEDrine 5-120 MG per 12 hr tablet    zyrTEC-D     Take 1 tablet by mouth daily Reported on 3/31/2017        fluticasone 50 MCG/ACT spray    FLONASE    3 Package    Spray 2 sprays into both nostrils daily    Asthma       ibuprofen 800 MG tablet    ADVIL/MOTRIN    30 tablet    Take 1 tablet (800 mg) by mouth every 6 hours as needed for moderate pain    Post-operative pain       JOLIVETTE 0.35 MG per tablet   Generic drug:  norethindrone     84 tablet    TAKE 1 TABLET (0.35 MG) BY MOUTH DAILY    Encounter for surveillance of contraceptive pills       LANCETS ULTRA FINE Misc     300 each    1 each 6 times daily    Diabetes mellitus, new onset (H)       medroxyPROGESTERone 150 MG/ML injection    DEPO-PROVERA    1 mL    Inject 1 mL (150 mg) into the muscle every 3 months    DUB (dysfunctional uterine bleeding)        medroxyPROGESTERone 2.5 MG tablet    PROVERA    30 tablet    Take 1 tablet (2.5 mg) by mouth daily    Abnormal uterine bleeding (AUB)       metFORMIN 500 MG tablet    GLUCOPHAGE    180 tablet    Take 1 tablet (500 mg) by mouth 2 times daily (with meals)    Type 2 diabetes mellitus without complication, without long-term current use of insulin (H)       order for DME     1 Device    Equipment being ordered: Nebulizer    Mild persistent asthma       order for DME     2 Device    Equipment being ordered: neb tubing and cup    Mild persistent asthma without complication       QVAR 80 MCG/ACT Inhaler   Generic drug:  beclomethasone     3 Inhaler    INHALE 2 PUFFS INTO THE LUNGS TWICE A DAY    Mild persistent asthma without complication       * Notice:  This list has 2 medication(s) that are the same as other medications prescribed for you. Read the directions carefully, and ask your doctor or other care provider to review them with you.

## 2018-04-24 ENCOUNTER — HEALTH MAINTENANCE LETTER (OUTPATIENT)
Age: 43
End: 2018-04-24

## 2018-04-27 ENCOUNTER — OFFICE VISIT (OUTPATIENT)
Dept: INTERNAL MEDICINE | Facility: CLINIC | Age: 43
End: 2018-04-27
Payer: COMMERCIAL

## 2018-04-27 VITALS
HEIGHT: 64 IN | BODY MASS INDEX: 50.02 KG/M2 | DIASTOLIC BLOOD PRESSURE: 62 MMHG | SYSTOLIC BLOOD PRESSURE: 110 MMHG | HEART RATE: 88 BPM | OXYGEN SATURATION: 97 % | TEMPERATURE: 98.6 F | WEIGHT: 293 LBS | RESPIRATION RATE: 18 BRPM

## 2018-04-27 DIAGNOSIS — E78.5 HYPERLIPIDEMIA WITH TARGET LDL LESS THAN 70: ICD-10-CM

## 2018-04-27 DIAGNOSIS — R10.13 ABDOMINAL PAIN, EPIGASTRIC: ICD-10-CM

## 2018-04-27 DIAGNOSIS — E11.9 TYPE 2 DIABETES MELLITUS WITHOUT COMPLICATION, WITHOUT LONG-TERM CURRENT USE OF INSULIN (H): ICD-10-CM

## 2018-04-27 DIAGNOSIS — E66.01 OBESITY, CLASS III, BMI 40-49.9 (MORBID OBESITY) (H): ICD-10-CM

## 2018-04-27 DIAGNOSIS — M77.02 MEDIAL EPICONDYLITIS OF ELBOW, LEFT: Primary | ICD-10-CM

## 2018-04-27 DIAGNOSIS — I10 ESSENTIAL HYPERTENSION WITH GOAL BLOOD PRESSURE LESS THAN 140/90: ICD-10-CM

## 2018-04-27 DIAGNOSIS — B37.2 YEAST INFECTION OF THE SKIN: ICD-10-CM

## 2018-04-27 DIAGNOSIS — J45.40 MODERATE PERSISTENT ASTHMA WITHOUT COMPLICATION: ICD-10-CM

## 2018-04-27 DIAGNOSIS — J45.30 MILD PERSISTENT ASTHMA WITHOUT COMPLICATION: ICD-10-CM

## 2018-04-27 DIAGNOSIS — L30.0 NUMMULAR ECZEMA: ICD-10-CM

## 2018-04-27 PROCEDURE — 99215 OFFICE O/P EST HI 40 MIN: CPT | Performed by: NURSE PRACTITIONER

## 2018-04-27 RX ORDER — IPRATROPIUM BROMIDE AND ALBUTEROL SULFATE 2.5; .5 MG/3ML; MG/3ML
SOLUTION RESPIRATORY (INHALATION)
Qty: 90 VIAL | Refills: 3 | Status: SHIPPED | OUTPATIENT
Start: 2018-04-27 | End: 2018-11-15

## 2018-04-27 RX ORDER — MONTELUKAST SODIUM 10 MG/1
TABLET ORAL
Qty: 90 TABLET | Refills: 3 | Status: SHIPPED | OUTPATIENT
Start: 2018-04-27 | End: 2019-05-19

## 2018-04-27 RX ORDER — NICOTINE POLACRILEX 4 MG/1
20 GUM, CHEWING ORAL DAILY
Qty: 90 TABLET | Refills: 3 | Status: SHIPPED | OUTPATIENT
Start: 2018-04-27 | End: 2019-07-18

## 2018-04-27 RX ORDER — NYSTATIN 100000 U/G
OINTMENT TOPICAL PRN
Qty: 60 G | Refills: 3 | Status: SHIPPED | OUTPATIENT
Start: 2018-04-27 | End: 2019-03-24

## 2018-04-27 RX ORDER — ATORVASTATIN CALCIUM 10 MG/1
10 TABLET, FILM COATED ORAL DAILY
Qty: 90 TABLET | Refills: 3 | Status: SHIPPED | OUTPATIENT
Start: 2018-04-27 | End: 2019-05-27

## 2018-04-27 RX ORDER — TRIAMCINOLONE ACETONIDE 1 MG/G
OINTMENT TOPICAL
Qty: 90 G | Refills: 3 | Status: SHIPPED | OUTPATIENT
Start: 2018-04-27 | End: 2019-07-18

## 2018-04-27 RX ORDER — LISINOPRIL 2.5 MG/1
2.5 TABLET ORAL DAILY
Qty: 90 TABLET | Refills: 1 | Status: SHIPPED | OUTPATIENT
Start: 2018-04-27 | End: 2018-11-15

## 2018-04-27 RX ORDER — ALBUTEROL SULFATE 90 UG/1
2 AEROSOL, METERED RESPIRATORY (INHALATION) EVERY 6 HOURS
Qty: 1 INHALER | Refills: 11 | Status: SHIPPED | OUTPATIENT
Start: 2018-04-27 | End: 2019-07-03

## 2018-04-27 NOTE — PATIENT INSTRUCTIONS
Increase metformin   1000 mg twice daily     Recheck labs in July ish      Tennis elbow brace     Ice to elbow     May use ibuprofen up to 600 - 800 mg up to 3 times a day

## 2018-04-27 NOTE — PROGRESS NOTES
.  SUBJECTIVE:   Cnoi Newberry is a 43 year old female who presents to clinic today for the following health issues:  Chief Complaint   Patient presents with     Elbow Pain     pt c/o pain in her left elbow and does not recall any injury.     Hyperglycemia     pt states last few months BS has been elevated.  160-180  Will increase metformin to 1000 mg twice daily    Asthma - needs new neb machine - neb broken   Using about once weekly  Using inhaler very little                Problem list and histories reviewed & adjusted, as indicated.  Additional history: as documented    Patient Active Problem List   Diagnosis     Mild persistent asthma     Nummular eczema     Hyperglycemia     Health Care Home     Type 2 diabetes mellitus without complication (H)     Hyperlipidemia with target LDL less than 70     Lumbago     Thoracic or lumbosacral neuritis or radiculitis, unspecified     Nonallopathic lesion of lumbar region     Nonallopathic lesion of thoracic region     Obesity     History of left salpingo-oophorectomy     Hemorrhagic cyst of ovary     Essential hypertension with goal blood pressure less than 140/90     Obesity, Class III, BMI 40-49.9 (morbid obesity) (H)     Abnormal uterine bleeding (AUB)     Past Surgical History:   Procedure Laterality Date     CYSTECTOMY OVARIAN BENIGN Left 2/23/2016    Procedure: CYSTECTOMY OVARIAN BENIGN;  Surgeon: Marisela Lopez MD;  Location: [facility] OR     CYSTOSCOPY N/A 1/3/2018    Procedure: CYSTOSCOPY;;  Surgeon: Lissy Maya DO;  Location: [facility] OR     DILATE CERVIX, HYSTEROSCOPY, ABLATE ENDOMETRIUM, COMBINED N/A 1/3/2018    Procedure: COMBINED DILATE CERVIX, HYSTEROSCOPY, ABLATE ENDOMETRIUM;  Hysteroscopy, cervical dilation, and cystoscopy;  Surgeon: Lissy Maya DO;  Location: [facility] OR     NONE OF THE ABOVE CORE MEASURE DIAGNOSES       SALPINGO-OOPHORECTOMY, COMBINED Left 2/23/2016    Procedure: COMBINED SALPINGO-OOPHORECTOMY;  Surgeon: Marisela Lopez MD;  Location:  "[facility] OR       Social History   Substance Use Topics     Smoking status: Never Smoker     Smokeless tobacco: Never Used     Alcohol use 0.0 oz/week     0 Standard drinks or equivalent per week      Comment: 1-2 drinks yearly     Family History   Problem Relation Age of Onset     DIABETES Mother      DIABETES Father      Hypertension Father      DIABETES Sister      CANCER Paternal Grandfather            Reviewed and updated as needed this visit by clinical staff  Tobacco  Allergies  Meds  Problems  Med Hx  Surg Hx  Fam Hx  Soc Hx        Reviewed and updated as needed this visit by Provider  Allergies  Meds  Problems         ROS:  Constitutional, HEENT, cardiovascular, pulmonary, gi and gu systems are negative, except as otherwise noted.    OBJECTIVE:     /62 (BP Location: Left arm, Patient Position: Sitting, Cuff Size: Adult Large)  Pulse 88  Temp 98.6  F (37  C) (Oral)  Resp 18  Ht 5' 4\" (1.626 m)  Wt 307 lb (139.3 kg)  LMP 04/05/2018  SpO2 97%  BMI 52.7 kg/m2  Body mass index is 52.7 kg/(m^2).  GENERAL: alert and no distress  RESP: lungs clear to auscultation - no rales, rhonchi or wheezes  CV: regular rate and rhythm  MS: no gross musculoskeletal defects noted,   Medial epicondylitis pain   NEURO: Normal strength and tone, mentation intact and speech normal  PSYCH: mentation appears normal, affect normal/bright    Diagnostic Test Results:  none     ASSESSMENT/PLAN:             1. Medial epicondylitis of elbow, left  Discussed   - order for DME; Equipment being ordered: tennis elbow brace  Dispense: 1 Device; Refill: 0    2. Mild persistent asthma without complication  Stable   - order for DME; Equipment being ordered: Nebulizer and tubing  Dispense: 1 Device; Refill: 0  - ipratropium - albuterol 0.5 mg/2.5 mg/3 mL (DUONEB) 0.5-2.5 (3) MG/3ML neb solution; USE ONE AMPULE IN NEBULIZER EVERY 6 HOURS AS NEEDED FOR  SHORTNESS  OF  BREATH/DYSPNEA  OR  WHEEZING  Dispense: 90 vial; Refill: 3  - " montelukast (SINGULAIR) 10 MG tablet; TAKE ONE TABLET BY MOUTH AT BEDTIME  Dispense: 90 tablet; Refill: 3    3. Type 2 diabetes mellitus without complication, without long-term current use of insulin (H)  Increased glucose - needs increased medication   - metFORMIN (GLUCOPHAGE) 500 MG tablet; Take 2 tablets (1,000 mg) by mouth 2 times daily (with meals)  Dispense: 360 tablet; Refill: 1    4. Essential hypertension with goal blood pressure less than 140/90  Good blood pressure   - lisinopril (PRINIVIL/ZESTRIL) 2.5 MG tablet; Take 1 tablet (2.5 mg) by mouth daily  Dispense: 90 tablet; Refill: 1    5. Obesity, Class III, BMI 40-49.9 (morbid obesity) (H)  Discussed     6. Moderate persistent asthma without complication    - albuterol (PROAIR HFA) 108 (90 Base) MCG/ACT Inhaler; Inhale 2 puffs into the lungs every 6 hours  Dispense: 1 Inhaler; Refill: 11    7. Hyperlipidemia with target LDL less than 70    - atorvastatin (LIPITOR) 10 MG tablet; Take 1 tablet (10 mg) by mouth daily  Dispense: 90 tablet; Refill: 3    8. Nummular eczema    - triamcinolone (KENALOG) 0.1 % ointment; Apply sparingly to affected area three times daily AS NEEDED FOR ECZEMA RASH  Dispense: 90 g; Refill: 3    9. Yeast infection of the skin    - nystatin (MYCOSTATIN) ointment; Apply topically as needed  Dispense: 60 g; Refill: 3    10. Abdominal pain, epigastric    - omeprazole 20 MG tablet; Take 1 tablet (20 mg) by mouth daily Take 30-60 minutes before a meal.  Dispense: 90 tablet; Refill: 3    Patient Instructions   Increase metformin   1000 mg twice daily     Recheck labs in July ish      Tennis elbow brace     Ice to elbow     May use ibuprofen up to 600 - 800 mg up to 3 times a day       DANYELL Spencer [facility]

## 2018-04-27 NOTE — LETTER
[facility]  [address]  [facility] [address]  [phone]        November 13, 2018    Coni Newberry  [address]  [facility] [address]              Dear Coni Newberry    This is to remind you that your FASTING LAB is due.    You may call our office at [phone] to schedule an appointment.    Please disregard this notice if you have already had your labs drawn or made an appointment.        Sincerely,        Farida Palencia RN, FNP, NP-C

## 2018-04-27 NOTE — MR AVS SNAPSHOT
After Visit Summary   4/27/2018    Coni Newberry    MRN: [id number]           Patient Information     Date Of Birth          1975        Visit Information        Provider Department      4/27/2018 3:00 PM Farida Palencia APRN CNP [facility]        Today's Diagnoses     Medial epicondylitis of elbow, left    -  1    Mild persistent asthma without complication        Type 2 diabetes mellitus without complication, without long-term current use of insulin (H)        Essential hypertension with goal blood pressure less than 140/90        Obesity, Class III, BMI 40-49.9 (morbid obesity) (H)        Moderate persistent asthma without complication        Hyperlipidemia with target LDL less than 70        Nummular eczema        Yeast infection of the skin        Abdominal pain, epigastric          Care Instructions    Increase metformin   1000 mg twice daily     Recheck labs in July ish      Tennis elbow brace     Ice to elbow     May use ibuprofen up to 600 - 800 mg up to 3 times a day           Follow-ups after your visit        Your next 10 appointments already scheduled     Jul 09, 2018 10:45 AM CDT   Nurse Only with [facility] OB - NURSE   [facility] ([facility]    [address] [address] [phone]              Who to contact     If you have questions or need follow up information about today's clinic visit or your schedule please contact [facility] directly at [phone].  Normal or non-critical lab and imaging results will be communicated to you by MyChart, letter or phone within 4 business days after the clinic has received the results. If you do not hear from us within 7 days, please contact the clinic through MyChart or phone. If you have a critical or abnormal lab result, we will notify you by phone as soon as possible.  Submit refill requests through Klipfolio or call your pharmacy  "and they will forward the refill request to us. Please allow 3 business days for your refill to be completed.          Additional Information About Your Visit        DeskGodhart Information     Wandoujia gives you secure access to your electronic health record. If you see a primary care provider, you can also send messages to your care team and make appointments. If you have questions, please call your primary care clinic.  If you do not have a primary care provider, please call [phone] and they will assist you.        Care EveryWhere ID     This is your Care EveryWhere ID. This could be used by other organizations to access your [facility] medical records  [id number]        Your Vitals Were     Pulse Temperature Respirations Height Last Period Pulse Oximetry    88 98.6  F (37  C) (Oral) 18 5' 4\" (1.626 m) 04/05/2018 97%    BMI (Body Mass Index)                   52.7 kg/m2            Blood Pressure from Last 3 Encounters:   04/27/18 110/62   04/19/18 122/74   03/12/18 140/82    Weight from Last 3 Encounters:   04/27/18 307 lb (139.3 kg)   04/19/18 311 lb 6.4 oz (141.3 kg)   03/12/18 (!) 310 lb (140.6 kg)              Today, you had the following     No orders found for display         Today's Medication Changes          These changes are accurate as of 4/27/18  3:37 PM.  If you have any questions, ask your nurse or doctor.               Start taking these medicines.        Dose/Directions    order for DME   Used for:  Medial epicondylitis of elbow, left   Started by:  Farida Palencia APRN CNP        Equipment being ordered: tennis elbow brace   Quantity:  1 Device   Refills:  0       order for DME   Used for:  Mild persistent asthma without complication   Started by:  Farida Palencia APRN CNP        Equipment being ordered: Nebulizer and tubing   Quantity:  1 Device   Refills:  0         These medicines have changed or have updated prescriptions.        Dose/Directions    ipratropium - albuterol 0.5 mg/2.5 " mg/3 mL 0.5-2.5 (3) MG/3ML neb solution   Commonly known as:  DUONEB   This may have changed:  See the new instructions.   Used for:  Mild persistent asthma without complication   Changed by:  Farida Palencia APRN CNP        USE ONE AMPULE IN NEBULIZER EVERY 6 HOURS AS NEEDED FOR  SHORTNESS  OF  BREATH/DYSPNEA  OR  WHEEZING   Quantity:  90 vial   Refills:  3       metFORMIN 500 MG tablet   Commonly known as:  GLUCOPHAGE   This may have changed:  how much to take   Used for:  Type 2 diabetes mellitus without complication, without long-term current use of insulin (H)   Changed by:  Farida Palencia APRN CNP        Dose:  1000 mg   Take 2 tablets (1,000 mg) by mouth 2 times daily (with meals)   Quantity:  360 tablet   Refills:  1            Where to get your medicines      These medications were sent to [facility] [address] [place], [place] [id number]     Phone:  [phone]     albuterol 108 (90 Base) MCG/ACT Inhaler    atorvastatin 10 MG tablet    ipratropium - albuterol 0.5 mg/2.5 mg/3 mL 0.5-2.5 (3) MG/3ML neb solution    lisinopril 2.5 MG tablet    metFORMIN 500 MG tablet    montelukast 10 MG tablet    nystatin ointment    omeprazole 20 MG tablet    triamcinolone 0.1 % ointment         Some of these will need a paper prescription and others can be bought over the counter.  Ask your nurse if you have questions.     Bring a paper prescription for each of these medications     order for DME    order for DME                Primary Care Provider Office Phone # Fax #    DANYELL Spencer [phone]       [address] [place] [address]        Equal Access to Services     CONSTANTIN SALMON : Jaya Vazquez, waluanada luqadaha, qaybta kaalmada adecaitlinyada, benoit dias. [facility] [phone].    ATENCIÓN: Si habla español, tiene a remy disposición servicios gratuitos de asistencia  lingüística. Yossi al [phone].    We comply with applicable federal civil rights laws and [place] laws. We do not discriminate on the basis of race, color, national origin, age, disability, sex, sexual orientation, or gender identity.            Thank you!     Thank you for choosing [facility]  for your care. Our goal is always to provide you with excellent care. Hearing back from our patients is one way we can continue to improve our services. Please take a few minutes to complete the written survey that you may receive in the mail after your visit with us. Thank you!             Your Updated Medication List - Protect others around you: Learn how to safely use, store and throw away your medicines at www.disposemymeds.org.          This list is accurate as of 4/27/18  3:37 PM.  Always use your most recent med list.                   Brand Name Dispense Instructions for use Diagnosis    albuterol 108 (90 Base) MCG/ACT Inhaler    PROAIR HFA    1 Inhaler    Inhale 2 puffs into the lungs every 6 hours    Moderate persistent asthma without complication       atorvastatin 10 MG tablet    LIPITOR    90 tablet    Take 1 tablet (10 mg) by mouth daily    Hyperlipidemia with target LDL less than 70       blood glucose monitoring meter device kit     1 kit    Use to test blood sugars 1 times daily or as directed.    DM type 2, goal A1C below 8.0       * blood glucose monitoring test strip    no brand specified    600 strip    Use to test blood sugar 6 times daily or as directed.    Diabetes mellitus, new onset (H)       * blood glucose monitoring test strip    ONETOUCH ULTRA    3 Box    Use to test blood sugars 1 times daily or as directed.    DM type 2, goal A1C below 8.0       cetirizine-psuedoePHEDrine 5-120 MG per 12 hr tablet    zyrTEC-D     Take 1 tablet by mouth daily Reported on 3/31/2017        fluticasone 50 MCG/ACT spray    FLONASE    3 Package    Spray 2 sprays into both nostrils daily     Asthma       ibuprofen 800 MG tablet    ADVIL/MOTRIN    30 tablet    Take 1 tablet (800 mg) by mouth every 6 hours as needed for moderate pain    Post-operative pain       ipratropium - albuterol 0.5 mg/2.5 mg/3 mL 0.5-2.5 (3) MG/3ML neb solution    DUONEB    90 vial    USE ONE AMPULE IN NEBULIZER EVERY 6 HOURS AS NEEDED FOR  SHORTNESS  OF  BREATH/DYSPNEA  OR  WHEEZING    Mild persistent asthma without complication       JOLIVETTE 0.35 MG per tablet   Generic drug:  norethindrone     84 tablet    TAKE 1 TABLET (0.35 MG) BY MOUTH DAILY    Encounter for surveillance of contraceptive pills       LANCETS ULTRA FINE Misc     300 each    1 each 6 times daily    Diabetes mellitus, new onset (H)       lisinopril 2.5 MG tablet    PRINIVIL/Zestril    90 tablet    Take 1 tablet (2.5 mg) by mouth daily    Essential hypertension with goal blood pressure less than 140/90       medroxyPROGESTERone 150 MG/ML injection    DEPO-PROVERA    1 mL    Inject 1 mL (150 mg) into the muscle every 3 months    DUB (dysfunctional uterine bleeding)       medroxyPROGESTERone 2.5 MG tablet    PROVERA    30 tablet    Take 1 tablet (2.5 mg) by mouth daily    Abnormal uterine bleeding (AUB)       metFORMIN 500 MG tablet    GLUCOPHAGE    360 tablet    Take 2 tablets (1,000 mg) by mouth 2 times daily (with meals)    Type 2 diabetes mellitus without complication, without long-term current use of insulin (H)       montelukast 10 MG tablet    SINGULAIR    90 tablet    TAKE ONE TABLET BY MOUTH AT BEDTIME    Mild persistent asthma without complication       nystatin ointment    MYCOSTATIN    60 g    Apply topically as needed    Yeast infection of the skin       omeprazole 20 MG tablet     90 tablet    Take 1 tablet (20 mg) by mouth daily Take 30-60 minutes before a meal.    Abdominal pain, epigastric       order for DME     1 Device    Equipment being ordered: Nebulizer    Mild persistent asthma       order for DME     2 Device    Equipment being ordered:  neb tubing and cup    Mild persistent asthma without complication       order for DME     1 Device    Equipment being ordered: tennis elbow brace    Medial epicondylitis of elbow, left       order for DME     1 Device    Equipment being ordered: Nebulizer and tubing    Mild persistent asthma without complication       QVAR 80 MCG/ACT Inhaler   Generic drug:  beclomethasone     3 Inhaler    INHALE 2 PUFFS INTO THE LUNGS TWICE A DAY    Mild persistent asthma without complication       triamcinolone 0.1 % ointment    KENALOG    90 g    Apply sparingly to affected area three times daily AS NEEDED FOR ECZEMA RASH    Nummular eczema       * Notice:  This list has 2 medication(s) that are the same as other medications prescribed for you. Read the directions carefully, and ask your doctor or other care provider to review them with you.

## 2018-04-27 NOTE — LETTER
My Asthma Action Plan  Name: Coni Newberry   YOB: 1975  Date: 4/27/2018   My doctor: DANYELL Spencer CNP   My clinic: [facility]        My Control Medicine: qvar  Singulair   My Rescue Medicine: duoneb albuterol    My Asthma Severity: moderate persistent  Avoid your asthma triggers:                GREEN ZONE   Good Control    I feel good    No cough or wheeze    Can work, sleep and play without asthma symptoms       Take your asthma control medicine every day.     1. If exercise triggers your asthma, take your rescue medication    15 minutes before exercise or sports, and    During exercise if you have asthma symptoms  2. Spacer to use with inhaler: If you have a spacer, make sure to use it with your inhaler             YELLOW ZONE Getting Worse  I have ANY of these:    I do not feel good    Cough or wheeze    Chest feels tight    Wake up at night   1. Keep taking your Green Zone medications  2. Start taking your rescue medicine:    every 20 minutes for up to 1 hour. Then every 4 hours for 24-48 hours.  3. If you stay in the Yellow Zone for more than 12-24 hours, contact your doctor.  4. If you do not return to the Green Zone in 12-24 hours or you get worse, start taking your oral steroid medicine if prescribed by your provider.           RED ZONE Medical Alert - Get Help  I have ANY of these:    I feel awful    Medicine is not helping    Breathing getting harder    Trouble walking or talking    Nose opens wide to breathe       1. Take your rescue medicine NOW  2. If your provider has prescribed an oral steroid medicine, start taking it NOW  3. Call your doctor NOW  4. If you are still in the Red Zone after 20 minutes and you have not reached your doctor:    Take your rescue medicine again and    Call 911 or go to the emergency room right away    See your regular doctor within 2 weeks of an Emergency Room or Urgent Care visit for follow-up treatment.          Annual  Reminders:  Meet with Asthma Educator,  Flu Shot in the Fall, consider Pneumonia Vaccination for patients with asthma (aged 19 and older).    Pharmacy:    [facility] [address] [place], [place] - [address] [id number] - [place], [place] - [address]                      Asthma Triggers  How To Control Things That Make Your Asthma Worse    Triggers are things that make your asthma worse.  Look at the list below to help you find your triggers and what you can do about them.  You can help prevent asthma flare-ups by staying away from your triggers.      Trigger                                                          What you can do   Cigarette Smoke  Tobacco smoke can make asthma worse. Do not allow smoking in your home, car or around you.  Be sure no one smokes at a child s day care or school.  If you smoke, ask your health care provider for ways to help you quit.  Ask family members to quit too.  Ask your health care provider for a referral to Quit Plan to help you quit smoking, or call 4-238-962-PLAN.     Colds, Flu, Bronchitis  These are common triggers of asthma. Wash your hands often.  Don t touch your eyes, nose or mouth.  Get a flu shot every year.     Dust Mites  These are tiny bugs that live in cloth or carpet. They are too small to see. Wash sheets and blankets in hot water every week.   Encase pillows and mattress in dust mite proof covers.  Avoid having carpet if you can. If you have carpet, vacuum weekly.   Use a dust mask and HEPA vacuum.   Pollen and Outdoor Mold  Some people are allergic to trees, grass, or weed pollen, or molds. Try to keep your windows closed.  Limit time out doors when pollen count is high.   Ask you health care provider about taking medicine during allergy season.     Animal Dander  Some people are allergic to skin flakes, urine or saliva from pets with fur or feathers. Keep pets with fur or feathers out of your home.    If you can t keep the pet  outdoors, then keep the pet out of your bedroom.  Keep the bedroom door closed.  Keep pets off cloth furniture and away from stuffed toys.     Mice, Rats, and Cockroaches  Some people are allergic to the waste from these pests.   Cover food and garbage.  Clean up spills and food crumbs.  Store grease in the refrigerator.   Keep food out of the bedroom.   Indoor Mold  This can be a trigger if your home has high moisture. Fix leaking faucets, pipes, or other sources of water.   Clean moldy surfaces.  Dehumidify basement if it is damp and smelly.   Smoke, Strong Odors, and Sprays  These can reduce air quality. Stay away from strong odors and sprays, such as perfume, powder, hair spray, paints, smoke incense, paint, cleaning products, candles and new carpet.   Exercise or Sports  Some people with asthma have this trigger. Be active!  Ask your doctor about taking medicine before sports or exercise to prevent symptoms.    Warm up for 5-10 minutes before and after sports or exercise.     Other Triggers of Asthma  Cold air:  Cover your nose and mouth with a scarf.  Sometimes laughing or crying can be a trigger.  Some medicines and food can trigger asthma.

## 2018-04-27 NOTE — NURSING NOTE
"Chief Complaint   Patient presents with     Elbow Pain     pt c/o pain in her left elbow and does not recall any injury.     Hyperglycemia     pt states last few months BS has been elevated.       Initial /62 (BP Location: Left arm, Patient Position: Sitting, Cuff Size: Adult Large)  Pulse 88  Temp 98.6  F (37  C) (Oral)  Resp 18  Ht 5' 4\" (1.626 m)  Wt 307 lb (139.3 kg)  LMP 04/05/2018  SpO2 97%  BMI 52.7 kg/m2 Estimated body mass index is 52.7 kg/(m^2) as calculated from the following:    Height as of this encounter: 5' 4\" (1.626 m).    Weight as of this encounter: 307 lb (139.3 kg).  Medication Reconciliation: complete    "

## 2018-04-28 ASSESSMENT — ASTHMA QUESTIONNAIRES: ACT_TOTALSCORE: 17

## 2018-05-02 ENCOUNTER — OFFICE VISIT (OUTPATIENT)
Dept: ORTHOPEDICS | Facility: CLINIC | Age: 43
End: 2018-05-02
Payer: COMMERCIAL

## 2018-05-02 VITALS
SYSTOLIC BLOOD PRESSURE: 124 MMHG | WEIGHT: 293 LBS | DIASTOLIC BLOOD PRESSURE: 82 MMHG | HEIGHT: 64 IN | BODY MASS INDEX: 50.02 KG/M2

## 2018-05-02 DIAGNOSIS — M77.51 ENTHESOPATHY OF RIGHT ANKLE: Primary | ICD-10-CM

## 2018-05-02 DIAGNOSIS — M77.12 LEFT LATERAL EPICONDYLITIS: ICD-10-CM

## 2018-05-02 PROCEDURE — 99212 OFFICE O/P EST SF 10 MIN: CPT | Performed by: ORTHOPAEDIC SURGERY

## 2018-05-02 NOTE — LETTER
"    5/2/2018         RE: Coni Newberry  [address]  [facility] [address]        Dear Colleague,    Thank you for referring your patient, Coni Newberry, to the [facility]. Please see a copy of my visit note below.    HISTORY OF PRESENT ILLNESS:    Coni Newberry is a 43 year old female who is seen in follow up for left ankle pain/achilles tendonitis. She wants to know if it is safe for her to go work out.  Present symptoms: pain scale: 4/10, other than that she is feeling good.  Treatments tried to this point: 1 PT visit, weaned off boot in 9/2017 - about 6-7 months without boot    PHYSICAL EXAM:  /82  Ht 5' 4\" (1.626 m)  Wt 307 lb (139.3 kg)  LMP 04/05/2018  BMI 52.7 kg/m2  Body mass index is 52.7 kg/(m^2).   GENERAL APPEARANCE: healthy, alert and no distress   SKIN: no suspicious lesions or rashes  NEURO: Normal strength and tone, mentation intact and speech normal  VASCULAR:  good pulses, and cappillary refill   LYMPH: no lymphadenopathy   PSYCH:  mentation appears normal and affect normal/bright    MSK:  Examination of her left ankle reveals no erythema, ecchymosis, nor edema.  There is no hypertrophy to the tendo calcaneal region.    IMAGING INTERPRETATION:       ASSESSMENT / PLAN: Healed Achilles tendinitis.  I told her if she is going to begin exercising to progress slowly.      Perico Pantoja MD  Dept. Orthopedic Surgery  [facility]       Again, thank you for allowing me to participate in the care of your patient.        Sincerely,        Jae Pantoja MD    "

## 2018-05-04 NOTE — PROGRESS NOTES
SUBJECTIVE:  Coni Newberry is a 43 year old,wishes depoprovera to control abnormal uterine bleeding. She has had attempted Mirena placement x 2 , including OR. Cervical stenosis.    Family History   Problem Relation Age of Onset     DIABETES Mother      DIABETES Father      Hypertension Father      DIABETES Sister      CANCER Paternal Grandfather        Past Medical History:   Diagnosis Date     Diabetes (H)      Heart murmur      HTN (hypertension)     No cardiologist     Hyperlipidaemia LDL goal <100      Mild persistent asthma 7/29/2014     Nummular eczema                                           Past Surgical History:   Procedure Laterality Date     CYSTECTOMY OVARIAN BENIGN Left 2/23/2016    Procedure: CYSTECTOMY OVARIAN BENIGN;  Surgeon: Marisela Lopez MD;  Location: [facility] OR     CYSTOSCOPY N/A 1/3/2018    Procedure: CYSTOSCOPY;;  Surgeon: Lissy Maya DO;  Location: [facility] OR     DILATE CERVIX, HYSTEROSCOPY, ABLATE ENDOMETRIUM, COMBINED N/A 1/3/2018    Procedure: COMBINED DILATE CERVIX, HYSTEROSCOPY, ABLATE ENDOMETRIUM;  Hysteroscopy, cervical dilation, and cystoscopy;  Surgeon: Lissy Maya DO;  Location: [facility] OR     NONE OF THE ABOVE CORE MEASURE DIAGNOSES       SALPINGO-OOPHORECTOMY, COMBINED Left 2/23/2016    Procedure: COMBINED SALPINGO-OOPHORECTOMY;  Surgeon: Marisela Lopez MD;  Location: [facility] OR       Current Outpatient Prescriptions   Medication     medroxyPROGESTERone (DEPO-PROVERA) 150 MG/ML injection     albuterol (PROAIR HFA) 108 (90 Base) MCG/ACT Inhaler     atorvastatin (LIPITOR) 10 MG tablet     blood glucose monitoring (ONE TOUCH ULTRA 2) meter device kit     blood glucose monitoring (ONE TOUCH ULTRA) test strip     blood glucose test strip     cetirizine-psuedoePHEDrine (ZYRTEC-D) 5-120 MG per tablet     fluticasone (FLONASE) 50 MCG/ACT nasal spray     ibuprofen (ADVIL/MOTRIN) 800 MG tablet     ipratropium - albuterol 0.5 mg/2.5 mg/3 mL (DUONEB) 0.5-2.5 (3) MG/3ML neb solution      JOLIVETTE 0.35 MG per tablet     LANCETS ULTRA FINE MISC     lisinopril (PRINIVIL/ZESTRIL) 2.5 MG tablet     medroxyPROGESTERone (PROVERA) 2.5 MG tablet     metFORMIN (GLUCOPHAGE) 500 MG tablet     montelukast (SINGULAIR) 10 MG tablet     nystatin (MYCOSTATIN) ointment     omeprazole 20 MG tablet     ORDER FOR DME     order for DME     order for DME     order for DME     QVAR 80 MCG/ACT Inhaler     triamcinolone (KENALOG) 0.1 % ointment     No current facility-administered medications for this visit.          Allergies   Allergen Reactions     Seasonal Allergies                                                    Social History   Substance Use Topics     Smoking status: Never Smoker     Smokeless tobacco: Never Used     Alcohol use 0.0 oz/week     0 Standard drinks or equivalent per week      Comment: 1-2 drinks yearly                      Review of Systems    CONSTITUTIONAL:NEGATIVE  EYES: NEGATIVE  ENT/MOUTH: NEGATIVE  RESP: NEGATIVE  CV: NEGATIVE  GI: NEGATIVE  : NEGATIVE  MUSCULOSKELATAL: NEGATIVE  INTEGUMENTARY/SKIN: NEGATIVE  BREAST: NEGATIVE  NEURO: NEGATIVE.      OBJECTIVE:  /74 (BP Location: Left arm, Patient Position: Chair, Cuff Size: Adult Large)  Wt 311 lb 6.4 oz (141.3 kg)  LMP 04/05/2018  BMI 53.45 kg/m2  Pelvis: deferred  General appearance: Healthy. No distress. Obese  Skin: Normal.  Mental Status: cooperative, normal affect, no gross thought process defects.  Extremities: Normal           ASSESSMENT:  DUB,     PLAN:    1) Begin depoprovera. Ordered. Total time spent was 20 minutes. 20 minutes of face to face time spent counseling and or coordination of care regarding DUB management .

## 2018-05-15 ENCOUNTER — THERAPY VISIT (OUTPATIENT)
Dept: PHYSICAL THERAPY | Facility: CLINIC | Age: 43
End: 2018-05-15
Payer: COMMERCIAL

## 2018-05-15 DIAGNOSIS — M25.572 PAIN IN JOINT INVOLVING ANKLE AND FOOT, LEFT: Primary | ICD-10-CM

## 2018-05-15 PROCEDURE — 97161 PT EVAL LOW COMPLEX 20 MIN: CPT | Mod: GP | Performed by: PHYSICAL THERAPIST

## 2018-05-15 PROCEDURE — 97110 THERAPEUTIC EXERCISES: CPT | Mod: GP | Performed by: PHYSICAL THERAPIST

## 2018-05-15 NOTE — PROGRESS NOTES
[facility] Initial Evaluation  Subjective:  HPI                    Objective:  System    Physical Exam    General     [facility]: Physical Therapy Initial Evaluation   May 15, 2018  Subjective:   Chief Complaint:    Pain: Posterior Left Ankle, sometimes comes to the middle of the arch   Numbness/Tingling: Some tingling in the same area   Weakness: Left foot   Stiffness: Left foot/Ankle  New/Recurrent/Chronic: Chronic  DOI/onset: Last year   Referral Date: 5/2/2018 - Dr. Jae Pantoja  Mechanism of onset: got progressively worse with working out at the gym.   PMH/surgical history/trauma:    - Diabetes (well-controlled with medications)   - Overweight   - HTN   - Asthma  General health as reported by patient: Fair    Medications: HTN, Diabetes, Asthma, Allergy  Occupation: [employer] Job duties: prolonged standing, Sitting (at home)  Previous Treatment (Effect): Boot (hated it, thinks it was maybe helpful with the pain), PT (was helpful while she was able to do it)  Imaging: None  AM/PM: worse in the morning  Quality of Pain: sharp, burning  Pain: 0/10 at present, 0/10 at best, 4/10 at worst  Better: ice, icy-hot,   Worse: standing, stairs,   Progression of Symptoms since onset: a little bit better   Sleeping: Has some trouble sleeping due to both feet being swollen, left more than right.   Other current functional challenges: standing, gym  Current Functional Status: standing - starts getting painful in the second half of her shift ; gym - has not gone back to the gym  Previous Functional Status: standing - no restrictions, normal workday on her feet ; gym - exercise bike, treadmill fast (), arms weight training  Red Flags:   - Patient denies the following: Night Pain ; Fever ;     Patient's Goal(s): Want her left foot to feel like her right foot. Wants to feel like she is not going to make things worse by working out.        Objective:    Standing Posture: Increased pronation of the left foot    Gait: Increased pronation of the left foot through the stance phase     AROM (PROM): (* indicates patient's pain)   ROM R ROM L   Plantarflexion 48 48*   DF, knee straight 14 7*   DF, knee bent 27 15*       Strength: (* indicates patient's pain)   MMT R MMT L   DF/Inv 5 4   DF/Ev 5 4+*   PF/Ev 5 5*   PF/Inv  4 4-       Ankle/Foot Mobilizations (hyper vs hypo): Joint mobility normal unless otherwise noted.   - Talocrual:  - Subtalar: hypomobile on the left  - Talonavicular:  - Calcaneocuboid:  - Cubonavicular-cuniform    Other:  - Patient referred to her left ankle as her right ankle multiple times.     Assessment/Plan:    Patient is a 43 year old female with left side ankle complaints.    Patient has the following significant findings with corresponding treatment plan.                Diagnosis 1:  Left Ankle Pain  Pain -  hot/cold therapy, manual therapy, splint/taping/bracing/orthotics, self management, education and home program  Decreased ROM/flexibility - manual therapy, therapeutic exercise, therapeutic activity and home program  Decreased joint mobility - manual therapy, therapeutic exercise, therapeutic activity and home program  Decreased strength - therapeutic exercise, therapeutic activities and home program  Impaired gait - gait training and home program  Decreased function - therapeutic activities and home program  Impaired posture - neuro re-education, therapeutic activities and home program    Therapy Evaluation Codes:   1) History comprised of:   Personal factors that impact the plan of care:      Time since onset of symptoms.    Comorbidity factors that impact the plan of care are:      Overweight.     Medications impacting care: None.  2) Examination of Body Systems comprised of:   Body structures and functions that impact the plan of care:      Ankle.   Activity limitations that impact the plan of care are:      Standing  and Walking.  3) Clinical presentation characteristics are:   Stable/Uncomplicated.  4) Decision-Making    Moderate complexity using standardized patient assessment instrument and/or measureable assessment of functional outcome.  Cumulative Therapy Evaluation is: Low complexity.    Previous and current functional limitations:  (See Goal Flow Sheet for this information)    Short term and Long term goals: (See Goal Flow Sheet for this information)     Communication ability:  Patient appears to be able to clearly communicate and understand verbal and written communication and follow directions correctly.  Treatment Explanation - The following has been discussed with the patient:   RX ordered/plan of care  Anticipated outcomes  Possible risks and side effects  This patient would benefit from PT intervention to resume normal activities.   Rehab potential is fair.    Frequency:  1 X week, once daily  Duration:  for 4 weeks tapering to 2 X a month over 6 weeks  Discharge Plan:  Achieve all LTG.  Independent in home treatment program.  Reach maximal therapeutic benefit.    Please refer to the daily flowsheet for treatment today, total treatment time and time spent performing 1:1 timed codes.

## 2018-05-16 PROBLEM — M25.572 PAIN IN JOINT INVOLVING ANKLE AND FOOT, LEFT: Status: ACTIVE | Noted: 2018-05-16

## 2018-05-21 ENCOUNTER — THERAPY VISIT (OUTPATIENT)
Dept: PHYSICAL THERAPY | Facility: CLINIC | Age: 43
End: 2018-05-21
Payer: COMMERCIAL

## 2018-05-21 DIAGNOSIS — M25.572 PAIN IN JOINT INVOLVING ANKLE AND FOOT, LEFT: ICD-10-CM

## 2018-05-21 PROCEDURE — 97110 THERAPEUTIC EXERCISES: CPT | Mod: GP | Performed by: PHYSICAL THERAPIST

## 2018-05-21 PROCEDURE — 97112 NEUROMUSCULAR REEDUCATION: CPT | Mod: GP | Performed by: PHYSICAL THERAPIST

## 2018-05-21 PROCEDURE — 97140 MANUAL THERAPY 1/> REGIONS: CPT | Mod: GP | Performed by: PHYSICAL THERAPIST

## 2018-06-01 ENCOUNTER — MYC MEDICAL ADVICE (OUTPATIENT)
Dept: INTERNAL MEDICINE | Facility: CLINIC | Age: 43
End: 2018-06-01

## 2018-06-01 ENCOUNTER — THERAPY VISIT (OUTPATIENT)
Dept: PHYSICAL THERAPY | Facility: CLINIC | Age: 43
End: 2018-06-01
Payer: COMMERCIAL

## 2018-06-01 DIAGNOSIS — M25.572 PAIN IN JOINT INVOLVING ANKLE AND FOOT, LEFT: ICD-10-CM

## 2018-06-01 PROCEDURE — 97110 THERAPEUTIC EXERCISES: CPT | Mod: GP | Performed by: PHYSICAL THERAPIST

## 2018-06-01 PROCEDURE — 97140 MANUAL THERAPY 1/> REGIONS: CPT | Mod: GP | Performed by: PHYSICAL THERAPIST

## 2018-06-02 ENCOUNTER — OFFICE VISIT (OUTPATIENT)
Dept: URGENT CARE | Facility: URGENT CARE | Age: 43
End: 2018-06-02
Payer: COMMERCIAL

## 2018-06-02 VITALS
HEART RATE: 68 BPM | SYSTOLIC BLOOD PRESSURE: 124 MMHG | TEMPERATURE: 97.8 F | RESPIRATION RATE: 16 BRPM | DIASTOLIC BLOOD PRESSURE: 76 MMHG | WEIGHT: 293 LBS | BODY MASS INDEX: 52.7 KG/M2

## 2018-06-02 DIAGNOSIS — R31.9 URINARY TRACT INFECTION WITH HEMATURIA, SITE UNSPECIFIED: Primary | ICD-10-CM

## 2018-06-02 DIAGNOSIS — N39.0 URINARY TRACT INFECTION WITH HEMATURIA, SITE UNSPECIFIED: Primary | ICD-10-CM

## 2018-06-02 DIAGNOSIS — R82.90 NONSPECIFIC FINDING ON EXAMINATION OF URINE: ICD-10-CM

## 2018-06-02 DIAGNOSIS — R30.0 DYSURIA: ICD-10-CM

## 2018-06-02 LAB
BACTERIA #/AREA URNS HPF: ABNORMAL /HPF
NON-SQ EPI CELLS #/AREA URNS LPF: ABNORMAL /LPF
RBC #/AREA URNS AUTO: ABNORMAL /HPF
WBC #/AREA URNS AUTO: ABNORMAL /HPF

## 2018-06-02 PROCEDURE — 87086 URINE CULTURE/COLONY COUNT: CPT | Performed by: FAMILY MEDICINE

## 2018-06-02 PROCEDURE — 81015 MICROSCOPIC EXAM OF URINE: CPT | Performed by: FAMILY MEDICINE

## 2018-06-02 PROCEDURE — 99213 OFFICE O/P EST LOW 20 MIN: CPT | Performed by: FAMILY MEDICINE

## 2018-06-02 RX ORDER — NITROFURANTOIN 25; 75 MG/1; MG/1
100 CAPSULE ORAL 2 TIMES DAILY
Qty: 14 CAPSULE | Refills: 0 | Status: SHIPPED | OUTPATIENT
Start: 2018-06-02 | End: 2018-06-09

## 2018-06-02 NOTE — MR AVS SNAPSHOT
After Visit Summary   6/2/2018    Coni Newberry    MRN: [id number]           Patient Information     Date Of Birth          1975        Visit Information        Provider Department      6/2/2018 8:55 AM Angie Dotson DO [facility] U[facility]        Today's Diagnoses     Urinary tract infection with hematuria, site unspecified    -  1    Dysuria        Nonspecific finding on examination of urine           Follow-ups after your visit        Your next 10 appointments already scheduled     Jun 07, 2018  3:40 PM CDT   [facility] ([facility] [place]  )    [address]  [address]  [facility] [address]   [phone]            Jul 09, 2018 10:45 AM CDT   Nurse Only with [facility] - NURSE   [facility]    [address] [address] [phone]              Who to contact     If you have questions or need follow up information about today's clinic visit or your schedule please contact [facility] directly at [phone].  Normal or non-critical lab and imaging results will be communicated to you by Mismihart, letter or phone within 4 business days after the clinic has received the results. If you do not hear from us within 7 days, please contact the clinic through Mismihart or phone. If you have a critical or abnormal lab result, we will notify you by phone as soon as possible.  Submit refill requests through Obatech or call your pharmacy and they will forward the refill request to us. Please allow 3 business days for your refill to be completed.          Additional Information About Your Visit        MyChart Information     Obatech gives you secure access to your electronic health record. If you see a primary care provider, you can also send messages to your care team and make appointments. If you have questions, please call your primary care  clinic.  If you do not have a primary care provider, please call [phone] and they will assist you.        Care EveryWhere ID     This is your Care EveryWhere ID. This could be used by other organizations to access your [place] medical records  [id number]        Your Vitals Were     Pulse Temperature Respirations Last Period Breastfeeding? BMI (Body Mass Index)    68 97.8  F (36.6  C) (Tympanic) 16 05/22/2018 (Exact Date) No 52.7 kg/m2       Blood Pressure from Last 3 Encounters:   06/02/18 124/76   05/02/18 124/82   04/27/18 110/62    Weight from Last 3 Encounters:   06/02/18 307 lb (139.3 kg)   05/02/18 307 lb (139.3 kg)   04/27/18 307 lb (139.3 kg)              We Performed the Following     Urine Culture Aerobic Bacterial     Urine Microscopic          Today's Medication Changes          These changes are accurate as of 6/2/18  9:44 AM.  If you have any questions, ask your nurse or doctor.               Start taking these medicines.        Dose/Directions    nitroFURantoin (macrocrystal-monohydrate) 100 MG capsule   Commonly known as:  MACROBID   Used for:  Urinary tract infection with hematuria, site unspecified   Started by:  Angie Dotson DO        Dose:  100 mg   Take 1 capsule (100 mg) by mouth 2 times daily for 7 days   Quantity:  14 capsule   Refills:  0            Where to get your medicines      These medications were sent to [facility] [address] [place], [place] [address]     Phone:  [phone]     nitroFURantoin (macrocrystal-monohydrate) 100 MG capsule                Primary Care Provider Office Phone # Fax #    DANYELL [facility] [phone]       [address] [facility] [address]        Equal Access to Services     [facility] VIKY AH: Jaya carrilloo Sonilsali, waaxda luqadaha, qaybta kaalmada adeegyada, waxay lon dias. So [facility] [phone].    ATENCIÓN: Antonio crow  español, tiene a remy disposición servicios gratuitos de asistencia lingüística. Yossi gupta [phone].    We comply with applicable federal civil rights laws and [place] laws. We do not discriminate on the basis of race, color, national origin, age, disability, sex, sexual orientation, or gender identity.            Thank you!     Thank you for choosing [facility]  for your care. Our goal is always to provide you with excellent care. Hearing back from our patients is one way we can continue to improve our services. Please take a few minutes to complete the written survey that you may receive in the mail after your visit with us. Thank you!             Your Updated Medication List - Protect others around you: Learn how to safely use, store and throw away your medicines at www.disposemymeds.org.          This list is accurate as of 6/2/18  9:44 AM.  Always use your most recent med list.                   Brand Name Dispense Instructions for use Diagnosis    albuterol 108 (90 Base) MCG/ACT Inhaler    PROAIR HFA    1 Inhaler    Inhale 2 puffs into the lungs every 6 hours    Moderate persistent asthma without complication       atorvastatin 10 MG tablet    LIPITOR    90 tablet    Take 1 tablet (10 mg) by mouth daily    Hyperlipidemia with target LDL less than 70       blood glucose monitoring meter device kit     1 kit    Use to test blood sugars 1 times daily or as directed.    DM type 2, goal A1C below 8.0       * blood glucose monitoring test strip    no brand specified    600 strip    Use to test blood sugar 6 times daily or as directed.    Diabetes mellitus, new onset (H)       * blood glucose monitoring test strip    ONETOUCH ULTRA    3 Box    Use to test blood sugars 1 times daily or as directed.    DM type 2, goal A1C below 8.0       cetirizine-psuedoePHEDrine 5-120 MG per 12 hr tablet    zyrTEC-D     Take 1 tablet by mouth daily Reported on 3/31/2017        fluticasone 50 MCG/ACT spray     FLONASE    3 Package    Spray 2 sprays into both nostrils daily    Asthma       ibuprofen 800 MG tablet    ADVIL/MOTRIN    30 tablet    Take 1 tablet (800 mg) by mouth every 6 hours as needed for moderate pain    Post-operative pain       ipratropium - albuterol 0.5 mg/2.5 mg/3 mL 0.5-2.5 (3) MG/3ML neb solution    DUONEB    90 vial    USE ONE AMPULE IN NEBULIZER EVERY 6 HOURS AS NEEDED FOR  SHORTNESS  OF  BREATH/DYSPNEA  OR  WHEEZING    Mild persistent asthma without complication       JOLIVETTE 0.35 MG per tablet   Generic drug:  norethindrone     84 tablet    TAKE 1 TABLET (0.35 MG) BY MOUTH DAILY    Encounter for surveillance of contraceptive pills       LANCETS ULTRA FINE Misc     300 each    1 each 6 times daily    Diabetes mellitus, new onset (H)       lisinopril 2.5 MG tablet    PRINIVIL/Zestril    90 tablet    Take 1 tablet (2.5 mg) by mouth daily    Essential hypertension with goal blood pressure less than 140/90       medroxyPROGESTERone 150 MG/ML injection    DEPO-PROVERA    1 mL    Inject 1 mL (150 mg) into the muscle every 3 months    DUB (dysfunctional uterine bleeding)       metFORMIN 500 MG tablet    GLUCOPHAGE    360 tablet    Take 2 tablets (1,000 mg) by mouth 2 times daily (with meals)    Type 2 diabetes mellitus without complication, without long-term current use of insulin (H)       montelukast 10 MG tablet    SINGULAIR    90 tablet    TAKE ONE TABLET BY MOUTH AT BEDTIME    Mild persistent asthma without complication       nitroFURantoin (macrocrystal-monohydrate) 100 MG capsule    MACROBID    14 capsule    Take 1 capsule (100 mg) by mouth 2 times daily for 7 days    Urinary tract infection with hematuria, site unspecified       nystatin ointment    MYCOSTATIN    60 g    Apply topically as needed    Yeast infection of the skin       omeprazole 20 MG tablet     90 tablet    Take 1 tablet (20 mg) by mouth daily Take 30-60 minutes before a meal.    Abdominal pain, epigastric       order for DME      2 Device    Equipment being ordered: neb tubing and cup    Mild persistent asthma without complication       order for DME     1 Device    Equipment being ordered: tennis elbow brace    Medial epicondylitis of elbow, left       order for DME     1 Device    Equipment being ordered: Nebulizer and tubing    Mild persistent asthma without complication       QVAR 80 MCG/ACT Inhaler   Generic drug:  beclomethasone     3 Inhaler    INHALE 2 PUFFS INTO THE LUNGS TWICE A DAY    Mild persistent asthma without complication       triamcinolone 0.1 % ointment    KENALOG    90 g    Apply sparingly to affected area three times daily AS NEEDED FOR ECZEMA RASH    Nummular eczema       * Notice:  This list has 2 medication(s) that are the same as other medications prescribed for you. Read the directions carefully, and ask your doctor or other care provider to review them with you.

## 2018-06-02 NOTE — PROGRESS NOTES
SUBJECTIVE:  Coni Newberry is a 43 year old female who  presents today for a possible UTI.   Symptoms:dysuria, urgency and frequency.  Noticed urine was orange this morning.   Did take Azo last night, not realizing it discolors urine.  Onset of symptoms: 1 day ago.  The day after using a douche for her period.  Then thought might be a yeast infection as she's had those before with her diabetes and used a vaginal yeast treatment last night and discomfort/urgency worsened since.  Discharge present?:  Unsure, menses   Hematuria?: unsure, menses.  LMP: Patient's last menstrual period was 05/22/2018 (exact date).  This patient does have a history of urinary tract infections.   Patient denies: fevers, flank pain or vomiting        ROS:   5-Point Review of Systems Negative-- Except as stated above.    OBJECTIVE:  /76 (BP Location: Right arm, Patient Position: Chair, Cuff Size: Adult Regular)  Pulse 68  Temp 97.8  F (36.6  C) (Tympanic)  Resp 16  Wt 307 lb (139.3 kg)  LMP 05/22/2018 (Exact Date)  Breastfeeding? No  BMI 52.7 kg/m2  GENERAL APPEARANCE: healthy, alert and no distress  RESP: lungs clear to auscultation - no rales, rhonchi or wheezes  CV: regular rates and rhythm, normal S1 S2, no murmur noted  BACK: No CVA tenderness    Results for orders placed or performed in visit on 06/02/18   Urine Microscopic   Result Value Ref Range    WBC Urine 10-25 (A) OTO5^0 - 5 /HPF    RBC Urine 5-10 (A) OTO2^O - 2 /HPF    Squamous Epithelial /LPF Urine Moderate (A) FEW^Few /LPF    Bacteria Urine Moderate (A) NEG^Negative /HPF       ASSESSMENT/PLAN:     ICD-10-CM    1. Urinary tract infection with hematuria, site unspecified N39.0 nitroFURantoin, macrocrystal-monohydrate, (MACROBID) 100 MG capsule    R31.9    2. Dysuria R30.0 Urine Microscopic     Urine Culture Aerobic Bacterial     CANCELED: *UA reflex to Microscopic and Culture ([place] [facility] (except [place] and [place])   3. Nonspecific finding on  examination of urine R82.90 Urine Culture Aerobic Bacterial     We discussed the expected course of the illness and symptomatic cares in detail.   Advised to return to care if symptoms not improving as expected, do not resolve completely, or if any new or worsening symptoms develop.

## 2018-06-03 LAB
BACTERIA SPEC CULT: NORMAL
BACTERIA SPEC CULT: NORMAL
SPECIMEN SOURCE: NORMAL

## 2018-06-07 ENCOUNTER — THERAPY VISIT (OUTPATIENT)
Dept: PHYSICAL THERAPY | Facility: CLINIC | Age: 43
End: 2018-06-07
Payer: COMMERCIAL

## 2018-06-07 ENCOUNTER — TRANSFERRED RECORDS (OUTPATIENT)
Dept: HEALTH INFORMATION MANAGEMENT | Facility: CLINIC | Age: 43
End: 2018-06-07

## 2018-06-07 DIAGNOSIS — M25.572 PAIN IN JOINT INVOLVING ANKLE AND FOOT, LEFT: ICD-10-CM

## 2018-06-07 PROCEDURE — 97110 THERAPEUTIC EXERCISES: CPT | Mod: GP | Performed by: PHYSICAL THERAPIST

## 2018-06-07 PROCEDURE — 97112 NEUROMUSCULAR REEDUCATION: CPT | Mod: GP | Performed by: PHYSICAL THERAPIST

## 2018-06-07 PROCEDURE — 97140 MANUAL THERAPY 1/> REGIONS: CPT | Mod: GP | Performed by: PHYSICAL THERAPIST

## 2018-06-15 ENCOUNTER — THERAPY VISIT (OUTPATIENT)
Dept: PHYSICAL THERAPY | Facility: CLINIC | Age: 43
End: 2018-06-15
Payer: COMMERCIAL

## 2018-06-15 DIAGNOSIS — M25.572 PAIN IN JOINT INVOLVING ANKLE AND FOOT, LEFT: ICD-10-CM

## 2018-06-15 PROCEDURE — 97140 MANUAL THERAPY 1/> REGIONS: CPT | Mod: GP | Performed by: PHYSICAL THERAPIST

## 2018-06-15 PROCEDURE — 97530 THERAPEUTIC ACTIVITIES: CPT | Mod: GP | Performed by: PHYSICAL THERAPIST

## 2018-06-26 ENCOUNTER — THERAPY VISIT (OUTPATIENT)
Dept: PHYSICAL THERAPY | Facility: CLINIC | Age: 43
End: 2018-06-26
Payer: COMMERCIAL

## 2018-06-26 DIAGNOSIS — M25.572 PAIN IN JOINT INVOLVING ANKLE AND FOOT, LEFT: ICD-10-CM

## 2018-06-26 PROCEDURE — 97530 THERAPEUTIC ACTIVITIES: CPT | Mod: GP | Performed by: PHYSICAL THERAPIST

## 2018-06-26 PROCEDURE — 97110 THERAPEUTIC EXERCISES: CPT | Mod: GP | Performed by: PHYSICAL THERAPIST

## 2018-06-26 PROCEDURE — 97140 MANUAL THERAPY 1/> REGIONS: CPT | Mod: GP | Performed by: PHYSICAL THERAPIST

## 2018-07-09 ENCOUNTER — OFFICE VISIT (OUTPATIENT)
Dept: NURSING | Facility: CLINIC | Age: 43
End: 2018-07-09
Payer: COMMERCIAL

## 2018-07-09 VITALS — SYSTOLIC BLOOD PRESSURE: 124 MMHG | DIASTOLIC BLOOD PRESSURE: 86 MMHG | WEIGHT: 293 LBS | BODY MASS INDEX: 52.18 KG/M2

## 2018-07-09 DIAGNOSIS — Z30.9 CONTRACEPTIVE MANAGEMENT: Primary | ICD-10-CM

## 2018-07-09 PROCEDURE — 99207 ZZC NO CHARGE NURSE ONLY: CPT

## 2018-07-09 PROCEDURE — 96372 THER/PROPH/DIAG INJ SC/IM: CPT

## 2018-07-09 NOTE — MR AVS SNAPSHOT
After Visit Summary   7/9/2018    Coni Newberry    MRN: [id number]           Patient Information     Date Of Birth          1975        Visit Information        Provider Department      7/9/2018 4:00 PM [facility] OB NURSE [facility]        Today's Diagnoses     Contraceptive management    -  1       Follow-ups after your visit        Your next 10 appointments already scheduled     Sep 27, 2018  4:15 PM CDT   Nurse Only with [facility] NURSE   [facility]    [address]  [facility] [address] [phone]              Who to contact     If you have questions or need follow up information about today's clinic visit or your schedule please contact [facility] directly at [phone].  Normal or non-critical lab and imaging results will be communicated to you by InsideTrackhart, letter or phone within 4 business days after the clinic has received the results. If you do not hear from us within 7 days, please contact the clinic through InsideTrackhart or phone. If you have a critical or abnormal lab result, we will notify you by phone as soon as possible.  Submit refill requests through WireImage or call your pharmacy and they will forward the refill request to us. Please allow 3 business days for your refill to be completed.          Additional Information About Your Visit        MyChart Information     WireImage gives you secure access to your electronic health record. If you see a primary care provider, you can also send messages to your care team and make appointments. If you have questions, please call your primary care clinic.  If you do not have a primary care provider, please call [phone] and they will assist you.        Care EveryWhere ID     This is your Care EveryWhere ID. This could be used by other organizations to access your [place] medical records  [id number]        Your Vitals Were     BMI (Body Mass Index)                    52.18 kg/m2            Blood Pressure from Last 3 Encounters:   07/09/18 124/86   06/02/18 124/76   05/02/18 124/82    Weight from Last 3 Encounters:   07/09/18 304 lb (137.9 kg)   06/02/18 307 lb (139.3 kg)   05/02/18 307 lb (139.3 kg)              Today, you had the following     No orders found for display       Primary Care Provider Office Phone # Fax #    Farida Valle Talha, APRN [facility] [phone]       [address] [facility] [address]        Equal Access to Services     [facility]: Hadii aad ku hadasho Somary beth, waaxda luqadaha, qaybta kaalmada adeegyada, benoit sinha . So [facility] [phone].    ATENCIÓN: Si habla español, tiene a remy disposición servicios gratuitos de asistencia lingüística. Llame al [phone].    We comply with applicable federal civil rights laws and [place] laws. We do not discriminate on the basis of race, color, national origin, age, disability, sex, sexual orientation, or gender identity.            Thank you!     Thank you for choosing [facility]  for your care. Our goal is always to provide you with excellent care. Hearing back from our patients is one way we can continue to improve our services. Please take a few minutes to complete the written survey that you may receive in the mail after your visit with us. Thank you!             Your Updated Medication List - Protect others around you: Learn how to safely use, store and throw away your medicines at www.disposemymeds.org.          This list is accurate as of 7/9/18 11:59 PM.  Always use your most recent med list.                   Brand Name Dispense Instructions for use Diagnosis    albuterol 108 (90 Base) MCG/ACT Inhaler    PROAIR HFA    1 Inhaler    Inhale 2 puffs into the lungs every 6 hours    Moderate persistent asthma without complication       atorvastatin 10 MG tablet    LIPITOR    90 tablet    Take 1 tablet (10 mg) by mouth daily     Hyperlipidemia with target LDL less than 70       blood glucose monitoring meter device kit     1 kit    Use to test blood sugars 1 times daily or as directed.    DM type 2, goal A1C below 8.0       * blood glucose monitoring test strip    no brand specified    600 strip    Use to test blood sugar 6 times daily or as directed.    Diabetes mellitus, new onset (H)       * blood glucose monitoring test strip    ONETOUCH ULTRA    3 Box    Use to test blood sugars 1 times daily or as directed.    DM type 2, goal A1C below 8.0       cetirizine-pseudoePHEDrine 5-120 MG per 12 hr tablet    zyrTEC-D     Take 1 tablet by mouth daily Reported on 3/31/2017        fluticasone 50 MCG/ACT spray    FLONASE    3 Package    Spray 2 sprays into both nostrils daily    Asthma       ibuprofen 800 MG tablet    ADVIL/MOTRIN    30 tablet    Take 1 tablet (800 mg) by mouth every 6 hours as needed for moderate pain    Post-operative pain       ipratropium - albuterol 0.5 mg/2.5 mg/3 mL 0.5-2.5 (3) MG/3ML neb solution    DUONEB    90 vial    USE ONE AMPULE IN NEBULIZER EVERY 6 HOURS AS NEEDED FOR  SHORTNESS  OF  BREATH/DYSPNEA  OR  WHEEZING    Mild persistent asthma without complication       JOLIVETTE 0.35 MG per tablet   Generic drug:  norethindrone     84 tablet    TAKE 1 TABLET (0.35 MG) BY MOUTH DAILY    Encounter for surveillance of contraceptive pills       LANCETS ULTRA FINE Misc     300 each    1 each 6 times daily    Diabetes mellitus, new onset (H)       lisinopril 2.5 MG tablet    PRINIVIL/Zestril    90 tablet    Take 1 tablet (2.5 mg) by mouth daily    Essential hypertension with goal blood pressure less than 140/90       medroxyPROGESTERone 150 MG/ML injection    DEPO-PROVERA    1 mL    Inject 1 mL (150 mg) into the muscle every 3 months    DUB (dysfunctional uterine bleeding)       metFORMIN 500 MG tablet    GLUCOPHAGE    360 tablet    Take 2 tablets (1,000 mg) by mouth 2 times daily (with meals)    Type 2 diabetes mellitus  without complication, without long-term current use of insulin (H)       montelukast 10 MG tablet    SINGULAIR    90 tablet    TAKE ONE TABLET BY MOUTH AT BEDTIME    Mild persistent asthma without complication       nystatin ointment    MYCOSTATIN    60 g    Apply topically as needed    Yeast infection of the skin       omeprazole 20 MG tablet     90 tablet    Take 1 tablet (20 mg) by mouth daily Take 30-60 minutes before a meal.    Abdominal pain, epigastric       order for DME     2 Device    Equipment being ordered: neb tubing and cup    Mild persistent asthma without complication       order for DME     1 Device    Equipment being ordered: tennis elbow brace    Medial epicondylitis of elbow, left       order for DME     1 Device    Equipment being ordered: Nebulizer and tubing    Mild persistent asthma without complication       QVAR 80 MCG/ACT Inhaler   Generic drug:  beclomethasone     3 Inhaler    INHALE 2 PUFFS INTO THE LUNGS TWICE A DAY    Mild persistent asthma without complication       triamcinolone 0.1 % ointment    KENALOG    90 g    Apply sparingly to affected area three times daily AS NEEDED FOR ECZEMA RASH    Nummular eczema       * Notice:  This list has 2 medication(s) that are the same as other medications prescribed for you. Read the directions carefully, and ask your doctor or other care provider to review them with you.

## 2018-07-31 ENCOUNTER — TELEPHONE (OUTPATIENT)
Dept: INTERNAL MEDICINE | Facility: CLINIC | Age: 43
End: 2018-07-31

## 2018-07-31 NOTE — TELEPHONE ENCOUNTER
Panel Management Review      Patient has the following on her problem list:     Asthma review     ACT Total Scores 4/27/2018   ACT TOTAL SCORE -   ASTHMA ER VISITS -   ASTHMA HOSPITALIZATIONS -   ACT TOTAL SCORE (Goal Greater than or Equal to 20) 17   In the past 12 months, how many times did you visit the emergency room for your asthma without being admitted to the hospital? 0   In the past 12 months, how many times were you hospitalized overnight because of your asthma? 0      1. Is Asthma diagnosis on the Problem List? Yes    2. Is Asthma listed on Health Maintenance? Yes    3. Patient is due for:  ACT    Diabetes    ASA: Not on med list    Last A1C  Lab Results   Component Value Date    A1C 6.0 12/29/2017    A1C 5.9 07/19/2017    A1C 5.8 03/03/2017    A1C 5.7 08/31/2016    A1C 5.6 02/25/2016     A1C tested: Passed    Last LDL:    Lab Results   Component Value Date    CHOL 135 07/19/2017     Lab Results   Component Value Date    HDL 55 07/19/2017     Lab Results   Component Value Date    LDL 62 07/19/2017     Lab Results   Component Value Date    TRIG 91 07/19/2017     Lab Results   Component Value Date    CHOLHDLRATIO 2.4 07/14/2015     Lab Results   Component Value Date    NHDL 80 07/19/2017       Is the patient on a Statin? YES             Is the patient on Aspirin? NO    Medications     HMG CoA Reductase Inhibitors    atorvastatin (LIPITOR) 10 MG tablet          Last three blood pressure readings:  BP Readings from Last 3 Encounters:   07/09/18 124/86   06/02/18 124/76   05/02/18 124/82       Date of last diabetes office visit: 4/2018     Tobacco History:     History   Smoking Status     Never Smoker   Smokeless Tobacco     Never Used           Composite cancer screening  Chart review shows that this patient is due/due soon for the following Pap Smear  Summary:    Patient is due/failing the following:   A1c, ACT and PAP    Action needed:   Patient needs office visit for HM.    Type of outreach:    Sent  [employer] message.    Questions for provider review:    None                                                                                                                                      KVNG Louise       Chart routed to None .

## 2018-07-31 NOTE — LETTER
[facility]  [address], [address]  [place] [address]  [phone]        August 21, 2018    Coni Newberry  [address]  [facility] [address]            Dear MsEddi Newberry:    This letter is to remind you to schedule an appointment for a Diabetes follow up and a Pap smear.    Sincerely,        ANT Jacobs

## 2018-08-13 DIAGNOSIS — Z30.41 ENCOUNTER FOR SURVEILLANCE OF CONTRACEPTIVE PILLS: ICD-10-CM

## 2018-08-13 PROBLEM — M25.572 PAIN IN JOINT INVOLVING ANKLE AND FOOT, LEFT: Status: RESOLVED | Noted: 2018-05-16 | Resolved: 2018-08-13

## 2018-08-13 NOTE — PROGRESS NOTES
Update as of August 13, 2018: Patient has failed to return to clinic. Current status unknown. This progress note shall serve as a discharge note.

## 2018-08-15 NOTE — TELEPHONE ENCOUNTER
Prescription approved per [facility] Refill Protocol.

## 2018-08-27 DIAGNOSIS — J45.30 MILD PERSISTENT ASTHMA WITHOUT COMPLICATION: Primary | ICD-10-CM

## 2018-08-27 NOTE — TELEPHONE ENCOUNTER
Received fax from [facility] requesting new rx for Qvar Redihaler 80 mcg to replace the previous Qvar inhaler per insurance.      Pharmacy has sig of inhale 2 puffs twice a day but in Epic sig is 1 puff twice daily.  DEBBIE Villalobos R.N.

## 2018-09-27 ENCOUNTER — ALLIED HEALTH/NURSE VISIT (OUTPATIENT)
Dept: NURSING | Facility: CLINIC | Age: 43
End: 2018-09-27
Payer: COMMERCIAL

## 2018-09-27 VITALS — BODY MASS INDEX: 50.88 KG/M2 | SYSTOLIC BLOOD PRESSURE: 128 MMHG | WEIGHT: 293 LBS | DIASTOLIC BLOOD PRESSURE: 86 MMHG

## 2018-09-27 DIAGNOSIS — N93.9 ABNORMAL UTERINE BLEEDING (AUB): Primary | ICD-10-CM

## 2018-09-27 NOTE — NURSING NOTE
BP: 128/86    LAST PAP/EXAM:   Lab Results   Component Value Date    PAP NIL 02/18/2015     URINE HCG:not indicated    The following medication was given:     MEDICATION: Depo Provera 150mg  ROUTE: IM  SITE: Deltoid - Right  : [facility]   LOT #: [id number]  EXP:05/2020  NEXT INJECTION DUE: 12/13/18 - 12/27/18   Provider: Jenn      When patient was in clinic for Depo, she clarified that she is taking Depo and OCP for abnormal bleeding. Patient states that she was having 2-3 periods a month. Since starting the Depo in April, she can skip a month of her period, but then the next month her period is very light, but she has A LOT of cramping and pain, and the bleeding will last 13-16 days.  Recommended patient make an appointment with Dr. Barajas to discuss this, but will huddle with Dr. Landry- on call doc regarding this.   Jama Gay CMA

## 2018-09-27 NOTE — NURSING NOTE
Spoke with Dr. Sam, as Dr. Landry was at the [facility].  She recommended patient schedule an appointment. Called patient and appt made for 10/29 at 5 pm.   Jama Gay CMA

## 2018-10-29 ENCOUNTER — OFFICE VISIT (OUTPATIENT)
Dept: OBGYN | Facility: CLINIC | Age: 43
End: 2018-10-29
Payer: COMMERCIAL

## 2018-10-29 VITALS
DIASTOLIC BLOOD PRESSURE: 60 MMHG | WEIGHT: 293 LBS | BODY MASS INDEX: 50.02 KG/M2 | HEIGHT: 64 IN | SYSTOLIC BLOOD PRESSURE: 124 MMHG

## 2018-10-29 DIAGNOSIS — N93.9 ABNORMAL UTERINE BLEEDING (AUB): Primary | ICD-10-CM

## 2018-10-29 PROCEDURE — 99213 OFFICE O/P EST LOW 20 MIN: CPT | Performed by: OBSTETRICS & GYNECOLOGY

## 2018-10-29 NOTE — NURSING NOTE
"Chief Complaint   Patient presents with     Other     Menstrual issues       Initial /60 (BP Location: Left arm, Patient Position: Chair, Cuff Size: Adult Large)  Ht 5' 4\" (1.626 m)  Wt 298 lb (135.2 kg)  LMP 2018 (Exact Date)  BMI 51.15 kg/m2 Estimated body mass index is 51.15 kg/(m^2) as calculated from the following:    Height as of this encounter: 5' 4\" (1.626 m).    Weight as of this encounter: 298 lb (135.2 kg).  BP completed using cuff size: large    Questioned patient about current smoking habits.  Pt. has never smoked.          The following HM Due: NONE    Rosmery Amos CMA           "

## 2018-10-29 NOTE — MR AVS SNAPSHOT
After Visit Summary   10/29/2018    Coni Newberry    MRN: [id number]           Patient Information     Date Of Birth          1975        Visit Information        Provider Department      10/29/2018 5:00 PM Edmund Barajas MD [facility]        Today's Diagnoses     Abnormal uterine bleeding (AUB)    -  1       Follow-ups after your visit        Your next 10 appointments already scheduled     Nov 15, 2018  5:20 PM CST   SHORT with DANYELL Spencer CNP   [facility] ([facility])    [address] [place] [address]   [phone]            Nov 26, 2018  5:30 PM CST   US PELVIC COMPLETE W TRANSVAGINAL with RIUS1   [facility] ([facility])    [address]  [address]  [facility] [address]   [phone]           How do I prepare for my exam? (Food and drink instructions) Adults: Drink four 8-ounce glasses of fluid an hour before your exam. If you need to empty your bladder before your exam, try to release only a little urine. Then, drink another glass of fluid.  Children: * Children who are potty trained up to 6 years old should drink at least 2 cups (16 oz) of water/non-carbonated beverage 30 minutes prior to the exam. * Children who are 6-10 years should drink at least 3 cups (24 oz) of water/non-carbonated beverage 45 minutes prior to the exam. * Children who are 10 years or older should drink at least 4 cups (32 oz) of water/non-carbonated beverage 45 minutes prior to the exam.  If your child is very uncomfortable or has an urgent need to pee, please notify a technologist; they will try to find out how much longer the wait may be and provide instructions to help relieve the pressure.  What should I wear: Wear comfortable clothes.  How long does the exam take: Most ultrasounds take 30 to 60 minutes.  What should I bring: Bring a list of your medicines, including  vitamins, minerals and over-the-counter drugs. It is safest to leave personal items at home.  Do I need a :  No  is needed.  What do I need to tell my doctor: Tell your doctor about any allergies you may have.  What should I do after the exam: No restrictions, You may resume normal activities.  What is this test: An ultrasound uses sound waves to make pictures of the body. Sound waves do not cause pain. The only discomfort may be the pressure of the wand against your skin or full bladder.  Who should I call with questions: If you have any questions, please call the Imaging Department where you will have your exam. Directions, parking instructions, and other information is available on our website, [email or web address].            Dec 03, 2018  6:00 PM CST   SHORT with Edmund Barajas MD   [facility]    [address] [facility] [address]  [facility] [id number] [phone]            Dec 13, 2018  3:30 PM CST   Nurse Only with RI OB NURSE   [facility]    [address] [facility] [address] [phone]              Who to contact     If you have questions or need follow up information about today's clinic visit or your schedule please contact [facility] directly at [phone].  Normal or non-critical lab and imaging results will be communicated to you by MyChart, letter or phone within 4 business days after the clinic has received the results. If you do not hear from us within 7 days, please contact the clinic through MyChart or phone. If you have a critical or abnormal lab result, we will notify you by phone as soon as possible.  Submit refill requests through [employer] or call your pharmacy and they will forward the refill request to us. Please allow 3 business days for your refill to be completed.          Additional Information About Your Visit        MyChart Information  "    [facility] gives you secure access to your electronic health record. If you see a primary care provider, you can also send messages to your care team and make appointments. If you have questions, please call your primary care clinic.  If you do not have a primary care provider, please call [phone] and they will assist you.        Care EveryWhere ID     This is your Care EveryWhere ID. This could be used by other organizations to access your [place] medical records  [id number]        Your Vitals Were     Height Last Period BMI (Body Mass Index)             5' 4\" (1.626 m) 08/16/2018 (Exact Date) 51.15 kg/m2          Blood Pressure from Last 3 Encounters:   10/29/18 124/60   09/27/18 128/86   07/09/18 124/86    Weight from Last 3 Encounters:   10/29/18 298 lb (135.2 kg)   09/27/18 296 lb 6.4 oz (134.4 kg)   07/09/18 304 lb (137.9 kg)               Primary Care Provider Office Phone # Fax #    Farida Palencia, DANYELL [facility] [phone]       [address] [facility] [address]        Equal Access to Services     SARAH BETH SALMON AH: Hadii aad ku hadasho Soomaali, waaxda luqadaha, qaybta kaalmada adeegyada, benoit razo labecka . So [facility] [phone].    ATENCIÓN: Si habla español, tiene a remy disposición servicios gratuitos de asistencia lingüística. [facility] [phone].    We comply with applicable federal civil rights laws and [place] laws. We do not discriminate on the basis of race, color, national origin, age, disability, sex, sexual orientation, or gender identity.            Thank you!     Thank you for choosing [facility]  for your care. Our goal is always to provide you with excellent care. Hearing back from our patients is one way we can continue to improve our services. Please take a few minutes to complete the written survey that you may receive in the mail after your visit with us. Thank you!             Your Updated Medication List - Protect " others around you: Learn how to safely use, store and throw away your medicines at www.disposemymeds.org.          This list is accurate as of 10/29/18 11:59 PM.  Always use your most recent med list.                   Brand Name Dispense Instructions for use Diagnosis    albuterol 108 (90 Base) MCG/ACT inhaler    PROAIR HFA    1 Inhaler    Inhale 2 puffs into the lungs every 6 hours    Moderate persistent asthma without complication       atorvastatin 10 MG tablet    LIPITOR    90 tablet    Take 1 tablet (10 mg) by mouth daily    Hyperlipidemia with target LDL less than 70       beclomethasone HFA 80 MCG/ACT inhaler    QVAR REDIHALER    1 Inhaler    Inhale 2 puffs into the lungs 2 times daily    Mild persistent asthma without complication       blood glucose monitoring meter device kit     1 kit    Use to test blood sugars 1 times daily or as directed.    DM type 2, goal A1C below 8.0       * blood glucose monitoring test strip    no brand specified    600 strip    Use to test blood sugar 6 times daily or as directed.    Diabetes mellitus, new onset (H)       * blood glucose monitoring test strip    ONETOUCH ULTRA    3 Box    Use to test blood sugars 1 times daily or as directed.    DM type 2, goal A1C below 8.0       cetirizine-pseudoePHEDrine 5-120 MG per 12 hr tablet    zyrTEC-D     Take 1 tablet by mouth daily Reported on 3/31/2017        fluticasone 50 MCG/ACT spray    FLONASE    3 Package    Spray 2 sprays into both nostrils daily    Asthma       ibuprofen 800 MG tablet    ADVIL/MOTRIN    30 tablet    Take 1 tablet (800 mg) by mouth every 6 hours as needed for moderate pain    Post-operative pain       ipratropium - albuterol 0.5 mg/2.5 mg/3 mL 0.5-2.5 (3) MG/3ML neb solution    DUONEB    90 vial    USE ONE AMPULE IN NEBULIZER EVERY 6 HOURS AS NEEDED FOR  SHORTNESS  OF  BREATH/DYSPNEA  OR  WHEEZING    Mild persistent asthma without complication       JOLIVETTE 0.35 MG per tablet   Generic drug:  norethindrone      84 tablet    TAKE 1 TABLET (0.35 MG) BY MOUTH DAILY    Encounter for surveillance of contraceptive pills       LANCETS ULTRA FINE Misc     300 each    1 each 6 times daily    Diabetes mellitus, new onset (H)       lisinopril 2.5 MG tablet    PRINIVIL/Zestril    90 tablet    Take 1 tablet (2.5 mg) by mouth daily    Essential hypertension with goal blood pressure less than 140/90       medroxyPROGESTERone 150 MG/ML injection    DEPO-PROVERA    1 mL    Inject 1 mL (150 mg) into the muscle every 3 months    DUB (dysfunctional uterine bleeding)       montelukast 10 MG tablet    SINGULAIR    90 tablet    TAKE ONE TABLET BY MOUTH AT BEDTIME    Mild persistent asthma without complication       nystatin ointment    MYCOSTATIN    60 g    Apply topically as needed    Yeast infection of the skin       omeprazole 20 MG tablet     90 tablet    Take 1 tablet (20 mg) by mouth daily Take 30-60 minutes before a meal.    Abdominal pain, epigastric       order for DME     2 Device    Equipment being ordered: neb tubing and cup    Mild persistent asthma without complication       order for DME     1 Device    Equipment being ordered: tennis elbow brace    Medial epicondylitis of elbow, left       order for DME     1 Device    Equipment being ordered: Nebulizer and tubing    Mild persistent asthma without complication       triamcinolone 0.1 % ointment    KENALOG    90 g    Apply sparingly to affected area three times daily AS NEEDED FOR ECZEMA RASH    Nummular eczema       * Notice:  This list has 2 medication(s) that are the same as other medications prescribed for you. Read the directions carefully, and ask your doctor or other care provider to review them with you.

## 2018-10-29 NOTE — PROGRESS NOTES
SUBJECTIVE:  Coni Newberry is a 43 year old, G0, using depoprovera to control abnormal uterine bleeding. She has had attempted Mirena placement x 2 , including OR. Cervical stenosis. Had 16 day period in September.   No bleeding since that time . Prior menses in June lasted rwo weeks also.U    Family History   Problem Relation Age of Onset     Diabetes Mother      Diabetes Father      Hypertension Father      Diabetes Sister      Cancer Paternal Grandfather        Past Medical History:   Diagnosis Date     Diabetes (H)      Heart murmur      HTN (hypertension)     No cardiologist     Hyperlipidaemia LDL goal <100      Mild persistent asthma 7/29/2014     Nummular eczema                                           Past Surgical History:   Procedure Laterality Date     CYSTECTOMY OVARIAN BENIGN Left 2/23/2016    Procedure: CYSTECTOMY OVARIAN BENIGN;  Surgeon: Marisela Lopez MD;  Location: [facility] OR     CYSTOSCOPY N/A 1/3/2018    Procedure: CYSTOSCOPY;;  Surgeon: Lissy Maya DO;  Location: [facility] OR     DILATE CERVIX, HYSTEROSCOPY, ABLATE ENDOMETRIUM, COMBINED N/A 1/3/2018    Procedure: COMBINED DILATE CERVIX, HYSTEROSCOPY, ABLATE ENDOMETRIUM;  Hysteroscopy, cervical dilation, and cystoscopy;  Surgeon: Lissy Maya DO;  Location: [facility] OR     NONE OF THE ABOVE CORE MEASURE DIAGNOSES       SALPINGO-OOPHORECTOMY, COMBINED Left 2/23/2016    Procedure: COMBINED SALPINGO-OOPHORECTOMY;  Surgeon: Marisela Lopez MD;  Location: [facility] OR       Current Outpatient Prescriptions   Medication     albuterol (PROAIR HFA) 108 (90 Base) MCG/ACT Inhaler     atorvastatin (LIPITOR) 10 MG tablet     beclomethasone HFA (QVAR REDIHALER) 80 MCG/ACT inhaler     blood glucose monitoring (ONE TOUCH ULTRA 2) meter device kit     blood glucose monitoring (ONE TOUCH ULTRA) test strip     blood glucose test strip     cetirizine-psuedoePHEDrine (ZYRTEC-D) 5-120 MG per tablet     fluticasone (FLONASE) 50 MCG/ACT nasal spray     ibuprofen  "(ADVIL/MOTRIN) 800 MG tablet     ipratropium - albuterol 0.5 mg/2.5 mg/3 mL (DUONEB) 0.5-2.5 (3) MG/3ML neb solution     JOLIVETTE 0.35 MG per tablet     LANCETS ULTRA FINE MISC     lisinopril (PRINIVIL/ZESTRIL) 2.5 MG tablet     medroxyPROGESTERone (DEPO-PROVERA) 150 MG/ML injection     metFORMIN (GLUCOPHAGE) 500 MG tablet     montelukast (SINGULAIR) 10 MG tablet     nystatin (MYCOSTATIN) ointment     omeprazole 20 MG tablet     order for DME     order for DME     order for DME     triamcinolone (KENALOG) 0.1 % ointment     No current facility-administered medications for this visit.          Allergies   Allergen Reactions     Seasonal Allergies                                                    Social History   Substance Use Topics     Smoking status: Never Smoker     Smokeless tobacco: Never Used     Alcohol use 0.0 oz/week     0 Standard drinks or equivalent per week      Comment: 1-2 drinks yearly                      Review of Systems    CONSTITUTIONAL:NEGATIVE  EYES: NEGATIVE  ENT/MOUTH: NEGATIVE  RESP: NEGATIVE  CV: NEGATIVE  GI: NEGATIVE  : NEGATIVE  MUSCULOSKELATAL: NEGATIVE  INTEGUMENTARY/SKIN: NEGATIVE  BREAST: NEGATIVE  NEURO: NEGATIVE.      OBJECTIVE:  /60 (BP Location: Left arm, Patient Position: Chair, Cuff Size: Adult Large)  Ht 5' 4\" (1.626 m)  Wt 298 lb (135.2 kg)  LMP 08/16/2018 (Exact Date)  BMI 51.15 kg/m2  General appearance: Healthy. No distress  Skin: Normal.  Mental Status: cooperative, normal affect, no gross thought process defects.  Extremities: Normal             ASSESSMENT:  Menorrhagia, refractory to previous management. Cervical stenosis.    PLAN:    1) 1) I discussed treatment such as hormonal methods ,including the Mirena IUD,  oral contraception and depoprovera. Surgical management: Hysteroscopy , D&C, Endometrial ablation (novasure) and Total Laparoscopic hysterectomy ,  Laparoscopic Suprecervical hysterectomy.  Patient most interersted in hysterectomy. Discussed " indication, and risks of surgery, such as infection , bleeding, damage to bowel, ureter or bladder. Discussed conservation vs. removal of ovaries and resultant surgical menopause. Will obtain pelvic ultrasound and base further management on the results. Total time spent was 25 minutes. 25 minutes of face to face time spent counseling and or coordination of care regarding above .

## 2018-11-01 DIAGNOSIS — E11.9 TYPE 2 DIABETES MELLITUS WITHOUT COMPLICATION, WITHOUT LONG-TERM CURRENT USE OF INSULIN (H): ICD-10-CM

## 2018-11-01 NOTE — LETTER
[facility]  [address]  [facility] [address]  Phone: [phone]        November 6, 2018      Coni Newberry                                                                                                                     [address]  [facility] [address]            Dear Ms. Newberry,    We are concerned about your health care.  We recently provided you with a medication refill.  Many medications require routine follow-up with your Doctor.      At this time we ask that: you schedule a fasting lab appointment and an office visit 1-2 days later for your 6 month diabetes follow up.    Your prescription: Has been refilled for 1 month so you may have time for the above noted follow-up.      Thank you,      Internal Medicine Staff

## 2018-11-01 NOTE — TELEPHONE ENCOUNTER
Requested Prescriptions   Pending Prescriptions Disp Refills     metFORMIN (GLUCOPHAGE) 500 MG tablet [Pharmacy Med Name: METFORMIN 500MG TAB] 360 tablet 1    Last Written Prescription Date:  04/27/2018  Last Fill Quantity: 360,  # refills: 1   Last office visit: 4/27/2018 with prescribing provider:     Future Office Visit:   Next 5 appointments (look out 90 days)     Dec 03, 2018  6:00 PM CST   SHORT with Edmund Barajas MD   [facility] ([facility])    [address]  [address]  [facility] [address]   [phone]            Dec 13, 2018  3:30 PM CST   Nurse Only with RI OB NURSE   [facility] ([facility])    [address]  [facility] [address]   [phone]                Sig: TAKE 2 TABLETS BY MOUTH TWICE DAILY WITH MEALS    Biguanide Agents Failed    11/1/2018  5:51 AM       Failed - Patient has documented LDL within the past 12 mos.    Recent Labs   Lab Test  07/19/17   0846   LDL  62            Failed - Patient has had a Microalbumin in the past 15 mos.    Recent Labs   Lab Test  03/03/17   1140   MICROL  24   UMALCR  12.24            Failed - Patient has documented A1c within the specified period of time.    If HgbA1C is 8 or greater, it needs to be on file within the past 3 months.  If less than 8, must be on file within the past 6 months.     Recent Labs   Lab Test  12/29/17   1334   A1C  6.0            Passed - Blood pressure less than 140/90 in past 6 months    BP Readings from Last 3 Encounters:   10/29/18 124/60   09/27/18 128/86   07/09/18 124/86                Passed - Patient is age 10 or older       Passed - Patient's CR is NOT>1.4 OR Patient's EGFR is NOT<45 within past 12 mos.    Recent Labs   Lab Test  12/29/17   1334   GFRESTIMATED  >90   GFRESTBLACK  >90       Recent Labs   Lab Test  12/29/17   1334   CR  0.59            Passed - Patient does NOT have a diagnosis of CHF.       Passed - Patient is not  "pregnant       Passed - Patient has not had a positive pregnancy test within the past 12 mos.        Passed - Recent (6 mo) or future (30 days) visit within the authorizing provider's specialty    Patient had office visit in the last 6 months or has a visit in the next 30 days with authorizing provider or within the authorizing provider's specialty.  See \"Patient Info\" tab in inbasket, or \"Choose Columns\" in Meds & Orders section of the refill encounter.            "

## 2018-11-06 NOTE — TELEPHONE ENCOUNTER
Medication is being filled for 1 time refill only due to:  Patient is due for fasting labs and an office visit 1-2 days later for 6 month diabetes check.  Letter also sent.  DEBBIE Villalobos R.N.

## 2018-11-15 ENCOUNTER — OFFICE VISIT (OUTPATIENT)
Dept: INTERNAL MEDICINE | Facility: CLINIC | Age: 43
End: 2018-11-15
Payer: COMMERCIAL

## 2018-11-15 VITALS
SYSTOLIC BLOOD PRESSURE: 132 MMHG | DIASTOLIC BLOOD PRESSURE: 68 MMHG | HEIGHT: 64 IN | OXYGEN SATURATION: 100 % | HEART RATE: 94 BPM | BODY MASS INDEX: 50.02 KG/M2 | RESPIRATION RATE: 20 BRPM | TEMPERATURE: 97.8 F | WEIGHT: 293 LBS

## 2018-11-15 DIAGNOSIS — E11.9 TYPE 2 DIABETES MELLITUS WITHOUT COMPLICATION, WITHOUT LONG-TERM CURRENT USE OF INSULIN (H): Primary | ICD-10-CM

## 2018-11-15 DIAGNOSIS — J45.30 MILD PERSISTENT ASTHMA WITHOUT COMPLICATION: ICD-10-CM

## 2018-11-15 DIAGNOSIS — E66.01 OBESITY, CLASS III, BMI 40-49.9 (MORBID OBESITY) (H): ICD-10-CM

## 2018-11-15 DIAGNOSIS — I10 ESSENTIAL HYPERTENSION WITH GOAL BLOOD PRESSURE LESS THAN 140/90: ICD-10-CM

## 2018-11-15 LAB — HBA1C MFR BLD: 6.7 % (ref 0–5.6)

## 2018-11-15 PROCEDURE — 82043 UR ALBUMIN QUANTITATIVE: CPT | Performed by: NURSE PRACTITIONER

## 2018-11-15 PROCEDURE — 80053 COMPREHEN METABOLIC PANEL: CPT | Performed by: NURSE PRACTITIONER

## 2018-11-15 PROCEDURE — 83036 HEMOGLOBIN GLYCOSYLATED A1C: CPT | Performed by: NURSE PRACTITIONER

## 2018-11-15 PROCEDURE — 36415 COLL VENOUS BLD VENIPUNCTURE: CPT | Performed by: NURSE PRACTITIONER

## 2018-11-15 PROCEDURE — 99214 OFFICE O/P EST MOD 30 MIN: CPT | Performed by: NURSE PRACTITIONER

## 2018-11-15 RX ORDER — IPRATROPIUM BROMIDE AND ALBUTEROL SULFATE 2.5; .5 MG/3ML; MG/3ML
SOLUTION RESPIRATORY (INHALATION)
Qty: 90 VIAL | Refills: 3 | Status: SHIPPED | OUTPATIENT
Start: 2018-11-15 | End: 2020-06-01

## 2018-11-15 RX ORDER — LISINOPRIL 2.5 MG/1
2.5 TABLET ORAL DAILY
Qty: 90 TABLET | Refills: 1 | Status: SHIPPED | OUTPATIENT
Start: 2018-11-15 | End: 2019-07-18

## 2018-11-15 RX ORDER — GLYBURIDE 5 MG/1
5 TABLET ORAL
Qty: 90 TABLET | Refills: 3 | Status: SHIPPED | OUTPATIENT
Start: 2018-11-15 | End: 2019-11-16

## 2018-11-15 NOTE — PROGRESS NOTES
".  SUBJECTIVE:   Coni Newberry is a 43 year old female who presents to clinic today for the following health issues:      Diabetes Follow-up    Patient is checking blood sugars: once daily.  Results are as follows:         am - 194    Diabetic concerns:  blood sugar frequently over 200     Symptoms of hypoglycemia (low blood sugar): none     Paresthesias (numbness or burning in feet) or sores: No     Date of last diabetic eye exam: x1 month ago    We will add glyburide daily     Lab today     BP Readings from Last 2 Encounters:   11/15/18 132/68   10/29/18 124/60     Hemoglobin A1C (%)   Date Value   12/29/2017 6.0   07/19/2017 5.9     LDL Cholesterol Calculated (mg/dL)   Date Value   07/19/2017 62   03/03/2017 66       Diabetes Management Resources    Amount of exercise or physical activity: None    Problems taking medications regularly: No    Medication side effects: none    Diet: regular (no restrictions)    Asthma not well controlled with weather   Worse with cold weather   Using albuterol more often   Qvar working   Does not want to change   On singulair also   ACT 14    Seeing GYN for periods   On BCP and shot to maintain periods stoppage - otherwise was bleeding most of the month     Working at [employer] as      Problem list and histories reviewed & adjusted, as indicated.  Additional history: as documented        Reviewed and updated as needed this visit by clinical staff  Tobacco  Allergies  Meds  Med Hx  Surg Hx  Fam Hx  Soc Hx      Reviewed and updated as needed this visit by Provider         ROS:  Constitutional, HEENT, cardiovascular, pulmonary, gi and gu systems are negative, except as otherwise noted.    OBJECTIVE:     /68  Pulse 94  Temp 97.8  F (36.6  C) (Oral)  Resp 20  Ht 5' 4\" (1.626 m)  Wt 303 lb (137.4 kg)  SpO2 100%  BMI 52.01 kg/m2  Body mass index is 52.01 kg/(m^2).  GENERAL: alert and no distress  RESP: lungs clear to auscultation - no rales, rhonchi or " wheezes  CV: regular rate and rhythm  ABDOMEN: soft, nontender, exam limited by body habitus and bowel sounds normal  MS: no gross musculoskeletal defects noted, no edema  NEURO: Normal strength and tone, mentation intact and speech normal  PSYCH: mentation appears normal, affect normal/bright    Diagnostic Test Results:  Lab   ASSESSMENT/PLAN:             1. Type 2 diabetes mellitus without complication, without long-term current use of insulin (H)  Not well controlled per patient - does not want to do insulin if possible   Will add glyburide   - lisinopril (PRINIVIL/ZESTRIL) 2.5 MG tablet; Take 1 tablet (2.5 mg) by mouth daily  Dispense: 90 tablet; Refill: 1  - glyBURIDE (DIABETA /MICRONASE) 5 MG tablet; Take 1 tablet (5 mg) by mouth daily (with breakfast)  Dispense: 90 tablet; Refill: 3  - Albumin Random Urine Quantitative with Creat Ratio  - Hemoglobin A1c  - Comprehensive metabolic panel    2. Mild persistent asthma without complication  Not well controlled with cold weather- does not want to change regimen   - ipratropium - albuterol 0.5 mg/2.5 mg/3 mL (DUONEB) 0.5-2.5 (3) MG/3ML neb solution; USE ONE AMPULE IN NEBULIZER EVERY 6 HOURS AS NEEDED FOR  SHORTNESS  OF  BREATH/DYSPNEA  OR  WHEEZING  Dispense: 90 vial; Refill: 3      3. Essential hypertension with goal blood pressure less than 140/90  In good range and tolerating med   - lisinopril (PRINIVIL/ZESTRIL) 2.5 MG tablet; Take 1 tablet (2.5 mg) by mouth daily  Dispense: 90 tablet; Refill: 1  - Albumin Random Urine Quantitative with Creat Ratio  - Hemoglobin A1c  - Comprehensive metabolic panel    5. Obesity, Class III, BMI 40-49.9 (morbid obesity) (H)  Discussed - trying to go to club       Patient Instructions   Lab [address]    Glyburide once daily in addition to metformin       DANYELL Spencer [facility]

## 2018-11-15 NOTE — NURSING NOTE
"Chief Complaint   Patient presents with     Diabetes     initial /68  Pulse 94  Temp 97.8  F (36.6  C) (Oral)  Resp 20  Ht 5' 4\" (1.626 m)  Wt 303 lb (137.4 kg)  SpO2 100%  BMI 52.01 kg/m2 Estimated body mass index is 52.01 kg/(m^2) as calculated from the following:    Height as of this encounter: 5' 4\" (1.626 m).    Weight as of this encounter: 303 lb (137.4 kg)..  bp completed using cuff size large  saida BAUTISTA LPN  "

## 2018-11-15 NOTE — MR AVS SNAPSHOT
After Visit Summary   11/15/2018    Coni Newberry    MRN: [id number]           Patient Information     Date Of Birth          1975        Visit Information        Provider Department      11/15/2018 5:20 PM Farida Palencia APRN CNP [facility]        Today's Diagnoses     Type 2 diabetes mellitus without complication, without long-term current use of insulin (H)    -  1    Mild persistent asthma without complication        Encounter for surveillance of contraceptive pills        Essential hypertension with goal blood pressure less than 140/90        Obesity, Class III, BMI 40-49.9 (morbid obesity) (H)          Care Instructions    Lab [address]    Glyburide once daily in addition to metformin           Follow-ups after your visit        Your next 10 appointments already scheduled     Nov 26, 2018  5:30 PM CST   US PELVIC COMPLETE W TRANSVAGINAL with RIUS1   [facility] ([facility])    [address]  [address]  [facility] [id number] [phone]           How do I prepare for my exam? (Food and drink instructions) Adults: Drink four 8-ounce glasses of fluid an hour before your exam. If you need to empty your bladder before your exam, try to release only a little urine. Then, drink another glass of fluid.  Children: * Children who are potty trained up to 6 years old should drink at least 2 cups (16 oz) of water/non-carbonated beverage 30 minutes prior to the exam. * Children who are 6-10 years should drink at least 3 cups (24 oz) of water/non-carbonated beverage 45 minutes prior to the exam. * Children who are 10 years or older should drink at least 4 cups (32 oz) of water/non-carbonated beverage 45 minutes prior to the exam.  If your child is very uncomfortable or has an urgent need to pee, please notify a technologist; they will try to find out how much longer the wait may be and provide instructions to help relieve the  pressure.  What should I wear: Wear comfortable clothes.  How long does the exam take: Most ultrasounds take 30 to 60 minutes.  What should I bring: Bring a list of your medicines, including vitamins, minerals and over-the-counter drugs. It is safest to leave personal items at home.  Do I need a :  No  is needed.  What do I need to tell my doctor: Tell your doctor about any allergies you may have.  What should I do after the exam: No restrictions, You may resume normal activities.  What is this test: An ultrasound uses sound waves to make pictures of the body. Sound waves do not cause pain. The only discomfort may be the pressure of the wand against your skin or full bladder.  Who should I call with questions: If you have any questions, please call the Imaging Department where you will have your exam. Directions, parking instructions, and other information is available on our website, [email or web address].            Dec 03, 2018  6:00 PM CST   SHORT with Edmund Barajas MD   [facility] ([facility]    [address]  [address]  [facility] [id number] [phone]            Dec 13, 2018  3:30 PM CST   Nurse Only with RI OB NURSE   [facility] ([facility])    [address]  [facility] [id number] [phone]              Who to contact     If you have questions or need follow up information about today's clinic visit or your schedule please contact [facility] directly at [phone].  Normal or non-critical lab and imaging results will be communicated to you by MyChart, letter or phone within 4 business days after the clinic has received the results. If you do not hear from us within 7 days, please contact the clinic through "[employer]hart or phone. If you have a critical or abnormal lab result, we will notify you by phone as soon as possible.  Submit refill requests through "[employer]hart or call your  "pharmacy and they will forward the refill request to us. Please allow 3 business days for your refill to be completed.          Additional Information About Your Visit        [place] Everywherehart Information     [employer] gives you secure access to your electronic health record. If you see a primary care provider, you can also send messages to your care team and make appointments. If you have questions, please call your primary care clinic.  If you do not have a primary care provider, please call [phone] and they will assist you.        Care EveryWhere ID     This is your Care EveryWhere ID. This could be used by other organizations to access your [facility] medical records  [id number]        Your Vitals Were     Pulse Temperature Respirations Height Pulse Oximetry BMI (Body Mass Index)    94 97.8  F (36.6  C) (Oral) 20 5' 4\" (1.626 m) 100% 52.01 kg/m2       Blood Pressure from Last 3 Encounters:   11/15/18 132/68   10/29/18 124/60   09/27/18 128/86    Weight from Last 3 Encounters:   11/15/18 303 lb (137.4 kg)   10/29/18 298 lb (135.2 kg)   09/27/18 296 lb 6.4 oz (134.4 kg)              We Performed the Following     Albumin Random Urine Quantitative with Creat Ratio     Comprehensive metabolic panel     Hemoglobin A1c          Today's Medication Changes          These changes are accurate as of 11/15/18  5:48 PM.  If you have any questions, ask your nurse or doctor.               Start taking these medicines.        Dose/Directions    glyBURIDE 5 MG tablet   Commonly known as:  DIABETA /MICRONASE   Used for:  Type 2 diabetes mellitus without complication, without long-term current use of insulin (H)   Started by:  Farida Palencia APRN CNP        Dose:  5 mg   Take 1 tablet (5 mg) by mouth daily (with breakfast)   Quantity:  90 tablet   Refills:  3            Where to get your medicines      These medications were sent to [facility] [address] [place] [address] [facility]  [address] [facility] " [place] [address]     Phone:  [phone]     glyBURIDE 5 MG tablet    ipratropium - albuterol 0.5 mg/2.5 mg/3 mL 0.5-2.5 (3) MG/3ML neb solution    lisinopril 2.5 MG tablet                Primary Care Provider Office Phone # Fax #    Farida Palencia, APRN [facility] [phone]       [address]  [facility] [address]        Equal Access to Services     SARAH BETH SALMON : Hadii aad ku hadasho Soomaali, waaxda luqadaha, qaybta kaalmada adeegyada, waxay idiin hayaan adeeg kharash la'juliana . So [facility] [phone].    ATENCIÓN: Si habla español, tiene a remy disposición servicios gratuitos de asistencia lingüística. [facility] [phone].    We comply with applicable federal civil rights laws and [place] laws. We do not discriminate on the basis of race, color, national origin, age, disability, sex, sexual orientation, or gender identity.            Thank you!     Thank you for choosing [facility]  for your care. Our goal is always to provide you with excellent care. Hearing back from our patients is one way we can continue to improve our services. Please take a few minutes to complete the written survey that you may receive in the mail after your visit with us. Thank you!             Your Updated Medication List - Protect others around you: Learn how to safely use, store and throw away your medicines at www.disposemymeds.org.          This list is accurate as of 11/15/18  5:48 PM.  Always use your most recent med list.                   Brand Name Dispense Instructions for use Diagnosis    albuterol 108 (90 Base) MCG/ACT inhaler    PROAIR HFA    1 Inhaler    Inhale 2 puffs into the lungs every 6 hours    Moderate persistent asthma without complication       atorvastatin 10 MG tablet    LIPITOR    90 tablet    Take 1 tablet (10 mg) by mouth daily    Hyperlipidemia with target LDL less than 70       beclomethasone HFA 80 MCG/ACT inhaler    QVAR REDIHALER    1 Inhaler    Inhale 2 puffs into the lungs 2  times daily    Mild persistent asthma without complication       blood glucose monitoring meter device kit     1 kit    Use to test blood sugars 1 times daily or as directed.    DM type 2, goal A1C below 8.0       * blood glucose monitoring test strip    no brand specified    600 strip    Use to test blood sugar 6 times daily or as directed.    Diabetes mellitus, new onset (H)       * blood glucose monitoring test strip    ONETOUCH ULTRA    3 Box    Use to test blood sugars 1 times daily or as directed.    DM type 2, goal A1C below 8.0       cetirizine-pseudoePHEDrine 5-120 MG per 12 hr tablet    zyrTEC-D     Take 1 tablet by mouth daily Reported on 3/31/2017        fluticasone 50 MCG/ACT spray    FLONASE    3 Package    Spray 2 sprays into both nostrils daily    Asthma       glyBURIDE 5 MG tablet    DIABETA /MICRONASE    90 tablet    Take 1 tablet (5 mg) by mouth daily (with breakfast)    Type 2 diabetes mellitus without complication, without long-term current use of insulin (H)       ibuprofen 800 MG tablet    ADVIL/MOTRIN    30 tablet    Take 1 tablet (800 mg) by mouth every 6 hours as needed for moderate pain    Post-operative pain       ipratropium - albuterol 0.5 mg/2.5 mg/3 mL 0.5-2.5 (3) MG/3ML neb solution    DUONEB    90 vial    USE ONE AMPULE IN NEBULIZER EVERY 6 HOURS AS NEEDED FOR  SHORTNESS  OF  BREATH/DYSPNEA  OR  WHEEZING    Mild persistent asthma without complication       JOLIVETTE 0.35 MG per tablet   Generic drug:  norethindrone     84 tablet    TAKE 1 TABLET (0.35 MG) BY MOUTH DAILY    Encounter for surveillance of contraceptive pills       LANCETS ULTRA FINE Misc     300 each    1 each 6 times daily    Diabetes mellitus, new onset (H)       lisinopril 2.5 MG tablet    PRINIVIL/Zestril    90 tablet    Take 1 tablet (2.5 mg) by mouth daily    Essential hypertension with goal blood pressure less than 140/90, Type 2 diabetes mellitus without complication, without long-term current use of insulin  (H)       medroxyPROGESTERone 150 MG/ML injection    DEPO-PROVERA    1 mL    Inject 1 mL (150 mg) into the muscle every 3 months    DUB (dysfunctional uterine bleeding)       metFORMIN 500 MG tablet    GLUCOPHAGE    360 tablet    TAKE 2 TABLETS BY MOUTH TWICE DAILY WITH MEALS    Type 2 diabetes mellitus without complication, without long-term current use of insulin (H)       montelukast 10 MG tablet    SINGULAIR    90 tablet    TAKE ONE TABLET BY MOUTH AT BEDTIME    Mild persistent asthma without complication       nystatin ointment    MYCOSTATIN    60 g    Apply topically as needed    Yeast infection of the skin       omeprazole 20 MG tablet     90 tablet    Take 1 tablet (20 mg) by mouth daily Take 30-60 minutes before a meal.    Abdominal pain, epigastric       order for DME     2 Device    Equipment being ordered: neb tubing and cup    Mild persistent asthma without complication       order for DME     1 Device    Equipment being ordered: tennis elbow brace    Medial epicondylitis of elbow, left       order for DME     1 Device    Equipment being ordered: Nebulizer and tubing    Mild persistent asthma without complication       triamcinolone 0.1 % ointment    KENALOG    90 g    Apply sparingly to affected area three times daily AS NEEDED FOR ECZEMA RASH    Nummular eczema       * Notice:  This list has 2 medication(s) that are the same as other medications prescribed for you. Read the directions carefully, and ask your doctor or other care provider to review them with you.

## 2018-11-16 LAB
ALBUMIN SERPL-MCNC: 4 G/DL (ref 3.4–5)
ALP SERPL-CCNC: 93 U/L (ref 40–150)
ALT SERPL W P-5'-P-CCNC: 21 U/L (ref 0–50)
ANION GAP SERPL CALCULATED.3IONS-SCNC: 10 MMOL/L (ref 3–14)
AST SERPL W P-5'-P-CCNC: 13 U/L (ref 0–45)
BILIRUB SERPL-MCNC: 0.4 MG/DL (ref 0.2–1.3)
BUN SERPL-MCNC: 18 MG/DL (ref 7–30)
CALCIUM SERPL-MCNC: 8.6 MG/DL (ref 8.5–10.1)
CHLORIDE SERPL-SCNC: 107 MMOL/L (ref 94–109)
CO2 SERPL-SCNC: 23 MMOL/L (ref 20–32)
CREAT SERPL-MCNC: 0.78 MG/DL (ref 0.52–1.04)
CREAT UR-MCNC: 392 MG/DL
GFR SERPL CREATININE-BSD FRML MDRD: 80 ML/MIN/1.7M2
GLUCOSE SERPL-MCNC: 113 MG/DL (ref 70–99)
MICROALBUMIN UR-MCNC: 66 MG/L
MICROALBUMIN/CREAT UR: 16.73 MG/G CR (ref 0–25)
POTASSIUM SERPL-SCNC: 3.7 MMOL/L (ref 3.4–5.3)
PROT SERPL-MCNC: 7.8 G/DL (ref 6.8–8.8)
SODIUM SERPL-SCNC: 140 MMOL/L (ref 133–144)

## 2018-11-16 ASSESSMENT — ASTHMA QUESTIONNAIRES: ACT_TOTALSCORE: 14

## 2018-12-13 ENCOUNTER — ALLIED HEALTH/NURSE VISIT (OUTPATIENT)
Dept: NURSING | Facility: CLINIC | Age: 43
End: 2018-12-13
Payer: COMMERCIAL

## 2018-12-13 VITALS — BODY MASS INDEX: 52.87 KG/M2 | DIASTOLIC BLOOD PRESSURE: 84 MMHG | SYSTOLIC BLOOD PRESSURE: 132 MMHG | WEIGHT: 293 LBS

## 2018-12-13 DIAGNOSIS — Z30.42 ENCOUNTER FOR SURVEILLANCE OF INJECTABLE CONTRACEPTIVE: Primary | ICD-10-CM

## 2018-12-13 PROCEDURE — 99207 ZZC NO CHARGE NURSE ONLY: CPT

## 2018-12-13 PROCEDURE — 96372 THER/PROPH/DIAG INJ SC/IM: CPT

## 2018-12-13 RX ORDER — MEDROXYPROGESTERONE ACETATE 150 MG/ML
150 INJECTION, SUSPENSION INTRAMUSCULAR
Status: DISCONTINUED | OUTPATIENT
Start: 2018-12-13 | End: 2019-10-16

## 2018-12-13 RX ADMIN — MEDROXYPROGESTERONE ACETATE 150 MG: 150 INJECTION, SUSPENSION INTRAMUSCULAR at 16:40

## 2018-12-13 NOTE — NURSING NOTE
Prior to injection, verified patient identity using patient's name and date of birth.  Due to injection administration, patient instructed to remain in clinic for 15 minutes  afterwards, and to report any adverse reaction to me immediately.    BP: 132/84    LAST PAP/EXAM:   Lab Results   Component Value Date    PAP NIL 02/18/2015     URINE HCG:not indicated    NEXT INJECTION DUE: 2/28/19 - 3/14/19       Drug Amount Wasted:  None.  Vial/Syringe: Single dose vial   Lot: [id number]  Exp:07/2020  Gertrude Davis LPN

## 2019-01-19 DIAGNOSIS — Z30.41 ENCOUNTER FOR SURVEILLANCE OF CONTRACEPTIVE PILLS: ICD-10-CM

## 2019-01-21 NOTE — TELEPHONE ENCOUNTER
"Requested Prescriptions   Pending Prescriptions Disp Refills     norethindrone (MICRONOR) 0.35 MG tablet [Pharmacy Med Name: NORETHINDRONE 0.35 MG TABLET] 84 tablet 1    Last Written Prescription Date:  Not on meds list  Last Fill Quantity: n/a,  # refills: n/a   Last office visit: 11/15/2018 with prescribing provider:     Future Office Visit:   Next 5 appointments (look out 90 days)    Feb 11, 2019  5:30 PM CST  SHORT with Edmund Barajas MD  [facility]) [address]  [facility] [address]  [facility] [address]  [phone]   Mar 01, 2019  3:30 PM CST  Nurse Only with RI OB NURSE  [facility]) [address] [facility] [address]  [phone]        Sig: TAKE 1 TABLET (0.35 MG) BY MOUTH DAILY    Contraceptives Protocol Passed - 1/19/2019  9:29 AM       Passed - Patient is not a current smoker if age is 35 or older       Passed - Recent (12 mo) or future (30 days) visit within the authorizing provider's specialty    Patient had office visit in the last 12 months or has a visit in the next 30 days with authorizing provider or within the authorizing provider's specialty.  See \"Patient Info\" tab in inbasket, or \"Choose Columns\" in Meds & Orders section of the refill encounter.             Passed - Medication is active on med list       Passed - No active pregnancy on record       Passed - No positive pregnancy test in past 12 months        "

## 2019-01-22 RX ORDER — ACETAMINOPHEN AND CODEINE PHOSPHATE 120; 12 MG/5ML; MG/5ML
SOLUTION ORAL
Qty: 84 TABLET | Refills: 1 | Status: SHIPPED | OUTPATIENT
Start: 2019-01-22 | End: 2019-06-27

## 2019-01-22 NOTE — TELEPHONE ENCOUNTER
Routing refill request to provider for review/approval because:  Drug not active on patient's medication list: different medication is on med list.    MADDY LyleN, RN  [employer] [facility]

## 2019-02-02 DIAGNOSIS — E11.9 TYPE 2 DIABETES MELLITUS WITHOUT COMPLICATION, WITHOUT LONG-TERM CURRENT USE OF INSULIN (H): ICD-10-CM

## 2019-02-04 ENCOUNTER — ANCILLARY PROCEDURE (OUTPATIENT)
Dept: ULTRASOUND IMAGING | Facility: CLINIC | Age: 44
End: 2019-02-04
Attending: OBSTETRICS & GYNECOLOGY
Payer: COMMERCIAL

## 2019-02-04 DIAGNOSIS — N93.9 ABNORMAL UTERINE BLEEDING (AUB): ICD-10-CM

## 2019-02-04 PROCEDURE — 76830 TRANSVAGINAL US NON-OB: CPT | Performed by: OBSTETRICS & GYNECOLOGY

## 2019-02-04 PROCEDURE — 76856 US EXAM PELVIC COMPLETE: CPT | Performed by: OBSTETRICS & GYNECOLOGY

## 2019-02-04 NOTE — TELEPHONE ENCOUNTER
Requested Prescriptions   Pending Prescriptions Disp Refills     metFORMIN (GLUCOPHAGE) 500 MG tablet [Pharmacy Med Name: METFORMIN 500MG TAB] 360 tablet 0    Last Written Prescription Date:  11/06/2018  Last Fill Quantity: 360,  # refills: 0   Last office visit: 11/15/2018 with prescribing provider:     Future Office Visit:   Next 5 appointments (look out 90 days)    Feb 11, 2019  5:30 PM CST  SHORT with Edmund [facility]ajas MD  [facility] [address]  [address]  [facility] [address]  [phone]   Mar 01, 2019  3:30 PM CST  Nurse Only with RI OB NURSE  [facility] ([facility]) [address]  [facility] [address]  [phone]        Sig: TAKE 2 TABLETS BY MOUTH TWICE DAILY WITH MEALS    Biguanide Agents Failed - 2/2/2019  5:30 AM       Failed - Patient has documented LDL within the past 12 mos.    Recent Labs   Lab Test 07/19/17  0846   LDL 62            Passed - Blood pressure less than 140/90 in past 6 months    BP Readings from Last 3 Encounters:   12/13/18 132/84   11/15/18 132/68   10/29/18 124/60                Passed - Patient has had a Microalbumin in the past 15 mos.    Recent Labs   Lab Test 11/15/18  1754   MICROL 66   UMALCR 16.73            Passed - Patient is age 10 or older       Passed - Patient has documented A1c within the specified period of time.    If HgbA1C is 8 or greater, it needs to be on file within the past 3 months.  If less than 8, must be on file within the past 6 months.     Recent Labs   Lab Test 11/15/18  1754   A1C 6.7*            Passed - Patient's CR is NOT>1.4 OR Patient's EGFR is NOT<45 within past 12 mos.    Recent Labs   Lab Test 11/15/18  1754   GFRESTIMATED 80   GFRESTBLACK >90       Recent Labs   Lab Test 11/15/18  1754   CR 0.78            Passed - Patient does NOT have a diagnosis of CHF.       Passed - Medication is active on med list       Passed - Patient is not  "pregnant       Passed - Patient has not had a positive pregnancy test within the past 12 mos.        Passed - Recent (6 mo) or future (30 days) visit within the authorizing provider's specialty    Patient had office visit in the last 6 months or has a visit in the next 30 days with authorizing provider or within the authorizing provider's specialty.  See \"Patient Info\" tab in inbasket, or \"Choose Columns\" in Meds & Orders section of the refill encounter.            "

## 2019-02-11 ENCOUNTER — OFFICE VISIT (OUTPATIENT)
Dept: OBGYN | Facility: CLINIC | Age: 44
End: 2019-02-11
Payer: COMMERCIAL

## 2019-02-11 VITALS — BODY MASS INDEX: 53.55 KG/M2 | WEIGHT: 293 LBS | DIASTOLIC BLOOD PRESSURE: 70 MMHG | SYSTOLIC BLOOD PRESSURE: 122 MMHG

## 2019-02-11 DIAGNOSIS — D25.1 INTRAMURAL LEIOMYOMA OF UTERUS: ICD-10-CM

## 2019-02-11 DIAGNOSIS — N93.9 ABNORMAL UTERINE BLEEDING (AUB): Primary | ICD-10-CM

## 2019-02-11 PROCEDURE — 99213 OFFICE O/P EST LOW 20 MIN: CPT | Performed by: OBSTETRICS & GYNECOLOGY

## 2019-02-11 NOTE — NURSING NOTE
"Chief Complaint   Patient presents with     Results     us        Initial /70   Wt 141.5 kg (312 lb)   BMI 53.55 kg/m   Estimated body mass index is 53.55 kg/m  as calculated from the following:    Height as of 11/15/18: 1.626 m (5' 4\").    Weight as of this encounter: 141.5 kg (312 lb).  BP completed using cuff size: regular    Questioned patient about current smoking habits.  Pt. has never smoked.          The following HM Due: NONE      The following patient reported/Care Every where data was sent to:  P ABSTRACT QUALITY INITIATIVES [90001]        Hanna Hawkins CMA                 "

## 2019-02-11 NOTE — PROGRESS NOTES
SUBJECTIVE:  Coni Newberry is a 43 year old, G0, using depoprovera to control abnormal uterine bleeding. She has had attempted Mirena placement x 2 , including OR. Cervical stenosis. Had 16 day period in September.   No bleeding since that time . Prior menses in June lasted rwo weeks also.  US noted :  Complete pelvic ultrasound utilizing both abdominal and vaginal transducers. Surgically absent left ovary. Multiple, small myomas, not affecting cavity of uterus. Small simple, right ovarian cyst.      Family History   Problem Relation Age of Onset     Diabetes Mother      Diabetes Father      Hypertension Father      Diabetes Sister      Cancer Paternal Grandfather        Past Medical History:   Diagnosis Date     Diabetes (H)      Heart murmur      HTN (hypertension)     No cardiologist     Hyperlipidaemia LDL goal <100      Mild persistent asthma 7/29/2014     Nummular eczema                                           Past Surgical History:   Procedure Laterality Date     CYSTECTOMY OVARIAN BENIGN Left 2/23/2016    Procedure: CYSTECTOMY OVARIAN BENIGN;  Surgeon: Marisela Lopez MD;  Location: [facility] OR     CYSTOSCOPY N/A 1/3/2018    Procedure: CYSTOSCOPY;;  Surgeon: Lissy Maya DO;  Location: [facility] OR     DILATE CERVIX, HYSTEROSCOPY, ABLATE ENDOMETRIUM, COMBINED N/A 1/3/2018    Procedure: COMBINED DILATE CERVIX, HYSTEROSCOPY, ABLATE ENDOMETRIUM;  Hysteroscopy, cervical dilation, and cystoscopy;  Surgeon: Lissy Maya DO;  Location: [facility] OR     NONE OF THE ABOVE CORE MEASURE DIAGNOSES       SALPINGO-OOPHORECTOMY, COMBINED Left 2/23/2016    Procedure: COMBINED SALPINGO-OOPHORECTOMY;  Surgeon: Marisela Lopez MD;  Location: [facility] OR       Current Outpatient Medications   Medication     albuterol (PROAIR HFA) 108 (90 Base) MCG/ACT Inhaler     atorvastatin (LIPITOR) 10 MG tablet     beclomethasone HFA (QVAR REDIHALER) 80 MCG/ACT inhaler     blood glucose monitoring (ONE TOUCH ULTRA 2) meter device kit     blood  glucose monitoring (ONE TOUCH ULTRA) test strip     blood glucose test strip     cetirizine-psuedoePHEDrine (ZYRTEC-D) 5-120 MG per tablet     fluticasone (FLONASE) 50 MCG/ACT nasal spray     glyBURIDE (DIABETA /MICRONASE) 5 MG tablet     ibuprofen (ADVIL/MOTRIN) 800 MG tablet     ipratropium - albuterol 0.5 mg/2.5 mg/3 mL (DUONEB) 0.5-2.5 (3) MG/3ML neb solution     LANCETS ULTRA FINE MISC     lisinopril (PRINIVIL/ZESTRIL) 2.5 MG tablet     medroxyPROGESTERone (DEPO-PROVERA) 150 MG/ML injection     metFORMIN (GLUCOPHAGE) 500 MG tablet     montelukast (SINGULAIR) 10 MG tablet     norethindrone (MICRONOR) 0.35 MG tablet     nystatin (MYCOSTATIN) ointment     omeprazole 20 MG tablet     order for DME     order for DME     order for DME     triamcinolone (KENALOG) 0.1 % ointment     Current Facility-Administered Medications   Medication     medroxyPROGESTERone (DEPO-PROVERA) injection 150 mg         Allergies   Allergen Reactions     Seasonal Allergies                                                    Social History     Tobacco Use     Smoking status: Never Smoker     Smokeless tobacco: Never Used   Substance Use Topics     Alcohol use: Yes     Alcohol/week: 0.0 oz     Comment: 1-2 drinks yearly                      Review of Systems    CONSTITUTIONAL:NEGATIVE  EYES: NEGATIVE  ENT/MOUTH: NEGATIVE  RESP: NEGATIVE  CV: NEGATIVE  GI: NEGATIVE  : NEGATIVE  MUSCULOSKELATAL: NEGATIVE  INTEGUMENTARY/SKIN: NEGATIVE  BREAST: NEGATIVE  NEURO: NEGATIVE.      OBJECTIVE:  /70   Wt 141.5 kg (312 lb)   BMI 53.55 kg/m    General appearance: Healthy. No distress  Skin: Normal.  Mental Status: cooperative, normal affect, no gross thought process defects.  Extremities: Normal             ASSESSMENT:  AUB. Myomas    PLAN:    1) No vaginal bleeding on Micronor and depoprovera. Continue present treatment. Total time spent was 20 minutes. 20 minutes of face to face time spent counseling and or coordination of care regarding above  .

## 2019-03-06 ENCOUNTER — ALLIED HEALTH/NURSE VISIT (OUTPATIENT)
Dept: NURSING | Facility: CLINIC | Age: 44
End: 2019-03-06
Payer: COMMERCIAL

## 2019-03-06 DIAGNOSIS — N93.9 ABNORMAL UTERINE BLEEDING (AUB): Primary | ICD-10-CM

## 2019-03-06 PROCEDURE — 99207 ZZC NO CHARGE NURSE ONLY: CPT

## 2019-03-06 PROCEDURE — 96372 THER/PROPH/DIAG INJ SC/IM: CPT

## 2019-03-06 RX ORDER — MEDROXYPROGESTERONE ACETATE 150 MG/ML
150 INJECTION, SUSPENSION INTRAMUSCULAR
Status: DISCONTINUED | OUTPATIENT
Start: 2019-03-06 | End: 2019-10-16

## 2019-03-06 RX ADMIN — MEDROXYPROGESTERONE ACETATE 150 MG: 150 INJECTION, SUSPENSION INTRAMUSCULAR at 16:30

## 2019-03-22 ENCOUNTER — OFFICE VISIT (OUTPATIENT)
Dept: URGENT CARE | Facility: URGENT CARE | Age: 44
End: 2019-03-22
Payer: COMMERCIAL

## 2019-03-22 ENCOUNTER — HOSPITAL ENCOUNTER (OUTPATIENT)
Dept: ULTRASOUND IMAGING | Facility: CLINIC | Age: 44
Discharge: HOME | End: 2019-03-22
Attending: PHYSICIAN ASSISTANT | Admitting: PHYSICIAN ASSISTANT
Payer: COMMERCIAL

## 2019-03-22 VITALS
OXYGEN SATURATION: 100 % | WEIGHT: 293 LBS | BODY MASS INDEX: 50.02 KG/M2 | TEMPERATURE: 98.9 F | DIASTOLIC BLOOD PRESSURE: 80 MMHG | HEART RATE: 80 BPM | RESPIRATION RATE: 20 BRPM | HEIGHT: 64 IN | SYSTOLIC BLOOD PRESSURE: 118 MMHG

## 2019-03-22 DIAGNOSIS — E11.8 TYPE 2 DIABETES MELLITUS WITH COMPLICATION, UNSPECIFIED WHETHER LONG TERM INSULIN USE: ICD-10-CM

## 2019-03-22 DIAGNOSIS — M54.31 SCIATICA OF RIGHT SIDE: Primary | ICD-10-CM

## 2019-03-22 DIAGNOSIS — M79.651 PAIN OF RIGHT THIGH: ICD-10-CM

## 2019-03-22 LAB
ALBUMIN SERPL-MCNC: 3.7 G/DL (ref 3.4–5)
ALP SERPL-CCNC: 99 U/L (ref 40–150)
ALT SERPL W P-5'-P-CCNC: 19 U/L (ref 0–50)
ANION GAP SERPL CALCULATED.3IONS-SCNC: 8 MMOL/L (ref 3–14)
AST SERPL W P-5'-P-CCNC: 15 U/L (ref 0–45)
BASOPHILS # BLD AUTO: 0 10E9/L (ref 0–0.2)
BASOPHILS NFR BLD AUTO: 0.4 %
BILIRUB SERPL-MCNC: 0.3 MG/DL (ref 0.2–1.3)
BUN SERPL-MCNC: 16 MG/DL (ref 7–30)
CALCIUM SERPL-MCNC: 8.8 MG/DL (ref 8.5–10.1)
CHLORIDE SERPL-SCNC: 110 MMOL/L (ref 94–109)
CO2 SERPL-SCNC: 23 MMOL/L (ref 20–32)
CREAT SERPL-MCNC: 0.74 MG/DL (ref 0.52–1.04)
D DIMER PPP FEU-MCNC: <0.3 UG/ML FEU (ref 0–0.5)
DIFFERENTIAL METHOD BLD: ABNORMAL
EOSINOPHIL # BLD AUTO: 0.2 10E9/L (ref 0–0.7)
EOSINOPHIL NFR BLD AUTO: 2.5 %
ERYTHROCYTE [DISTWIDTH] IN BLOOD BY AUTOMATED COUNT: 15.2 % (ref 10–15)
GFR SERPL CREATININE-BSD FRML MDRD: >90 ML/MIN/{1.73_M2}
GLUCOSE SERPL-MCNC: 93 MG/DL (ref 70–99)
HCT VFR BLD AUTO: 41.3 % (ref 35–47)
HGB BLD-MCNC: 13.4 G/DL (ref 11.7–15.7)
LYMPHOCYTES # BLD AUTO: 1.6 10E9/L (ref 0.8–5.3)
LYMPHOCYTES NFR BLD AUTO: 18.7 %
MCH RBC QN AUTO: 25.7 PG (ref 26.5–33)
MCHC RBC AUTO-ENTMCNC: 32.4 G/DL (ref 31.5–36.5)
MCV RBC AUTO: 79 FL (ref 78–100)
MONOCYTES # BLD AUTO: 0.6 10E9/L (ref 0–1.3)
MONOCYTES NFR BLD AUTO: 7 %
NEUTROPHILS # BLD AUTO: 6 10E9/L (ref 1.6–8.3)
NEUTROPHILS NFR BLD AUTO: 71.4 %
PLATELET # BLD AUTO: 205 10E9/L (ref 150–450)
POTASSIUM SERPL-SCNC: 3.6 MMOL/L (ref 3.4–5.3)
PROT SERPL-MCNC: 8 G/DL (ref 6.8–8.8)
RBC # BLD AUTO: 5.21 10E12/L (ref 3.8–5.2)
SODIUM SERPL-SCNC: 141 MMOL/L (ref 133–144)
WBC # BLD AUTO: 8.4 10E9/L (ref 4–11)

## 2019-03-22 PROCEDURE — 80053 COMPREHEN METABOLIC PANEL: CPT | Performed by: PHYSICIAN ASSISTANT

## 2019-03-22 PROCEDURE — 85379 FIBRIN DEGRADATION QUANT: CPT | Performed by: PHYSICIAN ASSISTANT

## 2019-03-22 PROCEDURE — 99214 OFFICE O/P EST MOD 30 MIN: CPT | Performed by: PHYSICIAN ASSISTANT

## 2019-03-22 PROCEDURE — 93971 EXTREMITY STUDY: CPT | Mod: RT

## 2019-03-22 PROCEDURE — 36415 COLL VENOUS BLD VENIPUNCTURE: CPT | Performed by: PHYSICIAN ASSISTANT

## 2019-03-22 PROCEDURE — 85025 COMPLETE CBC W/AUTO DIFF WBC: CPT | Performed by: PHYSICIAN ASSISTANT

## 2019-03-22 RX ORDER — CYCLOBENZAPRINE HCL 5 MG
5 TABLET ORAL 3 TIMES DAILY PRN
Qty: 15 TABLET | Refills: 0 | Status: SHIPPED | OUTPATIENT
Start: 2019-03-22 | End: 2019-07-18

## 2019-03-22 ASSESSMENT — MIFFLIN-ST. JEOR: SCORE: 2068.59

## 2019-03-22 NOTE — NURSING NOTE
Called in Cyclobenzaprine 5 mg prescription to [facility] in [place] [phone] per pt/provider request.  Will cancel rx at [facility] pharmacy.    Kurt Zarate

## 2019-03-22 NOTE — PROGRESS NOTES
SUBJECTIVE:   Coni Newberry is a 44 year old female presenting with a chief complaint of   Chief Complaint   Patient presents with     Leg Pain     cramping in right upper thigh last night, cramping lasted for 20 minutes, upper thight still feels tender and bruised, still feels sore        She is an established patient of [facility].    Coni Brar, a 44-year-old female presented to the urgent care for the evaluation of cramping pain in her right upper thigh and also in the right calf muscle that started last night.  She feels that the pain is cramping and constant in nature.  She denied any swelling or redness of her legs.  Patient denied any shortness of breath.  She denied any recent history of travel or prolonged sitting.  However she mentioned that she drove an hour each way this morning to drop her daughter off.  She mentioned that this is not very unusual for her and she has done this in the past as well.  Patient denied any recent history of air travel.  She denied any history of fall, trauma, or injury.  Patient is not on any blood thinners.  She describes the pain to be cramping and constant in nature.  Not radiating to her back.  She denied any difficulty with movement.  Patient has past medical history of diabetes, heart murmur, hypertension, hyperlipidemia, asthma and nummular eczema.  She denied any past history of DVT or pulmonary embolism    Review of Systems 10 point review of systems performed and is negative except as above and in HPI.      Past Medical History:   Diagnosis Date     Diabetes (H)      Heart murmur      HTN (hypertension)     No cardiologist     Hyperlipidaemia LDL goal <100      Mild persistent asthma 7/29/2014     Nummular eczema      Family History   Problem Relation Age of Onset     Diabetes Mother      Diabetes Father      Hypertension Father      Diabetes Sister      Cancer Paternal Grandfather      Current Outpatient Medications   Medication Sig Dispense Refill      albuterol (PROAIR HFA) 108 (90 Base) MCG/ACT Inhaler Inhale 2 puffs into the lungs every 6 hours 1 Inhaler 11     atorvastatin (LIPITOR) 10 MG tablet Take 1 tablet (10 mg) by mouth daily 90 tablet 3     beclomethasone HFA (QVAR REDIHALER) 80 MCG/ACT inhaler Inhale 2 puffs into the lungs 2 times daily 1 Inhaler 11     blood glucose monitoring (ONE TOUCH ULTRA 2) meter device kit Use to test blood sugars 1 times daily or as directed. 1 kit 0     blood glucose monitoring (ONE TOUCH ULTRA) test strip Use to test blood sugars 1 times daily or as directed. 3 Box 1     blood glucose test strip Use to test blood sugar 6 times daily or as directed. 600 strip 3     cetirizine-psuedoePHEDrine (ZYRTEC-D) 5-120 MG per tablet Take 1 tablet by mouth daily Reported on 3/31/2017       cyclobenzaprine (FLEXERIL) 5 MG tablet Take 1 tablet (5 mg) by mouth 3 times daily as needed for muscle spasms 15 tablet 0     fluticasone (FLONASE) 50 MCG/ACT nasal spray Spray 2 sprays into both nostrils daily 3 Package 3     glyBURIDE (DIABETA /MICRONASE) 5 MG tablet Take 1 tablet (5 mg) by mouth daily (with breakfast) 90 tablet 3     ibuprofen (ADVIL/MOTRIN) 800 MG tablet Take 1 tablet (800 mg) by mouth every 6 hours as needed for moderate pain 30 tablet 0     ipratropium - albuterol 0.5 mg/2.5 mg/3 mL (DUONEB) 0.5-2.5 (3) MG/3ML neb solution USE ONE AMPULE IN NEBULIZER EVERY 6 HOURS AS NEEDED FOR  SHORTNESS  OF  BREATH/DYSPNEA  OR  WHEEZING 90 vial 3     LANCETS ULTRA FINE MISC 1 each 6 times daily 300 each 2     lisinopril (PRINIVIL/ZESTRIL) 2.5 MG tablet Take 1 tablet (2.5 mg) by mouth daily 90 tablet 1     medroxyPROGESTERone (DEPO-PROVERA) 150 MG/ML injection Inject 1 mL (150 mg) into the muscle every 3 months 1 mL 0     metFORMIN (GLUCOPHAGE) 500 MG tablet TAKE 2 TABLETS BY MOUTH TWICE DAILY WITH MEALS 360 tablet 1     montelukast (SINGULAIR) 10 MG tablet TAKE ONE TABLET BY MOUTH AT BEDTIME 90 tablet 3     norethindrone (MICRONOR) 0.35 MG  "tablet TAKE 1 TABLET (0.35 MG) BY MOUTH DAILY 84 tablet 1     omeprazole 20 MG tablet Take 1 tablet (20 mg) by mouth daily Take 30-60 minutes before a meal. 90 tablet 3     order for DME Equipment being ordered: tennis elbow brace 1 Device 0     order for DME Equipment being ordered: Nebulizer and tubing 1 Device 0     order for DME Equipment being ordered: neb tubing and cup 2 Device prn     triamcinolone (KENALOG) 0.1 % ointment Apply sparingly to affected area three times daily AS NEEDED FOR ECZEMA RASH 90 g 3     nystatin (MYCOSTATIN) 612174 UNIT/GM external ointment APPLY  OINTMENT TOPICALLY AS NEEDED 30 g 1     Social History     Tobacco Use     Smoking status: Never Smoker     Smokeless tobacco: Never Used   Substance Use Topics     Alcohol use: Yes     Alcohol/week: 0.0 oz     Comment: 1-2 drinks yearly       OBJECTIVE  /80   Pulse 80   Temp 98.9  F (37.2  C)   Resp 20   Ht 1.613 m (5' 3.5\")   Wt 144.2 kg (317 lb 12.8 oz)   SpO2 100%   BMI 55.41 kg/m      Physical Exam   Constitutional: She is oriented to person, place, and time. She appears distressed.   Patient has high BMI   HENT:   Head: Normocephalic.   Right Ear: External ear normal.   Left Ear: External ear normal.   Eyes: Conjunctivae are normal. Pupils are equal, round, and reactive to light. Right eye exhibits no discharge. Left eye exhibits no discharge. No scleral icterus.   Neck: Normal range of motion.   Cardiovascular: Normal rate, regular rhythm and normal heart sounds.   Pulmonary/Chest: Effort normal and breath sounds normal. No stridor. No respiratory distress. She has no wheezes.   Abdominal: Soft. Bowel sounds are normal.   Musculoskeletal: Normal range of motion.   Leg raise on the right side re-created the pain on palpation right thigh right calf muscle and right leg did not show any signs of erythema or swelling.   Lymphadenopathy:     She has no cervical adenopathy.   Neurological: She is alert and oriented to person, " place, and time. She displays normal reflexes. No cranial nerve deficit. Coordination normal.   Skin: Skin is warm.   Psychiatric: Her mood appears anxious. Her affect is labile. Her speech is rapid and/or pressured.   Patient appeared very anxious, and worried about her diagnosis.       Labs:  No results found for this or any previous visit (from the past 24 hour(s)).  Results for orders placed or performed in visit on 03/22/19   US Lower Extremity Venous Duplex Right    Narrative    PROCEDURE:  Venous Doppler ultrasound of the right lower extremity    DATE OF PROCEDURE:  3/22/2019 2:53 PM    CLINICAL HISTORY/INDICATION:  Pain of right thigh    COMPARISON:  None relevant    TECHNIQUE:   Grayscale, color-flow, and spectral waveform analysis were performed  of the deep veins of the right lower extremity    FINDINGS:   The right common femoral vein, superficial femoral vein and popliteal  vein demonstrate normal compressibility, spectral waveform, color flow  and augmentation.    The right posterior tibial vein, peroneal vein, and greater saphenous  vein are compressible.    The contralateral left common femoral vein demonstrates normal  compressibility, spectral waveform, color flow and augmentation.      Impression    IMPRESSION:   No evidence of deep venous thrombosis in the right lower extremity    MIKHAIL AGUIRRE MD   CBC with platelets and differential   Result Value Ref Range    WBC 8.4 4.0 - 11.0 10e9/L    RBC Count 5.21 (H) 3.8 - 5.2 10e12/L    Hemoglobin 13.4 11.7 - 15.7 g/dL    Hematocrit 41.3 35.0 - 47.0 %    MCV 79 78 - 100 fl    MCH 25.7 (L) 26.5 - 33.0 pg    MCHC 32.4 31.5 - 36.5 g/dL    RDW 15.2 (H) 10.0 - 15.0 %    Platelet Count 205 150 - 450 10e9/L    % Neutrophils 71.4 %    % Lymphocytes 18.7 %    % Monocytes 7.0 %    % Eosinophils 2.5 %    % Basophils 0.4 %    Absolute Neutrophil 6.0 1.6 - 8.3 10e9/L    Absolute Lymphocytes 1.6 0.8 - 5.3 10e9/L    Absolute Monocytes 0.6 0.0 - 1.3 10e9/L     Absolute Eosinophils 0.2 0.0 - 0.7 10e9/L    Absolute Basophils 0.0 0.0 - 0.2 10e9/L    Diff Method Automated Method    D dimer, quantitative   Result Value Ref Range    D Dimer <0.3 0.0 - 0.50 ug/ml FEU   Comprehensive metabolic panel (BMP + Alb, Alk Phos, ALT, AST, Total. Bili, TP)   Result Value Ref Range    Sodium 141 133 - 144 mmol/L    Potassium 3.6 3.4 - 5.3 mmol/L    Chloride 110 (H) 94 - 109 mmol/L    Carbon Dioxide 23 20 - 32 mmol/L    Anion Gap 8 3 - 14 mmol/L    Glucose 93 70 - 99 mg/dL    Urea Nitrogen 16 7 - 30 mg/dL    Creatinine 0.74 0.52 - 1.04 mg/dL    GFR Estimate >90 >60 mL/min/[1.73_m2]    GFR Estimate If Black >90 >60 mL/min/[1.73_m2]    Calcium 8.8 8.5 - 10.1 mg/dL    Bilirubin Total 0.3 0.2 - 1.3 mg/dL    Albumin 3.7 3.4 - 5.0 g/dL    Protein Total 8.0 6.8 - 8.8 g/dL    Alkaline Phosphatase 99 40 - 150 U/L    ALT 19 0 - 50 U/L    AST 15 0 - 45 U/L       X-Ray was not done.    ASSESSMENT:      ICD-10-CM    1. Sciatica of right side M54.31 cyclobenzaprine (FLEXERIL) 5 MG tablet   2. Pain of right thigh M79.651 US Lower Extremity Venous Duplex Right     CBC with platelets and differential     D dimer, quantitative     Comprehensive metabolic panel (BMP + Alb, Alk Phos, ALT, AST, Total. Bili, TP)        Medical Decision Making:  Patient presented to the urgent care for evaluation of right-sided thigh and calf muscle pain.  The pain is cramping in nature and sudden onset.  Patient denied any recent history of travel fall or injury.  However she mentioned that she had to drive an hour each way to drop her daughter of this morning.  The pain started last night.  Patient does not have any redness or swelling in her calf muscle or thigh.  No signs of bruises on exam.  She has right-sided positive straight leg raise test.  Patient has positive past medical history of type 2 diabetes, hypertension, heart murmur, hyperlipidemia, asthma, and Neumiller eczema.  Patient has multiple comorbid conditions and  has morbid obesity with recent history of driving and sitting in the car for almost 2 hours.  We have discussed about the possibility of a deep vein thrombosis.  Patient was advised to get a d-dimer and ultrasound of the right sided lower limb to rule out a DVT.  The ultrasound came back as normal without any signs of DVT.  The d-dimer is within normal limit.  We have discussed that it is unlikely that she has a DVT at this time.  Patient has positive straight leg raise test.  I have also done a CBC to see the hemoglobin and white cell count level to exclude cellulitis.  A comprehensive metabolic panel was done to rule out any electrolyte imbalance which may cause leg cramps.      Her pain is probably from sciatica.  Flexeril has been prescribed.  Patient is advised not to drive or operate any heavy machinery while she is on Flexeril.  She is advised not to mix the Flexeril with Benadryl or alcohol.  Patient is advised to follow-up with primary care provider in 1 week if her symptoms are persisting.   Serious Comorbid Conditions:      PLAN:  Sciatica: Flexeril.  Follow-up with primary care provider in 1 week if symptoms are not improving   Avoid driving and doing other tasks or actions that call for you to be alert   .  To lower the chance of feeling dizzy or passing out, rise slowly if you have been sitting or lying down. Be careful going up and down stairs.  .  Be careful in hot weather or while being active. Drink lots of fluids to stop fluid loss.  .  Do not mix this drug with Alchohol, or benadryl   .  Do not take this drug for longer than you were told by your doctor.    Type 2 diabetes: Last A1c was 6.7.  Follow-up with primary care provider for diabetic management.  Please take the medications as advised by your primary care provider.    Followup:    If not improving or if condition worsens, follow up with your Primary Care Provider    There are no Patient Instructions on file for this visit.

## 2019-03-24 DIAGNOSIS — B37.2 YEAST INFECTION OF THE SKIN: ICD-10-CM

## 2019-03-25 RX ORDER — NYSTATIN 100000 U/G
OINTMENT TOPICAL
Qty: 30 G | Refills: 1 | Status: SHIPPED | OUTPATIENT
Start: 2019-03-25 | End: 2019-07-13

## 2019-03-25 NOTE — TELEPHONE ENCOUNTER
"Requested Prescriptions   Pending Prescriptions Disp Refills     nystatin (MYCOSTATIN) 557333 UNIT/GM external ointment [Pharmacy Med Name: NYSTATIN [id number]    OIN]  3    Last Written Prescription Date:  04/27/2018  Last Fill Quantity: 60 g,  # refills: 3   Last office visit: 11/15/2018 with prescribing provider:     Future Office Visit:   Next 5 appointments (look out 90 days)    May 24, 2019  3:30 PM CDT  Nurse Only with [place] OB NURSE  [facility] [address]  [facility] [address]  [phone]        Sig: APPLY  OINTMENT TOPICALLY AS NEEDED    Antifungal Agents Passed - 3/24/2019 11:59 AM       Passed - Recent (12 mo) or future (30 days) visit within the authorizing provider's specialty    Patient had office visit in the last 12 months or has a visit in the next 30 days with authorizing provider or within the authorizing provider's specialty.  See \"Patient Info\" tab in inbasket, or \"Choose Columns\" in Meds & Orders section of the refill encounter.             Passed - Not Fluconazole or Terconazole     If oral Fluconazole or Terconazole, may refill if indicated in progress notes.          Passed - Medication is active on med list        "

## 2019-03-26 NOTE — TELEPHONE ENCOUNTER
Medication is being filled for 1 time refill only due to:  Patient needs to be seen because due for DM appt in May.

## 2019-04-17 ENCOUNTER — OFFICE VISIT (OUTPATIENT)
Dept: ORTHOPEDICS | Facility: CLINIC | Age: 44
End: 2019-04-17
Payer: COMMERCIAL

## 2019-04-17 VITALS
HEIGHT: 64 IN | SYSTOLIC BLOOD PRESSURE: 140 MMHG | DIASTOLIC BLOOD PRESSURE: 92 MMHG | WEIGHT: 293 LBS | BODY MASS INDEX: 50.02 KG/M2

## 2019-04-17 DIAGNOSIS — M17.11 PRIMARY OSTEOARTHRITIS OF RIGHT KNEE: Primary | ICD-10-CM

## 2019-04-17 PROCEDURE — 99213 OFFICE O/P EST LOW 20 MIN: CPT | Mod: 25 | Performed by: FAMILY MEDICINE

## 2019-04-17 PROCEDURE — 20611 DRAIN/INJ JOINT/BURSA W/US: CPT | Performed by: FAMILY MEDICINE

## 2019-04-17 RX ORDER — METHYLPREDNISOLONE ACETATE 40 MG/ML
40 INJECTION, SUSPENSION INTRA-ARTICULAR; INTRALESIONAL; INTRAMUSCULAR; SOFT TISSUE
Status: DISCONTINUED | OUTPATIENT
Start: 2019-04-17 | End: 2020-01-13

## 2019-04-17 RX ADMIN — METHYLPREDNISOLONE ACETATE 40 MG: 40 INJECTION, SUSPENSION INTRA-ARTICULAR; INTRALESIONAL; INTRAMUSCULAR; SOFT TISSUE at 16:04

## 2019-04-17 ASSESSMENT — MIFFLIN-ST. JEOR: SCORE: 2064.96

## 2019-04-17 NOTE — PROGRESS NOTES
"ASSESSMENT & PLAN  Patient Instructions     1. Primary osteoarthritis of right knee      -Patient has right knee pain due to aggravation of her arthritis.  -Patient tolerated cortisone injection today without complications  -Patient will start formal physical therapy and home exercise program.  -Patient will follow-up when pain returns.  -Coni Brar to follow up with Primary Care provider regarding elevated blood pressure.  -Call direct clinic number [[phone]] at any time with questions or concerns.    Albert Yeo MD [facility] and [facility] Medicine  [facility]          -----    SUBJECTIVE:  Coni Newberry is a 44 year old female who is seen today for right knee pain.They were last seen for right knee pain by Dr. Pantoja on 3/12/18 and received an intra-articular cortisone injection for right knee that provided good relief lasting 6-8 months.     Since their last visit reports  They indicate that their current pain level is 0/10. Patient reports that pain at the worst is 10/10. Patient reports she has pain when trying to put on socks, climbing stairs, prolonged standing and prolonged walking. They have tried rest/activity avoidance, ice, heat, ibuprofen and previous imaging (xray 8/25/17).      The patient is seen by themselves.    Patient's past medical, surgical, social, and family histories were reviewed today and no changes are noted.    REVIEW OF SYSTEMS:  Constitutional: NEGATIVE for fever, chills, change in weight  Skin: NEGATIVE for worrisome rashes, moles or lesions  GI/: NEGATIVE for bowel or bladder changes  Neuro: NEGATIVE for weakness, dizziness or paresthesias    OBJECTIVE:  BP (!) 140/92   Ht 1.613 m (5' 3.5\")   Wt 143.8 kg (317 lb)   BMI 55.27 kg/m     General: healthy, alert and in no distress  HEENT: no scleral icterus or conjunctival erythema  Skin: no suspicious lesions or rash. No jaundice.  CV: regular rhythm by palpation, no pedal edema  Resp: " normal respiratory effort without conversational dyspnea   Psych: normal mood and affect  Gait: normal steady gait with appropriate coordination and balance  Neuro: normal light touch sensory exam of the extremities.    MSK:  RIGHT KNEE  Inspection:    Normal alignment; no edema, erythema, or ecchymosis present  Palpation:    Tender about the lateral patellar facet, medial patellar facet and medial joint line. Remainder of bony and ligamentous landmarks are nontender.    Trace effusion is present    Patellofemoral crepitus is Absent  Range of Motion:     00 extension to 1200 flexion  Strength:    Quadriceps 5-/5 and hamstrings 5-/5    Extensor mechanism intact  Special Tests:    Positive: none    Negative: MCL/valgus stress (0 & 30 deg), LCL/varus stress (0 & 30 deg), Lachman's, anterior drawer, posterior drawer, Alondra's      Independent visualization of the below image:  RIGHT KNEE THREE VIEWS  8/25/2017 9:11 AM     HISTORY:  Pain in right knee.     COMPARISON:  None.     FINDINGS:  No fracture or osseous lesion is seen. The joint spaces are  well preserved. Small osteophytes are seen along the tibial spines and  along the periphery of both the medial compartment and patellofemoral  articulation. No adjacent soft tissue pathology is seen.                                                                      IMPRESSION: Probable mild degenerative changes with osteophyte  formation as described above.     ANDERS SCOTT MD    Large Joint Injection/Arthocentesis  Date/Time: 4/17/2019 4:04 PM  Performed by: Yeo, Albert, MD  Authorized by: Yeo, Albert, MD     Indications:  Pain and joint swelling  Needle Size:  25 G  Guidance: ultrasound    Approach:  Superolateral  Location:  Knee          Medications:  40 mg methylPREDNISolone 40 MG/ML  Outcome:  Tolerated well, no immediate complications  Procedure discussed: discussed risks, benefits, and alternatives    Consent Given by:  Patient  Timeout: timeout called  immediately prior to procedure    Prep: patient was prepped and draped in usual sterile fashion            Albert Yeo MD, [facility] [place]

## 2019-04-17 NOTE — PATIENT INSTRUCTIONS
1. Primary osteoarthritis of right knee      -Patient has right knee pain due to aggravation of her arthritis.  -Patient tolerated cortisone injection today without complications  -Patient will start formal physical therapy and home exercise program.  -Patient will follow-up when pain returns.  -Coni Brar to follow up with Primary Care provider regarding elevated blood pressure.  -Call direct clinic number [[phone]] at any time with questions or concerns.    Albert Yeo MD [facility]  [facility]

## 2019-04-17 NOTE — LETTER
"    4/17/2019         RE: Coni Newberry  [address]  [facility] [address]        Dear Colleague,    Thank you for referring your patient, Coni Newberry, to the [facility]. Please see a copy of my visit note below.    ASSESSMENT & PLAN  Patient Instructions     1. Primary osteoarthritis of right knee      -Patient has right knee pain due to aggravation of her arthritis.  -Patient tolerated cortisone injection today without complications  -Patient will start formal physical therapy and home exercise program.  -Patient will follow-up when pain returns.  -Call direct clinic number [[phone]] at any time with questions or concerns.    Albert Yeo MD [facility]ics and [facility] Medicine  [facility]          -----    SUBJECTIVE:  Coni Newberry is a 44 year old female who is seen today for right knee pain.They were last seen for right knee pain by Dr. Pantoja on 3/12/18 and received an intra-articular cortisone injection for right knee that provided good relief lasting 6-8 months.     Since their last visit reports  They indicate that their current pain level is 0/10. Patient reports that pain at the worst is 10/10. Patient reports she has pain when trying to put on socks, climbing stairs, prolonged standing and prolonged walking. They have tried rest/activity avoidance, ice, heat, ibuprofen and previous imaging (xray 8/25/17).      The patient is seen by themselves.    Patient's past medical, surgical, social, and family histories were reviewed today and no changes are noted.    REVIEW OF SYSTEMS:  Constitutional: NEGATIVE for fever, chills, change in weight  Skin: NEGATIVE for worrisome rashes, moles or lesions  GI/: NEGATIVE for bowel or bladder changes  Neuro: NEGATIVE for weakness, dizziness or paresthesias    OBJECTIVE:  BP (!) 140/92   Ht 1.613 m (5' 3.5\")   Wt 143.8 kg (317 lb)   BMI 55.27 kg/m      General: healthy, " alert and in no distress  HEENT: no scleral icterus or conjunctival erythema  Skin: no suspicious lesions or rash. No jaundice.  CV: regular rhythm by palpation, no pedal edema  Resp: normal respiratory effort without conversational dyspnea   Psych: normal mood and affect  Gait: normal steady gait with appropriate coordination and balance  Neuro: normal light touch sensory exam of the extremities.    MSK:  RIGHT KNEE  Inspection:    Normal alignment; no edema, erythema, or ecchymosis present  Palpation:    Tender about the lateral patellar facet, medial patellar facet and medial joint line. Remainder of bony and ligamentous landmarks are nontender.    Trace effusion is present    Patellofemoral crepitus is Absent  Range of Motion:     00 extension to 1200 flexion  Strength:    Quadriceps 5-/5 and hamstrings 5-/5    Extensor mechanism intact  Special Tests:    Positive: none    Negative: MCL/valgus stress (0 & 30 deg), LCL/varus stress (0 & 30 deg), Lachman's, anterior drawer, posterior drawer, Alondra's      Independent visualization of the below image:  RIGHT KNEE THREE VIEWS  8/25/2017 9:11 AM     HISTORY:  Pain in right knee.     COMPARISON:  None.     FINDINGS:  No fracture or osseous lesion is seen. The joint spaces are  well preserved. Small osteophytes are seen along the tibial spines and  along the periphery of both the medial compartment and patellofemoral  articulation. No adjacent soft tissue pathology is seen.                                                                      IMPRESSION: Probable mild degenerative changes with osteophyte  formation as described above.     BRADLEY KRANENDONK, MD Albert Yeo MD, [facility]          Again, thank you for allowing me to participate in the care of your patient.        Sincerely,        Albert Yeo, MD

## 2019-04-27 DIAGNOSIS — R10.13 ABDOMINAL PAIN, EPIGASTRIC: Primary | ICD-10-CM

## 2019-04-29 NOTE — TELEPHONE ENCOUNTER
Prescription approved per [facility] Refill Protocol. DEBBIE Villalobos R.N.

## 2019-04-29 NOTE — TELEPHONE ENCOUNTER
"Requested Prescriptions   Pending Prescriptions Disp Refills     omeprazole (PRILOSEC) 20 MG DR capsule [Pharmacy Med Name: OMEPRAZOLE 20MG CAP] 90 capsule 3     Sig: TAKE 1 TABLET BY MOUTH ONCE DAILY 30-60 MINUTES BEFORE A MEAL   Last Written Prescription Date:  not on meds list   Last Fill Quantity: n/a,  # refills: n/a  Last office visit: 11/15/2018 with prescribing provider:    Future Office Visit:   Next 5 appointments (look out 90 days)    May 24, 2019  3:30 PM CDT  Nurse Only with [place] OB NURSE  [facility] [address]  [facility] [address]  [phone]           PPI Protocol Passed - 4/27/2019  9:18 AM        Passed - Not on Clopidogrel (unless Pantoprazole ordered)        Passed - No diagnosis of osteoporosis on record        Passed - Recent (12 mo) or future (30 days) visit within the authorizing provider's specialty     Patient had office visit in the last 12 months or has a visit in the next 30 days with authorizing provider or within the authorizing provider's specialty.  See \"Patient Info\" tab in inbasket, or \"Choose Columns\" in Meds & Orders section of the refill encounter.              Passed - Medication is active on med list        Passed - Patient is age 18 or older        Passed - No active pregnacy on record        Passed - No positive pregnancy test in past 12 months        "

## 2019-05-19 DIAGNOSIS — J45.30 MILD PERSISTENT ASTHMA WITHOUT COMPLICATION: ICD-10-CM

## 2019-05-19 NOTE — LETTER
[facility]  [address], [address]  [place], [place]  [address]                                            TEL:[phone]  FAX:[phone]          Coni Alexandrestacy Newberry  [address]  [facility] [address]      May 20, 2019      Dear Coni Brar,    We are concerned about your health care and many medications require routine follow-up with your Doctor. We have received a refill request from your pharmacy, however, we were only able to provide a one time refill because you are due for an asthma questionnaire known as an Asthma Control Test or ACT and an office visit. You should see your provider every 6 months for follow up of asthma and diabetes.  You can either complete the questionnaire and call us with your results or bring it to your appointment.    Please call [phone] to schedule an appointment before you are due for your next refill.      Sincerely,      ANT Jacobs/cl

## 2019-05-20 RX ORDER — MONTELUKAST SODIUM 10 MG/1
TABLET ORAL
Qty: 90 TABLET | Refills: 0 | Status: SHIPPED | OUTPATIENT
Start: 2019-05-20 | End: 2019-07-18

## 2019-05-20 NOTE — TELEPHONE ENCOUNTER
"Requested Prescriptions   Pending Prescriptions Disp Refills     montelukast (SINGULAIR) 10 MG tablet [Pharmacy Med Name: MONTELUKAST 10MG TAB] 90 tablet 3     Sig: TAKE 1 TABLET BY MOUTH AT BEDTIME       Leukotriene Inhibitors Protocol Failed - 5/19/2019  9:47 AM        Failed - Asthma control assessment score within normal limits in last 6 months     Please review ACT score.           Passed - Patient is age 12 or older     If patient is under 16, ok to refill using age based dosing.           Passed - Medication is active on med list        Passed - Recent (6 mo) or future (30 days) visit within the authorizing provider's specialty     Patient had office visit in the last 6 months or has a visit in the next 30 days with authorizing provider or within the authorizing provider's specialty.  See \"Patient Info\" tab in inbasket, or \"Choose Columns\" in Meds & Orders section of the refill encounter.            Medication is being filled for 1 time refill only due to:  ACT is due and patient is also due for diabetes and asthma follow up visit.  Letter sent, ACT sent.   DEBBIE Villalobos R.N.      "

## 2019-05-27 DIAGNOSIS — E78.5 HYPERLIPIDEMIA WITH TARGET LDL LESS THAN 70: ICD-10-CM

## 2019-05-27 NOTE — LETTER
[facility]  [address]  [facility] [address]  Phone: [phone]        June 12, 2019      Beatriz Matti Newberry                                                                                                                                [address]  [facility] [address]            Dear Ms. Matti Newberry,    We are concerned about your health care.  We recently provided you with a medication refill.  Many medications require routine follow-up with your provider.      At this time we ask that: You schedule a routine office visit with your provider to follow your diabetes and fasting labs    Your prescription: Has been refilled once so you may have time for the above noted follow-up.      Thank you,      Farida Palencia NP

## 2019-05-28 ENCOUNTER — ALLIED HEALTH/NURSE VISIT (OUTPATIENT)
Dept: NURSING | Facility: CLINIC | Age: 44
End: 2019-05-28
Payer: COMMERCIAL

## 2019-05-28 PROCEDURE — 96372 THER/PROPH/DIAG INJ SC/IM: CPT

## 2019-05-28 PROCEDURE — 99207 ZZC NO CHARGE NURSE ONLY: CPT

## 2019-05-28 RX ORDER — ATORVASTATIN CALCIUM 10 MG/1
TABLET, FILM COATED ORAL
Qty: 90 TABLET | Refills: 0 | Status: SHIPPED | OUTPATIENT
Start: 2019-05-28 | End: 2019-07-18

## 2019-05-28 RX ADMIN — MEDROXYPROGESTERONE ACETATE 150 MG: 150 INJECTION, SUSPENSION INTRAMUSCULAR at 16:07

## 2019-05-28 NOTE — TELEPHONE ENCOUNTER
"Requested Prescriptions   Pending Prescriptions Disp Refills     atorvastatin (LIPITOR) 10 MG tablet [Pharmacy Med Name: ATORVASTATIN 10MG   TAB] 90 tablet 3     Sig: TAKE 1 TABLET BY MOUTH ONCE DAILY       Statins Protocol Failed - 5/27/2019 10:43 AM        Failed - LDL on file in past 12 months     Recent Labs   Lab Test 07/19/17  0846   LDL 62             Passed - No abnormal creatine kinase in past 12 months     No lab results found.             Passed - Recent (12 mo) or future (30 days) visit within the authorizing provider's specialty     Patient had office visit in the last 12 months or has a visit in the next 30 days with authorizing provider or within the authorizing provider's specialty.  See \"Patient Info\" tab in inbasket, or \"Choose Columns\" in Meds & Orders section of the refill encounter.              Passed - Medication is active on med list        Passed - Patient is age 18 or older        Passed - No active pregnancy on record        Passed - No positive pregnancy test in past 12 months        Last Written Prescription Date:  4/27/18  Last Fill Quantity: 90,  # refills: 3   Last office visit: 11/15/2018 with prescribing provider:  Talha   Future Office Visit:   Next 5 appointments (look out 90 days)    May 28, 2019  4:00 PM CDT  Nurse Only with [facility] - NURSE  [facility] ([facility] [address] [address] [phone]         Routing refill request to provider for review/approval because:  Labs not current:  Fasting labs due.  Also due for diabetic follow up.    [employer] message sent.        "

## 2019-05-28 NOTE — PROGRESS NOTES
Prior to injection, verified patient identity using patient's name and date of birth.  Due to injection administration, patient instructed to remain in clinic for 15 minutes  afterwards, and to report any adverse reaction to me immediately.        LAST PAP/EXAM:   Lab Results   Component Value Date    PAP NIL 02/18/2015     URINE HCG:not indicated    NEXT INJECTION DUE: 8/13/19 - 8/27/19       Drug Amount Wasted:  None.  Vial/Syringe: Single dose vial  Expiration Date:  7/2020    Cintia GIFFORD R.N.  [facility]

## 2019-06-27 DIAGNOSIS — Z30.41 ENCOUNTER FOR SURVEILLANCE OF CONTRACEPTIVE PILLS: ICD-10-CM

## 2019-06-28 RX ORDER — ACETAMINOPHEN AND CODEINE PHOSPHATE 120; 12 MG/5ML; MG/5ML
SOLUTION ORAL
Qty: 84 TABLET | Refills: 0 | Status: SHIPPED | OUTPATIENT
Start: 2019-06-28 | End: 2019-07-18

## 2019-06-28 NOTE — TELEPHONE ENCOUNTER
Prescription approved per [facility] Refill Protocol.    Next 5 appointments (look out 90 days)    Jul 18, 2019  5:20 PM CDT  SHORT with DANYELL Spencer CNP  [facility] ([facility]) [address]  [facility] [id number] [phone]   Aug 13, 2019  4:00 PM CDT  Nurse Only with [facility] - NURSE  [facility] ([facility]) [address] [address] [phone]

## 2019-06-28 NOTE — TELEPHONE ENCOUNTER
"Requested Prescriptions   Pending Prescriptions Disp Refills     norethindrone (MICRONOR) 0.35 MG tablet [Pharmacy Med Name: NORETHINDRONE 0.35 MG TABLET] 84 tablet 1     Sig: TAKE 1 TABLET (0.35 MG) BY MOUTH DAILY   Last Written Prescription Date:  01/22/2019  Last Fill Quantity: 84,  # refills: 1   Last office visit: 11/15/2018 with prescribing provider:     Future Office Visit:   Next 5 appointments (look out 90 days)    Jul 18, 2019  5:20 PM CDT  SHORT with DANYELL Spencer CNP  [facility]) [address] [facility] [address]  [phone]   Aug 13, 2019  4:00 PM CDT  Nurse Only with [facility] - NURSE  [facility] ([facility]) [address] [address]  [phone]           Contraceptives Protocol Passed - 6/27/2019  7:43 PM        Passed - Patient is not a current smoker if age is 35 or older        Passed - Recent (12 mo) or future (30 days) visit within the authorizing provider's specialty     Patient had office visit in the last 12 months or has a visit in the next 30 days with authorizing provider or within the authorizing provider's specialty.  See \"Patient Info\" tab in inbasket, or \"Choose Columns\" in Meds & Orders section of the refill encounter.              Passed - Medication is active on med list        Passed - No active pregnancy on record        Passed - No positive pregnancy test in past 12 months        "

## 2019-06-29 DIAGNOSIS — E11.9 TYPE 2 DIABETES MELLITUS WITHOUT COMPLICATION, WITHOUT LONG-TERM CURRENT USE OF INSULIN (H): ICD-10-CM

## 2019-06-29 LAB — HBA1C MFR BLD: 7.4 % (ref 0–5.6)

## 2019-06-29 PROCEDURE — 36415 COLL VENOUS BLD VENIPUNCTURE: CPT | Performed by: NURSE PRACTITIONER

## 2019-06-29 PROCEDURE — 83036 HEMOGLOBIN GLYCOSYLATED A1C: CPT | Performed by: NURSE PRACTITIONER

## 2019-07-02 DIAGNOSIS — E11.9 TYPE 2 DIABETES MELLITUS WITHOUT COMPLICATION, WITHOUT LONG-TERM CURRENT USE OF INSULIN (H): ICD-10-CM

## 2019-07-03 DIAGNOSIS — J45.40 MODERATE PERSISTENT ASTHMA WITHOUT COMPLICATION: ICD-10-CM

## 2019-07-03 NOTE — TELEPHONE ENCOUNTER
"Requested Prescriptions   Pending Prescriptions Disp Refills     albuterol (PROAIR HFA/PROVENTIL HFA/VENTOLIN HFA) 108 (90 Base) MCG/ACT inhaler [Pharmacy Med Name: ALBUTEROL HFA 90MCG (18GM) AER]  11     Sig: INHALE 2 PUFFS BY MOUTH EVERY 6 HOURS   Last Written Prescription Date:  04/27/2018  Last Fill Quantity: 1 inhaler`,  # refills: 11   Last office visit: 11/15/2018 with prescribing provider:     Future Office Visit:   Next 5 appointments (look out 90 days)    Jul 18, 2019  5:20 PM CDT  SHORT with DANYELL Spencer CNP  [facility] ([facility]) [address] [facility] [id number] [phone]   Aug 13, 2019  4:00 PM CDT  [facility] with [facility] - NURSE  [facility] ([facility]) [address] [id number] [phone]           Asthma Maintenance Inhalers - Anticholinergics Failed - 7/3/2019  5:11 PM        Failed - Asthma control assessment score within normal limits in last 6 months     Please review ACT score.           Passed - Patient is age 12 years or older        Passed - Medication is active on med list        Passed - Recent (6 mo) or future (30 days) visit within the authorizing provider's specialty     Patient had office visit in the last 6 months or has a visit in the next 30 days with authorizing provider or within the authorizing provider's specialty.  See \"Patient Info\" tab in inbasket, or \"Choose Columns\" in Meds & Orders section of the refill encounter.            "

## 2019-07-05 RX ORDER — ALBUTEROL SULFATE 90 UG/1
AEROSOL, METERED RESPIRATORY (INHALATION)
Qty: 1 INHALER | Refills: 11 | Status: SHIPPED | OUTPATIENT
Start: 2019-07-05 | End: 2020-05-07

## 2019-07-05 NOTE — TELEPHONE ENCOUNTER
ACT Total Scores 3/3/2017 4/27/2018 11/15/2018   ACT TOTAL SCORE - - -   ASTHMA ER VISITS - - -   ASTHMA HOSPITALIZATIONS - - -   ACT TOTAL SCORE (Goal Greater than or Equal to 20) 21 17 14   In the past 12 months, how many times did you visit the emergency room for your asthma without being admitted to the hospital? 0 0 0   In the past 12 months, how many times were you hospitalized overnight because of your asthma? 0 0 0       Failed act

## 2019-07-13 DIAGNOSIS — B37.2 YEAST INFECTION OF THE SKIN: ICD-10-CM

## 2019-07-15 RX ORDER — NYSTATIN 100000 U/G
OINTMENT TOPICAL
Qty: 30 G | Refills: 0 | Status: SHIPPED | OUTPATIENT
Start: 2019-07-15 | End: 2020-01-10

## 2019-07-18 ENCOUNTER — OFFICE VISIT (OUTPATIENT)
Dept: INTERNAL MEDICINE | Facility: CLINIC | Age: 44
End: 2019-07-18
Payer: COMMERCIAL

## 2019-07-18 VITALS
TEMPERATURE: 98.4 F | HEIGHT: 64 IN | HEART RATE: 87 BPM | WEIGHT: 293 LBS | OXYGEN SATURATION: 97 % | DIASTOLIC BLOOD PRESSURE: 60 MMHG | BODY MASS INDEX: 50.02 KG/M2 | RESPIRATION RATE: 22 BRPM | SYSTOLIC BLOOD PRESSURE: 112 MMHG

## 2019-07-18 DIAGNOSIS — J45.30 MILD PERSISTENT ASTHMA WITHOUT COMPLICATION: ICD-10-CM

## 2019-07-18 DIAGNOSIS — E11.9 TYPE 2 DIABETES MELLITUS WITHOUT COMPLICATION, WITHOUT LONG-TERM CURRENT USE OF INSULIN (H): ICD-10-CM

## 2019-07-18 DIAGNOSIS — L30.0 NUMMULAR ECZEMA: ICD-10-CM

## 2019-07-18 DIAGNOSIS — R10.13 ABDOMINAL PAIN, EPIGASTRIC: ICD-10-CM

## 2019-07-18 DIAGNOSIS — M54.31 SCIATICA OF RIGHT SIDE: ICD-10-CM

## 2019-07-18 DIAGNOSIS — Z30.41 ENCOUNTER FOR SURVEILLANCE OF CONTRACEPTIVE PILLS: ICD-10-CM

## 2019-07-18 DIAGNOSIS — I10 ESSENTIAL HYPERTENSION WITH GOAL BLOOD PRESSURE LESS THAN 140/90: ICD-10-CM

## 2019-07-18 DIAGNOSIS — E78.5 HYPERLIPIDEMIA WITH TARGET LDL LESS THAN 70: ICD-10-CM

## 2019-07-18 PROCEDURE — 99214 OFFICE O/P EST MOD 30 MIN: CPT | Performed by: NURSE PRACTITIONER

## 2019-07-18 RX ORDER — MONTELUKAST SODIUM 10 MG/1
TABLET ORAL
Qty: 90 TABLET | Refills: 3 | Status: SHIPPED | OUTPATIENT
Start: 2019-07-18 | End: 2020-05-07

## 2019-07-18 RX ORDER — TRIAMCINOLONE ACETONIDE 1 MG/G
OINTMENT TOPICAL
Qty: 90 G | Refills: 3 | Status: SHIPPED | OUTPATIENT
Start: 2019-07-18 | End: 2019-12-31

## 2019-07-18 RX ORDER — CYCLOBENZAPRINE HCL 5 MG
5 TABLET ORAL 3 TIMES DAILY PRN
Qty: 20 TABLET | Refills: 3 | Status: SHIPPED | OUTPATIENT
Start: 2019-07-18 | End: 2019-08-27

## 2019-07-18 RX ORDER — ATORVASTATIN CALCIUM 10 MG/1
10 TABLET, FILM COATED ORAL DAILY
Qty: 90 TABLET | Refills: 3 | Status: SHIPPED | OUTPATIENT
Start: 2019-07-18 | End: 2020-02-13

## 2019-07-18 RX ORDER — ACETAMINOPHEN AND CODEINE PHOSPHATE 120; 12 MG/5ML; MG/5ML
SOLUTION ORAL
Qty: 84 TABLET | Refills: 3 | Status: SHIPPED | OUTPATIENT
Start: 2019-07-18 | End: 2019-10-16 | Stop reason: ALTCHOICE

## 2019-07-18 RX ORDER — LISINOPRIL 2.5 MG/1
2.5 TABLET ORAL DAILY
Qty: 90 TABLET | Refills: 1 | Status: SHIPPED | OUTPATIENT
Start: 2019-07-18 | End: 2020-05-07

## 2019-07-18 ASSESSMENT — MIFFLIN-ST. JEOR: SCORE: 2110.32

## 2019-07-18 NOTE — PATIENT INSTRUCTIONS
Metformin  2 1/2 tab twice daily - this is max dose   Recheck A1C in 3-6 months    Lab in 3-4 months fasting   You need to be fasting for 12hrs. You can have water and any meds you are on. But, no food, coffee, tea or diet pop.    Dr Sam OB GYN if you decide on nexplanon

## 2019-07-18 NOTE — PROGRESS NOTES
.Subjective     Coni Newberry is a 44 year old female who presents to clinic today for the following health issues:    HPI   Diabetes Follow-up      How often are you checking your blood sugar? One time daily    What time of day are you checking your blood sugars (select all that apply)?  Before meals    Have you had any blood sugars above 200?  No    Have you had any blood sugars below 70?  No    What symptoms do you notice when your blood sugar is low?  None    What concerns do you have today about your diabetes? : discuss med     Do you have any of these symptoms? (Select all that apply)  Blurry vision and Weight gain     Have you had a diabetic eye exam in the last 12 months? Yes- Date of last eye exam: x 2 months ago    Diabetes Management Resources    Hyperlipidemia Follow-Up      Are you having any of the following symptoms? (Select all that apply)  Shortness of breath    Are you regularly taking any medication or supplement to lower your cholesterol?   Yes-     Are you having muscle aches or other side effects that you think could be caused by your cholesterol lowering medication?  No    Hypertension Follow-up      Do you check your blood pressure regularly outside of the clinic? No     Are you following a low salt diet? Yes    Are your blood pressures ever more than 140 on the top number (systolic) OR more   than 90 on the bottom number (diastolic), for example 140/90? No    BP Readings from Last 2 Encounters:   07/18/19 112/60   04/17/19 (!) 140/92     Hemoglobin A1C (%)   Date Value   06/29/2019 7.4 (H)   11/15/2018 6.7 (H)     LDL Cholesterol Calculated (mg/dL)   Date Value   07/19/2017 62   03/03/2017 66       Amount of exercise or physical activity: None    Problems taking medications regularly: No    Medication side effects: none    Diet: low salt                Reviewed and updated as needed this visit by Provider  Tobacco  Allergies  Meds  Problems  Med Hx  Surg Hx  Fam Hx      "    Review of Systems   ROS COMP: Constitutional, HEENT, cardiovascular, pulmonary, GI, , musculoskeletal, neuro, skin, endocrine and psych systems are negative, except as otherwise noted.      Objective    /60   Pulse 87   Temp 98.4  F (36.9  C) (Oral)   Resp 22   Ht 1.613 m (5' 3.5\")   Wt 148.3 kg (327 lb)   LMP  (LMP Unknown)   SpO2 97%   Breastfeeding? No   BMI 57.02 kg/m    Body mass index is 57.02 kg/m .  Physical Exam   GENERAL: healthy, alert and no distress  RESP: lungs clear to auscultation - no rales, rhonchi or wheezes  CV: regular rate and rhythm, normal S1 S2, no S3 or S4, no murmur, click or rub, no peripheral edema and peripheral pulses strong  ABDOMEN: soft, nontender, no hepatosplenomegaly, no masses and bowel sounds normal  MS: no gross musculoskeletal defects noted, no edema  NEURO: Normal strength and tone, mentation intact and speech normal  PSYCH: mentation appears normal, affect normal/bright    Diagnostic Test Results:  Labs reviewed in [facility]  Lab         Assessment & Plan     1. Hyperlipidemia with target LDL less than 70  Tolerating medication   Due for lab- will do in 3-4 months   - atorvastatin (LIPITOR) 10 MG tablet; Take 1 tablet (10 mg) by mouth daily  Dispense: 90 tablet; Refill: 3    2. Mild persistent asthma without complication  Stable on medication   - beclomethasone HFA (QVAR REDIHALER) 80 MCG/ACT inhaler; Inhale 2 puffs into the lungs 2 times daily  Dispense: 1 Inhaler; Refill: 11  - montelukast (SINGULAIR) 10 MG tablet; TAKE 1 TABLET BY MOUTH AT BEDTIME  Dispense: 90 tablet; Refill: 3    3. Essential hypertension with goal blood pressure less than 140/90  In good range in current medication   - lisinopril (PRINIVIL/ZESTRIL) 2.5 MG tablet; Take 1 tablet (2.5 mg) by mouth daily  Dispense: 90 tablet; Refill: 1  - Comprehensive metabolic panel  - TSH with free T4 reflex  - CBC with platelets and differential    4. Type 2 diabetes mellitus without complication, " "without long-term current use of insulin (H)  Slight increase A1C- we increased metformin and will check in 3-4 months   - Lipid panel reflex to direct LDL Fasting  - lisinopril (PRINIVIL/ZESTRIL) 2.5 MG tablet; Take 1 tablet (2.5 mg) by mouth daily  Dispense: 90 tablet; Refill: 1  - Comprehensive metabolic panel  - TSH with free T4 reflex  - CBC with platelets and differential  - Hemoglobin A1c    5. Encounter for surveillance of contraceptive pills  Stable - may consider arm implant   Was not able to get IUD   - norethindrone (MICRONOR) 0.35 MG tablet; TAKE 1 TABLET (0.35 MG) BY MOUTH DAILY  Dispense: 84 tablet; Refill: 3    6. Abdominal pain, epigastric  Stable on medication   - omeprazole (PRILOSEC) 20 MG DR capsule; TAKE 1 TABLET BY MOUTH ONCE DAILY 30-60 MINUTES BEFORE A MEAL  Dispense: 90 capsule; Refill: 3    7. Nummular eczema  Cream helps with break outs   - triamcinolone (KENALOG) 0.1 % external ointment; Apply sparingly to affected area three times daily AS NEEDED FOR ECZEMA RASH  Dispense: 90 g; Refill: 3    8. Sciatica of right side  Needs refill- uses prn   Now has some trapezius muscle strain from holding phone with her head   Has head phone she can use   - cyclobenzaprine (FLEXERIL) 5 MG tablet; Take 1 tablet (5 mg) by mouth 3 times daily as needed for muscle spasms  Dispense: 20 tablet; Refill: 3     BMI:   Estimated body mass index is 57.02 kg/m  as calculated from the following:    Height as of this encounter: 1.613 m (5' 3.5\").    Weight as of this encounter: 148.3 kg (327 lb).   Weight management plan: Discussed healthy diet and exercise guidelines  She is working towards getting back to gym       Patient Instructions   Metformin  2 1/2 tab twice daily - this is max dose   Recheck A1C in 3-6 months    Lab in 3-4 months fasting   You need to be fasting for 12hrs. You can have water and any meds you are on. But, no food, coffee, tea or diet pop.    Dr Sam OB GYN if you decide on nexplanon "       Return in about 3 months (around 10/18/2019) for Lab Work.    DANYELL Spencer [facility]

## 2019-07-18 NOTE — NURSING NOTE
"Chief Complaint   Patient presents with     Recheck Medication     needs refills     initial Pulse 87   Temp 98.4  F (36.9  C) (Oral)   Resp 22   Ht 1.613 m (5' 3.5\")   Wt 148.3 kg (327 lb)   LMP  (LMP Unknown)   SpO2 97%   Breastfeeding? No   BMI 57.02 kg/m   Estimated body mass index is 57.02 kg/m  as calculated from the following:    Height as of this encounter: 1.613 m (5' 3.5\").    Weight as of this encounter: 148.3 kg (327 lb)..  bp completed using cuff size NA (Not Taken)  CIERA BAUTISTA LPN  "

## 2019-07-19 ASSESSMENT — ASTHMA QUESTIONNAIRES: ACT_TOTALSCORE: 11

## 2019-07-31 ENCOUNTER — OFFICE VISIT (OUTPATIENT)
Dept: ORTHOPEDICS | Facility: CLINIC | Age: 44
End: 2019-07-31
Payer: COMMERCIAL

## 2019-07-31 VITALS — WEIGHT: 293 LBS | HEIGHT: 64 IN | BODY MASS INDEX: 50.02 KG/M2

## 2019-07-31 DIAGNOSIS — M17.11 PRIMARY OSTEOARTHRITIS OF RIGHT KNEE: Primary | ICD-10-CM

## 2019-07-31 PROCEDURE — 99213 OFFICE O/P EST LOW 20 MIN: CPT | Mod: 25 | Performed by: FAMILY MEDICINE

## 2019-07-31 PROCEDURE — 20611 DRAIN/INJ JOINT/BURSA W/US: CPT | Mod: RT | Performed by: FAMILY MEDICINE

## 2019-07-31 RX ORDER — METHYLPREDNISOLONE ACETATE 40 MG/ML
40 INJECTION, SUSPENSION INTRA-ARTICULAR; INTRALESIONAL; INTRAMUSCULAR; SOFT TISSUE
Status: DISCONTINUED | OUTPATIENT
Start: 2019-07-31 | End: 2020-01-13 | Stop reason: CLARIF

## 2019-07-31 RX ORDER — ROPIVACAINE HYDROCHLORIDE 5 MG/ML
3 INJECTION, SOLUTION EPIDURAL; INFILTRATION; PERINEURAL
Status: DISCONTINUED | OUTPATIENT
Start: 2019-07-31 | End: 2019-10-16

## 2019-07-31 RX ADMIN — METHYLPREDNISOLONE ACETATE 40 MG: 40 INJECTION, SUSPENSION INTRA-ARTICULAR; INTRALESIONAL; INTRAMUSCULAR; SOFT TISSUE at 18:19

## 2019-07-31 RX ADMIN — ROPIVACAINE HYDROCHLORIDE 3 ML: 5 INJECTION, SOLUTION EPIDURAL; INFILTRATION; PERINEURAL at 18:19

## 2019-07-31 ASSESSMENT — MIFFLIN-ST. JEOR: SCORE: 2110.32

## 2019-07-31 NOTE — PATIENT INSTRUCTIONS
1. Primary osteoarthritis of right knee      -Patient is here for follow up of right knee pain due to aggravation of arthritis  -Patient tolerated cortisone injection today without complications  -Patient will follow up when pain returns.  We discussed possible viscosupplementation if next cortisone injection is sooner than 3-4 months  -We recommeneded physical therapy but she is refusing at this time since she does not have the time.  She is interested after next appointment  -Call direct clinic number [[phone]] at any time with questions or concerns.    Albert Yeo MD [facility]  [facility]

## 2019-07-31 NOTE — PROGRESS NOTES
"ASSESSMENT & PLAN  Patient Instructions     1. Primary osteoarthritis of right knee      -Patient is here for follow up of right knee pain due to aggravation of arthritis  -Patient tolerated cortisone injection today without complications  -Patient will follow up when pain returns.  We discussed possible viscosupplementation if next cortisone injection is sooner than 3-4 months  -We recommeneded physical therapy but she is refusing at this time since she does not have the time.  She is interested after next appointment  -Call direct clinic number [[phone]] at any time with questions or concerns.    Albert Yeo MD [facility]  [facility]          -----    SUBJECTIVE:  Coni Newberry is a 44 year old female who is seen in follow-up for right knee pain.They were last seen 4/17/2019 and received a right knee intra articular cortisone injection that provided good relief lasting about 3 months. Patient reports that she has had worsening pain, cracking, locking and popping over the last week. Patient reports increased pain with going up and down stairs, walking and standing. They indicate that their current pain level is 3/10 and at worst is 9/10. They have tried rest/activity avoidance, ibuprofen and other medications: Cyclobenzaprine (Flexeril) .      The patient is seen by themselves.    Patient's past medical, surgical, social, and family histories were reviewed today and no changes are noted.    REVIEW OF SYSTEMS:  Constitutional: NEGATIVE for fever, chills, change in weight  Skin: NEGATIVE for worrisome rashes, moles or lesions  GI/: NEGATIVE for bowel or bladder changes  Neuro: NEGATIVE for weakness, dizziness or paresthesias    OBJECTIVE:  Ht 1.613 m (5' 3.5\")   Wt 148.3 kg (327 lb)   LMP  (LMP Unknown)   BMI 57.02 kg/m     General: healthy, alert and in no distress  HEENT: no scleral icterus or conjunctival erythema  Skin: no suspicious lesions " or rash. No jaundice.  CV: regular rhythm by palpation, no pedal edema  Resp: normal respiratory effort without conversational dyspnea   Psych: normal mood and affect  Gait: normal steady gait with appropriate coordination and balance  Neuro: normal light touch sensory exam of the extremities.    MSK:  RIGHT KNEE  Inspection:    Normal alignment; no edema, erythema, or ecchymosis present  Palpation:    Tender about the lateral patellar facet, medial patellar facet and medial joint line. Remainder of bony and ligamentous landmarks are nontender.    Trace effusion is present    Patellofemoral crepitus is Absent  Range of Motion:     00 extension to 1200 flexion  Strength:    Quadriceps 5-/5 and hamstrings 5-/5    Extensor mechanism intact  Special Tests:    Positive: none    Negative: MCL/valgus stress (0 & 30 deg), LCL/varus stress (0 & 30 deg), Lachman's, anterior drawer, posterior drawer, Alondra's    Independent visualization of the below image:    Large Joint Injection/Arthocentesis: R knee joint  Date/Time: 7/31/2019 6:19 PM  Performed by: Yeo, Albert, MD  Authorized by: Yeo, Albert, MD     Indications:  Pain and joint swelling  Needle Size:  22 G  Guidance: ultrasound    Approach:  Superolateral  Location:  Knee      Medications:  40 mg methylPREDNISolone 40 MG/ML; 3 mL ropivacaine 5 MG/ML  Outcome:  Tolerated well, no immediate complications  Procedure discussed: discussed risks, benefits, and alternatives    Consent Given by:  Patient  Timeout: timeout called immediately prior to procedure    Prep: patient was prepped and draped in usual sterile fashion            Albert Yeo MD, [facility]

## 2019-07-31 NOTE — LETTER
7/31/2019         RE: Coni Newberry  [address]  [facility] [address]        Dear Colleague,    Thank you for referring your patient, Coni Newberry, to the [facility] SPORTS MEDICINE. Please see a copy of my visit note below.    ASSESSMENT & PLAN  Patient Instructions     1. Primary osteoarthritis of right knee      -Patient is here for follow up of right knee pain due to aggravation of arthritis  -Patient tolerated cortisone injection today without complications  -Patient will follow up when pain returns.  We discussed possible viscosupplementation if next cortisone injection is sooner than 3-4 months  -We recommeneded physical therapy but she is refusing at this time since she does not have the time.  She is interested after next appointment  -Call direct clinic number [[phone]] at any time with questions or concerns.    Albert Yeo MD [facility] Orthopedics and Sports Medicine  [facility]          -----    SUBJECTIVE:  Coni Newberry is a 44 year old female who is seen in follow-up for right knee pain.They were last seen 4/17/2019 and received a right knee intra articular cortisone injection that provided good relief lasting about 3 months. Patient reports that she has had worsening pain, cracking, locking and popping over the last week. Patient reports increased pain with going up and down stairs, walking and standing. They indicate that their current pain level is 3/10 and at worst is 9/10. They have tried rest/activity avoidance, ibuprofen and other medications: Cyclobenzaprine (Flexeril) .      The patient is seen by themselves.    Patient's past medical, surgical, social, and family histories were reviewed today and no changes are noted.    REVIEW OF SYSTEMS:  Constitutional: NEGATIVE for fever, chills, change in weight  Skin: NEGATIVE for worrisome rashes, moles or lesions  GI/: NEGATIVE for bowel or bladder changes  Neuro:  "NEGATIVE for weakness, dizziness or paresthesias    OBJECTIVE:  Ht 1.613 m (5' 3.5\")   Wt 148.3 kg (327 lb)   LMP  (LMP Unknown)   BMI 57.02 kg/m      General: healthy, alert and in no distress  HEENT: no scleral icterus or conjunctival erythema  Skin: no suspicious lesions or rash. No jaundice.  CV: regular rhythm by palpation, no pedal edema  Resp: normal respiratory effort without conversational dyspnea   Psych: normal mood and affect  Gait: normal steady gait with appropriate coordination and balance  Neuro: normal light touch sensory exam of the extremities.    MSK:  RIGHT KNEE  Inspection:    Normal alignment; no edema, erythema, or ecchymosis present  Palpation:    Tender about the lateral patellar facet, medial patellar facet and medial joint line. Remainder of bony and ligamentous landmarks are nontender.    Trace effusion is present    Patellofemoral crepitus is Absent  Range of Motion:     00 extension to 1200 flexion  Strength:    Quadriceps 5-/5 and hamstrings 5-/5    Extensor mechanism intact  Special Tests:    Positive: none    Negative: MCL/valgus stress (0 & 30 deg), LCL/varus stress (0 & 30 deg), Lachman's, anterior drawer, posterior drawer, Alondra's    Independent visualization of the below image:    Large Joint Injection/Arthocentesis: R knee joint  Date/Time: 7/31/2019 6:19 PM  Performed by: Yeo, Albert, MD  Authorized by: Yeo, Albert, MD     Indications:  Pain and joint swelling  Needle Size:  22 G  Guidance: ultrasound    Approach:  Superolateral  Location:  Knee      Medications:  40 mg methylPREDNISolone 40 MG/ML; 3 mL ropivacaine 5 MG/ML  Outcome:  Tolerated well, no immediate complications  Procedure discussed: discussed risks, benefits, and alternatives    Consent Given by:  Patient  Timeout: timeout called immediately prior to procedure    Prep: patient was prepped and draped in usual sterile fashion            Albert Yeo MD, [facility] Sports and Orthopedic " Care          Again, thank you for allowing me to participate in the care of your patient.        Sincerely,        Albert Yeo, MD

## 2019-08-09 ENCOUNTER — TELEPHONE (OUTPATIENT)
Dept: INTERNAL MEDICINE | Facility: CLINIC | Age: 44
End: 2019-08-09

## 2019-08-09 NOTE — TELEPHONE ENCOUNTER
Panel Management Review      Patient has the following on her problem list:     Asthma review     ACT Total Scores 7/18/2019   ACT TOTAL SCORE -   ASTHMA ER VISITS -   ASTHMA HOSPITALIZATIONS -   ACT TOTAL SCORE (Goal Greater than or Equal to 20) 11   In the past 12 months, how many times did you visit the emergency room for your asthma without being admitted to the hospital? 0   In the past 12 months, how many times were you hospitalized overnight because of your asthma? 0      1. Is Asthma diagnosis on the Problem List? Yes    2. Is Asthma listed on Health Maintenance? Yes    3. Patient is due for:  ACT      Composite cancer screening  Chart review shows that this patient is due/due soon for the following Pap Smear  Summary:    Patient is due/failing the following:   ACT and PAP    Action needed:   Patient needs to do ACT. and Pap reminder.   ACT in July was 11. Will reach out to pt in a week or two to see if better.     Type of outreach:    Copy of ACT mailed to patient, will reach out in 5 days.   And reminder for pap mailed.     Questions for provider review:    None                                                                                                                                      KVNG Louise       Chart routed to  .

## 2019-08-09 NOTE — LETTER
[facility]  [address], [address]  [place] [address]  [phone]        August 9, 2019    Coni Newberry  [address]  [facility] [address]            Dear Ms. Coni Newberry:    Enclosed is an ACT for your Asthma. We like your score to be 20 or above. Your last score was 11. I will call you in a week or two to review your answers.   Also, you are due for your Pap smear.     Sincerely,        Maria G POTTER LPN for [facility]  Internal Medicine

## 2019-08-13 ENCOUNTER — TELEPHONE (OUTPATIENT)
Dept: INTERNAL MEDICINE | Facility: CLINIC | Age: 44
End: 2019-08-13

## 2019-08-13 NOTE — TELEPHONE ENCOUNTER
Pt cancelled her appt today for depo but would like to have the Implanon device implanted instead. Please call pt to advise.       Thank you,   Maren MCGREGOR   Central Scheduling  [phone]

## 2019-08-14 ASSESSMENT — ASTHMA QUESTIONNAIRES: ACT_TOTALSCORE: 21

## 2019-08-16 ENCOUNTER — TELEPHONE (OUTPATIENT)
Dept: ORTHOPEDICS | Facility: CLINIC | Age: 44
End: 2019-08-16

## 2019-08-16 NOTE — TELEPHONE ENCOUNTER
Coni Newberry is a 44 year old female who left voicemail stating she saw Dr. Yeo on 7/31/19 for a cortisone injection of her right knee. It is still popping and locking and painful. They discussed a possible gel injection and she would like to make an appointment for that.     Patient expecting call back: YES      Preferred contact number:  [phone] (home)    Can we leave a detailed message on this number: YES consent on file    Attempted to return call and it appeared that call was answered by a machine but unable to understand any words. Left message asking for a return call.   Need to discuss if any relief from cortisone injection.     BECK Rene RN

## 2019-08-21 ENCOUNTER — OFFICE VISIT (OUTPATIENT)
Dept: ORTHOPEDICS | Facility: CLINIC | Age: 44
End: 2019-08-21
Payer: COMMERCIAL

## 2019-08-21 VITALS
HEIGHT: 64 IN | SYSTOLIC BLOOD PRESSURE: 130 MMHG | DIASTOLIC BLOOD PRESSURE: 80 MMHG | WEIGHT: 293 LBS | BODY MASS INDEX: 50.02 KG/M2

## 2019-08-21 DIAGNOSIS — M17.11 PRIMARY OSTEOARTHRITIS OF RIGHT KNEE: Primary | ICD-10-CM

## 2019-08-21 PROCEDURE — 99213 OFFICE O/P EST LOW 20 MIN: CPT | Mod: 25 | Performed by: FAMILY MEDICINE

## 2019-08-21 PROCEDURE — 20611 DRAIN/INJ JOINT/BURSA W/US: CPT | Mod: RT | Performed by: FAMILY MEDICINE

## 2019-08-21 ASSESSMENT — MIFFLIN-ST. JEOR: SCORE: 2110.32

## 2019-08-21 NOTE — PROGRESS NOTES
"ASSESSMENT & PLAN  Patient Instructions     1. Primary osteoarthritis of right knee      -Patient is here for follow-up of right knee pain due to osteoarthritis.  -Patient tolerated the gel 1 injection today without complications.  Patient was given postprocedure instructions.  -Patient will follow-up when pain returns for further treatment.  -Call direct clinic number [[phone]] at any time with questions or concerns.    Albert Yeo MD [facility] Orthopedics and Sports Medicine  [facility]          -----    SUBJECTIVE:  Coni Newberry is a 44 year old female who is seen in follow-up for right knee pain. They were last seen 7/31/2019 and received an intra articular cortisone injection for right knee which provided no relief.     Since their last visit reports worsening pain. They indicate that their current pain level is 3/10. Patient reports increased pain with walking, standing, and going up and down stairs. She also states she experiences constant popping and locking in the right knee. They have tried rest/activity avoidance and ibuprofen.      The patient is seen by themselves.    Patient's past medical, surgical, social, and family histories were reviewed today and no changes are noted.    REVIEW OF SYSTEMS:  Constitutional: NEGATIVE for fever, chills, change in weight  Skin: NEGATIVE for worrisome rashes, moles or lesions  GI/: NEGATIVE for bowel or bladder changes  Neuro: NEGATIVE for weakness, dizziness or paresthesias    OBJECTIVE:  /80   Ht 1.613 m (5' 3.5\")   Wt 148.3 kg (327 lb)   BMI 57.02 kg/m     General: healthy, alert and in no distress  HEENT: no scleral icterus or conjunctival erythema  Skin: no suspicious lesions or rash. No jaundice.  CV: regular rhythm by palpation, no pedal edema  Resp: normal respiratory effort without conversational dyspnea   Psych: normal mood and affect  Gait: normal steady gait with appropriate coordination and balance  Neuro: " normal light touch sensory exam of the extremities.    MSK:  RIGHT KNEE  Inspection:    Normal alignment; no edema, erythema, or ecchymosis present  Palpation:    Tender about the lateral patellar facet, medial patellar facet and medial joint line. Remainder of bony and ligamentous landmarks are nontender.    Trace effusion is present    Patellofemoral crepitus is Absent  Range of Motion:     00 extension to 1200 flexion  Strength:    Quadriceps 5-/5 and hamstrings 5-/5    Extensor mechanism intact  Special Tests:    Positive: none    Negative: MCL/valgus stress (0 & 30 deg), LCL/varus stress (0 & 30 deg), Lachman's, anterior drawer, posterior drawer, Alondra's    Independent visualization of the below image:    Large Joint Injection/Arthocentesis: R knee joint  Date/Time: 8/21/2019 6:06 PM  Performed by: Yeo, Albert, MD  Authorized by: Yeo, Albert, MD     Indications:  Pain, joint swelling and osteoarthritis  Needle Size:  25 G  Guidance: ultrasound    Approach:  Superolateral  Location:  Knee      Medications:  30 mg cross-Linked Hyaluronate 30 MG/3ML  Outcome:  Tolerated well, no immediate complications  Procedure discussed: discussed risks, benefits, and alternatives    Consent Given by:  Patient  Timeout: timeout called immediately prior to procedure    Prep: patient was prepped and draped in usual sterile fashion            Patient's conditions were thoroughly discussed during today's visit with greater than 50% of the visit spent counseling the patient with total time spent face-to-face with the patient being 20 minutes.    Albert Yeo MD, [facility] Sports and Orthopedic [facility]

## 2019-08-21 NOTE — PATIENT INSTRUCTIONS
1. Primary osteoarthritis of right knee      -Patient is here for follow-up of right knee pain due to osteoarthritis.  -Patient tolerated the gel 1 injection today without complications.  Patient was given postprocedure instructions.  -Patient will follow-up when pain returns for further treatment.  -Call direct clinic number [[phone]] at any time with questions or concerns.    Albert Yeo MD [facility]  [facility]

## 2019-08-21 NOTE — LETTER
"    8/21/2019         RE: Coni Newberry  [address]  [facility] [address]        Dear Colleague,    Thank you for referring your patient, Coni Newberry, to the [facility] SPORTS MEDICINE. Please see a copy of my visit note below.    ASSESSMENT & PLAN  Patient Instructions     1. Primary osteoarthritis of right knee      -Patient is here for follow-up of right knee pain due to osteoarthritis.  -Patient tolerated the gel 1 injection today without complications.  Patient was given postprocedure instructions.  -Patient will follow-up when pain returns for further treatment.  -Call direct clinic number [[phone]] at any time with questions or concerns.    Albert Yeo MD [facility] Orthopedics and Sports Medicine  [facility]          -----    SUBJECTIVE:  Coni Newberry is a 44 year old female who is seen in follow-up for right knee pain. They were last seen 7/31/2019 and received an intra articular cortisone injection for right knee which provided no relief.     Since their last visit reports worsening pain. They indicate that their current pain level is 3/10. Patient reports increased pain with walking, standing, and going up and down stairs. She also states she experiences constant popping and locking in the right knee. They have tried rest/activity avoidance and ibuprofen.      The patient is seen by themselves.    Patient's past medical, surgical, social, and family histories were reviewed today and no changes are noted.    REVIEW OF SYSTEMS:  Constitutional: NEGATIVE for fever, chills, change in weight  Skin: NEGATIVE for worrisome rashes, moles or lesions  GI/: NEGATIVE for bowel or bladder changes  Neuro: NEGATIVE for weakness, dizziness or paresthesias    OBJECTIVE:  /80   Ht 1.613 m (5' 3.5\")   Wt 148.3 kg (327 lb)   BMI 57.02 kg/m      General: healthy, alert and in no distress  HEENT: no scleral icterus or conjunctival " erythema  Skin: no suspicious lesions or rash. No jaundice.  CV: regular rhythm by palpation, no pedal edema  Resp: normal respiratory effort without conversational dyspnea   Psych: normal mood and affect  Gait: normal steady gait with appropriate coordination and balance  Neuro: normal light touch sensory exam of the extremities.    MSK:  RIGHT KNEE  Inspection:    Normal alignment; no edema, erythema, or ecchymosis present  Palpation:    Tender about the lateral patellar facet, medial patellar facet and medial joint line. Remainder of bony and ligamentous landmarks are nontender.    Trace effusion is present    Patellofemoral crepitus is Absent  Range of Motion:     00 extension to 1200 flexion  Strength:    Quadriceps 5-/5 and hamstrings 5-/5    Extensor mechanism intact  Special Tests:    Positive: none    Negative: MCL/valgus stress (0 & 30 deg), LCL/varus stress (0 & 30 deg), Lachman's, anterior drawer, posterior drawer, Alondra's    Independent visualization of the below image:    Large Joint Injection/Arthocentesis: R knee joint  Date/Time: 8/21/2019 6:06 PM  Performed by: Yeo, Albert, MD  Authorized by: Yeo, Albert, MD     Indications:  Pain, joint swelling and osteoarthritis  Needle Size:  25 G  Guidance: ultrasound    Approach:  Superolateral  Location:  Knee      Medications:  30 mg cross-Linked Hyaluronate 30 MG/3ML  Outcome:  Tolerated well, no immediate complications  Procedure discussed: discussed risks, benefits, and alternatives    Consent Given by:  Patient  Timeout: timeout called immediately prior to procedure    Prep: patient was prepped and draped in usual sterile fashion            Patient's conditions were thoroughly discussed during today's visit with greater than 50% of the visit spent counseling the patient with total time spent face-to-face with the patient being 20 minutes.    Albert Yeo MD, [facility] and Orthopedic Care          Again, thank you for allowing me to  participate in the care of your patient.        Sincerely,        Albert Yeo, MD

## 2019-08-27 ENCOUNTER — TELEPHONE (OUTPATIENT)
Dept: INTERNAL MEDICINE | Facility: CLINIC | Age: 44
End: 2019-08-27

## 2019-08-27 DIAGNOSIS — M54.31 SCIATICA OF RIGHT SIDE: ICD-10-CM

## 2019-08-27 RX ORDER — TIZANIDINE HYDROCHLORIDE 4 MG/1
4 CAPSULE, GELATIN COATED ORAL
Qty: 30 CAPSULE | Refills: 0 | Status: SHIPPED | OUTPATIENT
Start: 2019-08-27 | End: 2020-01-29

## 2019-08-27 NOTE — TELEPHONE ENCOUNTER
Patient calling because she is having more sciatic pain and the flexeril does not seem to be helping. She is wondering if there is anything stronger she can get for this.

## 2019-08-28 NOTE — TELEPHONE ENCOUNTER
Left message on home/cell voicemail asking patient to return call to clinic.  DEBBIE Villalobos R.N.

## 2019-09-15 DIAGNOSIS — Z30.41 ENCOUNTER FOR SURVEILLANCE OF CONTRACEPTIVE PILLS: ICD-10-CM

## 2019-09-16 NOTE — TELEPHONE ENCOUNTER
"Requested Prescriptions   Pending Prescriptions Disp Refills     norethindrone (MICRONOR) 0.35 MG tablet [Pharmacy Med Name:  Last Written Prescription Date:  7/18/2019  Last Fill Quantity: 84,  # refills: 3   Last office visit: 7/18/2019 with prescribing provider:     Future Office Visit:   Next 5 appointments (look out 90 days)    Sep 30, 2019  5:15 PM CDT  Office Visit with Елена Rivera CNM  [facility] [address] [address] [phone]        NORETHINDRONE 0.35 MG TABLET] 84 tablet 0     Sig: TAKE 1 TABLET (0.35 MG) BY MOUTH DAILY       Contraceptives Protocol Passed - 9/15/2019  9:11 AM        Passed - Patient is not a current smoker if age is 35 or older        Passed - Recent (12 mo) or future (30 days) visit within the authorizing provider's specialty     Patient had office visit in the last 12 months or has a visit in the next 30 days with authorizing provider or within the authorizing provider's specialty.  See \"Patient Info\" tab in inbasket, or \"Choose Columns\" in Meds & Orders section of the refill encounter.              Passed - Medication is active on med list        Passed - No active pregnancy on record        Passed - No positive pregnancy test in past 12 months        "

## 2019-09-17 RX ORDER — ACETAMINOPHEN AND CODEINE PHOSPHATE 120; 12 MG/5ML; MG/5ML
SOLUTION ORAL
Qty: 84 TABLET | Refills: 1 | Status: SHIPPED | OUTPATIENT
Start: 2019-09-17 | End: 2019-10-16

## 2019-09-17 NOTE — TELEPHONE ENCOUNTER
Patient requesting remaining refills from 7/18/19 be sent to different pharmacy.     Prescription approved per [facility] Ref[facility] Protocol.

## 2019-09-30 ENCOUNTER — OFFICE VISIT (OUTPATIENT)
Dept: OBGYN | Facility: CLINIC | Age: 44
End: 2019-09-30
Payer: COMMERCIAL

## 2019-09-30 VITALS — BODY MASS INDEX: 50.02 KG/M2 | HEIGHT: 64 IN | WEIGHT: 293 LBS

## 2019-09-30 DIAGNOSIS — Z30.017 INSERTION OF IMPLANTABLE SUBDERMAL CONTRACEPTIVE: Primary | ICD-10-CM

## 2019-09-30 DIAGNOSIS — Z01.812 PRE-PROCEDURE LAB EXAM: ICD-10-CM

## 2019-09-30 LAB — HCG UR QL: NEGATIVE

## 2019-09-30 PROCEDURE — 11981 INSERTION DRUG DLVR IMPLANT: CPT | Performed by: ADVANCED PRACTICE MIDWIFE

## 2019-09-30 PROCEDURE — 81025 URINE PREGNANCY TEST: CPT | Performed by: ADVANCED PRACTICE MIDWIFE

## 2019-09-30 PROCEDURE — 99213 OFFICE O/P EST LOW 20 MIN: CPT | Mod: 25 | Performed by: ADVANCED PRACTICE MIDWIFE

## 2019-09-30 ASSESSMENT — MIFFLIN-ST. JEOR: SCORE: 2126.65

## 2019-09-30 NOTE — PROGRESS NOTES
"Nexplanon Insertion:    Is a pregnancy test required: Yes.  Was it positive or negative?  Negative  Was a consent obtained?  Yes    Subjective: Coni Newberry is a 44 year old  presents for Nexplanon.    Patient has been given the opportunity to ask questions about all forms of birth control, including all options appropriate for Coni Newberry. Discussed that no method of birth control, except abstinence is 100% effective against pregnancy or sexually transmitted infection.     Coni Newberry understands she may have the Nexplanon removed at any time and it should be removed by a health care provider.    The entire insertion procedure was reviewed with the patient, including care after placement.    No LMP recorded. Patient has had an implant. Has current contraception. No allergy to betadine or shellfish. Patient declines STD screening  HCG Qual Urine   Date Value Ref Range Status   2019 Negative NEG^Negative Final     Comment:     This test is for screening purposes.  Results should be interpreted along with   the clinical picture.  Confirmation testing is available if warranted by   ordering YAM867, HCG Quantitative Pregnancy.           Ht 1.613 m (5' 3.5\")   Wt 150 kg (330 lb 9.6 oz)   Breastfeeding? No   BMI 57.64 kg/m      PROCEDURE NOTE: -- Nexplanon Insertion    Reason for Insertion: abnormal uterine bleeding    Patient was placed supine with left arm exposed.  Khris was made 8-10 cm above medial epicondyle and a guiding khris 4 cm above the first.  Arm was prepped with Betadine. Insertion point was anesthetized with 3 mL 1% lidocaine. After stretching the skin with thumb and index finger around the insertion site, skin punctured with the tip of the needle inserted at 30 degrees and then lowered to horizontal position. The needle was then advanced to its full length. Applicator was then stabilized and slider was unlocked. Slider was pulled back until it stopped and then " removed.    Correct placement of the implant was confirmed by palpation in the patient's arm and visualizing the purple top of the obturator.   Bandage and pressure dressing applied to insertion site.    Lot # [id number]  Exp: 11/12/2021    EBL: minimal    Complications: none    ASSESSMENT:     ICD-10-CM    1. Pre-procedure lab exam Z01.812 HCG Qual, Urine ([id number])   2. Insertion of implantable subdermal contraceptive Z30.017         PLAN:    Given 's handouts, including when to have Nexplanon removed, list of danger s/sx, side effects and follow up recommended. Encouraged condom use for prevention of STD. Back up contraception advised for 7 days. Advised to call for any fever, for prolonged or severe pain or bleeding, abnormal vaginal dischage. She was advised to use pain medications (ibuprofen) as needed for mild to moderate pain.     She is currently using POPs along with her depo injections to help decrease the amount of pain and bleeding she currently has with each cycle. I recommend continued use of POPs for at least 4 months to aid in symptom relief. If bleeding and cramping subside with concurrent use, may discontinue POPs to assess baseline statuse with Nexplanon only. Counseled to allow at least 6 months before making a determination if Nexplanon is helpful for her symptoms. If cramping and bleeding do not improve, would recommend consult with OB/GYN to discuss possible surgical management of multiple myomas. Patient has had 2 attempts for IUD insertion, one under anesthesia and resulted in perforation of the uterus.    Елена Rivera CNM

## 2019-09-30 NOTE — NURSING NOTE
"Chief Complaint   Patient presents with     Consult     Birth control Nexplanon       Initial Ht 1.613 m (5' 3.5\")   Wt 150 kg (330 lb 9.6 oz)   Breastfeeding? No   BMI 57.64 kg/m   Estimated body mass index is 57.64 kg/m  as calculated from the following:    Height as of this encounter: 1.613 m (5' 3.5\").    Weight as of this encounter: 150 kg (330 lb 9.6 oz).  BP completed using cuff size:    Questioned patient about current smoking habits.  Pt. has never smoked.          The following HM Due: NONE      Jessica Morel MA             "

## 2019-10-10 ENCOUNTER — NURSE TRIAGE (OUTPATIENT)
Dept: NURSING | Facility: CLINIC | Age: 44
End: 2019-10-10

## 2019-10-10 ENCOUNTER — HOSPITAL ENCOUNTER (EMERGENCY)
Facility: CLINIC | Age: 44
Discharge: HOME | End: 2019-10-10
Attending: EMERGENCY MEDICINE | Admitting: EMERGENCY MEDICINE
Payer: COMMERCIAL

## 2019-10-10 VITALS
HEART RATE: 97 BPM | TEMPERATURE: 97.8 F | RESPIRATION RATE: 26 BRPM | OXYGEN SATURATION: 99 % | SYSTOLIC BLOOD PRESSURE: 171 MMHG | DIASTOLIC BLOOD PRESSURE: 78 MMHG

## 2019-10-10 DIAGNOSIS — G89.18 POST-OPERATIVE PAIN: ICD-10-CM

## 2019-10-10 DIAGNOSIS — M79.622 PAIN OF LEFT UPPER ARM: ICD-10-CM

## 2019-10-10 PROCEDURE — 25000132 ZZH RX MED GY IP 250 OP 250 PS 637: Performed by: EMERGENCY MEDICINE

## 2019-10-10 PROCEDURE — 99284 EMERGENCY DEPT VISIT MOD MDM: CPT | Mod: 25

## 2019-10-10 PROCEDURE — 76882 US LMTD JT/FCL EVL NVASC XTR: CPT

## 2019-10-10 RX ORDER — LIDOCAINE 50 MG/G
1 PATCH TOPICAL EVERY 24 HOURS
Qty: 10 PATCH | Refills: 0 | Status: SHIPPED | OUTPATIENT
Start: 2019-10-10 | End: 2020-01-10

## 2019-10-10 RX ORDER — IBUPROFEN 200 MG
400 TABLET ORAL ONCE
Status: COMPLETED | OUTPATIENT
Start: 2019-10-10 | End: 2019-10-10

## 2019-10-10 RX ORDER — LIDOCAINE 4 G/G
1 PATCH TOPICAL ONCE
Status: DISCONTINUED | OUTPATIENT
Start: 2019-10-10 | End: 2019-10-10 | Stop reason: HOSPADM

## 2019-10-10 RX ADMIN — LIDOCAINE 1 PATCH: 560 PATCH PERCUTANEOUS; TOPICAL; TRANSDERMAL at 21:31

## 2019-10-10 RX ADMIN — IBUPROFEN 400 MG: 200 TABLET, FILM COATED ORAL at 21:20

## 2019-10-10 ASSESSMENT — ENCOUNTER SYMPTOMS
FEVER: 0
MYALGIAS: 1
CHILLS: 0

## 2019-10-10 NOTE — ED AVS SNAPSHOT
[facility] Emergency Department  [address]  [facility] [address]  Phone:  [phone]  Fax:  [phone]                                    Coni Newberry   MRN: [id number]    Department:  [facility] Emergency Department   Date of Visit:  10/10/2019           After Visit Summary Signature Page    I have received my discharge instructions, and my questions have been answered. I have discussed any challenges I see with this plan with the nurse or doctor.    ..........................................................................................................................................  Patient/Patient Representative Signature      ..........................................................................................................................................  Patient Representative Print Name and Relationship to Patient    ..................................................               ................................................  Date                                   Time    ..........................................................................................................................................  Reviewed by Signature/Title    ...................................................              ..............................................  Date                                               Time          22EPIC Rev 08/18

## 2019-10-11 ENCOUNTER — TELEPHONE (OUTPATIENT)
Dept: OBGYN | Facility: CLINIC | Age: 44
End: 2019-10-11

## 2019-10-11 NOTE — ED TRIAGE NOTES
Patient had a birth control implant two weeks ago.  States it has been painful since it was put in but tonight it got red and very painful.      ABCs intact.  Alert and oriented x 3.

## 2019-10-11 NOTE — TELEPHONE ENCOUNTER
"Patient states she has Left upper arm pain. States \"20 minutes ago I was lifting a pan and felt like something ripped in my arm.\"  States had a birth control implant device placed at the site of the Left arm pain 1-2 weeks ago.    Currently describes pain as \"10\" on a 1-10 pain scale.   States at rest and use of ice pack the pain is \"3-5.\"  States the area is \"turning purple.\"   States might be starting to swell.  Denies weakness or numbness Left arm and Left hand.    Protocol-  Arm Pain- Adult  Care advice reviewed.   Disposition-  See a Physician within 4 hours.  Caller states understanding of the recommended disposition.   Caller plans to go to [place] ED now.  Advised to call back if further questions or concerns.     JUAN C Kinney RN  [facility]     Reason for Disposition    [1] Arm pains with exertion (e.g., walking) AND [2] pain goes away on resting AND [3] not present now    Additional Information    Negative: Shock suspected (e.g., cold/pale/clammy skin, too weak to stand, low BP, rapid pulse)    Negative: [1] Similar pain previously AND [2] it was from \"heart attack\"    Negative: [1] Similar pain previously AND [2] it was from \"angina\" AND [3] not relieved by nitroglycerin    Negative: Sounds like a life-threatening emergency to the triager    Negative: Difficulty breathing or unusual sweating (e.g., sweating without exertion)    Negative: [1] Age > 40 AND [2] associated chest or jaw pain AND [3] pain lasts > 5 minutes    Negative: [1] Age > 40 AND [2] no obvious cause AND [3] pain even when not moving the arm    (Exception: pain is clearly made worse by moving arm or bending neck)    Negative: [1] SEVERE pain AND [2] not improved 2 hours after pain medicine    Negative: [1] Red area or streak AND [2] fever    Negative: [1] Swollen joint AND [2] fever    Negative: Patient sounds very sick or weak to the triager    Negative: [1] Red area or streak AND [2] large (> 2 in. or 5 cm)    " Negative: Entire arm is swollen    Negative: [1] Cast on wrist or arm AND [2] now increased pain    Negative: Weakness (i.e., loss of strength) in hand or fingers     (Exception: not truly weak; hand feels weak because of pain)    Protocols used: ARM PAIN-A-AH

## 2019-10-11 NOTE — ED PROVIDER NOTES
History     Chief Complaint:  Arm Pain    HPI   Coni Newberry is a 44 year old female who presents with her  to the ED for evaluation of left upper arm pain. The patient reports she had a Nexplanon implanted in her arm on 9/30. She has been having pain after the implant. The patient notes the pain worsened this evening. Movement particularly worsens the pain. She has associated redness and swelling. The patient denies any fever, chills, or other symptoms/complaints.      Allergies:  No known drug allergies    Medications:    Albuterol inhaler  Atorvastatin  Qvar inhaler  Zyrtec  Glyburide  Duoneb  Lisinopril   Depo-Provera  Metformin  Singulair  Micronor  Omeprazole  Tizanidine    Past Medical History:    Type 2 DM  Thoracic/lumbosacral neuritis/radiculitis   Ovarian cyst  Nummular eczema  Asthma  HTN  Heart murmur     Past Surgical History:    Left ovarian cystectomy   Endometrium ablation w/ cervix dilation   Left salpingo-oophorectomy      Family History:    Diabetes: mother  Diabetes, HTN: father  Diabetes: sister     Social History:  Smoking status: Never smoker    Alcohol use: Rarely   Presents to ED with     Marital Status:   [2]     Review of Systems   Constitutional: Negative for chills and fever.   Musculoskeletal: Positive for myalgias.   Skin: Positive for rash.   All other systems reviewed and are negative.    Physical Exam   First Vitals:  BP: (!) 190/86  Pulse: 97  Temp: 97.8  F (36.6  C)  Resp: 26  SpO2: 99 %    Physical Exam  General:  No respiratory distress    Cardiovascular: Good cap refill.    Respiratory: Breathing non labored.     Musculoskeletal: No tenderness. No bony deformity.     Skin: Left medial upper arm: there's a mild area of erythema. Palpable foreign body under skin with mild tenderness.     Neurologic: non focal      Psychiatric: Appropriate     Emergency Department Course     Procedures:      Limited Bedside Screening Ultrasound     PERFORMED BY:  Garret Velasquez MD  BODY AREA IMAGED: Left upper medial arm  FINDINGS: No fluid collection.  Interventions:  2120: Ibuprofen 400mg PO  2131: Lidocaine 4% 1 patch Transdermal    Emergency Department Course:  Past medical records, nursing notes, and vitals reviewed.  2054: I performed an exam of the patient and obtained history, as documented above.    2125: I performed the bedside ultrasound as noted above.    Findings and plan explained to the Patient and spouse. Patient discharged home with instructions regarding supportive care, medications, and reasons to return. The importance of close follow-up was reviewed. The patient was prescribed Lidocaine patch.     Impression & Plan      Medical Decision Making:  The patient presented complaining of left arm pain.  I did consider the possibility of ACS, however with the patient being tender in the location being over the area where the implant was performed this is most consistent with a local tissue reaction.  I think there is some element of postoperative pain.  I did consider abscess and therefore did perform an ultrasound that did not show any fluid collection.  Regarding pain control I did tell her to use ibuprofen and place a Lidoderm patch over the top to help numb the area we discussed symptomatic treatment like ice but she does agree to follow-up with her OB provider regarding potential removal if other pain management techniques do not work.  I do not do that there is a charan infection.  There is no tense compartment and does not appear to be related to be related to muscle or bone.    Diagnosis:    ICD-10-CM   1. Pain of left upper arm M79.622   2. Post-operative pain G89.18     Disposition: Patient discharged to home with       Discharge Medications:  New Prescriptions    LIDOCAINE (LIDODERM) 5 % PATCH    Place 1 patch onto the skin every 24 hours for 10 days     Monique Cage  10/10/2019   [facility] EMERGENCY  DEPARTMENT    Scribe Disclosure:  I, Monique Fauzia, am serving as a scribe at 8:54 PM on 10/10/2019 to document services personally performed by Garret Jo MD based on my observations and the provider's statements to me.        Garret Jo MD  10/10/19 [id number]

## 2019-10-11 NOTE — TELEPHONE ENCOUNTER
"Patient was in ED for eval of Nexplanon.  She felt as if it had \"moved\" and was \"tearing muscle\".  ED did US and looked as if it was in pace as should be.  Consulted with Coni Avila (on call) she said give the patient the option to wait a week and see if pain worsens. If it does to call back.   Patient was ok with this plan.  "

## 2019-10-14 NOTE — PROGRESS NOTES
Nexplanon Removal:   Coni is here requesting removal of nexplanon. She had it placed about 1 month ago but she has had pain at site, discomfort with movement and has even been seen in the ER since placement. Would like to go back on depoprovera but states she also needed OCPs to help control bleeding.      Is a pregnancy test required: No.  Was a consent obtained?  Yes    Coni Newberry is here for removal of etonogestrel implant Nexplanon/Implanon    Indication: patient request      Preoperative Diagnosis: etonogestrel implant  Postoperative Diagnosis: etonogestrel implant removed    Technique: On the left arm  Skin prep Betadine  Anesthesia 1% lidocaine, with epi  Procedure: Small incision (<5mm) was made at distal end of palpable implant, curved hemostat or mosquito forceps was used to isolate the implant and bring it to the incision, the fibrous capsule containing the implant  was incised and the Implant was removed intact.      EBL: minimal  Complications:  No  Tolerance:  Pt tolerated procedure well and was in stable condition.   Dressing:    A pressure bandage was placed for the next 12-24 hours.    Contraception was discussed and patient chose the following method depoprovera        Follow up: Pt was instructed to call if bleeding, severe pain or foul smell.   Recommend back up method for 7 days, use condoms as back up method not OCPs  Per CDC patients with Obesity depo provera is risk level 1  Depoprovera injection done following procedure  Discussed risk of blood clots, increased BP and CV risk with OCP use especially coupled with another form of contraception especially at her current BMI when she is already at risk  Recommend patient try depo for 3-6 months before adding in another form  If heavy bleeding occurs it may be related to history of fibroids, option for surgical consult with MD to discuss options for management  Patient plans follow up with OB GYN MD for consult, annual, pap smear, and  2nd depoprovera after the first of the year    Jessica Thorne, APRN CNM

## 2019-10-16 ENCOUNTER — OFFICE VISIT (OUTPATIENT)
Dept: OBGYN | Facility: CLINIC | Age: 44
End: 2019-10-16
Payer: COMMERCIAL

## 2019-10-16 VITALS
DIASTOLIC BLOOD PRESSURE: 90 MMHG | HEART RATE: 80 BPM | SYSTOLIC BLOOD PRESSURE: 168 MMHG | WEIGHT: 293 LBS | HEIGHT: 64 IN | BODY MASS INDEX: 50.02 KG/M2

## 2019-10-16 DIAGNOSIS — Z30.432 ENCOUNTER FOR REMOVAL OF INTRAUTERINE CONTRACEPTIVE DEVICE: Primary | ICD-10-CM

## 2019-10-16 DIAGNOSIS — Z86.018 HISTORY OF UTERINE FIBROID: ICD-10-CM

## 2019-10-16 DIAGNOSIS — Z30.42 DEPO-PROVERA CONTRACEPTIVE STATUS: ICD-10-CM

## 2019-10-16 PROCEDURE — 96372 THER/PROPH/DIAG INJ SC/IM: CPT | Mod: 59 | Performed by: ADVANCED PRACTICE MIDWIFE

## 2019-10-16 PROCEDURE — 11982 REMOVE DRUG IMPLANT DEVICE: CPT | Performed by: ADVANCED PRACTICE MIDWIFE

## 2019-10-16 RX ORDER — MEDROXYPROGESTERONE ACETATE 150 MG/ML
150 INJECTION, SUSPENSION INTRAMUSCULAR ONCE
Status: COMPLETED | OUTPATIENT
Start: 2019-10-16 | End: 2019-10-16

## 2019-10-16 RX ADMIN — MEDROXYPROGESTERONE ACETATE 150 MG: 150 INJECTION, SUSPENSION INTRAMUSCULAR at 12:44

## 2019-10-16 ASSESSMENT — MIFFLIN-ST. JEOR: SCORE: 2127.9

## 2019-10-16 NOTE — NURSING NOTE
The following medication was given:     MEDICATION: Depo Provera 150mg  ROUTE: IM   SITE: Deltoid - Right  DOSE: 150 mg  LOT #: [id number]   :  Mylan   EXPIRATION DATE:  01/2021  NDC#: 24205-646-83

## 2019-10-30 DIAGNOSIS — B37.2 YEAST INFECTION OF THE SKIN: ICD-10-CM

## 2019-10-30 NOTE — LETTER
[facility]  [address]  [facility] [address]  Phone: [phone]        October 31, 2019      Beatriz Matti Newberry                                                                                                                                [address]  [facility] [address]            Dear Ms. Matti Newberry,    We are concerned about your health care.  We recently provided you with a medication refill.  Many medications require routine follow-up with your Doctor.      At this time we ask that: You come to your clinic for routine labs for medication monitoring.  Please call the clinic at [phone] to schedule a lab only appointment asap.  At your last office visit in July,  Farida wanted you to come back for fasting labs in 3 months.  Thanks for your understanding in this matter.      Your prescription: Has been refilled once so you may have time for the above noted follow-up.      Thank you,      [facility]

## 2019-10-30 NOTE — TELEPHONE ENCOUNTER
"Requested Prescriptions   Pending Prescriptions Disp Refills     nystatin (MYCOSTATIN) 129279 UNIT/GM external ointment [Pharmacy Med Name: NYSTATIN [id number]    OIN]  1     Sig: APPLY  OINTMENT TOPICALLY AS NEEDED   Last Written Prescription Date:  07/15/2019  Last Fill Quantity: 30 g,  # refills: 0   Last office visit: 7/18/2019 with prescribing provider:     Future Office Visit:   Next 5 appointments (look out 90 days)    Jan 06, 2020  8:30 AM CST  [facility] [address]  [address]  [facility] [address]  [phone]           Antifungal Agents Passed - 10/30/2019  6:03 AM        Passed - Recent (12 mo) or future (30 days) visit within the authorizing provider's specialty     Patient has had an office visit with the authorizing provider or a provider within the authorizing providers department within the previous 12 mos or has a future within next 30 days. See \"Patient Info\" tab in inbasket, or \"Choose Columns\" in Meds & Orders section of the refill encounter.              Passed - Not Fluconazole or Terconazole      If oral Fluconazole or Terconazole, may refill if indicated in progress notes.           Passed - Medication is active on med list        "

## 2019-10-31 ENCOUNTER — TELEPHONE (OUTPATIENT)
Dept: INTERNAL MEDICINE | Facility: CLINIC | Age: 44
End: 2019-10-31

## 2019-10-31 DIAGNOSIS — B37.2 YEAST INFECTION OF THE SKIN: ICD-10-CM

## 2019-10-31 RX ORDER — NYSTATIN 100000 U/G
OINTMENT TOPICAL
Qty: 30 G | Refills: 3 | Status: SHIPPED | OUTPATIENT
Start: 2019-10-31 | End: 2020-05-07

## 2019-10-31 RX ORDER — NYSTATIN 100000 U/G
OINTMENT TOPICAL
Qty: 30 G | Refills: 0 | Status: SHIPPED | OUTPATIENT
Start: 2019-10-31 | End: 2019-10-31

## 2019-10-31 NOTE — TELEPHONE ENCOUNTER
[facility] [place] [facility] calling about the nystatin ointment. The directions need to say where it is applied for billing purposes and also they need to know if the patient will apply it once daily PRN or twice daily PRN.

## 2019-10-31 NOTE — TELEPHONE ENCOUNTER
Please see message below and advise on directions. Per chart, this has been prescribed since 2014, to be applied under skin folds.

## 2019-10-31 NOTE — TELEPHONE ENCOUNTER
Medication is being filled for 1 time refill only due to:  Patient needs to be seen because at last office visit on 7/18/19, pt was to follow up for labs in about 3 months.    Reminder letter mailed to patient as patient not reading my chart messages.

## 2019-11-16 DIAGNOSIS — E11.9 TYPE 2 DIABETES MELLITUS WITHOUT COMPLICATION, WITHOUT LONG-TERM CURRENT USE OF INSULIN (H): ICD-10-CM

## 2019-11-18 NOTE — TELEPHONE ENCOUNTER
"Requested Prescriptions   Pending Prescriptions Disp Refills     glyBURIDE (DIABETA /MICRONASE) 5 MG tablet [Pharmacy Med Name: GLYBURIDE 5MG TAB] 90 tablet 3     Sig: TAKE 1 TABLET BY MOUTH ONCE DAILY WITH BREAKFAST       Sulfonylurea Agents Failed - 11/16/2019 10:04 AM        Failed - Blood pressure less than 140/90 in past 6 months     BP Readings from Last 3 Encounters:   10/16/19 (!) 168/90   10/10/19 (!) 171/78   08/21/19 130/80                 Failed - Patient has documented LDL within the past 12 mos.     Recent Labs   Lab Test 07/19/17  0846   LDL 62             Passed - Patient has had a Microalbumin in the past 15 mos.     Recent Labs   Lab Test 11/15/18  1754   MICROL 66   UMALCR 16.73             Passed - Patient has documented A1c within the specified period of time.     If HgbA1C is 8 or greater, it needs to be on file within the past 3 months.  If less than 8, must be on file within the past 6 months.     Recent Labs   Lab Test 06/29/19  0948   A1C 7.4*             Passed - Medication is active on med list        Passed - Patient is age 18 or older        Passed - No active pregnancy on record        Passed - Patient has a recent creatinine (normal) within the past 12 mos.     Recent Labs   Lab Test 03/22/19  1038   CR 0.74             Passed - Patient has not had a positive pregnancy test within the past 12 mos.        Passed - Recent (6 mo) or future (30 days) visit within the authorizing provider's specialty     Patient had office visit in the last 6 months or has a visit in the next 30 days with authorizing provider or within the authorizing provider's specialty.  See \"Patient Info\" tab in inbasket, or \"Choose Columns\" in Meds & Orders section of the refill encounter.            Last Written Prescription Date:  11/15/18  Last Fill Quantity: 90,  # refills: 3   Last office visit: 7/18/2019 with prescribing provider:  7/18/19   Future Office Visit:   Next 5 appointments (look out 90 days)    Suresh " 06, 2020  8:30 AM CST  Office Visit with Misael Andrews MD  [facility] [address]  [address]  [facility] [id number] [phone]

## 2019-11-19 RX ORDER — GLYBURIDE 5 MG/1
TABLET ORAL
Qty: 90 TABLET | Refills: 3 | Status: SHIPPED | OUTPATIENT
Start: 2019-11-19 | End: 2020-12-14

## 2019-11-19 NOTE — TELEPHONE ENCOUNTER
Routing refill request to provider for review/approval because:  Labs not current:  LDL    BP also out of range for RN approval.  Christelle Barnett RN

## 2019-12-28 DIAGNOSIS — L30.0 NUMMULAR ECZEMA: ICD-10-CM

## 2019-12-30 NOTE — TELEPHONE ENCOUNTER
"Requested Prescriptions   Pending Prescriptions Disp Refills     triamcinolone (KENALOG) 0.1 % external ointment [Pharmacy Med Name: Triamcinolone Acetonide 0.1 % External Ointment]  Last Written Prescription Date:  7/18/2019  Last Fill Quantity: 90g,  # refills: 3   Last office visit: 7/18/2019 with prescribing provider:     Future Office Visit:   Next 5 appointments (look out 90 days)    Jan 06, 2020  8:30 AM CST  Office Visit with Misael Andrews MD  [facility] [address]  [address]  [facility] [address]  [phone]        80 g 0     Sig: APPLY OINTMENT SPARINGLY TO AFFECTED AREA THREE TIMES DAILY AS NEEDED FOR ECZEMA RASH       Topical Steroids and Nonsteroidals Protocol Passed - 12/28/2019  5:34 PM        Passed - Patient is age 6 or older        Passed - Authorizing prescriber's most recent note related to this medication read.     If refill request is for ophthalmic use, please forward request to provider for approval.          Passed - High potency steroid not ordered        Passed - Recent (12 mo) or future (30 days) visit within the authorizing provider's specialty     Patient has had an office visit with the authorizing provider or a provider within the authorizing providers department within the previous 12 mos or has a future within next 30 days. See \"Patient Info\" tab in inbasket, or \"Choose Columns\" in Meds & Orders section of the refill encounter.              Passed - Medication is active on med list        "

## 2019-12-31 RX ORDER — TRIAMCINOLONE ACETONIDE 1 MG/G
OINTMENT TOPICAL
Qty: 80 G | Refills: 1 | Status: SHIPPED | OUTPATIENT
Start: 2019-12-31 | End: 2020-08-18

## 2019-12-31 NOTE — TELEPHONE ENCOUNTER
Prescription approved per [facility], [facility] or [facility] refill protocol.  Kitty ORTIZ - Registered Nurse  [facility]  Acute and Diagnostic Services

## 2020-01-10 ENCOUNTER — OFFICE VISIT (OUTPATIENT)
Dept: INTERNAL MEDICINE | Facility: CLINIC | Age: 45
End: 2020-01-10
Payer: COMMERCIAL

## 2020-01-10 VITALS
HEIGHT: 64 IN | BODY MASS INDEX: 50.02 KG/M2 | TEMPERATURE: 97.9 F | DIASTOLIC BLOOD PRESSURE: 76 MMHG | SYSTOLIC BLOOD PRESSURE: 150 MMHG | HEART RATE: 88 BPM | OXYGEN SATURATION: 99 % | RESPIRATION RATE: 24 BRPM | WEIGHT: 293 LBS

## 2020-01-10 DIAGNOSIS — J32.0 MAXILLARY SINUSITIS, UNSPECIFIED CHRONICITY: Primary | ICD-10-CM

## 2020-01-10 PROCEDURE — 99214 OFFICE O/P EST MOD 30 MIN: CPT | Performed by: INTERNAL MEDICINE

## 2020-01-10 RX ORDER — CEFDINIR 300 MG/1
300 CAPSULE ORAL 2 TIMES DAILY
Qty: 20 CAPSULE | Refills: 0 | Status: SHIPPED | OUTPATIENT
Start: 2020-01-10 | End: 2020-02-13

## 2020-01-10 ASSESSMENT — MIFFLIN-ST. JEOR: SCORE: 2130.62

## 2020-01-10 NOTE — PROGRESS NOTES
Subjective     Coni Newberry is a 44 year old female who presents to clinic today for the following health issues:    HPI      Pt is a 44 year old female who is seen here to day complaints of sinus pressure , sinus congestion, purulent nasal drainage since 5 to 6 days but worse since 3 days, also complains of cough greenish sputum, no fever but had chills.  Has been using over-the-counter DayQuil and   NyQuil without symptom relief. Has history of asthma and has been using more than usual albuterol inhaler for shortness of breath.  No wheezing at this time.  Also on Qvar.  Has history of allergic rhinitis and is on Zyrtec and Flonase nasal spray.      Patient Active Problem List   Diagnosis     Mild persistent asthma     Nummular eczema     Hyperglycemia     Health Care Home     Type 2 diabetes mellitus without complication (H)     Hyperlipidemia with target LDL less than 70     Lumbago     Thoracic or lumbosacral neuritis or radiculitis, unspecified     Nonallopathic lesion of lumbar region     Nonallopathic lesion of thoracic region     Obesity     History of left salpingo-oophorectomy     Hemorrhagic cyst of ovary     Essential hypertension with goal blood pressure less than 140/90     Obesity, Class III, BMI 40-49.9 (morbid obesity) (H)     Abnormal uterine bleeding (AUB)     Past Surgical History:   Procedure Laterality Date     CYSTECTOMY OVARIAN BENIGN Left 2/23/2016    Procedure: CYSTECTOMY OVARIAN BENIGN;  Surgeon: Marisela Lopez MD;  Location: [facility] OR     CYSTOSCOPY N/A 1/3/2018    Procedure: CYSTOSCOPY;;  Surgeon: Lissy Maya DO;  Location: [facility] OR     DILATE CERVIX, HYSTEROSCOPY, ABLATE ENDOMETRIUM, COMBINED N/A 1/3/2018    Procedure: COMBINED DILATE CERVIX, HYSTEROSCOPY, ABLATE ENDOMETRIUM;  Hysteroscopy, cervical dilation, and cystoscopy;  Surgeon: Lissy Maya DO;  Location: [facility] OR     NONE OF THE ABOVE CORE MEASURE DIAGNOSES       SALPINGO-OOPHORECTOMY, COMBINED Left 2/23/2016     Procedure: COMBINED SALPINGO-OOPHORECTOMY;  Surgeon: Marisela Lopez MD;  Location: [facility] OR       Social History     Tobacco Use     Smoking status: Never Smoker     Smokeless tobacco: Never Used   Substance Use Topics     Alcohol use: Yes     Alcohol/week: 0.0 standard drinks     Comment: 1-2 drinks yearly     Family History   Problem Relation Age of Onset     Diabetes Mother      Diabetes Father      Hypertension Father      Diabetes Sister      Cancer Paternal Grandfather          Current Outpatient Medications   Medication Sig Dispense Refill     albuterol (PROAIR HFA/PROVENTIL HFA/VENTOLIN HFA) 108 (90 Base) MCG/ACT inhaler INHALE 2 PUFFS BY MOUTH EVERY 6 HOURS 1 Inhaler 11     atorvastatin (LIPITOR) 10 MG tablet Take 1 tablet (10 mg) by mouth daily 90 tablet 3     beclomethasone HFA (QVAR REDIHALER) 80 MCG/ACT inhaler Inhale 2 puffs into the lungs 2 times daily 1 Inhaler 11     blood glucose monitoring (ONE TOUCH ULTRA 2) meter device kit Use to test blood sugars 1 times daily or as directed. 1 kit 0     blood glucose monitoring (ONE TOUCH ULTRA) test strip Use to test blood sugars 1 times daily or as directed. 3 Box 1     blood glucose test strip Use to test blood sugar 6 times daily or as directed. 600 strip 3     cefdinir (OMNICEF) 300 MG capsule Take 1 capsule (300 mg) by mouth 2 times daily 20 capsule 0     cetirizine-psuedoePHEDrine (ZYRTEC-D) 5-120 MG per tablet Take 1 tablet by mouth daily Reported on 3/31/2017       fluticasone (FLONASE) 50 MCG/ACT nasal spray Spray 2 sprays into both nostrils daily 3 Package 3     glyBURIDE (DIABETA /MICRONASE) 5 MG tablet TAKE 1 TABLET BY MOUTH ONCE DAILY WITH BREAKFAST 90 tablet 3     ibuprofen (ADVIL/MOTRIN) 800 MG tablet Take 1 tablet (800 mg) by mouth every 6 hours as needed for moderate pain 30 tablet 0     ipratropium - albuterol 0.5 mg/2.5 mg/3 mL (DUONEB) 0.5-2.5 (3) MG/3ML neb solution USE ONE AMPULE IN NEBULIZER EVERY 6 HOURS AS NEEDED FOR  SHORTNESS  OF   "BREATH/DYSPNEA  OR  WHEEZING 90 vial 3     LANCETS ULTRA FINE MISC 1 each 6 times daily 300 each 2     lisinopril (PRINIVIL/ZESTRIL) 2.5 MG tablet Take 1 tablet (2.5 mg) by mouth daily 90 tablet 1     metFORMIN (GLUCOPHAGE) 500 MG tablet Take 2.5 tablets (1,250 mg) by mouth 2 times daily (with meals) 450 tablet 1     montelukast (SINGULAIR) 10 MG tablet TAKE 1 TABLET BY MOUTH AT BEDTIME 90 tablet 3     nystatin (MYCOSTATIN) 862538 UNIT/GM external ointment APPLY  OINTMENT TOPICALLY TWICE DAILY AS NEEDED TO SKIN FOLD RASH 30 g 3     omeprazole (PRILOSEC) 20 MG DR capsule TAKE 1 TABLET BY MOUTH ONCE DAILY 30-60 MINUTES BEFORE A MEAL 90 capsule 3     order for DME Equipment being ordered: tennis elbow brace 1 Device 0     order for DME Equipment being ordered: Nebulizer and tubing 1 Device 0     order for DME Equipment being ordered: neb tubing and cup 2 Device prn     tiZANidine (ZANAFLEX) 4 MG capsule Take 1 capsule (4 mg) by mouth nightly as needed for muscle spasms 30 capsule 0     triamcinolone (KENALOG) 0.1 % external ointment APPLY OINTMENT SPARINGLY TO AFFECTED AREA THREE TIMES DAILY AS NEEDED FOR ECZEMA RASH 80 g 1         Reviewed and updated as needed this visit by Provider         Review of Systems   ROS COMP: CONSTITUTIONAL: NEGATIVE for fever, chills, change in weight  EYES: NEGATIVE for vision changes or irritation  ENT/MOUTH: nasal congestion, postnasal drainage, rhinorrhea-purulent and sinus pressure  RESP:cough-productive and Hx asthma  CV: NEGATIVE for chest pain, palpitations or peripheral edema  MUSCULOSKELETAL: NEGATIVE for significant arthralgias or myalgia      Objective    BP (!) 150/76 (BP Location: Other (Comment), Patient Position: Sitting, Cuff Size: Adult Large)   Pulse 88   Temp 97.9  F (36.6  C) (Oral)   Resp 24   Ht 1.619 m (5' 3.75\")   Wt 150 kg (330 lb 9.6 oz)   SpO2 99%   BMI 57.19 kg/m    Body mass index is 57.19 kg/m .  Physical Exam   GENERAL: healthy, alert and no " distress  EYES: Eyes grossly normal to inspection, PERRL and conjunctivae and sclerae normal  HENT: ear canals and TM's normal, nose and mouth without ulcers or lesions, tenderness on bilateral maxiallry sinusses  NECK: no adenopathy, no asymmetry, masses, or scars and thyroid normal to palpation  RESP: lungs clear to auscultation - no rales, rhonchi or wheezes at this time   CV: regular rate and rhythm,          Assessment & Plan     (J32.0) Maxillary sinusitis, unspecified chronicity  (primary encounter diagnosis)  Plan: pt says Sanaz does not help her symptoms, started on cefdinir (OMNICEF) 300 MG capsule as directed.explained clearly about the medication,insructions and side effects.  Continue to use albuterol as needed for shortness of breath and wheezing, over-the-counter cough syrup as needed.Call or return to clinic prn if these symtoms worsen, fail to improve as anticipated, or if new symptoms develop.    Diabetes/elevated BP-,needs labs and OV , advised to f/u with PCP          Julissa Aldana MD  [facility]

## 2020-01-13 ENCOUNTER — ANCILLARY PROCEDURE (OUTPATIENT)
Dept: GENERAL RADIOLOGY | Facility: CLINIC | Age: 45
End: 2020-01-13
Attending: INTERNAL MEDICINE
Payer: COMMERCIAL

## 2020-01-13 ENCOUNTER — OFFICE VISIT (OUTPATIENT)
Dept: INTERNAL MEDICINE | Facility: CLINIC | Age: 45
End: 2020-01-13
Payer: COMMERCIAL

## 2020-01-13 VITALS
OXYGEN SATURATION: 96 % | HEART RATE: 129 BPM | BODY MASS INDEX: 50.02 KG/M2 | TEMPERATURE: 98.2 F | DIASTOLIC BLOOD PRESSURE: 90 MMHG | WEIGHT: 293 LBS | HEIGHT: 64 IN | SYSTOLIC BLOOD PRESSURE: 144 MMHG | RESPIRATION RATE: 24 BRPM

## 2020-01-13 DIAGNOSIS — J45.30 MILD PERSISTENT ASTHMA WITHOUT COMPLICATION: ICD-10-CM

## 2020-01-13 DIAGNOSIS — J06.9 UPPER RESPIRATORY TRACT INFECTION, UNSPECIFIED TYPE: ICD-10-CM

## 2020-01-13 DIAGNOSIS — E11.9 TYPE 2 DIABETES MELLITUS WITHOUT COMPLICATION, WITHOUT LONG-TERM CURRENT USE OF INSULIN (H): ICD-10-CM

## 2020-01-13 DIAGNOSIS — J06.9 UPPER RESPIRATORY TRACT INFECTION, UNSPECIFIED TYPE: Primary | ICD-10-CM

## 2020-01-13 LAB
FLUAV+FLUBV AG SPEC QL: NEGATIVE
FLUAV+FLUBV AG SPEC QL: NEGATIVE
SPECIMEN SOURCE: NORMAL

## 2020-01-13 PROCEDURE — 99214 OFFICE O/P EST MOD 30 MIN: CPT | Performed by: INTERNAL MEDICINE

## 2020-01-13 PROCEDURE — 71046 X-RAY EXAM CHEST 2 VIEWS: CPT

## 2020-01-13 PROCEDURE — 87804 INFLUENZA ASSAY W/OPTIC: CPT | Mod: 59 | Performed by: INTERNAL MEDICINE

## 2020-01-13 RX ORDER — PREDNISONE 20 MG/1
20 TABLET ORAL 2 TIMES DAILY
Qty: 10 TABLET | Refills: 0 | Status: SHIPPED | OUTPATIENT
Start: 2020-01-13 | End: 2020-02-13

## 2020-01-13 ASSESSMENT — MIFFLIN-ST. JEOR: SCORE: 2109.76

## 2020-01-13 NOTE — LETTER
[place] [address]  [facility] [address]  [phone]          January 13, 2020    RE:  Coni Newberry                                                                                                                                                       [address]  [facility] [address]            To whom it may concern:    Coni eNwberry is under my professional care for Upper respiratory tract infection, unspecified type     Please excuse from work for medical reasons from 01/10/20 till 01/15/20.     Sincerely,        Lester Temple MD

## 2020-01-13 NOTE — PROGRESS NOTES
Subjective     Coni Newberry is a 44 year old female who presents to clinic today for the following health issues:    HPI   URI:     Presents with persistent cough, sore throat, SOB.   Seen here 3 days ago. Started on Cefdinir for URI, history of asthma.   Taking her antibiotic and using her inhalers.   No chest pain. Has had wheezing. Cough is productive of yellow green phlegm.    No fevers.   Has H/O DM. On diet , exercise and PO treatment. Blood sugars are controlled. No parestesias. No hypoglycemias.          Patient Active Problem List   Diagnosis     Mild persistent asthma     Nummular eczema     Hyperglycemia     Health Care Home     Type 2 diabetes mellitus without complication (H)     Hyperlipidemia with target LDL less than 70     Lumbago     Thoracic or lumbosacral neuritis or radiculitis, unspecified     Nonallopathic lesion of lumbar region     Nonallopathic lesion of thoracic region     Obesity     History of left salpingo-oophorectomy     Hemorrhagic cyst of ovary     Essential hypertension with goal blood pressure less than 140/90     Obesity, Class III, BMI 40-49.9 (morbid obesity) (H)     Abnormal uterine bleeding (AUB)     Past Surgical History:   Procedure Laterality Date     CYSTECTOMY OVARIAN BENIGN Left 2/23/2016    Procedure: CYSTECTOMY OVARIAN BENIGN;  Surgeon: Marisela Lopez MD;  Location: [facility] OR     CYSTOSCOPY N/A 1/3/2018    Procedure: CYSTOSCOPY;;  Surgeon: Lissy Maya DO;  Location: [facility] OR     DILATE CERVIX, HYSTEROSCOPY, ABLATE ENDOMETRIUM, COMBINED N/A 1/3/2018    Procedure: COMBINED DILATE CERVIX, HYSTEROSCOPY, ABLATE ENDOMETRIUM;  Hysteroscopy, cervical dilation, and cystoscopy;  Surgeon: Lissy Maya DO;  Location: [facility] OR     NONE OF THE ABOVE CORE MEASURE DIAGNOSES       SALPINGO-OOPHORECTOMY, COMBINED Left 2/23/2016    Procedure: COMBINED SALPINGO-OOPHORECTOMY;  Surgeon: Marisela Lopez MD;  Location: [facility] OR       Social History     Tobacco Use     Smoking  status: Never Smoker     Smokeless tobacco: Never Used   Substance Use Topics     Alcohol use: Yes     Alcohol/week: 0.0 standard drinks     Comment: 1-2 drinks yearly     Family History   Problem Relation Age of Onset     Diabetes Mother      Diabetes Father      Hypertension Father      Diabetes Sister      Cancer Paternal Grandfather          Current Outpatient Medications   Medication Sig Dispense Refill     albuterol (PROAIR HFA/PROVENTIL HFA/VENTOLIN HFA) 108 (90 Base) MCG/ACT inhaler INHALE 2 PUFFS BY MOUTH EVERY 6 HOURS 1 Inhaler 11     atorvastatin (LIPITOR) 10 MG tablet Take 1 tablet (10 mg) by mouth daily 90 tablet 3     beclomethasone HFA (QVAR REDIHALER) 80 MCG/ACT inhaler Inhale 2 puffs into the lungs 2 times daily 1 Inhaler 11     cefdinir (OMNICEF) 300 MG capsule Take 1 capsule (300 mg) by mouth 2 times daily 20 capsule 0     cetirizine-psuedoePHEDrine (ZYRTEC-D) 5-120 MG per tablet Take 1 tablet by mouth daily Reported on 3/31/2017       fluticasone (FLONASE) 50 MCG/ACT nasal spray Spray 2 sprays into both nostrils daily 3 Package 3     glyBURIDE (DIABETA /MICRONASE) 5 MG tablet TAKE 1 TABLET BY MOUTH ONCE DAILY WITH BREAKFAST 90 tablet 3     ibuprofen (ADVIL/MOTRIN) 800 MG tablet Take 1 tablet (800 mg) by mouth every 6 hours as needed for moderate pain 30 tablet 0     ipratropium - albuterol 0.5 mg/2.5 mg/3 mL (DUONEB) 0.5-2.5 (3) MG/3ML neb solution USE ONE AMPULE IN NEBULIZER EVERY 6 HOURS AS NEEDED FOR  SHORTNESS  OF  BREATH/DYSPNEA  OR  WHEEZING 90 vial 3     lisinopril (PRINIVIL/ZESTRIL) 2.5 MG tablet Take 1 tablet (2.5 mg) by mouth daily 90 tablet 1     metFORMIN (GLUCOPHAGE) 500 MG tablet Take 2.5 tablets (1,250 mg) by mouth 2 times daily (with meals) 450 tablet 1     montelukast (SINGULAIR) 10 MG tablet TAKE 1 TABLET BY MOUTH AT BEDTIME 90 tablet 3     nystatin (MYCOSTATIN) 142419 UNIT/GM external ointment APPLY  OINTMENT TOPICALLY TWICE DAILY AS NEEDED TO SKIN FOLD RASH 30 g 3      "omeprazole (PRILOSEC) 20 MG DR capsule TAKE 1 TABLET BY MOUTH ONCE DAILY 30-60 MINUTES BEFORE A MEAL 90 capsule 3     predniSONE (DELTASONE) 20 MG tablet Take 1 tablet (20 mg) by mouth 2 times daily 10 tablet 0     tiZANidine (ZANAFLEX) 4 MG capsule Take 1 capsule (4 mg) by mouth nightly as needed for muscle spasms 30 capsule 0     triamcinolone (KENALOG) 0.1 % external ointment APPLY OINTMENT SPARINGLY TO AFFECTED AREA THREE TIMES DAILY AS NEEDED FOR ECZEMA RASH 80 g 1     blood glucose monitoring (ONE TOUCH ULTRA 2) meter device kit Use to test blood sugars 1 times daily or as directed. 1 kit 0     blood glucose monitoring (ONE TOUCH ULTRA) test strip Use to test blood sugars 1 times daily or as directed. 3 Box 1     blood glucose test strip Use to test blood sugar 6 times daily or as directed. 600 strip 3     LANCETS ULTRA FINE MISC 1 each 6 times daily 300 each 2     order for DME Equipment being ordered: tennis elbow brace 1 Device 0     order for DME Equipment being ordered: Nebulizer and tubing 1 Device 0     order for DME Equipment being ordered: neb tubing and cup 2 Device prn         Reviewed and updated as needed this visit by Provider         Review of Systems   ROS COMP: Constitutional, HEENT, cardiovascular, pulmonary, gi and gu systems are negative, except as otherwise noted.      Objective    BP (!) 144/90   Pulse 129   Temp 98.2  F (36.8  C) (Oral)   Resp 24   Ht 1.619 m (5' 3.75\")   Wt 147.9 kg (326 lb)   SpO2 96%   BMI 56.40 kg/m    Body mass index is 56.4 kg/m .  Physical Exam   GENERAL: obese, alert and no distress  EYES: Eyes grossly normal to inspection, PERRL and conjunctivae and sclerae normal  HENT: ear canals and TM's normal, nose and mouth without ulcers or lesions  NECK: no adenopathy, no asymmetry, masses, or scars and thyroid normal to palpation  RESP: lungs clear to auscultation - no rales,+  rhonchi and  wheezes  CV: regular rate and rhythm, normal S1 S2, no S3 or S4, no " "murmur, click or rub, no peripheral edema and peripheral pulses strong  ABDOMEN: soft, obese, nontender, no hepatosplenomegaly, no masses and bowel sounds normal  MS: no gross musculoskeletal defects noted, no edema    Diagnostic Test Results:  Labs reviewed in Epic  Results for orders placed or performed in visit on 01/13/20 (from the past 24 hour(s))   Influenza A/B antigen   Result Value Ref Range    Influenza A/B Agn Specimen Nasal     Influenza A Negative NEG^Negative    Influenza B Negative NEG^Negative     CXR - no infiltrate         Assessment & Plan   Problem List Items Addressed This Visit     Mild persistent asthma    Type 2 diabetes mellitus without complication (H)    Relevant Medications    predniSONE (DELTASONE) 20 MG tablet      Other Visit Diagnoses     Upper respiratory tract infection, unspecified type    -  Primary    Relevant Medications    predniSONE (DELTASONE) 20 MG tablet    Other Relevant Orders    XR Chest 2 Views (Completed)    Influenza A/B antigen (Completed)         Assess for influenza - negative   Assess CXR- no infiltrate  Asthma exacerbation with URI, start on Prednisone, continue antibiotic, continue bronchodilators. Advised for side effects.   Reassess if symptoms do not improve in 2-3 days     BMI:   Estimated body mass index is 56.4 kg/m  as calculated from the following:    Height as of this encounter: 1.619 m (5' 3.75\").    Weight as of this encounter: 147.9 kg (326 lb).   Weight management plan: Discussed healthy diet and exercise guidelines        See Patient Instructions  Return in about 1 week (around 1/20/2020) for follow up on acute problem if persists.    Lester Temple MD  [facility]        "

## 2020-01-13 NOTE — NURSING NOTE
"Vital signs:  Temp: 98.2  F (36.8  C) Temp src: Oral BP: (!) 144/90 Pulse: 129   Resp: 24 SpO2: 96 %     Height: 161.9 cm (5' 3.75\") Weight: 147.9 kg (326 lb)  Estimated body mass index is 56.4 kg/m  as calculated from the following:    Height as of this encounter: 1.619 m (5' 3.75\").    Weight as of this encounter: 147.9 kg (326 lb).        "

## 2020-01-27 DIAGNOSIS — M54.31 SCIATICA OF RIGHT SIDE: ICD-10-CM

## 2020-01-27 NOTE — TELEPHONE ENCOUNTER
Requested Prescriptions   Pending Prescriptions Disp Refills     tiZANidine (ZANAFLEX) 4 MG capsule [Pharmacy Med Name: tiZANidine HCl 4 MG Oral Capsule]  0     Sig: TAKE 1 CAPSULE BY MOUTH NIGHTLY AS NEEDED FOR MUSCLE SPASM       There is no refill protocol information for this order      Last Written Prescription Date:  08/27/2019  Last Fill Quantity: 30,  # refills: 0   Last office visit: 1/13/2020 with prescribing provider:     Future Office Visit:

## 2020-01-29 RX ORDER — TIZANIDINE HYDROCHLORIDE 4 MG/1
CAPSULE, GELATIN COATED ORAL
Qty: 30 CAPSULE | Refills: 3 | Status: SHIPPED | OUTPATIENT
Start: 2020-01-29 | End: 2020-06-05

## 2020-02-05 ENCOUNTER — OFFICE VISIT (OUTPATIENT)
Dept: ORTHOPEDICS | Facility: CLINIC | Age: 45
End: 2020-02-05
Payer: COMMERCIAL

## 2020-02-05 ENCOUNTER — ANCILLARY PROCEDURE (OUTPATIENT)
Dept: GENERAL RADIOLOGY | Facility: CLINIC | Age: 45
End: 2020-02-05
Attending: FAMILY MEDICINE
Payer: COMMERCIAL

## 2020-02-05 VITALS
HEIGHT: 64 IN | DIASTOLIC BLOOD PRESSURE: 90 MMHG | BODY MASS INDEX: 50.02 KG/M2 | WEIGHT: 293 LBS | SYSTOLIC BLOOD PRESSURE: 138 MMHG

## 2020-02-05 DIAGNOSIS — M17.12 PRIMARY OSTEOARTHRITIS OF LEFT KNEE: ICD-10-CM

## 2020-02-05 DIAGNOSIS — M25.561 ACUTE PAIN OF RIGHT KNEE: Primary | ICD-10-CM

## 2020-02-05 DIAGNOSIS — M25.569 KNEE PAIN: ICD-10-CM

## 2020-02-05 PROCEDURE — 73562 X-RAY EXAM OF KNEE 3: CPT | Performed by: FAMILY MEDICINE

## 2020-02-05 PROCEDURE — 99214 OFFICE O/P EST MOD 30 MIN: CPT | Performed by: FAMILY MEDICINE

## 2020-02-05 RX ORDER — CYCLOBENZAPRINE HCL 10 MG
10 TABLET ORAL
Qty: 30 TABLET | Refills: 0 | Status: SHIPPED | OUTPATIENT
Start: 2020-02-05 | End: 2020-03-13

## 2020-02-05 ASSESSMENT — MIFFLIN-ST. JEOR: SCORE: 2104.76

## 2020-02-05 NOTE — PATIENT INSTRUCTIONS
1. Acute pain of right knee    2. Primary osteoarthritis of left knee      -Patient is here for bilateral knee pain due to aggravation of arthritis in left knee and possible intra-articular injuries to the right knee after a twisting accident  -Patient has a significant pain, recurrent clicking and popping and difficulty ambulating the right knee.  Patient get an MRI of the right knee for further evaluation of meniscus versus cartilage injuries.Your MRI has been ordered. Will check for same day otherwise, schedule with [place] ([phone]). Once you know the date of your MRI, please call my office and schedule a follow-up visit for 2 days after that.  -Patient will start nabumetone and Flexeril as needed.  -Patient will follow-up either on Saturday if she can get the MRI later today or tomorrow or next week to review MRI results and possibly administer cortisone injection in the left knee and over the right knee if patient does not need surgery.  -Call direct clinic number [[phone]] at any time with questions or concerns.    Albert Yeo MD CAQSM  [place] [facility]  [facility] [place]

## 2020-02-05 NOTE — PROGRESS NOTES
ASSESSMENT & PLAN  Patient Instructions     1. Acute pain of right knee    2. Primary osteoarthritis of left knee      -Patient is here for bilateral knee pain due to aggravation of arthritis in left knee and possible intra-articular injuries to the right knee after a twisting accident  -Patient has a significant pain, recurrent clicking and popping and difficulty ambulating the right knee.  Patient get an MRI of the right knee for further evaluation of meniscus versus cartilage injuries.Your MRI has been ordered. Will check for same day otherwise, schedule with [place] ([phone]). Once you know the date of your MRI, please call my office and schedule a follow-up visit for 2 days after that.  -Patient will start nabumetone and Flexeril as needed.  -Patient will follow-up either on Saturday if she can get the MRI later today or tomorrow or next week to review MRI results and possibly administer cortisone injection in the left knee and over the right knee if patient does not need surgery.  -Call direct clinic number [[phone]] at any time with questions or concerns.    Albert Yeo MD [facility]  [facility] [place]          -----    SUBJECTIVE:  Coni Newberry is a 45 year old female who is seen in follow-up for right knee pain.They were last seen 8/21/2019. Was chasing the cat, 9 days ago, and twisted on her knee wrong and felt a pop and knee shifted.. States pain is in all over knee. Bought a knee brace, which has been helping a little bit.     Since their last visit reports worsening pain. They indicate that their current pain level is 7/10. They have tried Tylenol, ibuprofen, corticosteroid injection (most recent date: 8/21/19) that provided  4 month(s) of relief and casting/splinting/bracing.      The patient is seen by themselves.    Patient's past medical, surgical, social, and family histories were reviewed today and exceptions are as follows:  "sweating a lot the Saturday the 25th for no apparent reason.    REVIEW OF SYSTEMS:  Constitutional: NEGATIVE for fever, chills, change in weight  Skin: NEGATIVE for worrisome rashes, moles or lesions  GI/: NEGATIVE for bowel or bladder changes  Neuro: NEGATIVE for weakness, dizziness or paresthesias    OBJECTIVE:  BP (!) 138/90   Ht 1.619 m (5' 3.75\")   Wt 147.9 kg (326 lb)   BMI 56.40 kg/m     General: healthy, alert and in no distress  HEENT: no scleral icterus or conjunctival erythema  Skin: no suspicious lesions or rash. No jaundice.  CV: regular rhythm by palpation, no pedal edema  Resp: normal respiratory effort without conversational dyspnea   Psych: normal mood and affect  Gait: normal steady gait with appropriate coordination and balance  Neuro: normal light touch sensory exam of the extremities.    MSK:  RIGHT KNEE  Inspection:    Normal alignment; no edema, erythema, or ecchymosis present  Palpation:    Tender about the lateral patellar facet, medial patellar facet and medial/lateral joint line. Remainder of bony and ligamentous landmarks are nontender.    Trace effusion is present    Patellofemoral crepitus is Absent  Range of Motion:     00 extension to 900 flexion  Strength:    Quadriceps 5-/5 and hamstrings 5-/5    Extensor mechanism intact  Special Tests:    Positive: Alondra's in the right    Negative: MCL/valgus stress (0 & 30 deg), LCL/varus stress (0 & 30 deg), Lachman's, anterior drawer, posterior drawer       Independent visualization of the below image:  Recent Results (from the past 24 hour(s))   XR Knee Standing AP Bilat [place] Bilat Lat Right    Narrative    X-rays of bilateral knees shows mild medial compartment L femoral   compartment joint space narrowing with small patellar osteophytes seen   bilaterally.  No acute fracture, dislocation.           Albert Yeo MD, [facility]        "

## 2020-02-05 NOTE — LETTER
2/5/2020         RE: Coni Newberry  [address]  [place] [address]        Dear Colleague,    Thank you for referring your patient, Coni Newberry, to the [facility]. Please see a copy of my visit note below.    ASSESSMENT & PLAN  Patient Instructions     1. Acute pain of right knee    2. Primary osteoarthritis of left knee      -Patient is here for bilateral knee pain due to aggravation of arthritis in left knee and possible intra-articular injuries to the right knee after a twisting accident  -Patient has a significant pain, recurrent clicking and popping and difficulty ambulating the right knee.  Patient get an MRI of the right knee for further evaluation of meniscus versus cartilage injuries.Your MRI has been ordered. Will check for same day otherwise, schedule with [place] ([phone]). Once you know the date of your MRI, please call my office and schedule a follow-up visit for 2 days after that.  -Patient will start nabumetone and Flexeril as needed.  -Patient will follow-up either on Saturday if she can get the MRI later today or tomorrow or next week to review MRI results and possibly administer cortisone injection in the left knee and over the right knee if patient does not need surgery.  -Call direct clinic number [[phone]] at any time with questions or concerns.    Albert Yeo MD [facility]  [facility] [place]          -----    SUBJECTIVE:  Coni Newberry is a 45 year old female who is seen in follow-up for right knee pain.They were last seen 8/21/2019. Was chasing the cat, 9 days ago, and twisted on her knee wrong and felt a pop and knee shifted.. States pain is in all over knee. Bought a knee brace, which has been helping a little bit.     Since their last visit reports worsening pain. They indicate that their current pain level is 7/10. They have tried Tylenol, ibuprofen, corticosteroid  "injection (most recent date: 8/21/19) that provided  4 month(s) of relief and casting/splinting/bracing.      The patient is seen by themselves.    Patient's past medical, surgical, social, and family histories were reviewed today and exceptions are as follows: sweating a lot the Saturday the 25th for no apparent reason.    REVIEW OF SYSTEMS:  Constitutional: NEGATIVE for fever, chills, change in weight  Skin: NEGATIVE for worrisome rashes, moles or lesions  GI/: NEGATIVE for bowel or bladder changes  Neuro: NEGATIVE for weakness, dizziness or paresthesias    OBJECTIVE:  BP (!) 138/90   Ht 1.619 m (5' 3.75\")   Wt 147.9 kg (326 lb)   BMI 56.40 kg/m      General: healthy, alert and in no distress  HEENT: no scleral icterus or conjunctival erythema  Skin: no suspicious lesions or rash. No jaundice.  CV: regular rhythm by palpation, no pedal edema  Resp: normal respiratory effort without conversational dyspnea   Psych: normal mood and affect  Gait: normal steady gait with appropriate coordination and balance  Neuro: normal light touch sensory exam of the extremities.    MSK:  RIGHT KNEE  Inspection:    Normal alignment; no edema, erythema, or ecchymosis present  Palpation:    Tender about the lateral patellar facet, medial patellar facet and medial/lateral joint line. Remainder of bony and ligamentous landmarks are nontender.    Trace effusion is present    Patellofemoral crepitus is Absent  Range of Motion:     00 extension to 900 flexion  Strength:    Quadriceps 5-/5 and hamstrings 5-/5    Extensor mechanism intact  Special Tests:    Positive: Alondra's in the right    Negative: MCL/valgus stress (0 & 30 deg), LCL/varus stress (0 & 30 deg), Lachman's, anterior drawer, posterior drawer       Independent visualization of the below image:  Recent Results (from the past 24 hour(s))   XR Knee Standing AP Bilat [place] Bilat Lat Right    Narrative    X-rays of bilateral knees shows mild medial compartment L femoral "   compartment joint space narrowing with small patellar osteophytes seen   bilaterally.  No acute fracture, dislocation.           Albert Yeo MD, [facility]          Again, thank you for allowing me to participate in the care of your patient.        Sincerely,        Albert Yeo, MD

## 2020-02-07 ENCOUNTER — HOSPITAL ENCOUNTER (OUTPATIENT)
Dept: MRI IMAGING | Facility: CLINIC | Age: 45
Discharge: HOME | End: 2020-02-07
Attending: FAMILY MEDICINE | Admitting: FAMILY MEDICINE
Payer: COMMERCIAL

## 2020-02-07 DIAGNOSIS — M25.561 ACUTE PAIN OF RIGHT KNEE: ICD-10-CM

## 2020-02-07 PROCEDURE — 73721 MRI JNT OF LWR EXTRE W/O DYE: CPT | Mod: RT

## 2020-02-08 ENCOUNTER — OFFICE VISIT (OUTPATIENT)
Dept: ORTHOPEDICS | Facility: CLINIC | Age: 45
End: 2020-02-08
Payer: COMMERCIAL

## 2020-02-08 VITALS
SYSTOLIC BLOOD PRESSURE: 130 MMHG | DIASTOLIC BLOOD PRESSURE: 88 MMHG | HEIGHT: 64 IN | BODY MASS INDEX: 50.02 KG/M2 | WEIGHT: 293 LBS

## 2020-02-08 DIAGNOSIS — S83.231D COMPLEX TEAR OF MEDIAL MENISCUS OF RIGHT KNEE AS CURRENT INJURY, SUBSEQUENT ENCOUNTER: Primary | ICD-10-CM

## 2020-02-08 DIAGNOSIS — M17.12 PRIMARY OSTEOARTHRITIS OF LEFT KNEE: ICD-10-CM

## 2020-02-08 PROCEDURE — 20611 DRAIN/INJ JOINT/BURSA W/US: CPT | Mod: LT | Performed by: FAMILY MEDICINE

## 2020-02-08 PROCEDURE — 99214 OFFICE O/P EST MOD 30 MIN: CPT | Mod: 25 | Performed by: FAMILY MEDICINE

## 2020-02-08 RX ORDER — METHYLPREDNISOLONE ACETATE 40 MG/ML
40 INJECTION, SUSPENSION INTRA-ARTICULAR; INTRALESIONAL; INTRAMUSCULAR; SOFT TISSUE
Status: DISCONTINUED | OUTPATIENT
Start: 2020-02-08 | End: 2020-02-13

## 2020-02-08 RX ORDER — HYDROCODONE BITARTRATE AND ACETAMINOPHEN 5; 325 MG/1; MG/1
1 TABLET ORAL EVERY 8 HOURS PRN
Qty: 30 TABLET | Refills: 0 | Status: SHIPPED | OUTPATIENT
Start: 2020-02-08 | End: 2020-05-07

## 2020-02-08 RX ADMIN — METHYLPREDNISOLONE ACETATE 40 MG: 40 INJECTION, SUSPENSION INTRA-ARTICULAR; INTRALESIONAL; INTRAMUSCULAR; SOFT TISSUE at 09:53

## 2020-02-08 ASSESSMENT — MIFFLIN-ST. JEOR: SCORE: 2104.76

## 2020-02-08 NOTE — PROGRESS NOTES
"ASSESSMENT & PLAN  Patient Instructions     1. Complex tear of medial meniscus of right knee as current injury, subsequent encounter    2. Primary osteoarthritis of left knee      -Patient is here for follow-up of right knee pain due to complex tearing of the medial meniscus and left knee pain due to aggravation of arthritis  -MRI of the right knee was reviewed.  All questions answered.  -Patient has multidirectional tearing of the medial meniscus and I recommend a surgical consultation.  An order was placed to meet with Dr. Murtaza Kendrick.  -Patient is in severe pain and so will take  -Call direct clinic number [[phone]] at any time with questions or concerns.    Albert Yeo MD [facility] Orthopedics and Sports Medicine  [facility]          -----    SUBJECTIVE:  Coni Newberry is a 45 year old female who is seen in follow-up for bilateral knee pain due to aggravation of arthritis in left knee and possible intra-articular injuries to the right knee after a twisting accident.They were last seen 2/5/2020.     Since their last visit reports 0% - (About the same as last time). States pain feels worse as the day goes on.  They indicate that their current pain level is 5/10. They have tried other medications: Cyclobenzaprine (Flexeril)  and nabumetone.      The patient is seen by themselves.    Patient's past medical, surgical, social, and family histories were reviewed today and no changes are noted.    REVIEW OF SYSTEMS:  Constitutional: NEGATIVE for fever, chills, change in weight  Skin: NEGATIVE for worrisome rashes, moles or lesions  GI/: NEGATIVE for bowel or bladder changes  Neuro: NEGATIVE for weakness, dizziness or paresthesias    OBJECTIVE:  /88   Ht 1.619 m (5' 3.75\")   Wt 147.9 kg (326 lb)   BMI 56.40 kg/m     General: healthy, alert and in no distress  HEENT: no scleral icterus or conjunctival erythema  Skin: no suspicious lesions or rash. No jaundice.  CV: " regular rhythm by palpation, no pedal edema  Resp: normal respiratory effort without conversational dyspnea   Psych: normal mood and affect  Gait: normal steady gait with appropriate coordination and balance  Neuro: normal light touch sensory exam of the extremities.    MSK:  RIGHT KNEE  Inspection:    Normal alignment; no edema, erythema, or ecchymosis present  Palpation:    Tender about the lateral patellar facet, medial patellar facet and medial/lateral joint line. Remainder of bony and ligamentous landmarks are nontender.    Trace effusion is present    Patellofemoral crepitus is Absent  Range of Motion:     00 extension to 900 flexion  Strength:    Quadriceps 5-/5 and hamstrings 5-/5    Extensor mechanism intact  Special Tests:    Positive: Alondra's in the right    Negative: MCL/valgus stress (0 & 30 deg), LCL/varus stress (0 & 30 deg), Lachman's, anterior drawer, posterior drawer    Independent visualization of the below image:  Recent Results (from the past 24 hour(s))   MR Knee Right w/o Contrast    Narrative    MR right knee without contrast    Techniques: Multiplanar multisequence imaging of the right  knee was  obtained without  administration of intra-articular or intravenous  contrast using routing protocol.    History: Knee pain, xray internal derangement; ; Patient twisted knee,  felt pop, severe pain, difficulty ambulating. r/o meniscus tear.;  Acute pain of right knee     Comparison: Radiographs 2/5/2020    Findings:    MENISCI:  Medial meniscus: Multidirectional tearing of the junction of the  posterior horn and posterior root ligament of the medial meniscus. 4  mm of peripheral extrusion of the body of the medial meniscus.  Lateral meniscus: Intact.    LIGAMENTS  Cruciate ligaments: Intact.  Medial supporting structures: The superficial tibial collateral  ligament is mildly bowed by peripherally extruded medial meniscus.  Mild edema superficial to the MCL. Mild past antrum bursal fluid.  Lateral  supporting structures: Intact.    EXTENSOR MECHANISM  Intact. Partially visualized fluid within the quadriceps tendon at the  edge of the field-of-view on sagittal images, possibly artifactual.    FLUID  Small joint effusion with synovitis. No substantial Baker's cyst.    OSSEOUS and ARTICULAR STRUCTURES  Bones: No fracture, contusion, or osseous lesion is seen.    Patellofemoral compartment: High-grade cartilage loss of the median  ridge and lateral facet of the patella minimal subchondral cystic  change. Full-thickness cartilage fissuring over the lateral trochlea  with mild subchondral edema.    Medial compartment: Moderate thinning of cartilage of the anterior  weightbearing medial femoral condyle.    Lateral compartment: Partial-thickness fraying of cartilage over the  lateral femoral condyle and lateral tibial plateau.    ANCILLARY FINDINGS  None.      Impression    Impression:  1. Multidirectional tearing of the junction of the posterior horn and  posterior root ligament of the medial meniscus.    2. 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    3. Small joint effusion with synovitis.    4. At the proximal edge of the field-of-view on sagittal imaging there  is increased signal within the quadriceps tendon, possibly  artifactual. If there is concern for quadriceps tendon pathology,  femur/thigh MRI could be performed.    4. Low-grade MCL sprain.    TED (Nora OTOOLE MD     Large Joint Injection/Arthocentesis: L knee joint  Date/Time: 2/8/2020 9:53 AM  Performed by: Yeo, Albert, MD  Authorized by: Yeo, Albert, MD     Indications:  Pain and osteoarthritis  Needle Size:  25 G  Guidance: ultrasound    Approach:  Superolateral  Location:  Knee      Medications:  40 mg methylPREDNISolone 40 MG/ML  Aspirate amount (mL):  18  Aspirate:  Yellow  Outcome:  Tolerated well, no immediate complications  Procedure discussed: discussed risks, benefits, and alternatives     Consent Given by:  Patient  Timeout: timeout called immediately prior to procedure    Prep: patient was prepped and draped in usual sterile fashion            Patient's conditions were thoroughly discussed during today's visit with greater than 50% of the visit spent counseling the patient with total time spent face-to-face with the patient being 30 minutes.    Albert Yeo MD, [facility]

## 2020-02-08 NOTE — LETTER
2/8/2020         RE: Coni Newberry  [address]  [place] [address]        Dear Colleague,    Thank you for referring your patient, Coni Newberry, to the [facility] SPORTS MEDICINE. Please see a copy of my visit note below.    ASSESSMENT & PLAN  Patient Instructions     1. Complex tear of medial meniscus of right knee as current injury, subsequent encounter    2. Primary osteoarthritis of left knee      -Patient is here for follow-up of right knee pain due to complex tearing of the medial meniscus and left knee pain due to aggravation of arthritis  -MRI of the right knee was reviewed.  All questions answered.  -Patient has multidirectional tearing of the medial meniscus and I recommend a surgical consultation.  An order was placed to meet with Dr. Murtaza Kendrick.  -Patient is in severe pain and so will take  -Call direct clinic number [[phone]] at any time with questions or concerns.    Albert Yeo MD [facility] Orthopedics and Sports Medicine  [facility]          -----    SUBJECTIVE:  Coni Newberry is a 45 year old female who is seen in follow-up for bilateral knee pain due to aggravation of arthritis in left knee and possible intra-articular injuries to the right knee after a twisting accident.They were last seen 2/5/2020.     Since their last visit reports 0% - (About the same as last time). States pain feels worse as the day goes on.  They indicate that their current pain level is 5/10. They have tried other medications: Cyclobenzaprine (Flexeril)  and nabumetone.      The patient is seen by themselves.    Patient's past medical, surgical, social, and family histories were reviewed today and no changes are noted.    REVIEW OF SYSTEMS:  Constitutional: NEGATIVE for fever, chills, change in weight  Skin: NEGATIVE for worrisome rashes, moles or lesions  GI/: NEGATIVE for bowel or bladder changes  Neuro: NEGATIVE for weakness, dizziness  "or paresthesias    OBJECTIVE:  /88   Ht 1.619 m (5' 3.75\")   Wt 147.9 kg (326 lb)   BMI 56.40 kg/m      General: healthy, alert and in no distress  HEENT: no scleral icterus or conjunctival erythema  Skin: no suspicious lesions or rash. No jaundice.  CV: regular rhythm by palpation, no pedal edema  Resp: normal respiratory effort without conversational dyspnea   Psych: normal mood and affect  Gait: normal steady gait with appropriate coordination and balance  Neuro: normal light touch sensory exam of the extremities.    MSK:  RIGHT KNEE  Inspection:    Normal alignment; no edema, erythema, or ecchymosis present  Palpation:    Tender about the lateral patellar facet, medial patellar facet and medial/lateral joint line. Remainder of bony and ligamentous landmarks are nontender.    Trace effusion is present    Patellofemoral crepitus is Absent  Range of Motion:     00 extension to 900 flexion  Strength:    Quadriceps 5-/5 and hamstrings 5-/5    Extensor mechanism intact  Special Tests:    Positive: Alondra's in the right    Negative: MCL/valgus stress (0 & 30 deg), LCL/varus stress (0 & 30 deg), Lachman's, anterior drawer, posterior drawer    Independent visualization of the below image:  Recent Results (from the past 24 hour(s))   MR Knee Right w/o Contrast    Narrative    MR right knee without contrast    Techniques: Multiplanar multisequence imaging of the right  knee was  obtained without  administration of intra-articular or intravenous  contrast using routing protocol.    History: Knee pain, xray internal derangement; ; Patient twisted knee,  felt pop, severe pain, difficulty ambulating. r/o meniscus tear.;  Acute pain of right knee     Comparison: Radiographs 2/5/2020    Findings:    MENISCI:  Medial meniscus: Multidirectional tearing of the junction of the  posterior horn and posterior root ligament of the medial meniscus. 4  mm of peripheral extrusion of the body of the medial meniscus.  Lateral " meniscus: Intact.    LIGAMENTS  Cruciate ligaments: Intact.  Medial supporting structures: The superficial tibial collateral  ligament is mildly bowed by peripherally extruded medial meniscus.  Mild edema superficial to the MCL. Mild past antrum bursal fluid.  Lateral supporting structures: Intact.    EXTENSOR MECHANISM  Intact. Partially visualized fluid within the quadriceps tendon at the  edge of the field-of-view on sagittal images, possibly artifactual.    FLUID  Small joint effusion with synovitis. No substantial Baker's cyst.    OSSEOUS and ARTICULAR STRUCTURES  Bones: No fracture, contusion, or osseous lesion is seen.    Patellofemoral compartment: High-grade cartilage loss of the median  ridge and lateral facet of the patella minimal subchondral cystic  change. Full-thickness cartilage fissuring over the lateral trochlea  with mild subchondral edema.    Medial compartment: Moderate thinning of cartilage of the anterior  weightbearing medial femoral condyle.    Lateral compartment: Partial-thickness fraying of cartilage over the  lateral femoral condyle and lateral tibial plateau.    ANCILLARY FINDINGS  None.      Impression    Impression:  1. Multidirectional tearing of the junction of the posterior horn and  posterior root ligament of the medial meniscus.    2. 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    3. Small joint effusion with synovitis.    4. At the proximal edge of the field-of-view on sagittal imaging there  is increased signal within the quadriceps tendon, possibly  artifactual. If there is concern for quadriceps tendon pathology,  femur/thigh MRI could be performed.    4. Low-grade MCL sprain.    TED OTOOLE MD (Joe)     Large Joint Injection/Arthocentesis: L knee joint  Date/Time: 2/8/2020 9:53 AM  Performed by: Yeo, Albert, MD  Authorized by: Yeo, Albert, MD     Indications:  Pain and osteoarthritis  Needle Size:  25 G  Guidance: ultrasound     Approach:  Superolateral  Location:  Knee      Medications:  40 mg methylPREDNISolone 40 MG/ML  Aspirate amount (mL):  18  Aspirate:  Yellow  Outcome:  Tolerated well, no immediate complications  Procedure discussed: discussed risks, benefits, and alternatives    Consent Given by:  Patient  Timeout: timeout called immediately prior to procedure    Prep: patient was prepped and draped in usual sterile fashion            Patient's conditions were thoroughly discussed during today's visit with greater than 50% of the visit spent counseling the patient with total time spent face-to-face with the patient being 30 minutes.    Albert Yeo MD, [facility]          Again, thank you for allowing me to participate in the care of your patient.        Sincerely,        Albert Yeo, MD

## 2020-02-08 NOTE — PATIENT INSTRUCTIONS
1. Complex tear of medial meniscus of right knee as current injury, subsequent encounter    2. Primary osteoarthritis of left knee      -Patient is here for follow-up of right knee pain due to complex tearing of the medial meniscus and left knee pain due to aggravation of arthritis  -MRI of the right knee was reviewed.  All questions answered.  -Patient has multidirectional tearing of the medial meniscus and I recommend a surgical consultation.  An order was placed to meet with Dr. Murtaza Kendrick.  -Patient is in severe pain and so will take  -Call direct clinic number [[phone]] at any time with questions or concerns.    Albert Yeo MD [facility]  [facility] [place]

## 2020-02-13 ENCOUNTER — OFFICE VISIT (OUTPATIENT)
Dept: INTERNAL MEDICINE | Facility: CLINIC | Age: 45
End: 2020-02-13
Payer: COMMERCIAL

## 2020-02-13 VITALS
BODY MASS INDEX: 50.02 KG/M2 | OXYGEN SATURATION: 100 % | SYSTOLIC BLOOD PRESSURE: 130 MMHG | RESPIRATION RATE: 20 BRPM | DIASTOLIC BLOOD PRESSURE: 78 MMHG | TEMPERATURE: 98 F | WEIGHT: 293 LBS | HEART RATE: 80 BPM | HEIGHT: 64 IN

## 2020-02-13 DIAGNOSIS — Z71.3 WEIGHT LOSS COUNSELING, ENCOUNTER FOR: ICD-10-CM

## 2020-02-13 DIAGNOSIS — I10 ESSENTIAL HYPERTENSION WITH GOAL BLOOD PRESSURE LESS THAN 140/90: ICD-10-CM

## 2020-02-13 DIAGNOSIS — E78.5 HYPERLIPIDEMIA WITH TARGET LDL LESS THAN 70: ICD-10-CM

## 2020-02-13 DIAGNOSIS — Z11.4 ENCOUNTER FOR SCREENING FOR HIV: ICD-10-CM

## 2020-02-13 DIAGNOSIS — Z23 NEED FOR PROPHYLACTIC VACCINATION AND INOCULATION AGAINST INFLUENZA: Primary | ICD-10-CM

## 2020-02-13 DIAGNOSIS — E11.9 TYPE 2 DIABETES MELLITUS WITHOUT COMPLICATION, WITHOUT LONG-TERM CURRENT USE OF INSULIN (H): ICD-10-CM

## 2020-02-13 PROCEDURE — 99214 OFFICE O/P EST MOD 30 MIN: CPT | Performed by: NURSE PRACTITIONER

## 2020-02-13 RX ORDER — LISINOPRIL 5 MG/1
5 TABLET ORAL DAILY
Qty: 90 TABLET | Refills: 3 | Status: SHIPPED | OUTPATIENT
Start: 2020-02-13 | End: 2020-06-01 | Stop reason: DRUGHIGH

## 2020-02-13 RX ORDER — ATORVASTATIN CALCIUM 10 MG/1
10 TABLET, FILM COATED ORAL DAILY
Qty: 90 TABLET | Refills: 3 | Status: SHIPPED | OUTPATIENT
Start: 2020-02-13 | End: 2021-02-16

## 2020-02-13 ASSESSMENT — MIFFLIN-ST. JEOR: SCORE: 2114.74

## 2020-02-13 NOTE — PROGRESS NOTES
"Subjective     Coni Newberry is a 45 year old female who presents to clinic today for the following health issues:     HPI     Torn meniscus - going to try to fix knee - right  Sees knee surgeon next week     Needs weight loss   Does not want her knees to need multiple surgeries because of weight   She has gained 50 pounds sitting at her job   Wants to consider a surgical weight loss     Reviewed and updated as needed this visit by Provider  Tobacco  Allergies  Meds  Problems  Med Hx  Surg Hx  Fam Hx         Review of Systems   ROS COMP: Constitutional, HEENT, cardiovascular, pulmonary, GI, , musculoskeletal, neuro, skin, endocrine and psych systems are negative, except as otherwise noted.      Objective    /78 (BP Location: Right arm, Patient Position: Sitting)   Pulse 80   Temp 98  F (36.7  C) (Oral)   Resp 20   Ht 1.619 m (5' 3.75\")   Wt 148.9 kg (328 lb 3.2 oz)   LMP 01/30/2020 (Approximate)   SpO2 100%   Breastfeeding No   BMI 56.78 kg/m    Body mass index is 56.78 kg/m .  Physical Exam   GENERAL: alert and no distress- knee pain, and wearing right knee brace - obese   RESP: lungs clear to auscultation - no rales, rhonchi or wheezes  CV: regular rate and rhythm  ABDOMEN: soft, nontender, and bowel sounds normal  MS: no gross musculoskeletal defects noted, no edema  NEURO: Normal strength and tone, mentation intact and speech normal  PSYCH: mentation appears normal, affect normal/bright    Diagnostic Test Results:  Lab future         Assessment & Plan     1. Type 2 diabetes mellitus without complication, without long-term current use of insulin (H)  Needs recheck   Working on weight loss - desire for weight loss surgery   - metFORMIN (GLUCOPHAGE) 500 MG tablet; Take 2.5 tablets (1,250 mg) by mouth 2 times daily (with meals)  Dispense: 450 tablet; Refill: 3  - lisinopril (PRINIVIL/ZESTRIL) 5 MG tablet; Take 1 tablet (5 mg) by mouth daily  Dispense: 90 tablet; Refill: 3  - Albumin " Random Urine Quantitative with Creat Ratio; Future    2. Hyperlipidemia with target LDL less than 70    - atorvastatin (LIPITOR) 10 MG tablet; Take 1 tablet (10 mg) by mouth daily  Dispense: 90 tablet; Refill: 3    3. Need for prophylactic vaccination and inoculation against influenza    - INFLUENZA VACCINE IM > 6 MONTHS VALENT IIV4 [36396]    4. Weight loss counseling, encounter for    - COMPREHENSIVE WEIGHT MANAGEMENT    5. Essential hypertension with goal blood pressure less than 140/90  In ok range today, but has been higher recently - will increase dose to 5 mg daily   - lisinopril (PRINIVIL/ZESTRIL) 5 MG tablet; Take 1 tablet (5 mg) by mouth daily  Dispense: 90 tablet; Refill: 3  - Albumin Random Urine Quantitative with Creat Ratio; Future    6. Encounter for screening for HIV    - HIV Antigen Antibody Combo; Future       Patient Instructions   Lab   You need to be fasting for 12hrs. You can have water and any meds you are on. But, no food, coffee, tea or diet pop.      [facility]: Comprehensive Weight [facility] - [place] [phone] [email or web address]      Return in about 6 months (around 8/13/2020).    DANYELL Spencer [facility]

## 2020-02-13 NOTE — PATIENT INSTRUCTIONS
Lab   You need to be fasting for 12hrs. You can have water and any meds you are on. But, no food, coffee, tea or diet pop.      G: [facility] Weight [facility] - [place] [phone] [email or web address]

## 2020-02-14 ASSESSMENT — ASTHMA QUESTIONNAIRES: ACT_TOTALSCORE: 16

## 2020-02-20 ENCOUNTER — OFFICE VISIT (OUTPATIENT)
Dept: ORTHOPEDICS | Facility: CLINIC | Age: 45
End: 2020-02-20
Payer: COMMERCIAL

## 2020-02-20 VITALS
HEIGHT: 64 IN | WEIGHT: 293 LBS | DIASTOLIC BLOOD PRESSURE: 80 MMHG | SYSTOLIC BLOOD PRESSURE: 134 MMHG | BODY MASS INDEX: 50.02 KG/M2

## 2020-02-20 DIAGNOSIS — S83.231D COMPLEX TEAR OF MEDIAL MENISCUS OF RIGHT KNEE AS CURRENT INJURY, SUBSEQUENT ENCOUNTER: ICD-10-CM

## 2020-02-20 PROCEDURE — 20610 DRAIN/INJ JOINT/BURSA W/O US: CPT | Mod: RT | Performed by: ORTHOPAEDIC SURGERY

## 2020-02-20 PROCEDURE — 99213 OFFICE O/P EST LOW 20 MIN: CPT | Mod: 25 | Performed by: ORTHOPAEDIC SURGERY

## 2020-02-20 RX ORDER — LIDOCAINE HYDROCHLORIDE 10 MG/ML
1 INJECTION, SOLUTION INFILTRATION; PERINEURAL
Status: DISCONTINUED | OUTPATIENT
Start: 2020-02-20 | End: 2020-06-01

## 2020-02-20 RX ORDER — TRIAMCINOLONE ACETONIDE 40 MG/ML
40 INJECTION, SUSPENSION INTRA-ARTICULAR; INTRAMUSCULAR
Status: DISCONTINUED | OUTPATIENT
Start: 2020-02-20 | End: 2020-06-01

## 2020-02-20 RX ADMIN — LIDOCAINE HYDROCHLORIDE 1 ML: 10 INJECTION, SOLUTION INFILTRATION; PERINEURAL at 09:01

## 2020-02-20 RX ADMIN — TRIAMCINOLONE ACETONIDE 40 MG: 40 INJECTION, SUSPENSION INTRA-ARTICULAR; INTRAMUSCULAR at 09:01

## 2020-02-20 ASSESSMENT — MIFFLIN-ST. JEOR: SCORE: 2101.13

## 2020-02-20 NOTE — LETTER
2/20/2020         RE: Coni Newberry  [address]  [facility] [address]        Dear Colleague,    Thank you for referring your patient, Coni Newberry, to the [facility] ORTHOPEDIC SURGERY. Please see a copy of my visit note below.    CHIEF CONCERN: Right knee pain.    HISTORY:   Ms. Matti Newberry is seen today in consultation for right knee pain, after injury on 2/5/20.  Patient reports recent fall on 2/19/20 after tripping on cat. She reports chronic history of bilateral knee pain.  Patient is accompanied today by her .   Pain is located right knee.  Increased pain with stairs, moving from sit to stand. She reports waking due to pain in the knee.  Decreased pain with propping the knee with the pillow, knee brace.  Swelling in the knee that comes and goes.  Patient reports popping in the knee, locking.  She admits that she has had pain about her knee for the last 2 to 3 years.  She said intermittent use of injections.  Her knee does keep her from doing with she wants to do.  This last event approximate 2 to 3 weeks ago was very painful to her.    Current pain level: 6/10, Worst pain level: 9/10     Current treatments: Norco 1-2 tabs per day, Nabumetone, and Cyclobenzaprine. Corticosteroid injection 8/21/19 with 4 months relief. Icy hot.  Ice and heat.     PAST MEDICAL HISTORY: (Reviewed with the patient and in the [facility] medical record)  1. Diabetes mellitus  2. Body mass index is 56.26 kg/m .  3. Hypertension    PAST SURGICAL HISTORY: (Reviewed with the patient and in the [facility] medical record)  1. No knee surgery    MEDICATIONS: (Reviewed with the patient and in the [facility] medical record)    Notable medications include: No blood thinners she is taking Norco for pain medicine    ALLERGIES: (Reviewed with the patient and in the [facility] medical record)  1. Seasonal allergies      SOCIAL HISTORY: (Reviewed with the patient and in the medical record)  --Tobacco:  Non-smoker  --Occupation: Call Center at [employer]  --Avocation/Sport: She enjoys spending time with her , cats and going to movies    FAMILY HISTORY: (Reviewed with the patient and in the medical record)  -- No family history of bleeding, clotting, or difficulty with anesthesia    REVIEW OF SYSTEMS: (Reviewed with the patient and on the health intake form)  -- A comprehensive 10 point review of systems was conducted and is negative except as noted in the HPI    EXAM:     General: Awake, Alert and Oriented, No acute Distress. Articulate and Interactive    There is no height or weight on file to calculate BMI.    Right lower extremity :    Skin is Warm and Well perfused, no suggestion of infection    Positive medial joint line tenderness positive anterior tenderness    Range of motion is 0 to 115 degrees    Stable to varus and valgus stress testing    Stable anterior and posterior drawer testing    No pivot shift    EHL/FHL/TA/GS 5/5    Sensation intact L3-S1    2+ Dorsalis Pedis Pulse    IMAGING:    Plain Radiographs: Plain radiographs today show overall reasonably well-preserved joint spaces some patellofemoral disease noted    MRI: MRI shows high-grade cartilage loss the patellofemoral compartment with full-thickness cartilage loss in the trochlea as well as the distal aspect of the patella and subchondral edema.  High-grade cartilage loss of the medial femoral condyle is noted as well.  There is a medial meniscus root tear with extrusion of the medial meniscus    ASSESSMENT:  1. Patellofemoral arthritis  2. Medial compartment arthritis  3. Body mass index is 56.26 kg/m .   4. Medial meniscus tear/root tear       PLAN:  1. I had a long discussion with the patient regarding her knee.  At this time I really think a recommendation should be to exhaust all nonsurgical means.  I am hesitant to offer her surgery for her meniscus tear given the fact that she has high-grade cartilage loss of her patellofemoral  compartment as well as her medial compartment.  2. Performing a meniscus root repair in the setting of high-grade cartilage loss probably does not change long-term risks of arthritis her knee replacement surgery and I am hesitant to put her through the time, pain and energy of meniscus root repair without the knowledge that it could improve her long-term symptoms  3. Realistically, maximization of nonsurgical management including time, relative rest, anti-inflammatories, physical therapy program to keep her muscles strong keep her joints moving and intermittent brace is a better treatment modality  4. If she ultimately fails these things I think discussion regarding knee replacement surgery is a better choice  5. Certainly her BMI is elevated the patient brought up to me the idea of pursuing weight reduction surgery and I would certainly endorse this time encouraged her in this pursuit.  Meniscus repairs are really not indicated to be performed in the situation of her knee elevated BMI (greater than 40) as the risk of failure significant.    After written informed consent obtained and signed, after sufficient prepping and sterile technique, 40 mg of kenalog and , 8 cc of 1% lidocaine were injected without complication into the right knee. The patient tolerated the injection well and a sterile dressing was applied.       Large Joint Injection/Arthocentesis: R knee joint  Date/Time: 2/20/2020 9:01 AM  Performed by: Murtaza Kendrick MD  Authorized by: Murtaza Kendrick MD     Indications:  Pain  Needle Size:  22 G  Guidance: landmark guided    Approach:  Anterolateral  Location:  Knee      Medications:  40 mg triamcinolone 40 MG/ML; 1 mL lidocaine 1 %  Medications comment:  8 ml of Lidocaine 1% was infiltrated into the right knee.   Outcome:  Tolerated well, no immediate complications  Procedure discussed: discussed risks, benefits, and alternatives    Consent Given by:  Patient  Timeout: timeout  called immediately prior to procedure    Prep: patient was prepped and draped in usual sterile fashion              Again, thank you for allowing me to participate in the care of your patient.        Sincerely,        Murtaza Kendrick MD

## 2020-02-20 NOTE — LETTER
February 20, 2020      Coni Newberry  [address]  [place] [address]        To Whom It May Concern:    Coni Newberry was seen in our clinic on 2/20/20. She may return to work without restrictions.      Sincerely,        Murtaza Kendrick MD

## 2020-02-20 NOTE — PATIENT INSTRUCTIONS
1. Complex tear of medial meniscus of right knee as current injury, subsequent encounter      Steroid injection of the right knee was performed today in clinic    - Would not soak in a hot tub, bath or swimming pool for 48 hours  - Ok to shower  - Ice today and only do your normal amounts of activity  - The lidocaine (what is giving you pain relief right now) will likely stop working in 1-2 hours.  You will then have pain again, similar to before you received the injection. The corticosteroid will not start working until approximately 1-2 weeks from now.  In a small percentage of people, cortisone can cause flushing/redness in the face. This usually lasts for 1-3 days and resolves. Cool compress and Ibuprofen/Tylenol can help if this happens.  Can repeat the injection anytime after 4 months      Orthotics Referral placed for hinged right knee brace. Walked to Orthotics Dept to coordinate appointment.

## 2020-02-20 NOTE — PROGRESS NOTES
CHIEF CONCERN: Right knee pain.    HISTORY:   Ms. Matti Newberry is seen today in consultation for right knee pain, after injury on 2/5/20.  Patient reports recent fall on 2/19/20 after tripping on cat. She reports chronic history of bilateral knee pain.  Patient is accompanied today by her .   Pain is located right knee.  Increased pain with stairs, moving from sit to stand. She reports waking due to pain in the knee.  Decreased pain with propping the knee with the pillow, knee brace.  Swelling in the knee that comes and goes.  Patient reports popping in the knee, locking.  She admits that she has had pain about her knee for the last 2 to 3 years.  She said intermittent use of injections.  Her knee does keep her from doing with she wants to do.  This last event approximate 2 to 3 weeks ago was very painful to her.    Current pain level: 6/10, Worst pain level: 9/10     Current treatments: Norco 1-2 tabs per day, Nabumetone, and Cyclobenzaprine. Corticosteroid injection 8/21/19 with 4 months relief. Icy hot.  Ice and heat.     PAST MEDICAL HISTORY: (Reviewed with the patient and in the [facility] [facility] medical record)  1. Diabetes mellitus  2. Body mass index is 56.26 kg/m .  3. Hypertension    PAST SURGICAL HISTORY: (Reviewed with the patient and in the [facility] medical record)  1. No knee surgery    MEDICATIONS: (Reviewed with the patient and in the [facility] [facility] medical record)    Notable medications include: No blood thinners she is taking Norco for pain medicine    ALLERGIES: (Reviewed with the patient and in the [employer] [facility] medical record)  1. Seasonal allergies      SOCIAL HISTORY: (Reviewed with the patient and in the medical record)  --Tobacco: Non-smoker  --Occupation: [employer]  --Avocation/Sport: She enjoys spending time with her , cats and going to movies    FAMILY HISTORY: (Reviewed with the patient and in the medical record)  -- No family history of bleeding, clotting, or difficulty with  anesthesia    REVIEW OF SYSTEMS: (Reviewed with the patient and on the health intake form)  -- A comprehensive 10 point review of systems was conducted and is negative except as noted in the HPI    EXAM:     General: Awake, Alert and Oriented, No acute Distress. Articulate and Interactive    There is no height or weight on file to calculate BMI.    Right lower extremity :    Skin is Warm and Well perfused, no suggestion of infection    Positive medial joint line tenderness positive anterior tenderness    Range of motion is 0 to 115 degrees    Stable to varus and valgus stress testing    Stable anterior and posterior drawer testing    No pivot shift    EHL/FHL/TA/GS 5/5    Sensation intact L3-S1    2+ Dorsalis Pedis Pulse    IMAGING:    Plain Radiographs: Plain radiographs today show overall reasonably well-preserved joint spaces some patellofemoral disease noted    MRI: MRI shows high-grade cartilage loss the patellofemoral compartment with full-thickness cartilage loss in the trochlea as well as the distal aspect of the patella and subchondral edema.  High-grade cartilage loss of the medial femoral condyle is noted as well.  There is a medial meniscus root tear with extrusion of the medial meniscus    ASSESSMENT:  1. Patellofemoral arthritis  2. Medial compartment arthritis  3. Body mass index is 56.26 kg/m .   4. Medial meniscus tear/root tear       PLAN:  1. I had a long discussion with the patient regarding her knee.  At this time I really think a recommendation should be to exhaust all nonsurgical means.  I am hesitant to offer her surgery for her meniscus tear given the fact that she has high-grade cartilage loss of her patellofemoral compartment as well as her medial compartment.  2. Performing a meniscus root repair in the setting of high-grade cartilage loss probably does not change long-term risks of arthritis her knee replacement surgery and I am hesitant to put her through the time, pain and energy of  meniscus root repair without the knowledge that it could improve her long-term symptoms  3. Realistically, maximization of nonsurgical management including time, relative rest, anti-inflammatories, physical therapy program to keep her muscles strong keep her joints moving and intermittent brace is a better treatment modality  4. If she ultimately fails these things I think discussion regarding knee replacement surgery is a better choice  5. Certainly her BMI is elevated the patient brought up to me the idea of pursuing weight reduction surgery and I would certainly endorse this time encouraged her in this pursuit.  Meniscus repairs are really not indicated to be performed in the situation of her knee elevated BMI (greater than 40) as the risk of failure significant.    After written informed consent obtained and signed, after sufficient prepping and sterile technique, 40 mg of kenalog and , 8 cc of 1% lidocaine were injected without complication into the right knee. The patient tolerated the injection well and a sterile dressing was applied.       Large Joint Injection/Arthocentesis: R knee joint  Date/Time: 2/20/2020 9:01 AM  Performed by: Murtaza Kendrick MD  Authorized by: Murtaza Kendirck MD     Indications:  Pain  Needle Size:  22 G  Guidance: landmark guided    Approach:  Anterolateral  Location:  Knee      Medications:  40 mg triamcinolone 40 MG/ML; 1 mL lidocaine 1 %  Medications comment:  8 ml of Lidocaine 1% was infiltrated into the right knee.   Outcome:  Tolerated well, no immediate complications  Procedure discussed: discussed risks, benefits, and alternatives    Consent Given by:  Patient  Timeout: timeout called immediately prior to procedure    Prep: patient was prepped and draped in usual sterile fashion

## 2020-02-22 DIAGNOSIS — E11.9 TYPE 2 DIABETES MELLITUS WITHOUT COMPLICATION, WITHOUT LONG-TERM CURRENT USE OF INSULIN (H): ICD-10-CM

## 2020-02-22 DIAGNOSIS — I10 ESSENTIAL HYPERTENSION WITH GOAL BLOOD PRESSURE LESS THAN 140/90: ICD-10-CM

## 2020-02-22 DIAGNOSIS — Z11.4 ENCOUNTER FOR SCREENING FOR HIV: ICD-10-CM

## 2020-02-22 LAB
ALBUMIN SERPL-MCNC: 3.6 G/DL (ref 3.4–5)
ALP SERPL-CCNC: 88 U/L (ref 40–150)
ALT SERPL W P-5'-P-CCNC: 32 U/L (ref 0–50)
ANION GAP SERPL CALCULATED.3IONS-SCNC: 7 MMOL/L (ref 3–14)
AST SERPL W P-5'-P-CCNC: 15 U/L (ref 0–45)
BASOPHILS # BLD AUTO: 0 10E9/L (ref 0–0.2)
BASOPHILS NFR BLD AUTO: 0.3 %
BILIRUB SERPL-MCNC: 0.5 MG/DL (ref 0.2–1.3)
BUN SERPL-MCNC: 19 MG/DL (ref 7–30)
CALCIUM SERPL-MCNC: 8.4 MG/DL (ref 8.5–10.1)
CHLORIDE SERPL-SCNC: 108 MMOL/L (ref 94–109)
CHOLEST SERPL-MCNC: 144 MG/DL
CO2 SERPL-SCNC: 23 MMOL/L (ref 20–32)
CREAT SERPL-MCNC: 0.65 MG/DL (ref 0.52–1.04)
CREAT UR-MCNC: 69 MG/DL
DIFFERENTIAL METHOD BLD: ABNORMAL
EOSINOPHIL # BLD AUTO: 0 10E9/L (ref 0–0.7)
EOSINOPHIL NFR BLD AUTO: 0.1 %
ERYTHROCYTE [DISTWIDTH] IN BLOOD BY AUTOMATED COUNT: 15.2 % (ref 10–15)
GFR SERPL CREATININE-BSD FRML MDRD: >90 ML/MIN/{1.73_M2}
GLUCOSE SERPL-MCNC: 127 MG/DL (ref 70–99)
HBA1C MFR BLD: 7.3 % (ref 0–5.6)
HCT VFR BLD AUTO: 42.3 % (ref 35–47)
HDLC SERPL-MCNC: 54 MG/DL
HGB BLD-MCNC: 13.2 G/DL (ref 11.7–15.7)
LDLC SERPL CALC-MCNC: 74 MG/DL
LYMPHOCYTES # BLD AUTO: 1.9 10E9/L (ref 0.8–5.3)
LYMPHOCYTES NFR BLD AUTO: 17.6 %
MCH RBC QN AUTO: 25.8 PG (ref 26.5–33)
MCHC RBC AUTO-ENTMCNC: 31.2 G/DL (ref 31.5–36.5)
MCV RBC AUTO: 83 FL (ref 78–100)
MICROALBUMIN UR-MCNC: 18 MG/L
MICROALBUMIN/CREAT UR: 25.66 MG/G CR (ref 0–25)
MONOCYTES # BLD AUTO: 0.6 10E9/L (ref 0–1.3)
MONOCYTES NFR BLD AUTO: 5.1 %
NEUTROPHILS # BLD AUTO: 8.5 10E9/L (ref 1.6–8.3)
NEUTROPHILS NFR BLD AUTO: 76.9 %
NONHDLC SERPL-MCNC: 90 MG/DL
PLATELET # BLD AUTO: 219 10E9/L (ref 150–450)
POTASSIUM SERPL-SCNC: 3.8 MMOL/L (ref 3.4–5.3)
PROT SERPL-MCNC: 7.3 G/DL (ref 6.8–8.8)
RBC # BLD AUTO: 5.11 10E12/L (ref 3.8–5.2)
SODIUM SERPL-SCNC: 138 MMOL/L (ref 133–144)
TRIGL SERPL-MCNC: 79 MG/DL
TSH SERPL DL<=0.005 MIU/L-ACNC: 1.57 MU/L (ref 0.4–4)
WBC # BLD AUTO: 11 10E9/L (ref 4–11)

## 2020-02-22 PROCEDURE — 82043 UR ALBUMIN QUANTITATIVE: CPT | Performed by: NURSE PRACTITIONER

## 2020-02-22 PROCEDURE — 87389 HIV-1 AG W/HIV-1&-2 AB AG IA: CPT | Performed by: NURSE PRACTITIONER

## 2020-02-22 PROCEDURE — 80061 LIPID PANEL: CPT | Performed by: NURSE PRACTITIONER

## 2020-02-22 PROCEDURE — 80050 GENERAL HEALTH PANEL: CPT | Performed by: NURSE PRACTITIONER

## 2020-02-22 PROCEDURE — 83036 HEMOGLOBIN GLYCOSYLATED A1C: CPT | Performed by: NURSE PRACTITIONER

## 2020-02-22 PROCEDURE — 36415 COLL VENOUS BLD VENIPUNCTURE: CPT | Performed by: NURSE PRACTITIONER

## 2020-02-24 LAB — HIV 1+2 AB+HIV1 P24 AG SERPL QL IA: NONREACTIVE

## 2020-03-02 ENCOUNTER — HEALTH MAINTENANCE LETTER (OUTPATIENT)
Age: 45
End: 2020-03-02

## 2020-03-05 ENCOUNTER — OFFICE VISIT (OUTPATIENT)
Dept: ORTHOPEDICS | Facility: CLINIC | Age: 45
End: 2020-03-05
Payer: COMMERCIAL

## 2020-03-05 VITALS
BODY MASS INDEX: 50.02 KG/M2 | DIASTOLIC BLOOD PRESSURE: 86 MMHG | HEIGHT: 64 IN | SYSTOLIC BLOOD PRESSURE: 148 MMHG | WEIGHT: 293 LBS

## 2020-03-05 DIAGNOSIS — M17.11 PRIMARY OSTEOARTHRITIS OF RIGHT KNEE: ICD-10-CM

## 2020-03-05 DIAGNOSIS — S83.231D COMPLEX TEAR OF MEDIAL MENISCUS OF RIGHT KNEE AS CURRENT INJURY, SUBSEQUENT ENCOUNTER: Primary | ICD-10-CM

## 2020-03-05 PROCEDURE — 99213 OFFICE O/P EST LOW 20 MIN: CPT | Performed by: ORTHOPAEDIC SURGERY

## 2020-03-05 ASSESSMENT — MIFFLIN-ST. JEOR: SCORE: 2100.22

## 2020-03-05 NOTE — LETTER
3/5/2020         RE: Coni Newberry  [address]  [facility] [id number]        Dear Colleague,    Thank you for referring your patient, Coni Newberry, to the [facility]. Please see a copy of my visit note below.    DIAGNOSIS:   1.  Complex tear of medial meniscus of right knee   2.  High-grade cartilage loss medial femoral condyle, high-grade cartilage loss patella and trochlea  3. Body mass index is 56.22 kg/m .      HISTORY:  Ms. Matti Newberry is seen today for follow up of right knee pain. Patient was last evaluated on 2/20/20 where she was offered a right knee corticosteroid injection.  She reports increased knee pain for 2 days s/p injection, and relief then lasting for 4 days. Since her pain has returned.  She obtained hinged knee brace from orthotics today, and is finding the brace frustrating, and having difficulties with the brace staying in place.  Patient reports increased pain at nighttime.  Patient would like to discuss possible referral to physical therapy, and other options today.     Current pain level: 6/10     EXAM:     General: Awake, Alert, and oriented. Articulates and communicates with a normal affect     Right lower Extremity:    Skin is intact no sign of infection    Trace effusion    Range of motion unchanged from previously 0 to 120 degrees    Stable to varus and valgus stress testing stable anterior and posterior drawer testing    Neurovascularly intact    IMAGING:  No new imaging    ASSESSMENT:  1. High-grade cartilage loss the patellofemoral compartment as well as the medial compartment, medial meniscus root tear  2. Body mass index is 56.22 kg/m .    PLAN:   Long discussion with the patient.  This time affixed a brace while she was in clinic so it fit a little bit better.  We also set her up with a physical therapy appointment and provided her a handicap parking sticker.  She can follow-up with me on an as-needed basis.      Again,  thank you for allowing me to participate in the care of your patient.        Sincerely,        Murtaza Kendrick MD

## 2020-03-05 NOTE — PROGRESS NOTES
DIAGNOSIS:   1.  Complex tear of medial meniscus of right knee   2.  High-grade cartilage loss medial femoral condyle, high-grade cartilage loss patella and trochlea  3. Body mass index is 56.22 kg/m .      HISTORY:  Ms. Matti Newberry is seen today for follow up of right knee pain. Patient was last evaluated on 2/20/20 where she was offered a right knee corticosteroid injection.  She reports increased knee pain for 2 days s/p injection, and relief then lasting for 4 days. Since her pain has returned.  She obtained hinged knee brace from orthotics today, and is finding the brace frustrating, and having difficulties with the brace staying in place.  Patient reports increased pain at nighttime.  Patient would like to discuss possible referral to physical therapy, and other options today.     Current pain level: 6/10     EXAM:     General: Awake, Alert, and oriented. Articulates and communicates with a normal affect     Right lower Extremity:    Skin is intact no sign of infection    Trace effusion    Range of motion unchanged from previously 0 to 120 degrees    Stable to varus and valgus stress testing stable anterior and posterior drawer testing    Neurovascularly intact    IMAGING:  No new imaging    ASSESSMENT:  1. High-grade cartilage loss the patellofemoral compartment as well as the medial compartment, medial meniscus root tear  2. Body mass index is 56.22 kg/m .    PLAN:   Long discussion with the patient.  This time affixed a brace while she was in clinic so it fit a little bit better.  We also set her up with a physical therapy appointment and provided her a handicap parking sticker.  She can follow-up with me on an as-needed basis.

## 2020-03-05 NOTE — PATIENT INSTRUCTIONS
Orders for physical therapy have been placed.   OK to schedule at the desk on your way out, or they will call you to schedule.     Form for handicap parking provided. Complete top portion and bring to [facility].

## 2020-03-06 ENCOUNTER — THERAPY VISIT (OUTPATIENT)
Dept: PHYSICAL THERAPY | Facility: CLINIC | Age: 45
End: 2020-03-06
Payer: COMMERCIAL

## 2020-03-06 DIAGNOSIS — M17.11 PRIMARY OSTEOARTHRITIS OF RIGHT KNEE: ICD-10-CM

## 2020-03-06 DIAGNOSIS — S83.231D COMPLEX TEAR OF MEDIAL MENISCUS OF RIGHT KNEE AS CURRENT INJURY, SUBSEQUENT ENCOUNTER: ICD-10-CM

## 2020-03-06 DIAGNOSIS — M25.561 RIGHT KNEE PAIN: ICD-10-CM

## 2020-03-06 PROCEDURE — 97162 PT EVAL MOD COMPLEX 30 MIN: CPT | Mod: GP | Performed by: PHYSICAL THERAPIST

## 2020-03-06 PROCEDURE — 97110 THERAPEUTIC EXERCISES: CPT | Mod: GP | Performed by: PHYSICAL THERAPIST

## 2020-03-06 ASSESSMENT — ACTIVITIES OF DAILY LIVING (ADL)
KNEEL ON THE FRONT OF YOUR KNEE: ACTIVITY IS VERY DIFFICULT
SQUAT: ACTIVITY IS VERY DIFFICULT
GO UP STAIRS: ACTIVITY IS FAIRLY DIFFICULT
STIFFNESS: THE SYMPTOM AFFECTS MY ACTIVITY SEVERELY
KNEE_ACTIVITY_OF_DAILY_LIVING_SCORE: 25.71
WEAKNESS: THE SYMPTOM AFFECTS MY ACTIVITY SEVERELY
KNEE_ACTIVITY_OF_DAILY_LIVING_SUM: 18
STAND: ACTIVITY IS FAIRLY DIFFICULT
RISE FROM A CHAIR: ACTIVITY IS FAIRLY DIFFICULT
SWELLING: THE SYMPTOM AFFECTS MY ACTIVITY SEVERELY
HOW_WOULD_YOU_RATE_THE_OVERALL_FUNCTION_OF_YOUR_KNEE_DURING_YOUR_USUAL_DAILY_ACTIVITIES?: ABNORMAL
AS_A_RESULT_OF_YOUR_KNEE_INJURY,_HOW_WOULD_YOU_RATE_YOUR_CURRENT_LEVEL_OF_DAILY_ACTIVITY?: ABNORMAL
WALK: ACTIVITY IS FAIRLY DIFFICULT
GIVING WAY, BUCKLING OR SHIFTING OF KNEE: THE SYMPTOM AFFECTS MY ACTIVITY SEVERELY
LIMPING: THE SYMPTOM AFFECTS MY ACTIVITY SEVERELY
SIT WITH YOUR KNEE BENT: ACTIVITY IS FAIRLY DIFFICULT
GO DOWN STAIRS: ACTIVITY IS VERY DIFFICULT
HOW_WOULD_YOU_RATE_THE_CURRENT_FUNCTION_OF_YOUR_KNEE_DURING_YOUR_USUAL_DAILY_ACTIVITIES_ON_A_SCALE_FROM_0_TO_100_WITH_100_BEING_YOUR_LEVEL_OF_KNEE_FUNCTION_PRIOR_TO_YOUR_INJURY_AND_0_BEING_THE_INABILITY_TO_PERFORM_ANY_OF_YOUR_USUAL_DAILY_ACTIVITIES?: 25
PAIN: THE SYMPTOM PREVENTS ME FROM ALL DAILY ACTIVITIES
RAW_SCORE: 18

## 2020-03-06 NOTE — PROGRESS NOTES
[facility]: Physical Therapy Initial Evaluation   Mar 6, 2020  Precautions/Restrictions/Physician instructions:   Per Patient: Not going to do surgery to correct the meniscus because it would make the knee pain/arthritis worse    Subjective:   Chief Complaint: Right knee pain   Pain: front of the knee pain   Numbness/Tingling: None   Weakness: weakness in the right knee, shaking   Stiffness: some in the right knee  New/Recurrent/Chronic: Recurrent  DOI/onset: 1/28/2020   Referral Date: 3/5/2020 - Murtaza Kendrick MD  Mechanism of onset: went to rubén the cat, her knee went the other way and made a loud popping sound  PMH/surgical history/trauma:    - asthma   - diabetes   - HTN   - Overweight  General health as reported by patient: Fair    Medications: HTN, Muscle Relaxants,   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 Multidirectional tearing of the junction of the posterior horn and posterior root ligament of the medial meniscus.  2. Tricompartmental chondromalacia most substantial in the patellofemoral compartment.  3. Small joint effusion with synovitis.  4. At the proximal edge of the field-of-view on sagittal imaging there is increased signal within the quadriceps tendon, possibly artifactual. If there is concern for quadriceps tendon pathology, femur/thigh MRI could be performed.  5. Low-grade MCL sprain.    AM/PM: hurts less in the morning worse as the day goes on  Quality of Pain: sharp/stabbing,   Pain: 7/10  Worse: stairs (down more than up), in/out of the car hurts more now that she has the brace on it, can be painful to get up/down from toilet, especially if the knee pops.   Better: muscle relaxant, pain meds (vicodin),   Progression of Symptoms since onset: felt better shortly  after the injection, but then went back a little worse from there   Sleeping: will wake her 3-4 times per night, every night   Current Functional Status:   - stairs - pain with going up/down stairs   - walking - feels like she is limping, walks close to the walls at work  - sitting - pain worsens with sitting  - Driving - keeps her foot close to the pedal for support  Transportation to Therapy: Independent with transportation  Live with Others: , 3 stupid cats,     Patient's goal(s): Be able to get the knee working to lose said weight to help said knee get better.       Objective:    Standing Posture: decreased weightbearing on the right lower extremity.         AROM: (* indicates patient's pain)      R  L   Hyperextension 2 3   Extension 0 0   Flexion 51* 120         Assessment/Plan:    Patient is a 45 year old female with right side knee complaints.    Patient has the following significant findings with corresponding treatment plan.                Referring Diagnosis: Complex tear of medial meniscus of right knee as current injury ; Primary osteoarthritis of right knee   Pain -  hot/cold therapy, manual therapy, splint/taping/bracing/orthotics, self management, education and home program  Decreased ROM/flexibility - manual therapy, therapeutic exercise, therapeutic activity and home program  Impaired gait - gait training, assistive devices and home program  Decreased function - therapeutic activities and home program  Impaired posture - neuro re-education, therapeutic activities and home program      Therapy Evaluation Codes:   1) History comprised of:   Personal factors that impact the plan of care:      Overall behavior pattern and Profession.    Comorbidity factors that impact the plan of care are:      Osteoarthritis and Overweight.     Medications impacting care: Muscle relaxant.  2) Examination of Body Systems comprised of:   Body structures and functions that impact the plan of care:       Knee.   Activity limitations that impact the plan of care are:      Driving, Sitting, Stairs, Walking and Sleeping.  3) Clinical presentation characteristics are:   Evolving/Changing.  4) Decision-Making    Moderate complexity using standardized patient assessment instrument and/or measureable assessment of functional outcome.  Cumulative Therapy Evaluation is: Moderate complexity.    Previous and current functional limitations:  (See Goal Flow Sheet for this information)    Short term and Long term goals: (See Goal Flow Sheet for this information)     Communication ability:  Patient appears to be able to clearly communicate and understand verbal and written communication and follow directions correctly.  Treatment Explanation - The following has been discussed with the patient:   RX ordered/plan of care  Anticipated outcomes  Possible risks and side effects  This patient would benefit from PT intervention to resume normal activities.   Rehab potential is fair.    Frequency:  1 X week, once daily  Duration:  for 8 weeks  Discharge Plan:  Achieve all LTG.  Independent in home treatment program.  Reach maximal therapeutic benefit.    Please refer to the daily flowsheet for treatment today, total treatment time and time spent performing 1:1 timed codes.

## 2020-03-07 DIAGNOSIS — S83.231D COMPLEX TEAR OF MEDIAL MENISCUS OF RIGHT KNEE AS CURRENT INJURY, SUBSEQUENT ENCOUNTER: ICD-10-CM

## 2020-03-07 DIAGNOSIS — M25.561 ACUTE PAIN OF RIGHT KNEE: ICD-10-CM

## 2020-03-07 DIAGNOSIS — M17.11 PRIMARY OSTEOARTHRITIS OF RIGHT KNEE: Primary | ICD-10-CM

## 2020-03-10 PROBLEM — M25.561 RIGHT KNEE PAIN: Status: ACTIVE | Noted: 2020-03-10

## 2020-03-10 PROBLEM — S83.231D COMPLEX TEAR OF MEDIAL MENISCUS OF RIGHT KNEE AS CURRENT INJURY, SUBSEQUENT ENCOUNTER: Status: ACTIVE | Noted: 2020-03-10

## 2020-03-10 NOTE — TELEPHONE ENCOUNTER
Sent patient a [employer] message asking about medication refill. Will wait for response back.     Sarah Ramirez MS, ATC

## 2020-03-12 NOTE — TELEPHONE ENCOUNTER
LVM with patient to call us back about refill request and asked about symptoms if she wanted that refill.     Sraah Ramirez MS, ATC

## 2020-03-13 RX ORDER — CYCLOBENZAPRINE HCL 10 MG
TABLET ORAL
Qty: 90 TABLET | Refills: 3 | Status: SHIPPED | OUTPATIENT
Start: 2020-03-13 | End: 2021-06-09

## 2020-03-13 NOTE — TELEPHONE ENCOUNTER
I will prescribe her flexaril. In the future all refills should come from her primary care physician.     I will not prescribe vicodin it is not indicated for chronic pain management of her arthritis.    I do not have a surgical option for her.     She would be allowed to discuss knee replacement surgery, however, her young age and elevated BMI make this difficult.    Her goals should be to make her knee as good as she can for as long as she can. She can use, ice, activity modification, bracing, anti-inflammatories, PT program, weight loss as able. I would also be happy to offer referral to pain management clinic if she desires.

## 2020-03-13 NOTE — TELEPHONE ENCOUNTER
"Patient left voicemail returning Sarah's call. She states she did ask for the refill of Flexeril. She takes it at night only and it does help with sleep.   She states there was another medication that was refilled that she didn't want. She does not know the name but states it is a big white pill that makes her \"loopy\" for 15 hours and she feels like she can't do anything. She thinks it is like Tylenol.     Per chart review, patient is now being treated by Dr. Kendrick and was originally seen by Dr. Yeo.     Phone call to patient. She states the Vicodin was helpful during the day time and she did not have any side effects, was able to function. Unlike, the Relafen which made it so she couldn't function. She states she felt intoxicated after taking it and is no longer taking it. Recommended she not take it in the future. She would also like a refill of the Vicodin if able. Informed Dr. Kendrick most likely will not refill the Vicodin because it is a narcotic and other medications are recommended.   She is also taking Ibuprofen 600mg 4 times daily. Informed that is the maximum she should take in a 24 hr period.   Informed that will discuss refill with Dr. Kendrick and get back with her most likely next week.     She can be reached at: [phone]  Ok to leave message : YES    Please advise on refill for Flexeril and Vicodin.     BECK Rene RN            "

## 2020-03-17 NOTE — TELEPHONE ENCOUNTER
Called patient and confirmed receipt of Flexeril.   Informed patient of Dr. Kendrick's recommendations, and that he will not be providing a refill of the Vicodin mediation.     Patient was understanding, and would like to pursue Pain Management Consultation.  Order placed, and informed patient that Pain Management will call to schedule consultation.     She had no further questions.     Fannie Dos Santos, ATC

## 2020-03-18 ENCOUNTER — TELEPHONE (OUTPATIENT)
Dept: PALLIATIVE MEDICINE | Facility: CLINIC | Age: 45
End: 2020-03-18

## 2020-03-18 NOTE — TELEPHONE ENCOUNTER
Order received from Yeo, Albert, MD at [facility].      Patient is being referred for Evaluation for comprehensive services.      Patients diagnosis is right knee osteoarthritis, complex tear of right medial meniscus, right knee pain.      Routing to provider pool to determine if this is essential.     Sugey ROME    [facility]

## 2020-03-31 DIAGNOSIS — Z30.41 ENCOUNTER FOR SURVEILLANCE OF CONTRACEPTIVE PILLS: ICD-10-CM

## 2020-03-31 RX ORDER — ACETAMINOPHEN AND CODEINE PHOSPHATE 120; 12 MG/5ML; MG/5ML
SOLUTION ORAL
Qty: 84 TABLET | Refills: 0 | Status: SHIPPED | OUTPATIENT
Start: 2020-03-31 | End: 2020-06-22

## 2020-03-31 NOTE — TELEPHONE ENCOUNTER
"Requested Prescriptions   Pending Prescriptions Disp Refills     norethindrone (MICRONOR) 0.35 MG tablet [Pharmacy Med Name: NORETHINDRONE 0.35 MG TABLET]  Last Written Prescription Date:  Not on med list  Last Fill Quantity: ,  # refills:    Last office visit: 2/13/2020 with prescribing provider:     Future Office Visit:   84 tablet 1     Sig: TAKE 1 TABLET BY MOUTH EVERY DAY       Contraceptives Protocol Failed - 3/31/2020  1:54 AM        Failed - Medication is active on med list        Passed - Patient is not a current smoker if age is 35 or older        Passed - Recent (12 mo) or future (30 days) visit within the authorizing provider's specialty     Patient has had an office visit with the authorizing provider or a provider within the authorizing providers department within the previous 12 mos or has a future within next 30 days. See \"Patient Info\" tab in inbasket, or \"Choose Columns\" in Meds & Orders section of the refill encounter.              Passed - No active pregnancy on record        Passed - No positive pregnancy test in past 12 months           "

## 2020-03-31 NOTE — TELEPHONE ENCOUNTER
Discontinued from medication list 10/16/19 at OB-GYN appointment and patient started on Depo-Provera instead. Patient has not received any additional Depo-Provera since 10/16/19.     Contacted patient. She states that she has stopped Depo-Provera because her work schedule does not allow her to come to the clinic during the day for nurse only appointment and cannot get an evening appointment for this. Patient had refills remaining at pharmacy for Norethindrone and she restarted this about 2 months ago. Patient asks if Farida can refill this prescription for her.    Routing refill request to provider for review/approval because:  Drug not active on patient's medication list

## 2020-04-10 NOTE — TELEPHONE ENCOUNTER
Routing to determine if pt can be scheduled for a virtual visit.    Sugey ROME    [facility]

## 2020-04-10 NOTE — TELEPHONE ENCOUNTER
Recommend in-person visit once COVID restrictions have been lifted for this patient.     DO [facility]

## 2020-04-13 ENCOUNTER — TELEPHONE (OUTPATIENT)
Dept: BEHAVIORAL HEALTH | Facility: CLINIC | Age: 45
End: 2020-04-13

## 2020-04-13 NOTE — TELEPHONE ENCOUNTER
Community Paramedic Social Needs Outreach  [facility]/Voicemail       Clinical Data: Community Paramedic Outreach    Outreach attempted x 1.      Left message on patient's voicemail with call back information and requested return call.    Community Paramedic Plan will try to reach patient again in 1-2 business days.

## 2020-04-16 ENCOUNTER — TELEPHONE (OUTPATIENT)
Dept: BEHAVIORAL HEALTH | Facility: CLINIC | Age: 45
End: 2020-04-16

## 2020-04-16 NOTE — TELEPHONE ENCOUNTER
Community Paramedic Social Needs Outreach  [facility]/Voicemail       Clinical Data: Community Paramedic Outreach    Outreach attempted x 2.      Left message on patient's voicemail with call back information and requested return call.    Community Paramedic Plan will do no further outreaches at this time.

## 2020-04-23 NOTE — TELEPHONE ENCOUNTER
Pt lvm requesting a call back. Phoned pt left 2nd msg on vm.      Kit DOLAN    [facility] [place]

## 2020-05-07 ENCOUNTER — VIRTUAL VISIT (OUTPATIENT)
Dept: INTERNAL MEDICINE | Facility: CLINIC | Age: 45
End: 2020-05-07
Payer: COMMERCIAL

## 2020-05-07 ENCOUNTER — NURSE TRIAGE (OUTPATIENT)
Dept: NURSING | Facility: CLINIC | Age: 45
End: 2020-05-07

## 2020-05-07 DIAGNOSIS — E66.01 OBESITY, CLASS III, BMI 40-49.9 (MORBID OBESITY) (H): ICD-10-CM

## 2020-05-07 DIAGNOSIS — I10 ESSENTIAL HYPERTENSION WITH GOAL BLOOD PRESSURE LESS THAN 140/90: ICD-10-CM

## 2020-05-07 DIAGNOSIS — R10.13 ABDOMINAL PAIN, EPIGASTRIC: ICD-10-CM

## 2020-05-07 DIAGNOSIS — B37.2 YEAST INFECTION OF THE SKIN: ICD-10-CM

## 2020-05-07 DIAGNOSIS — E11.9 TYPE 2 DIABETES MELLITUS TREATED WITHOUT INSULIN (H): ICD-10-CM

## 2020-05-07 DIAGNOSIS — E78.5 HYPERLIPIDEMIA WITH TARGET LDL LESS THAN 70: ICD-10-CM

## 2020-05-07 DIAGNOSIS — J45.40 MODERATE PERSISTENT ASTHMA WITHOUT COMPLICATION: Primary | ICD-10-CM

## 2020-05-07 DIAGNOSIS — J45.41 MODERATE PERSISTENT ASTHMA WITH ACUTE EXACERBATION: ICD-10-CM

## 2020-05-07 DIAGNOSIS — R06.02 SOB (SHORTNESS OF BREATH): ICD-10-CM

## 2020-05-07 PROCEDURE — 99214 OFFICE O/P EST MOD 30 MIN: CPT | Mod: TEL | Performed by: NURSE PRACTITIONER

## 2020-05-07 RX ORDER — MONTELUKAST SODIUM 10 MG/1
TABLET ORAL
Qty: 90 TABLET | Refills: 3 | Status: SHIPPED | OUTPATIENT
Start: 2020-05-07 | End: 2021-05-11

## 2020-05-07 RX ORDER — ALBUTEROL SULFATE 90 UG/1
2 POWDER, METERED RESPIRATORY (INHALATION) EVERY 4 HOURS PRN
Qty: 3 INHALER | Refills: 3 | Status: SHIPPED | OUTPATIENT
Start: 2020-05-07 | End: 2020-08-18

## 2020-05-07 RX ORDER — MONTELUKAST SODIUM 10 MG/1
TABLET ORAL
Qty: 90 TABLET | Refills: 3 | Status: SHIPPED | OUTPATIENT
Start: 2020-05-07 | End: 2020-05-07

## 2020-05-07 RX ORDER — AZITHROMYCIN 250 MG/1
TABLET, FILM COATED ORAL
Qty: 6 TABLET | Refills: 0 | Status: SHIPPED | OUTPATIENT
Start: 2020-05-07 | End: 2020-08-18

## 2020-05-07 RX ORDER — PREDNISONE 10 MG/1
TABLET ORAL
Qty: 30 TABLET | Refills: 0 | Status: SHIPPED | OUTPATIENT
Start: 2020-05-07 | End: 2020-06-01

## 2020-05-07 RX ORDER — NYSTATIN 100000 U/G
OINTMENT TOPICAL
Qty: 30 G | Refills: 3 | Status: SHIPPED | OUTPATIENT
Start: 2020-05-07 | End: 2020-05-07

## 2020-05-07 RX ORDER — NYSTATIN 100000 U/G
OINTMENT TOPICAL
Qty: 30 G | Refills: 3 | Status: SHIPPED | OUTPATIENT
Start: 2020-05-07 | End: 2021-09-17

## 2020-05-07 RX ORDER — ALBUTEROL SULFATE 0.83 MG/ML
2.5 SOLUTION RESPIRATORY (INHALATION) EVERY 6 HOURS PRN
Qty: 2 BOX | Refills: 11 | Status: SHIPPED | OUTPATIENT
Start: 2020-05-07 | End: 2021-06-03

## 2020-05-07 RX ORDER — ALBUTEROL SULFATE 90 UG/1
2 POWDER, METERED RESPIRATORY (INHALATION) EVERY 4 HOURS PRN
Qty: 3 INHALER | Refills: 3 | Status: SHIPPED | OUTPATIENT
Start: 2020-05-07 | End: 2020-05-07

## 2020-05-07 NOTE — PROGRESS NOTES
"Coni Newberry is a 45 year old female who is being evaluated via a billable telephone visit.      The patient has been notified of following:     \"This telephone visit will be conducted via a call between you and your physician/provider. We have found that certain health care needs can be provided without the need for a physical exam.  This service lets us provide the care you need with a short phone conversation.  If a prescription is necessary we can send it directly to your pharmacy.  If lab work is needed we can place an order for that and you can then stop by our lab to have the test done at a later time.    Telephone visits are billed at different rates depending on your insurance coverage. During this emergency period, for some insurers they may be billed the same as an in-person visit.  Please reach out to your insurance provider with any questions.    If during the course of the call the physician/provider feels a telephone visit is not appropriate, you will not be charged for this service.\"    Patient has given verbal consent for Telephone visit?  Yes    What phone number would you like to be contacted at?     How would you like to obtain your AVS? 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 Two times daily  Blood sugar testing frequency justification:  Uncontrolled diabetes  What time of day are you checking your blood sugars (select all that apply)?  before breakfast , sometimes before super   Have you had any blood sugars above 200?  " No  Have you had any blood sugars below 70?  No    What symptoms do you notice when your blood sugar is low?  Shaky and sweating     What concerns do you have today about your diabetes? None     Do you have any of these symptoms? (Select all that apply)  Weight gain,thirst     Have you had a diabetic eye exam in the last 12 months? No- will make appiontment     Lab Results   Component Value Date    A1C 7.3 02/22/2020    A1C 7.4 06/29/2019    A1C 6.7 11/15/2018    A1C 6.0 12/29/2017    A1C 5.9 07/19/2017                 Hyperlipidemia Follow-Up      Are you regularly taking any medication or supplement to lower your cholesterol?   Yes- simvastain    Are you having muscle aches or other side effects that you think could be caused by your cholesterol lowering medication?  No    Hypertension Follow-up      Do you check your blood pressure regularly outside of the clinic? Yes -occasionally     Are you following a low salt diet? Yes    Are your blood pressures ever more than 140 on the top number (systolic) OR more   than 90 on the bottom number (diastolic), for example 140/90? Yes    BP Readings from Last 2 Encounters:   03/05/20 (!) 148/86   02/20/20 134/80     Hemoglobin A1C (%)   Date Value   02/22/2020 7.3 (H)   06/29/2019 7.4 (H)     LDL Cholesterol Calculated (mg/dL)   Date Value   02/22/2020 74   07/19/2017 62         How many servings of fruits and vegetables do you eat daily?  2-3    On average, how many sweetened beverages do you drink each day (Examples: soda, juice, sweet tea, etc.  Do NOT count diet or artificially sweetened beverages)?   0    How many days per week do you exercise enough to make your heart beat faster? 3 or less    How many minutes a day do you exercise enough to make your heart beat faster? 9 or less  How many days per week do you miss taking your medication? 2    What makes it hard for you to take your medications?  remembering to take      Patient Active Problem List   Diagnosis     Mild  persistent asthma     Nummular eczema     Hyperglycemia     Health Care Home     Type 2 diabetes mellitus treated without insulin (H)     Hyperlipidemia with target LDL less than 70     Lumbago     Thoracic or lumbosacral neuritis or radiculitis, unspecified     Nonallopathic lesion of lumbar region     Nonallopathic lesion of thoracic region     Obesity     History of left salpingo-oophorectomy     Hemorrhagic cyst of ovary     Essential hypertension with goal blood pressure less than 140/90     Obesity, Class III, BMI 40-49.9 (morbid obesity) (H)     Abnormal uterine bleeding (AUB)     Complex tear of medial meniscus of right knee as current injury, subsequent encounter     Right knee pain     Past Surgical History:   Procedure Laterality Date     CYSTECTOMY OVARIAN BENIGN Left 2/23/2016    Procedure: CYSTECTOMY OVARIAN BENIGN;  Surgeon: Marisela Lopez MD;  Location: [facility] OR     CYSTOSCOPY N/A 1/3/2018    Procedure: CYSTOSCOPY;;  Surgeon: Lissy Maya DO;  Location: [facility] OR     DILATE CERVIX, HYSTEROSCOPY, ABLATE ENDOMETRIUM, COMBINED N/A 1/3/2018    Procedure: COMBINED DILATE CERVIX, HYSTEROSCOPY, ABLATE ENDOMETRIUM;  Hysteroscopy, cervical dilation, and cystoscopy;  Surgeon: Lissy Maya DO;  Location: [facility] OR     NONE OF THE ABOVE CORE MEASURE DIAGNOSES       SALPINGO-OOPHORECTOMY, COMBINED Left 2/23/2016    Procedure: COMBINED SALPINGO-OOPHORECTOMY;  Surgeon: Marisela Lopez MD;  Location: [facility] OR       Social History     Tobacco Use     Smoking status: Never Smoker     Smokeless tobacco: Never Used   Substance Use Topics     Alcohol use: Yes     Alcohol/week: 0.0 standard drinks     Comment: 1-2 drinks yearly     Family History   Problem Relation Age of Onset     Diabetes Mother      Diabetes Father      Hypertension Father      Diabetes Sister      Cancer Paternal Grandfather      Diabetes Brother            Reviewed and updated as needed this visit by Provider  Tobacco  Allergies  Meds  Problems   Med Hx  Surg Hx  Fam Hx         Review of Systems   ROS COMP: Constitutional, HEENT, cardiovascular, pulmonary, GI, , musculoskeletal, neuro, skin, endocrine and psych systems are negative, except as otherwise noted.       Objective   Reported vitals:  There were no vitals taken for this visit.   alert and mild distress with breathing - shorter sentences, but this is common for her with her asthma   PSYCH: Alert and oriented times 3; coherent speech, normal   rate and volume, able to articulate logical thoughts, able   to abstract reason, no tangential thoughts, no hallucinations   or delusions  Her affect is normal  RESP: No cough, no audible wheezing, able to talk in full sentences  Remainder of exam unable to be completed due to telephone visits    Diagnostic Test Results:  none         Assessment/Plan:  1. Moderate persistent asthma without complication  Exacerbation related to allergy and change os season   Did discuss possibility for COVID 19- gave resources to call oncare if not improving or go to ER if breathing worsens  She has had episodes like this for years, so think it is her asthma - but did also add possibility of COVID 19  - albuterol (PROAIR RESPICLICK) 108 (90 Base) MCG/ACT inhaler; Inhale 2 puffs into the lungs every 4 hours as needed for shortness of breath / dyspnea  Dispense: 3 Inhaler; Refill: 3  - beclomethasone HFA (QVAR REDIHALER) 80 MCG/ACT inhaler; Inhale 2 puffs into the lungs 2 times daily  Dispense: 1 Inhaler; Refill: 11  - montelukast (SINGULAIR) 10 MG tablet; TAKE 1 TABLET BY MOUTH AT BEDTIME  Dispense: 90 tablet; Refill: 3  - albuterol (PROVENTIL) (2.5 MG/3ML) 0.083% neb solution; Take 1 vial (2.5 mg) by nebulization every 6 hours as needed for shortness of breath / dyspnea or wheezing Needs refill due to asthma exacerbation  Dispense: 2 Box; Refill: 11  - predniSONE (DELTASONE) 10 MG tablet; Take 4 tab daily for 3 days   Take 3 tab daily for 3 days   Take 2 tab daily for 3  days   Take 1 tab daily for 3 days   Then stop  Dispense: 30 tablet; Refill: 0  - azithromycin (ZITHROMAX) 250 MG tablet; Take 2 tablets (500 mg) by mouth daily for 1 day, THEN 1 tablet (250 mg) daily for 4 days.  Dispense: 6 tablet; Refill: 0    2. Type 2 diabetes mellitus treated without insulin (H)  stable    3. Obesity, Class III, BMI 40-49.9 (morbid obesity) (H)  discussed    4. Essential hypertension with goal blood pressure less than 140/90  In good range most of the time    5. Abdominal pain, epigastric  Working well with medication   - omeprazole (PRILOSEC) 20 MG DR capsule; TAKE 1 TABLET BY MOUTH ONCE DAILY 30-60 MINUTES BEFORE A MEAL  Dispense: 90 capsule; Refill: 3    6. Yeast infection of the skin  Prn use  - nystatin (MYCOSTATIN) 383843 UNIT/GM external ointment; APPLY  OINTMENT TOPICALLY TWICE DAILY AS NEEDED TO SKIN FOLD RASH  Dispense: 30 g; Refill: 3    7. SOB (shortness of breath)  More with seasonal changes  Covid 19 concern if she does not improve as expected   - albuterol (PROAIR RESPICLICK) 108 (90 Base) MCG/ACT inhaler; Inhale 2 puffs into the lungs every 4 hours as needed for shortness of breath / dyspnea  Dispense: 3 Inhaler; Refill: 3  - albuterol (PROVENTIL) (2.5 MG/3ML) 0.083% neb solution; Take 1 vial (2.5 mg) by nebulization every 6 hours as needed for shortness of breath / dyspnea or wheezing Needs refill due to asthma exacerbation  Dispense: 2 Box; Refill: 11  - predniSONE (DELTASONE) 10 MG tablet; Take 4 tab daily for 3 days   Take 3 tab daily for 3 days   Take 2 tab daily for 3 days   Take 1 tab daily for 3 days   Then stop  Dispense: 30 tablet; Refill: 0  - azithromycin (ZITHROMAX) 250 MG tablet; Take 2 tablets (500 mg) by mouth daily for 1 day, THEN 1 tablet (250 mg) daily for 4 days.  Dispense: 6 tablet; Refill: 0    8. Hyperlipidemia with target LDL less than 70  Tolerating medication       Return in about 2 weeks (around 5/21/2020) for if not improved.      Phone call  duration:  24 minutes    DANYELL Spencer CNP

## 2020-05-07 NOTE — PATIENT INSTRUCTIONS
z pack take as directed     prednsione wean - take in morning with food   Take 4 tab daily for 3 days   Take 3 tab daily for 3 days   Take 2 tab daily for 3 days   Take 1 tab daily for 3 days   Then stop     If you are not improving, may consider COVID 19   [email or web address] screens this and can potentially test     If SOB increases, may consider ER visit

## 2020-05-07 NOTE — TELEPHONE ENCOUNTER
"Patient stating symptoms increased starting on Friday 5/1/20 with shortness of breath. Stating she was on her nebulizer \"all night.\" Reporting she is now using the nebulizer prior to going to bed. Afebrile. Reporting shortness of breath with exertion. Chest pain intermittent. Patient is attributing symptoms to \"change in seasons and asthma.\" Patient reporting coughing \"a couple of times a day.\"     Sugey Oneill RN  [facility]      Connected to Central Scheduling to requesting Telephone Visit.     COVID 19 Nurse Triage Plan/Patient Instructions    Please be aware that novel coronavirus (COVID-19) may be circulating in the community. If you develop symptoms such as fever, cough, or SOB or if you have concerns about the presence of another infection including coronavirus (COVID-19), please contact your health care provider or visit [email or web address].     Disposition/Instructions    Patient to have scheduled Telephone Visit with a provider. Follow System Ambulatory Workflow for COVID 19.     The clinic staff will assist you to schedule an appointment to complete the Telephone Visit with a provider during normal clinic hours.       Call Back If: Your symptoms worsen before you are able to complete your Telephone Visit with a provider.    Thank you for limiting contact with others, wearing a simple mask to cover your cough, practice good hand hygiene habits and accessing our virtual services where possible to limit the spread of this virus.    For more information about COVID19 and options for caring for yourself at home, please visit the CDC website at https://www.cdc.gov/coronavirus/2019-ncov/about/steps-when-sick.html  For more options for care at [facility], please visit our website at [email or web address]    For more information, please use the [place] Department of Health COVID-19 Website: https://www.health.state.[place]/diseases/coronavirus/index.html  [place] " Department of Health ([facility]) COVID-19 Hotlines (Interpreters available):      Health questions: Phone Number: [phone] or [phone] and Hours: 7 a.m. to 7 p.m.    Schools and  questions: Phone Number: [phone] or [phone] and Hours 7 a.m. to 7 p.m.      Reason for Disposition    MILD difficulty breathing (e.g., minimal/no SOB at rest, SOB with walking, pulse <100)    Additional Information    Negative: SEVERE difficulty breathing (e.g., struggling for each breath, speaks in single words)    Negative: Difficult to awaken or acting confused (e.g., disoriented, slurred speech)    Negative: Bluish (or gray) lips or face now    Negative: Shock suspected (e.g., cold/pale/clammy skin, too weak to stand, low BP, rapid pulse)    Negative: Sounds like a life-threatening emergency to the triager    Negative: SEVERE or constant chest pain (Exception: mild central chest pain, present only when coughing)    Negative: MODERATE difficulty breathing (e.g., speaks in phrases, SOB even at rest, pulse 100-120)    Negative: Patient sounds very sick or weak to the triager    Protocols used: CORONAVIRUS (COVID-19) DIAGNOSED OR [id number] 4.22.20

## 2020-05-08 ASSESSMENT — ASTHMA QUESTIONNAIRES: ACT_TOTALSCORE: 7

## 2020-05-15 ENCOUNTER — TELEPHONE (OUTPATIENT)
Dept: PHYSICAL THERAPY | Facility: CLINIC | Age: 45
End: 2020-05-15

## 2020-05-15 DIAGNOSIS — M25.561 RIGHT KNEE PAIN: ICD-10-CM

## 2020-05-15 DIAGNOSIS — S83.231D COMPLEX TEAR OF MEDIAL MENISCUS OF RIGHT KNEE AS CURRENT INJURY, SUBSEQUENT ENCOUNTER: ICD-10-CM

## 2020-05-22 ENCOUNTER — THERAPY VISIT (OUTPATIENT)
Dept: PHYSICAL THERAPY | Facility: CLINIC | Age: 45
End: 2020-05-22
Payer: COMMERCIAL

## 2020-05-22 DIAGNOSIS — S83.231D COMPLEX TEAR OF MEDIAL MENISCUS OF RIGHT KNEE AS CURRENT INJURY, SUBSEQUENT ENCOUNTER: ICD-10-CM

## 2020-05-22 DIAGNOSIS — M25.561 RIGHT KNEE PAIN: ICD-10-CM

## 2020-05-22 PROCEDURE — 97110 THERAPEUTIC EXERCISES: CPT | Mod: GP | Performed by: PHYSICAL THERAPIST

## 2020-05-28 ENCOUNTER — TELEPHONE (OUTPATIENT)
Dept: INTERNAL MEDICINE | Facility: CLINIC | Age: 45
End: 2020-05-28

## 2020-05-28 ENCOUNTER — THERAPY VISIT (OUTPATIENT)
Dept: PHYSICAL THERAPY | Facility: CLINIC | Age: 45
End: 2020-05-28
Payer: COMMERCIAL

## 2020-05-28 DIAGNOSIS — S83.231D COMPLEX TEAR OF MEDIAL MENISCUS OF RIGHT KNEE AS CURRENT INJURY, SUBSEQUENT ENCOUNTER: ICD-10-CM

## 2020-05-28 DIAGNOSIS — M25.561 RIGHT KNEE PAIN: ICD-10-CM

## 2020-05-28 DIAGNOSIS — J45.30 MILD PERSISTENT ASTHMA WITHOUT COMPLICATION: Primary | ICD-10-CM

## 2020-05-28 PROCEDURE — 97110 THERAPEUTIC EXERCISES: CPT | Mod: GP | Performed by: PHYSICAL THERAPIST

## 2020-05-28 RX ORDER — ALBUTEROL SULFATE 90 UG/1
2 AEROSOL, METERED RESPIRATORY (INHALATION) EVERY 6 HOURS
Qty: 1 INHALER | Refills: 11 | Status: SHIPPED | OUTPATIENT
Start: 2020-05-28 | End: 2021-09-17

## 2020-05-28 NOTE — TELEPHONE ENCOUNTER
Spoke with patient.    1.  States she tried the Albuterol respiclick last night and did not work for her.  She wants to go back to the regular Albuterol inhaler--please e-scribe prescription.  States if [facility] won't give it to her, she wants to go thru [facility] in [place].  This writer did recommend to go back to the pharmacy for a demonstration on using this medication but she did not want to.    Last office visit 5-7-20 (virtual visit)    2.  States she thinks she had a hot flash last night (2am)--whole body got hot (did not perspire) which sent her into a panic attack.  Fine now and only happened one time.  States she gets normal monthly menstrual cycles--on birth control medication.  Denies any other symptoms and temp this morning was 96 (gets checked at work).  Recommended patient monitor for now and call back as needed.    Please advise regarding the albuterol inhaler, thanks.

## 2020-06-01 ENCOUNTER — TRANSFERRED RECORDS (OUTPATIENT)
Dept: HEALTH INFORMATION MANAGEMENT | Facility: CLINIC | Age: 45
End: 2020-06-01

## 2020-06-01 ENCOUNTER — VIRTUAL VISIT (OUTPATIENT)
Dept: INTERNAL MEDICINE | Facility: CLINIC | Age: 45
End: 2020-06-01
Payer: COMMERCIAL

## 2020-06-01 DIAGNOSIS — J45.41 MODERATE PERSISTENT ASTHMA WITH EXACERBATION: Primary | ICD-10-CM

## 2020-06-01 DIAGNOSIS — I10 ESSENTIAL HYPERTENSION WITH GOAL BLOOD PRESSURE LESS THAN 140/90: ICD-10-CM

## 2020-06-01 LAB — RETINOPATHY: NORMAL

## 2020-06-01 PROCEDURE — 99214 OFFICE O/P EST MOD 30 MIN: CPT | Mod: TEL | Performed by: INTERNAL MEDICINE

## 2020-06-01 RX ORDER — LISINOPRIL 10 MG/1
10 TABLET ORAL DAILY
Qty: 30 TABLET | Refills: 1 | Status: SHIPPED | OUTPATIENT
Start: 2020-06-01 | End: 2020-07-24

## 2020-06-01 RX ORDER — METHYLPREDNISOLONE 4 MG
TABLET, DOSE PACK ORAL
Qty: 21 TABLET | Refills: 0 | Status: SHIPPED | OUTPATIENT
Start: 2020-06-01 | End: 2020-08-18

## 2020-06-01 NOTE — PROGRESS NOTES
"Coni Newberry is a 45 year old female who is being evaluated via a billable telephone visit.      The patient has been notified of following:     \"This telephone visit will be conducted via a call between you and your physician/provider. We have found that certain health care needs can be provided without the need for a physical exam.  This service lets us provide the care you need with a short phone conversation.  If a prescription is necessary we can send it directly to your pharmacy.  If lab work is needed we can place an order for that and you can then stop by our lab to have the test done at a later time.    Telephone visits are billed at different rates depending on your insurance coverage. During this emergency period, for some insurers they may be billed the same as an in-person visit.  Please reach out to your insurance provider with any questions.    If during the course of the call the physician/provider feels a telephone visit is not appropriate, you will not be charged for this service.\"    Patient has given verbal consent for Telephone visit?  Yes    What phone number would you like to be contacted at? [phone]    How would you like to obtain your AVS? Melinda    Subjective     Coni Newberry is a 45 year old female who presents via phone visit today for the following health issues:    HPI     Pt is a 45 year old female for phone visit  to day with c/o wheezing since 3 days, also has mild shortness of breath, dry cough.  No fever or chills patient checks her temperature regularly.  Has been using albuterol nebs more often than usual in the last 3 days.  Has not been using inhaler.  Patient works at a [employer] and states that  they check her temperature every day when she goes to work and she is maintaining social distancing also and not exposed to any COVID-19 patients.  Patient had virtual  visit 3 weeks ago and was treated with prednisone and was started on steroid inhaler and " Singulair.  Patient has been using Qvar only 1 puff once a day instead of 2 puffs twice daily.     Hypertension Follow-up      Do you check your blood pressure regularly outside of the clinic? Yes complains of high bp readings since few days and blood pressure this morning 184/105 , on lisinopril 5 mg daily     are you following a low salt diet? Yes    Are your blood pressures ever more than 140 on the top number (systolic) OR more   than 90 on the bottom number (diastolic), for example 140/90? Yes    BP Readings from Last 2 Encounters:   03/05/20 (!) 148/86   02/20/20 134/80     Hemoglobin A1C (%)   Date Value   02/22/2020 7.3 (H)   06/29/2019 7.4 (H)     LDL Cholesterol Calculated (mg/dL)   Date Value   02/22/2020 74   07/19/2017 62            How many days per week do you miss taking your asthma controller medication?  0    Please describe any recent triggers for your asthma: Weather    Have you had any Emergency Room Visits, Urgent Care Visits, or Hospital Admissions since your last office visit?  No      How many servings of fruits and vegetables do you eat daily?  2-3    On average, how many sweetened beverages do you drink each day (Examples: soda, juice, sweet tea, etc.  Do NOT count diet or artificially sweetened beverages)?   1    How many days per week do you exercise enough to make your heart beat faster? 3 or less    How many minutes a day do you exercise enough to make your heart beat faster? 30 - 60    How many days per week do you miss taking your medication? 0      Patient Active Problem List   Diagnosis     Mild persistent asthma     Nummular eczema     Hyperglycemia     Health Care Home     Type 2 diabetes mellitus treated without insulin (H)     Hyperlipidemia with target LDL less than 70     Lumbago     Thoracic or lumbosacral neuritis or radiculitis, unspecified     Nonallopathic lesion of lumbar region     Nonallopathic lesion of thoracic region     Obesity     History of left  salpingo-oophorectomy     Hemorrhagic cyst of ovary     Essential hypertension with goal blood pressure less than 140/90     Obesity, Class III, BMI 40-49.9 (morbid obesity) (H)     Abnormal uterine bleeding (AUB)     Complex tear of medial meniscus of right knee as current injury, subsequent encounter     Right knee pain     Past Surgical History:   Procedure Laterality Date     CYSTECTOMY OVARIAN BENIGN Left 2/23/2016    Procedure: CYSTECTOMY OVARIAN BENIGN;  Surgeon: Marisela Lopez MD;  Location: [facility] OR     CYSTOSCOPY N/A 1/3/2018    Procedure: CYSTOSCOPY;;  Surgeon: Lissy Maya DO;  Location: [facility] OR     DILATE CERVIX, HYSTEROSCOPY, ABLATE ENDOMETRIUM, COMBINED N/A 1/3/2018    Procedure: COMBINED DILATE CERVIX, HYSTEROSCOPY, ABLATE ENDOMETRIUM;  Hysteroscopy, cervical dilation, and cystoscopy;  Surgeon: Lissy Maya DO;  Location: [facility] OR     NONE OF THE ABOVE CORE MEASURE DIAGNOSES       SALPINGO-OOPHORECTOMY, COMBINED Left 2/23/2016    Procedure: COMBINED SALPINGO-OOPHORECTOMY;  Surgeon: Marisela Lopez MD;  Location: [facility] OR       Social History     Tobacco Use     Smoking status: Never Smoker     Smokeless tobacco: Never Used   Substance Use Topics     Alcohol use: Yes     Alcohol/week: 0.0 standard drinks     Comment: 1-2 drinks yearly     Family History   Problem Relation Age of Onset     Diabetes Mother      Diabetes Father      Hypertension Father      Diabetes Sister      Cancer Paternal Grandfather      Diabetes Brother          Current Outpatient Medications   Medication Sig Dispense Refill     albuterol (PROAIR HFA/PROVENTIL HFA/VENTOLIN HFA) 108 (90 Base) MCG/ACT inhaler Inhale 2 puffs into the lungs every 6 hours respiclick did not work for patient 1 Inhaler 11     albuterol (PROAIR RESPICLICK) 108 (90 Base) MCG/ACT inhaler Inhale 2 puffs into the lungs every 4 hours as needed for shortness of breath / dyspnea 3 Inhaler 3     albuterol (PROVENTIL) (2.5 MG/3ML) 0.083% neb solution Take 1  vial (2.5 mg) by nebulization every 6 hours as needed for shortness of breath / dyspnea or wheezing Needs refill due to asthma exacerbation 2 Box 11     atorvastatin (LIPITOR) 10 MG tablet Take 1 tablet (10 mg) by mouth daily 90 tablet 3     beclomethasone HFA (QVAR REDIHALER) 80 MCG/ACT inhaler Inhale 2 puffs into the lungs 2 times daily 1 Inhaler 11     blood glucose monitoring (ONE TOUCH ULTRA 2) meter device kit Use to test blood sugars 1 times daily or as directed. 1 kit 0     blood glucose monitoring (ONE TOUCH ULTRA) test strip Use to test blood sugars 1 times daily or as directed. 3 Box 1     blood glucose test strip Use to test blood sugar 6 times daily or as directed. 600 strip 3     cetirizine-psuedoePHEDrine (ZYRTEC-D) 5-120 MG per tablet Take 1 tablet by mouth daily Reported on 3/31/2017       cyclobenzaprine (FLEXERIL) 10 MG tablet TAKE 1 TABLET BY MOUTH NIGHTLY AS NEEDED FOR MUSCLE SPASM 90 tablet 3     fluticasone (FLONASE) 50 MCG/ACT nasal spray Spray 2 sprays into both nostrils daily 3 Package 3     ibuprofen (ADVIL/MOTRIN) 800 MG tablet Take 1 tablet (800 mg) by mouth every 6 hours as needed for moderate pain 30 tablet 0     LANCETS ULTRA FINE MISC 1 each 6 times daily 300 each 2     lisinopril (ZESTRIL) 10 MG tablet Take 1 tablet (10 mg) by mouth daily 30 tablet 1     metFORMIN (GLUCOPHAGE) 500 MG tablet Take 2.5 tablets (1,250 mg) by mouth 2 times daily (with meals) 450 tablet 3     methylPREDNISolone (MEDROL DOSEPAK) 4 MG tablet therapy pack Follow Package Directions 21 tablet 0     montelukast (SINGULAIR) 10 MG tablet TAKE 1 TABLET BY MOUTH AT BEDTIME 90 tablet 3     nabumetone (RELAFEN) 750 MG tablet Take 1 tablet (750 mg) by mouth 2 times daily as needed for moderate pain 60 tablet 1     nystatin (MYCOSTATIN) 327363 UNIT/GM external ointment APPLY  OINTMENT TOPICALLY TWICE DAILY AS NEEDED TO SKIN FOLD RASH 30 g 3     omeprazole (PRILOSEC) 20 MG DR capsule TAKE 1 TABLET BY MOUTH ONCE DAILY  30-60 MINUTES BEFORE A MEAL 90 capsule 3     order for DME Equipment being ordered: Nebulizer and tubing 1 Device 0     tiZANidine (ZANAFLEX) 4 MG capsule TAKE 1 CAPSULE BY MOUTH NIGHTLY AS NEEDED FOR MUSCLE SPASM 30 capsule 3     triamcinolone (KENALOG) 0.1 % external ointment APPLY OINTMENT SPARINGLY TO AFFECTED AREA THREE TIMES DAILY AS NEEDED FOR ECZEMA RASH 80 g 1     glyBURIDE (DIABETA /MICRONASE) 5 MG tablet TAKE 1 TABLET BY MOUTH ONCE DAILY WITH BREAKFAST (Patient not taking: Reported on 6/1/2020) 90 tablet 3     norethindrone (MICRONOR) 0.35 MG tablet TAKE 1 TABLET BY MOUTH EVERY DAY 84 tablet 0     order for DME Equipment being ordered: tennis elbow brace (Patient not taking: Reported on 6/1/2020) 1 Device 0       Reviewed and updated as needed this visit by Provider         Review of Systems   CONSTITUTIONAL: NEGATIVE for fever, chills, change in weight  EYES: NEGATIVE for vision changes or irritation  ENT/MOUTH: NEGATIVE for ear, mouth and throat problems  RESP:wheezing, sob  CV: NEGATIVE for chest pain, palpitations or peripheral edema  NEURO: NEGATIVE for weakness, dizziness or paresthesias       Objective   Reported vitals:  There were no vitals taken for this visit.   alert and no distress  PSYCH: Alert and oriented times 3; coherent speech, normal   rate and volume, able to articulate logical thoughts, able   to abstract reason, no tangential thoughts, no hallucinations   or delusions  Her affect is normal  RESP: No cough, no audible wheezing, able to talk in full sentences  Remainder of exam unable to be completed due to telephone visits          Assessment/Plan:    1. Moderate persistent asthma with exacerbation  - pt is using Qvar 1 puff once daily only instead of 2 pufffs bid. Using alb neb more frequently in the last 3 days , pt was advised to use Q karina 2 puffs bid, started on . methylPREDNISolone (MEDROL DOSEPAK) 4 MG tablet therapy pack; Follow Package Directions  Dispense: 21 tablet; Refill:  0.explained clearly about the medication,insructions and side effects.  Call  clinic prn if these symtoms worsen, fail to improve as anticipated, or if new symptoms develop.      2. Essential hypertension with goal blood pressure less than 140/90  --elevated BP at home  -increase  lisinopril (ZESTRIL) to  10 MG tablet; Take 1 tablet (10 mg) by mouth daily  .explained clearly about the medication,insructions and side effects.     Phone call duration:  12 minutes    Julissa Aldana MD

## 2020-06-02 ASSESSMENT — ASTHMA QUESTIONNAIRES: ACT_TOTALSCORE: 6

## 2020-06-05 DIAGNOSIS — M54.31 SCIATICA OF RIGHT SIDE: ICD-10-CM

## 2020-06-05 RX ORDER — TIZANIDINE HYDROCHLORIDE 4 MG/1
CAPSULE, GELATIN COATED ORAL
Qty: 90 CAPSULE | Refills: 3 | Status: SHIPPED | OUTPATIENT
Start: 2020-06-05 | End: 2021-09-17

## 2020-06-05 NOTE — TELEPHONE ENCOUNTER
tiZANidine (ZANAFLEX) 4 MG capsule      Last Written Prescription Date:  1/29/2020  Last Fill Quantity: 30,   # refills: 3  Last Office Visit: Talha Quesada Office visit:       Routing refill request to provider for review/approval because:  Drug not on the [facility], [facility] or [facility] refill protocol or controlled substance

## 2020-06-11 ENCOUNTER — THERAPY VISIT (OUTPATIENT)
Dept: PHYSICAL THERAPY | Facility: CLINIC | Age: 45
End: 2020-06-11
Payer: COMMERCIAL

## 2020-06-11 DIAGNOSIS — M25.561 RIGHT KNEE PAIN: ICD-10-CM

## 2020-06-11 DIAGNOSIS — S83.231D COMPLEX TEAR OF MEDIAL MENISCUS OF RIGHT KNEE AS CURRENT INJURY, SUBSEQUENT ENCOUNTER: ICD-10-CM

## 2020-06-11 PROCEDURE — 97110 THERAPEUTIC EXERCISES: CPT | Mod: GP | Performed by: PHYSICAL THERAPIST

## 2020-06-11 PROCEDURE — 97140 MANUAL THERAPY 1/> REGIONS: CPT | Mod: GP | Performed by: PHYSICAL THERAPIST

## 2020-06-18 ENCOUNTER — THERAPY VISIT (OUTPATIENT)
Dept: PHYSICAL THERAPY | Facility: CLINIC | Age: 45
End: 2020-06-18
Payer: COMMERCIAL

## 2020-06-18 DIAGNOSIS — S83.231D COMPLEX TEAR OF MEDIAL MENISCUS OF RIGHT KNEE AS CURRENT INJURY, SUBSEQUENT ENCOUNTER: ICD-10-CM

## 2020-06-18 DIAGNOSIS — M25.561 RIGHT KNEE PAIN: ICD-10-CM

## 2020-06-18 PROCEDURE — 97140 MANUAL THERAPY 1/> REGIONS: CPT | Mod: GP | Performed by: PHYSICAL THERAPIST

## 2020-06-18 PROCEDURE — 97110 THERAPEUTIC EXERCISES: CPT | Mod: GP | Performed by: PHYSICAL THERAPIST

## 2020-06-22 DIAGNOSIS — Z30.41 ENCOUNTER FOR SURVEILLANCE OF CONTRACEPTIVE PILLS: ICD-10-CM

## 2020-06-23 RX ORDER — ACETAMINOPHEN AND CODEINE PHOSPHATE 120; 12 MG/5ML; MG/5ML
0.35 SOLUTION ORAL DAILY
Qty: 84 TABLET | Refills: 3 | Status: SHIPPED | OUTPATIENT
Start: 2020-06-23 | End: 2021-05-20

## 2020-07-02 ENCOUNTER — THERAPY VISIT (OUTPATIENT)
Dept: PHYSICAL THERAPY | Facility: CLINIC | Age: 45
End: 2020-07-02
Payer: COMMERCIAL

## 2020-07-02 DIAGNOSIS — S83.231D COMPLEX TEAR OF MEDIAL MENISCUS OF RIGHT KNEE AS CURRENT INJURY, SUBSEQUENT ENCOUNTER: ICD-10-CM

## 2020-07-02 DIAGNOSIS — M25.561 RIGHT KNEE PAIN: ICD-10-CM

## 2020-07-02 PROCEDURE — 97110 THERAPEUTIC EXERCISES: CPT | Mod: GP | Performed by: PHYSICAL THERAPIST

## 2020-07-02 NOTE — PROGRESS NOTES
PROGRESS  REPORT    Progress reporting period is from Mar 6, 2020 to Jul 2, 2020.         SUBJECTIVE  Subjective changes noted by patient: Different spot is hurting today than last time. Only did the stretches for a few days because the knee had been more painful. Overall notes improvements in function.     Current Pain level: 5/10.     Initial Pain level: 7/10.   Changes in function:  Yes (See Goal flowsheet attached for changes in current functional level)  Adverse reaction to treatment or activity: treatment/activity - increased fatigue and some increase in pain with loading of the joint with activity or treatment exercises    OBJECTIVE  Changes noted in objective findings:  Yes, patient has demonstrated an improvement in right knee ROM and function since starting therapy.     Calf circumference at 15 cm distal to knee joint line: 51cm on right, 51 cm on left.         ASSESSMENT/PLAN  Updated problem list and treatment plan: Diagnosis 1:  Complex tear of medial meniscus of right knee as current injury ; Primary osteoarthritis of right knee   Pain -  hot/cold therapy, manual therapy, splint/taping/bracing/orthotics, self management, education and home program  Decreased ROM/flexibility - manual therapy, therapeutic exercise, therapeutic activity and home program  Impaired gait - gait training, assistive devices and home program  Decreased function - therapeutic activities and home program  Impaired posture - neuro re-education, therapeutic activities and home program    STG/LTGs have been met or progress has been made towards goals:  Yes (See Goal flow sheet completed today.)  Assessment of Progress: The patient's condition is improving.  The patient's condition has potential to improve.  Self Management Plans:  Patient is independent in a home treatment program.  Patient is independent in self management of symptoms.  I have re-evaluated this patient and find that the nature, scope, duration and intensity of the  therapy is appropriate for the medical condition of the patient.  Coni Brar continues to require the following intervention to meet STG and LTG's:  Undetermined at this time    Recommendations:  Patient has a well established HEP that she feels is still challenging and appropriate. Due to the nature of the injury, a pain-free recovery is not anticipated  Patient will follow-up by phone in 1 month. Additional need for PT will be assessed at that time.     Please refer to the daily flowsheet for treatment today, total treatment time and time spent performing 1:1 timed codes.

## 2020-07-17 ENCOUNTER — OFFICE VISIT (OUTPATIENT)
Dept: ORTHOPEDICS | Facility: CLINIC | Age: 45
End: 2020-07-17
Payer: COMMERCIAL

## 2020-07-17 VITALS
BODY MASS INDEX: 50.02 KG/M2 | DIASTOLIC BLOOD PRESSURE: 92 MMHG | SYSTOLIC BLOOD PRESSURE: 138 MMHG | HEIGHT: 64 IN | WEIGHT: 293 LBS

## 2020-07-17 DIAGNOSIS — M17.11 PRIMARY OSTEOARTHRITIS OF RIGHT KNEE: Primary | ICD-10-CM

## 2020-07-17 DIAGNOSIS — M17.12 PRIMARY OSTEOARTHRITIS OF LEFT KNEE: ICD-10-CM

## 2020-07-17 DIAGNOSIS — S83.231D COMPLEX TEAR OF MEDIAL MENISCUS OF RIGHT KNEE AS CURRENT INJURY, SUBSEQUENT ENCOUNTER: ICD-10-CM

## 2020-07-17 PROCEDURE — 20611 DRAIN/INJ JOINT/BURSA W/US: CPT | Mod: 50 | Performed by: FAMILY MEDICINE

## 2020-07-17 PROCEDURE — 99213 OFFICE O/P EST LOW 20 MIN: CPT | Mod: 25 | Performed by: FAMILY MEDICINE

## 2020-07-17 RX ORDER — METHYLPREDNISOLONE ACETATE 40 MG/ML
40 INJECTION, SUSPENSION INTRA-ARTICULAR; INTRALESIONAL; INTRAMUSCULAR; SOFT TISSUE
Status: DISCONTINUED | OUTPATIENT
Start: 2020-07-17 | End: 2021-09-17

## 2020-07-17 RX ADMIN — METHYLPREDNISOLONE ACETATE 40 MG: 40 INJECTION, SUSPENSION INTRA-ARTICULAR; INTRALESIONAL; INTRAMUSCULAR; SOFT TISSUE at 16:30

## 2020-07-17 ASSESSMENT — MIFFLIN-ST. JEOR: SCORE: 2154.65

## 2020-07-17 NOTE — LETTER
"    7/17/2020         RE: Coni Newberry  [address]  [place] [facility] [id number]        Dear Colleague,    Thank you for referring your patient, Coni Newberry, to the [facility]. Please see a copy of my visit note below.    ASSESSMENT & PLAN  Patient Instructions     1. Primary osteoarthritis of right knee    2. Primary osteoarthritis of left knee    3. Complex tear of medial meniscus of right knee as current injury, subsequent encounter      -Patient has bilateral knee pain and swelling due to arthritis and meniscus tear  -Patient tolerated bilateral cortisone injections today without complications.  -Patient will follow-up when pain and swelling return for repeat treatment.  -Call direct clinic number [[phone]] at any time with questions or concerns.    Albert Yeo MD [facility]  [facility]          -----    SUBJECTIVE:  Coni Newberry is a 45 year old female who is seen in follow-up for right knee pain due to complex tearing of the medial meniscus .They were last seen 2/8/2020.     Since their last visit reports 5% improvement. They indicate that their current pain level is 5/10. Right states is feeling worse than the left. They have tried ice, ibuprofen, home exercises, physical therapy (5 visits), previous imaging (MRI 2/7/2020) and corticosteroid injection (most recent date: 2/20/2020) that provided  3 month(s) of relief.      The patient is seen by themselves.    Patient's past medical, surgical, social, and family histories were reviewed today and no changes are noted.    REVIEW OF SYSTEMS:  Constitutional: NEGATIVE for fever, chills, change in weight  Skin: NEGATIVE for worrisome rashes, moles or lesions  GI/: NEGATIVE for bowel or bladder changes  Neuro: NEGATIVE for weakness, dizziness or paresthesias    OBJECTIVE:  BP (!) 138/92   Ht 1.619 m (5' 3.75\")   Wt (!) 152.9 kg (337 lb)   BMI " 58.30 kg/m     General: healthy, alert and in no distress  HEENT: no scleral icterus or conjunctival erythema  Skin: no suspicious lesions or rash. No jaundice.  CV: regular rhythm by palpation, no pedal edema  Resp: normal respiratory effort without conversational dyspnea   Psych: normal mood and affect  Gait: normal steady gait with appropriate coordination and balance  Neuro: normal light touch sensory exam of the extremities.    MSK:  Bilateral KNEE  Inspection:    Normal alignment; no edema, erythema, or ecchymosis present  Palpation:    Tender about the lateral patellar facet, medial patellar facet and medial/lateral joint line. Remainder of bony and ligamentous landmarks are nontender.    Trace effusion is present    Patellofemoral crepitus is Absent  Range of Motion:     00 extension to 900 flexion  Strength:    Quadriceps 5-/5 and hamstrings 5-/5    Extensor mechanism intact  Special Tests:    Positive: Alondra's in the right    Negative: MCL/valgus stress (0 & 30 deg), LCL/varus stress (0 & 30 deg), Lachman's, anterior drawer, posterior drawer    Independent visualization of the below image:  Recent Results (from the past 24 hour(s))   MR Knee Right w/o Contrast     Narrative     MR right knee without contrast     Techniques: Multiplanar multisequence imaging of the right  knee was  obtained without  administration of intra-articular or intravenous  contrast using routing protocol.     History: Knee pain, xray internal derangement; ; Patient twisted knee,  felt pop, severe pain, difficulty ambulating. r/o meniscus tear.;  Acute pain of right knee      Comparison: Radiographs 2/5/2020     Findings:     MENISCI:  Medial meniscus: Multidirectional tearing of the junction of the  posterior horn and posterior root ligament of the medial meniscus. 4  mm of peripheral extrusion of the body of the medial meniscus.  Lateral meniscus: Intact.     LIGAMENTS  Cruciate ligaments: Intact.  Medial supporting structures:  The superficial tibial collateral  ligament is mildly bowed by peripherally extruded medial meniscus.  Mild edema superficial to the MCL. Mild past antrum bursal fluid.  Lateral supporting structures: Intact.     EXTENSOR MECHANISM  Intact. Partially visualized fluid within the quadriceps tendon at the  edge of the field-of-view on sagittal images, possibly artifactual.     FLUID  Small joint effusion with synovitis. No substantial Baker's cyst.     OSSEOUS and ARTICULAR STRUCTURES  Bones: No fracture, contusion, or osseous lesion is seen.     Patellofemoral compartment: High-grade cartilage loss of the median  ridge and lateral facet of the patella minimal subchondral cystic  change. Full-thickness cartilage fissuring over the lateral trochlea  with mild subchondral edema.     Medial compartment: Moderate thinning of cartilage of the anterior  weightbearing medial femoral condyle.     Lateral compartment: Partial-thickness fraying of cartilage over the  lateral femoral condyle and lateral tibial plateau.     ANCILLARY FINDINGS  None.        Impression     Impression:  1. Multidirectional tearing of the junction of the posterior horn and  posterior root ligament of the medial meniscus.     2. 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     3. Small joint effusion with synovitis.     4. At the proximal edge of the field-of-view on sagittal imaging there  is increased signal within the quadriceps tendon, possibly  artifactual. If there is concern for quadriceps tendon pathology,  femur/thigh MRI could be performed.     4. Low-grade MCL sprain.     TED OTOOLE MD (Joe)     Large Joint Injection/Arthocentesis: bilateral knee    Date/Time: 7/17/2020 4:30 PM  Performed by: Yeo, Albert, MD  Authorized by: Yeo, Albert, MD     Indications:  Pain and osteoarthritis  Needle Size:  25 G  Guidance: ultrasound    Approach:  Superolateral  Location:  Knee  Laterality:   Bilateral      Medications (Right):  40 mg methylPREDNISolone 40 MG/ML  Medications (Left):  40 mg methylPREDNISolone 40 MG/ML  Outcome:  Tolerated well, no immediate complications  Procedure discussed: discussed risks, benefits, and alternatives    Consent Given by:  Patient  Timeout: timeout called immediately prior to procedure    Prep: patient was prepped and draped in usual sterile fashion            Albert Yeo MD, [facility]          Again, thank you for allowing me to participate in the care of your patient.        Sincerely,        Albert Yeo, MD

## 2020-07-17 NOTE — PROGRESS NOTES
"ASSESSMENT & PLAN  Patient Instructions     1. Primary osteoarthritis of right knee    2. Primary osteoarthritis of left knee    3. Complex tear of medial meniscus of right knee as current injury, subsequent encounter      -Patient has bilateral knee pain and swelling due to arthritis and meniscus tear  -Patient tolerated bilateral cortisone injections today without complications.  -Patient will follow-up when pain and swelling return for repeat treatment.  -Call direct clinic number [[phone]] at any time with questions or concerns.    Albert Yeo MD [facility]  [facility]          -----    SUBJECTIVE:  Coni Newberry is a 45 year old female who is seen in follow-up for right knee pain due to complex tearing of the medial meniscus .They were last seen 2/8/2020.     Since their last visit reports 5% improvement. They indicate that their current pain level is 5/10. Right states is feeling worse than the left. They have tried ice, ibuprofen, home exercises, physical therapy (5 visits), previous imaging (MRI 2/7/2020) and corticosteroid injection (most recent date: 2/20/2020) that provided  3 month(s) of relief.      The patient is seen by themselves.    Patient's past medical, surgical, social, and family histories were reviewed today and no changes are noted.    REVIEW OF SYSTEMS:  Constitutional: NEGATIVE for fever, chills, change in weight  Skin: NEGATIVE for worrisome rashes, moles or lesions  GI/: NEGATIVE for bowel or bladder changes  Neuro: NEGATIVE for weakness, dizziness or paresthesias    OBJECTIVE:  BP (!) 138/92   Ht 1.619 m (5' 3.75\")   Wt (!) 152.9 kg (337 lb)   BMI 58.30 kg/m     General: healthy, alert and in no distress  HEENT: no scleral icterus or conjunctival erythema  Skin: no suspicious lesions or rash. No jaundice.  CV: regular rhythm by palpation, no pedal edema  Resp: normal respiratory effort without conversational dyspnea "   Psych: normal mood and affect  Gait: normal steady gait with appropriate coordination and balance  Neuro: normal light touch sensory exam of the extremities.    MSK:  Bilateral KNEE  Inspection:    Normal alignment; no edema, erythema, or ecchymosis present  Palpation:    Tender about the lateral patellar facet, medial patellar facet and medial/lateral joint line. Remainder of bony and ligamentous landmarks are nontender.    Trace effusion is present    Patellofemoral crepitus is Absent  Range of Motion:     00 extension to 900 flexion  Strength:    Quadriceps 5-/5 and hamstrings 5-/5    Extensor mechanism intact  Special Tests:    Positive: Alondra's in the right    Negative: MCL/valgus stress (0 & 30 deg), LCL/varus stress (0 & 30 deg), Lachman's, anterior drawer, posterior drawer    Independent visualization of the below image:  Recent Results (from the past 24 hour(s))   MR Knee Right w/o Contrast     Narrative     MR right knee without contrast     Techniques: Multiplanar multisequence imaging of the right  knee was  obtained without  administration of intra-articular or intravenous  contrast using routing protocol.     History: Knee pain, xray internal derangement; ; Patient twisted knee,  felt pop, severe pain, difficulty ambulating. r/o meniscus tear.;  Acute pain of right knee      Comparison: Radiographs 2/5/2020     Findings:     MENISCI:  Medial meniscus: Multidirectional tearing of the junction of the  posterior horn and posterior root ligament of the medial meniscus. 4  mm of peripheral extrusion of the body of the medial meniscus.  Lateral meniscus: Intact.     LIGAMENTS  Cruciate ligaments: Intact.  Medial supporting structures: The superficial tibial collateral  ligament is mildly bowed by peripherally extruded medial meniscus.  Mild edema superficial to the MCL. Mild past antrum bursal fluid.  Lateral supporting structures: Intact.     EXTENSOR MECHANISM  Intact. Partially visualized fluid  within the quadriceps tendon at the  edge of the field-of-view on sagittal images, possibly artifactual.     FLUID  Small joint effusion with synovitis. No substantial Baker's cyst.     OSSEOUS and ARTICULAR STRUCTURES  Bones: No fracture, contusion, or osseous lesion is seen.     Patellofemoral compartment: High-grade cartilage loss of the median  ridge and lateral facet of the patella minimal subchondral cystic  change. Full-thickness cartilage fissuring over the lateral trochlea  with mild subchondral edema.     Medial compartment: Moderate thinning of cartilage of the anterior  weightbearing medial femoral condyle.     Lateral compartment: Partial-thickness fraying of cartilage over the  lateral femoral condyle and lateral tibial plateau.     ANCILLARY FINDINGS  None.        Impression     Impression:  1. Multidirectional tearing of the junction of the posterior horn and  posterior root ligament of the medial meniscus.     2. 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     3. Small joint effusion with synovitis.     4. At the proximal edge of the field-of-view on sagittal imaging there  is increased signal within the quadriceps tendon, possibly  artifactual. If there is concern for quadriceps tendon pathology,  femur/thigh MRI could be performed.     4. Low-grade MCL sprain.     TED OTOOLE MD (Joe)     Large Joint Injection/Arthocentesis: bilateral knee    Date/Time: 7/17/2020 4:30 PM  Performed by: Yeo, Albert, MD  Authorized by: Yeo, Albert, MD     Indications:  Pain and osteoarthritis  Needle Size:  25 G  Guidance: ultrasound    Approach:  Superolateral  Location:  Knee  Laterality:  Bilateral      Medications (Right):  40 mg methylPREDNISolone 40 MG/ML  Medications (Left):  40 mg methylPREDNISolone 40 MG/ML  Outcome:  Tolerated well, no immediate complications  Procedure discussed: discussed risks, benefits, and alternatives    Consent Given by:   Patient  Timeout: timeout called immediately prior to procedure    Prep: patient was prepped and draped in usual sterile fashion            Albert Yeo MD, [facility]

## 2020-07-17 NOTE — PATIENT INSTRUCTIONS
1. Primary osteoarthritis of right knee    2. Primary osteoarthritis of left knee    3. Complex tear of medial meniscus of right knee as current injury, subsequent encounter      -Patient has bilateral knee pain and swelling due to arthritis and meniscus tear  -Patient tolerated bilateral cortisone injections today without complications.  -Patient will follow-up when pain and swelling return for repeat treatment.   Coni Brar to follow up with Primary Care provider regarding elevated blood pressure.    -Call direct clinic number [[phone]] at any time with questions or concerns.    Albert Yeo MD [facility]  [facility]

## 2020-07-17 NOTE — NURSING NOTE
Coni Brar to follow up with Primary Care provider regarding elevated blood pressure.  Sarah Ramirez MS, ATC

## 2020-07-24 DIAGNOSIS — I10 ESSENTIAL HYPERTENSION WITH GOAL BLOOD PRESSURE LESS THAN 140/90: ICD-10-CM

## 2020-07-24 RX ORDER — LISINOPRIL 10 MG/1
TABLET ORAL
Qty: 30 TABLET | Refills: 0 | Status: SHIPPED | OUTPATIENT
Start: 2020-07-24 | End: 2020-09-16 | Stop reason: ALTCHOICE

## 2020-08-01 ENCOUNTER — TRANSFERRED RECORDS (OUTPATIENT)
Dept: HEALTH INFORMATION MANAGEMENT | Facility: CLINIC | Age: 45
End: 2020-08-01

## 2020-08-18 ENCOUNTER — VIRTUAL VISIT (OUTPATIENT)
Dept: INTERNAL MEDICINE | Facility: CLINIC | Age: 45
End: 2020-08-18
Payer: COMMERCIAL

## 2020-08-18 VITALS — DIASTOLIC BLOOD PRESSURE: 85 MMHG | SYSTOLIC BLOOD PRESSURE: 165 MMHG

## 2020-08-18 DIAGNOSIS — J45.41 MODERATE PERSISTENT ASTHMA WITH ACUTE EXACERBATION: Primary | ICD-10-CM

## 2020-08-18 DIAGNOSIS — E11.9 TYPE 2 DIABETES MELLITUS TREATED WITHOUT INSULIN (H): ICD-10-CM

## 2020-08-18 DIAGNOSIS — I10 ESSENTIAL HYPERTENSION WITH GOAL BLOOD PRESSURE LESS THAN 140/90: ICD-10-CM

## 2020-08-18 DIAGNOSIS — L30.0 NUMMULAR ECZEMA: ICD-10-CM

## 2020-08-18 PROCEDURE — 99214 OFFICE O/P EST MOD 30 MIN: CPT | Mod: TEL | Performed by: INTERNAL MEDICINE

## 2020-08-18 RX ORDER — PREDNISONE 10 MG/1
20 TABLET ORAL 2 TIMES DAILY
Qty: 60 TABLET | Refills: 0 | Status: SHIPPED | OUTPATIENT
Start: 2020-08-18 | End: 2020-09-16

## 2020-08-18 RX ORDER — TRIAMCINOLONE ACETONIDE 1 MG/G
OINTMENT TOPICAL
Qty: 80 G | Refills: 1 | Status: SHIPPED | OUTPATIENT
Start: 2020-08-18 | End: 2020-11-11

## 2020-08-18 RX ORDER — LISINOPRIL 20 MG/1
20 TABLET ORAL DAILY
Qty: 90 TABLET | Refills: 1 | Status: SHIPPED | OUTPATIENT
Start: 2020-08-18 | End: 2021-02-11

## 2020-08-18 NOTE — PROGRESS NOTES
"Coni Newberry is a 45 year old female who is being evaluated via a billable telephone visit.      The patient has been notified of following:     \"This telephone visit will be conducted via a call between you and your physician/provider. We have found that certain health care needs can be provided without the need for a physical exam.  This service lets us provide the care you need with a short phone conversation.  If a prescription is necessary we can send it directly to your pharmacy.  If lab work is needed we can place an order for that and you can then stop by our lab to have the test done at a later time.    Telephone visits are billed at different rates depending on your insurance coverage. During this emergency period, for some insurers they may be billed the same as an in-person visit.  Please reach out to your insurance provider with any questions.    If during the course of the call the physician/provider feels a telephone visit is not appropriate, you will not be charged for this service.\"    Patient has given verbal consent for Telephone visit?  Yes    What phone number would you like to be contacted at? [phone]    How would you like to obtain your AVS? Melinda Keita     Coni Newberry is a 45 year old female who presents via phone visit today for the following health issues:    HPI    Hypertension Follow-up      Do you check your blood pressure regularly outside of the clinic? Yes     Are you following a low salt diet? No    Are your blood pressures ever more than 140 on the top number (systolic) OR more   than 90 on the bottom number (diastolic), for example 140/90? Yes      How many servings of fruits and vegetables do you eat daily?  0-1    On average, how many sweetened beverages do you drink each day (Examples: soda, juice, sweet tea, etc.  Do NOT count diet or artificially sweetened beverages)?   0    How many days per week do you exercise enough to make your heart " beat faster? 3 or less    How many minutes a day do you exercise enough to make your heart beat faster? 9 or less    How many days per week do you miss taking your medication? 0      HPI:   Her asthma flared this week and with it her blood pressure has been as high as the 190s systolic.     She has cough with clear phlegm, wheezing shortness of breath. Denies any fever chills or night sweats. No ,sore throat or nasal symptoms. No infectious contacts. She is using her neb up to 6 times a day. She is on Qvar, singular as well. Her last flare was June.    She is a diabetic and her blood sugars do go up with prednisone. She usually takes an extra metfomin when she is on steroids.     Current Outpatient Medications   Medication Sig Dispense Refill     albuterol (PROAIR HFA/PROVENTIL HFA/VENTOLIN HFA) 108 (90 Base) MCG/ACT inhaler Inhale 2 puffs into the lungs every 6 hours respiclick did not work for patient 1 Inhaler 11     albuterol (PROVENTIL) (2.5 MG/3ML) 0.083% neb solution Take 1 vial (2.5 mg) by nebulization every 6 hours as needed for shortness of breath / dyspnea or wheezing Needs refill due to asthma exacerbation 2 Box 11     atorvastatin (LIPITOR) 10 MG tablet Take 1 tablet (10 mg) by mouth daily 90 tablet 3     beclomethasone HFA (QVAR REDIHALER) 80 MCG/ACT inhaler Inhale 2 puffs into the lungs 2 times daily 1 Inhaler 11     blood glucose monitoring (ONE TOUCH ULTRA 2) meter device kit Use to test blood sugars 1 times daily or as directed. 1 kit 0     blood glucose monitoring (ONE TOUCH ULTRA) test strip Use to test blood sugars 1 times daily or as directed. 3 Box 1     blood glucose test strip Use to test blood sugar 6 times daily or as directed. 600 strip 3     cetirizine-psuedoePHEDrine (ZYRTEC-D) 5-120 MG per tablet Take 1 tablet by mouth daily Reported on 3/31/2017       cyclobenzaprine (FLEXERIL) 10 MG tablet TAKE 1 TABLET BY MOUTH NIGHTLY AS NEEDED FOR MUSCLE SPASM 90 tablet 3     fluticasone (FLONASE)  50 MCG/ACT nasal spray Spray 2 sprays into both nostrils daily 3 Package 3     glyBURIDE (DIABETA /MICRONASE) 5 MG tablet TAKE 1 TABLET BY MOUTH ONCE DAILY WITH BREAKFAST 90 tablet 3     ibuprofen (ADVIL/MOTRIN) 800 MG tablet Take 1 tablet (800 mg) by mouth every 6 hours as needed for moderate pain 30 tablet 0     LANCETS ULTRA FINE MISC 1 each 6 times daily 300 each 2     lisinopril (ZESTRIL) 10 MG tablet Take 1 tablet by mouth once daily 30 tablet 0     lisinopril (ZESTRIL) 20 MG tablet Take 1 tablet (20 mg) by mouth daily 90 tablet 1     metFORMIN (GLUCOPHAGE) 500 MG tablet Take 2.5 tablets (1,250 mg) by mouth 2 times daily (with meals) 450 tablet 3     montelukast (SINGULAIR) 10 MG tablet TAKE 1 TABLET BY MOUTH AT BEDTIME 90 tablet 3     nabumetone (RELAFEN) 750 MG tablet Take 1 tablet (750 mg) by mouth 2 times daily as needed for moderate pain 60 tablet 1     norethindrone (MICRONOR) 0.35 MG tablet Take 1 tablet (0.35 mg) by mouth daily 84 tablet 3     nystatin (MYCOSTATIN) 782988 UNIT/GM external ointment APPLY  OINTMENT TOPICALLY TWICE DAILY AS NEEDED TO SKIN FOLD RASH 30 g 3     omeprazole (PRILOSEC) 20 MG DR capsule TAKE 1 TABLET BY MOUTH ONCE DAILY 30-60 MINUTES BEFORE A MEAL 90 capsule 3     order for DME Equipment being ordered: tennis elbow brace 1 Device 0     order for DME Equipment being ordered: Nebulizer and tubing 1 Device 0     predniSONE (DELTASONE) 10 MG tablet Take 2 tablets (20 mg) by mouth 2 times daily 60 tablet 0     tiZANidine (ZANAFLEX) 4 MG capsule TAKE 1 CAPSULE BY MOUTH NIGHTLY AS NEEDED FOR MUSCLE SPASM 90 capsule 3     triamcinolone (KENALOG) 0.1 % external ointment APPLY OINTMENT SPARINGLY TO AFFECTED AREA THREE TIMES DAILY AS NEEDED FOR ECZEMA RASH 80 g 1     Recent Labs   Lab Test 02/22/20  0952 06/29/19  0948 03/22/19  1038 11/15/18  1754  07/19/17  0846  03/03/17  1233   A1C 7.3* 7.4*  --  6.7*   < > 5.9  --  5.8   LDL 74  --   --   --   --  62  --  66   HDL 54  --   --   --    --  55  --  50   TRIG 79  --   --   --   --  91  --  67   ALT 32  --  19 21   < > 18  --  17   CR 0.65  --  0.74 0.78   < > 0.72  --  0.64   GFRESTIMATED >90  --  >90 80   < > 89  --  >90  Non  GFR Calc     GFRESTBLACK >90  --  >90 >90   < > >90  African American GFR Calc    --  >90   GFR Calc     POTASSIUM 3.8  --  3.6 3.7   < > 3.4  --  4.2   TSH 1.57  --   --   --   --  1.08   < > 1.00    < > = values in this interval not displayed.        Reviewed and updated as needed this visit by Provider         Review of Systems   Constitutional, HEENT, cardiovascular, pulmonary, GI, , musculoskeletal, neuro, skin, endocrine and psych systems are negative, except as otherwise noted.       Objective          Vitals:  No vitals were obtained today due to virtual visit.    healthy, alert and no distress  PSYCH: Alert and oriented times 3; coherent speech, normal   rate and volume, able to articulate logical thoughts, able   to abstract reason, no tangential thoughts, no hallucinations   or delusions  Her affect is normal  RESP: No cough, no audible wheezing, able to talk in full sentences  Remainder of exam unable to be completed due to telephone visits            Assessment/Plan:    Assessment & Plan     1. Moderate persistent asthma with acute exacerbation  Will add prednisone to taper over 14 days  - predniSONE (DELTASONE) 10 MG tablet; Take 2 tablets (20 mg) by mouth 2 times daily  Dispense: 60 tablet; Refill: 0    2. Essential hypertension with goal blood pressure less than 140/90  Will increase to 20 mg and she is due for labs on higher dose  - lisinopril (ZESTRIL) 20 MG tablet; Take 1 tablet (20 mg) by mouth daily  Dispense: 90 tablet; Refill: 1  - **Comprehensive metabolic panel FUTURE anytime; Future    3. Type 2 diabetes mellitus treated without insulin (H)  Due for  - **A1C FUTURE anytime; Future    4. Nummular eczema  refilled  - triamcinolone (KENALOG) 0.1 % external ointment;  "APPLY OINTMENT SPARINGLY TO AFFECTED AREA THREE TIMES DAILY AS NEEDED FOR ECZEMA RASH  Dispense: 80 g; Refill: 1     BMI:   Estimated body mass index is 58.3 kg/m  as calculated from the following:    Height as of 7/17/20: 1.619 m (5' 3.75\").    Weight as of 7/17/20: 152.9 kg (337 lb).               No follow-ups on file.    Diana Sanders MD  [facility]    Phone call duration:  15 minutes                "

## 2020-08-19 ASSESSMENT — ASTHMA QUESTIONNAIRES: ACT_TOTALSCORE: 13

## 2020-08-24 DIAGNOSIS — E11.9 TYPE 2 DIABETES MELLITUS TREATED WITHOUT INSULIN (H): ICD-10-CM

## 2020-08-24 DIAGNOSIS — I10 ESSENTIAL HYPERTENSION WITH GOAL BLOOD PRESSURE LESS THAN 140/90: ICD-10-CM

## 2020-08-24 LAB — HBA1C MFR BLD: 7.8 % (ref 0–5.6)

## 2020-08-24 PROCEDURE — 80053 COMPREHEN METABOLIC PANEL: CPT | Performed by: INTERNAL MEDICINE

## 2020-08-24 PROCEDURE — 36415 COLL VENOUS BLD VENIPUNCTURE: CPT | Performed by: INTERNAL MEDICINE

## 2020-08-24 PROCEDURE — 83036 HEMOGLOBIN GLYCOSYLATED A1C: CPT | Performed by: INTERNAL MEDICINE

## 2020-08-25 LAB
ALBUMIN SERPL-MCNC: 3.8 G/DL (ref 3.4–5)
ALP SERPL-CCNC: 81 U/L (ref 40–150)
ALT SERPL W P-5'-P-CCNC: 30 U/L (ref 0–50)
ANION GAP SERPL CALCULATED.3IONS-SCNC: 8 MMOL/L (ref 3–14)
AST SERPL W P-5'-P-CCNC: 16 U/L (ref 0–45)
BILIRUB SERPL-MCNC: 0.5 MG/DL (ref 0.2–1.3)
BUN SERPL-MCNC: 21 MG/DL (ref 7–30)
CALCIUM SERPL-MCNC: 8.9 MG/DL (ref 8.5–10.1)
CHLORIDE SERPL-SCNC: 103 MMOL/L (ref 94–109)
CO2 SERPL-SCNC: 24 MMOL/L (ref 20–32)
CREAT SERPL-MCNC: 0.75 MG/DL (ref 0.52–1.04)
GFR SERPL CREATININE-BSD FRML MDRD: >90 ML/MIN/{1.73_M2}
GLUCOSE SERPL-MCNC: 272 MG/DL (ref 70–99)
POTASSIUM SERPL-SCNC: 3.8 MMOL/L (ref 3.4–5.3)
PROT SERPL-MCNC: 7.5 G/DL (ref 6.8–8.8)
SODIUM SERPL-SCNC: 135 MMOL/L (ref 133–144)

## 2020-09-16 ENCOUNTER — VIRTUAL VISIT (OUTPATIENT)
Dept: INTERNAL MEDICINE | Facility: CLINIC | Age: 45
End: 2020-09-16
Payer: COMMERCIAL

## 2020-09-16 VITALS — DIASTOLIC BLOOD PRESSURE: 84 MMHG | SYSTOLIC BLOOD PRESSURE: 165 MMHG

## 2020-09-16 DIAGNOSIS — I10 ESSENTIAL HYPERTENSION WITH GOAL BLOOD PRESSURE LESS THAN 140/90: Primary | ICD-10-CM

## 2020-09-16 DIAGNOSIS — J45.41 MODERATE PERSISTENT ASTHMA WITH ACUTE EXACERBATION: ICD-10-CM

## 2020-09-16 PROCEDURE — 99214 OFFICE O/P EST MOD 30 MIN: CPT | Mod: 95 | Performed by: INTERNAL MEDICINE

## 2020-09-16 RX ORDER — PREDNISONE 10 MG/1
20 TABLET ORAL 2 TIMES DAILY
Qty: 70 TABLET | Refills: 0 | Status: SHIPPED | OUTPATIENT
Start: 2020-09-16 | End: 2021-06-28

## 2020-09-16 RX ORDER — TRIAMTERENE/HYDROCHLOROTHIAZID 37.5-25 MG
1 TABLET ORAL DAILY
Qty: 90 TABLET | Refills: 1 | Status: SHIPPED | OUTPATIENT
Start: 2020-09-16 | End: 2020-10-12 | Stop reason: SINTOL

## 2020-09-16 NOTE — PROGRESS NOTES
"Coni Newberry is a 45 year old female who is being evaluated via a billable telephone visit.      The patient has been notified of following:     \"This telephone visit will be conducted via a call between you and your physician/provider. We have found that certain health care needs can be provided without the need for a physical exam.  This service lets us provide the care you need with a short phone conversation.  If a prescription is necessary we can send it directly to your pharmacy.  If lab work is needed we can place an order for that and you can then stop by our lab to have the test done at a later time.    Telephone visits are billed at different rates depending on your insurance coverage. During this emergency period, for some insurers they may be billed the same as an in-person visit.  Please reach out to your insurance provider with any questions.    If during the course of the call the physician/provider feels a telephone visit is not appropriate, you will not be charged for this service.\"    Patient has given verbal consent for Telephone visit?  Yes    What phone number would you like to be contacted at? [phone]    How would you like to obtain your AVS? Melinda Keita     Coni Newberry is a 45 year old female who presents via phone visit today for the following health issues:    HPI      Follow up asthma.     HPI:   At last visit we put her on a rapid taper of prednisone she felt good at 20 mg bid but as soon as she went below 20 mg once a day her asthma flared. She is again struggling with cough, wheezing, shortness of breath. Denies any fever chills or night sweats. No URI symptoms. Energy is good. Appetite is good.     He blood pressure has remained elevated. Denies any problem with chest pain, pressure or dyspnea on exertion.     Review of Systems   Constitutional, HEENT, cardiovascular, pulmonary, GI, , musculoskeletal, neuro, skin, endocrine and psych systems are " "negative, except as otherwise noted.       Objective          Vitals:  No vitals were obtained today due to virtual visit.    healthy, alert and no distress  PSYCH: Alert and oriented times 3; coherent speech, normal   rate and volume, able to articulate logical thoughts, able   to abstract reason, no tangential thoughts, no hallucinations   or delusions  Her affect is normal  RESP: No cough, no audible wheezing, able to talk in full sentences  Remainder of exam unable to be completed due to telephone visits            Assessment/Plan:    Assessment & Plan     Moderate persistent asthma with acute exacerbation  Will do slower taper of prednisone starting at 20 bid for 1 week, 20 am 10 pm for 1 week, 200 am for 1 week then 10 am for 1 week. If she flares at any time in there we will need to see her in the office  - predniSONE (DELTASONE) 10 MG tablet; Take 2 tablets (20 mg) by mouth 2 times daily    Essential hypertension with goal blood pressure less than 140/90  under inadequate control Will add Maxzide and Follow up in 1 month sooner if asthma flares. Will need bmp  - triamterene-HCTZ (MAXZIDE-25) 37.5-25 MG tablet; Take 1 tablet by mouth daily     BMI:   Estimated body mass index is 58.3 kg/m  as calculated from the following:    Height as of 7/17/20: 1.619 m (5' 3.75\").    Weight as of 7/17/20: 152.9 kg (337 lb).   Weight management plan: Discussed healthy diet and exercise guidelines            No follow-ups on file.    Diana Sanders MD  [facility]    Phone call duration:  12 minutes                "

## 2020-09-17 ASSESSMENT — ASTHMA QUESTIONNAIRES: ACT_TOTALSCORE: 11

## 2020-10-12 ENCOUNTER — OFFICE VISIT (OUTPATIENT)
Dept: INTERNAL MEDICINE | Facility: CLINIC | Age: 45
End: 2020-10-12
Payer: COMMERCIAL

## 2020-10-12 VITALS
RESPIRATION RATE: 20 BRPM | DIASTOLIC BLOOD PRESSURE: 88 MMHG | HEIGHT: 64 IN | BODY MASS INDEX: 50.02 KG/M2 | TEMPERATURE: 97.8 F | SYSTOLIC BLOOD PRESSURE: 151 MMHG | OXYGEN SATURATION: 98 % | HEART RATE: 133 BPM | WEIGHT: 293 LBS

## 2020-10-12 DIAGNOSIS — E11.9 TYPE 2 DIABETES MELLITUS TREATED WITHOUT INSULIN (H): ICD-10-CM

## 2020-10-12 DIAGNOSIS — J45.31 MILD PERSISTENT ASTHMA WITH ACUTE EXACERBATION: Primary | ICD-10-CM

## 2020-10-12 DIAGNOSIS — I10 ESSENTIAL HYPERTENSION WITH GOAL BLOOD PRESSURE LESS THAN 140/90: ICD-10-CM

## 2020-10-12 PROCEDURE — 99214 OFFICE O/P EST MOD 30 MIN: CPT | Performed by: INTERNAL MEDICINE

## 2020-10-12 RX ORDER — CHLORTHALIDONE 25 MG/1
25 TABLET ORAL DAILY
Qty: 30 TABLET | Refills: 3 | Status: SHIPPED | OUTPATIENT
Start: 2020-10-12 | End: 2021-02-02

## 2020-10-12 ASSESSMENT — MIFFLIN-ST. JEOR: SCORE: 2154.65

## 2020-10-12 NOTE — PROGRESS NOTES
Subjective     Coni Newberry is a 45 year old female who presents to clinic today for the following health issues:    HPI         Diabetes Follow-up    How often are you checking your blood sugar? Three times daily  Blood sugar testing frequency justification:  Adjustment of medication(s)  What time of day are you checking your blood sugars (select all that apply)?  before breakfast , after lunch , before bed - during the night   Have you had any blood sugars above 200?  Yes with being on steroid   Have you had any blood sugars below 70?  No    What symptoms do you notice when your blood sugar is low?  Shaky and hot     What concerns do you have today about your diabetes? Fluctuation of blood surgar     Do you have any of these symptoms? (Select all that apply)  Weight gain    Have you had a diabetic eye exam in the last 12 months? Mariano Gilbert 8/20 , no retinopathy                 Hyperlipidemia Follow-Up      Are you regularly taking any medication or supplement to lower your cholesterol?   Yes- atorvastatin    Are you having muscle aches or other side effects that you think could be caused by your cholesterol lowering medication?  No    Hypertension Follow-up      Do you check your blood pressure regularly outside of the clinic? Yes     Are you following a low salt diet? No    Are your blood pressures ever more than 140 on the top number (systolic) OR more   than 90 on the bottom number (diastolic), for example 140/90? Yes    BP Readings from Last 2 Encounters:   10/12/20 (!) 151/88   09/16/20 (!) 165/84     Hemoglobin A1C (%)   Date Value   08/24/2020 7.8 (H)   02/22/2020 7.3 (H)     LDL Cholesterol Calculated (mg/dL)   Date Value   02/22/2020 74   07/19/2017 62         How many servings of fruits and vegetables do you eat daily?  0-1    On average, how many sweetened beverages do you drink each day (Examples: soda, juice, sweet tea, etc.  Do NOT count diet or artificially sweetened beverages)?    "0    How many days per week do you exercise enough to make your heart beat faster? 3 or less    How many minutes a day do you exercise enough to make your heart beat faster? 9 or less    How many days per week do you miss taking your medication? 0    Her asthma is doing well feels back to baseline but she still has a couple days left of prednisone. She is not wheezing or coughing.    The Maxzide for her blood pressure is making her nauseated. She wants to get off that. She has no problems to take the Lisinopril.     Her recent A1C was 7.8. but on the prednisone her sugars have gone up. They are running .     Review of Systems   Constitutional, HEENT, cardiovascular, pulmonary, GI, , musculoskeletal, neuro, skin, endocrine and psych systems are negative, except as otherwise noted.      Objective    BP (!) 151/88   Pulse 133   Temp 97.8  F (36.6  C) (Oral)   Resp 20   Ht 1.619 m (5' 3.75\")   Wt (!) 152.9 kg (337 lb)   SpO2 98%   BMI 58.30 kg/m    Body mass index is 58.3 kg/m .  Physical Exam   GENERAL: healthy, alert and no distress  NECK: no adenopathy, no asymmetry, masses, or scars and thyroid normal to palpation  RESP: lungs clear to auscultation - no rales, rhonchi or wheezes  CV: regular rate and rhythm, normal S1 S2, no S3 or S4, no murmur, click or rub, no peripheral edema and peripheral pulses strong  ABDOMEN: soft, nontender, no hepatosplenomegaly, no masses and bowel sounds normal  MS: no gross musculoskeletal defects noted, no edema  PSYCH: mentation appears normal, affect normal/bright          Assessment & Plan     Mild persistent asthma with acute exacerbation  On max Q karina hopefully that will hold her when she stops the prednisone. Follow up in 1 month     Essential hypertension with goal blood pressure less than 140/90  Her blood pressure has come down and hopefully will come down some more off the prednisone will change Maxzide the Chlorthalidone.Follow up in 1 month   - chlorthalidone " (HYGROTON) 25 MG tablet; Take 1 tablet (25 mg) by mouth daily    Type 2 diabetes mellitus treated without insulin (H)  Will follow clinically for now.           No follow-ups on file.    Diana Sanders MD  [facility]

## 2020-10-16 PROBLEM — M25.561 RIGHT KNEE PAIN: Status: RESOLVED | Noted: 2020-03-10 | Resolved: 2020-10-16

## 2020-10-16 PROBLEM — S83.231D COMPLEX TEAR OF MEDIAL MENISCUS OF RIGHT KNEE AS CURRENT INJURY, SUBSEQUENT ENCOUNTER: Status: RESOLVED | Noted: 2020-03-10 | Resolved: 2020-10-16

## 2020-10-16 NOTE — PROGRESS NOTES
Update as of October 16, 2020: Patient has failed to return to clinic. Current status unknown. This progress note shall serve as a discharge note.

## 2020-10-19 ENCOUNTER — TELEPHONE (OUTPATIENT)
Dept: ORTHOPEDICS | Facility: CLINIC | Age: 45
End: 2020-10-19

## 2020-10-20 ENCOUNTER — MYC MEDICAL ADVICE (OUTPATIENT)
Dept: ORTHOPEDICS | Facility: CLINIC | Age: 45
End: 2020-10-20

## 2020-10-23 NOTE — TELEPHONE ENCOUNTER
Called and talked with patient. Have her scheduled to see Dr. Yeo on 11/3/2020 at 7:40am.    Sarah Ramirez MS, ATC

## 2020-10-23 NOTE — TELEPHONE ENCOUNTER
FREDDYM with patient to call us back regarding her knee pain. Left nurse triage number    Huddled with Dr. Yeo. She can be seen for another cortisone injection if she would like.    Sarah Ramirez MS, ATC

## 2020-10-23 NOTE — TELEPHONE ENCOUNTER
Patient left voicemail returning Sarah's call regarding patient's knee pain. She can be reached at: [phone].     BECK Rene RN

## 2020-10-31 NOTE — TELEPHONE ENCOUNTER
"Requested Prescriptions   Pending Prescriptions Disp Refills     JOLIVETTE 0.35 MG per tablet [Pharmacy Med Name: JOLIVETTE TABLET]  Last Written Prescription Date:  3/9/2018  Last Fill Quantity: 84,  # refills: 1   Last office visit: 4/27/2018 with prescribing provider:     Future Office Visit:   Next 5 appointments (look out 90 days)     Sep 27, 2018  4:15 PM CDT   Nurse Only with [place] OB NURSE   [facility]    [address]  [facility] [address]   [phone]                84 tablet 1     Sig: TAKE 1 TABLET (0.35 MG) BY MOUTH DAILY    Contraceptives Protocol Passed    8/13/2018  1:54 AM       Passed - Patient is not a current smoker if age is 35 or older       Passed - Recent (12 mo) or future (30 days) visit within the authorizing provider's specialty    Patient had office visit in the last 12 months or has a visit in the next 30 days with authorizing provider or within the authorizing provider's specialty.  See \"Patient Info\" tab in inbasket, or \"Choose Columns\" in Meds & Orders section of the refill encounter.           Passed - No active pregnancy on record       Passed - No positive pregnancy test in past 12 months        " Pt significant other Nas: [phone]

## 2020-11-03 ENCOUNTER — OFFICE VISIT (OUTPATIENT)
Dept: ORTHOPEDICS | Facility: CLINIC | Age: 45
End: 2020-11-03
Payer: COMMERCIAL

## 2020-11-03 VITALS
SYSTOLIC BLOOD PRESSURE: 151 MMHG | DIASTOLIC BLOOD PRESSURE: 88 MMHG | HEIGHT: 64 IN | WEIGHT: 293 LBS | BODY MASS INDEX: 50.02 KG/M2

## 2020-11-03 DIAGNOSIS — S83.231D COMPLEX TEAR OF MEDIAL MENISCUS OF RIGHT KNEE AS CURRENT INJURY, SUBSEQUENT ENCOUNTER: ICD-10-CM

## 2020-11-03 DIAGNOSIS — M17.11 PRIMARY OSTEOARTHRITIS OF RIGHT KNEE: Primary | ICD-10-CM

## 2020-11-03 DIAGNOSIS — M17.12 PRIMARY OSTEOARTHRITIS OF LEFT KNEE: ICD-10-CM

## 2020-11-03 PROCEDURE — 99214 OFFICE O/P EST MOD 30 MIN: CPT | Mod: 25 | Performed by: FAMILY MEDICINE

## 2020-11-03 PROCEDURE — 20611 DRAIN/INJ JOINT/BURSA W/US: CPT | Mod: 50 | Performed by: FAMILY MEDICINE

## 2020-11-03 RX ORDER — METHYLPREDNISOLONE ACETATE 40 MG/ML
80 INJECTION, SUSPENSION INTRA-ARTICULAR; INTRALESIONAL; INTRAMUSCULAR; SOFT TISSUE
Status: DISCONTINUED | OUTPATIENT
Start: 2020-11-03 | End: 2021-09-17

## 2020-11-03 RX ADMIN — METHYLPREDNISOLONE ACETATE 80 MG: 40 INJECTION, SUSPENSION INTRA-ARTICULAR; INTRALESIONAL; INTRAMUSCULAR; SOFT TISSUE at 08:27

## 2020-11-03 ASSESSMENT — MIFFLIN-ST. JEOR: SCORE: 2154.65

## 2020-11-03 NOTE — LETTER
11/3/2020         RE: Coni Newberry  [address]  [facility] [address]        Dear Colleague,    Thank you for referring your patient, Coni Newberry, to the [facility] [place]. Please see a copy of my visit note below.    ASSESSMENT & PLAN  Patient Instructions     1. Primary osteoarthritis of right knee    2. Primary osteoarthritis of left knee    3. Complex tear of medial meniscus of right knee as current injury, subsequent encounter      -Patient is following up for bilateral knee pain due to arthritis and meniscus tear  -Patient tolerated cortisone injection today of bilateral knees without complications.  Patient was given postprocedure instructions  -Patient was informed that she cannot have cortisone 2-3 times a year for many years.    -We will get prior authorization for viscosupplementation  -We also discussed need for total knee replacement in the near future.  However, patient will need to lose considerable weight to minimize complications of surgery.  I do recommend she seriously pursue gastric bypass surgery to help her achieve a healthier body weight  -I reviewed the surgical consult notes of Dr. Kendrick who evaluated her for possible arthroscopy for extensive medial meniscus complex tearing.  However, he does not feel she can undergo arthroscopy due to her arthritis.  We will try to delay her total knee replacement surgery for as long as possible  -Coni Brar to follow up with Primary Care provider regarding elevated blood pressure.  -Patient is requesting handicap placard for her car.  She may drop off forms to be filled out.  -Call direct clinic number [[phone]] at any time with questions or concerns.    Albert Yeo MD [facility]ics and Sports Medicine  [facility]          -----    SUBJECTIVE:  Coni Newberry is a 45 year old female who is seen in follow-up for bilateral knee pain and  "swelling due to arthritis and meniscus tear.They were last seen 07/17//2020.     Since their last visit reports worsening pain.  feels like there is glass in her right knee and feels like her left knee is going to give out.  pain has been getting worse over the last 2 months.  They indicate that their current pain level is 5/10. They have tried Tylenol, other medications: nabumetone, tizanidine, home exercises and corticosteroid injection (most recent date: 07/17/2020) that provided  2 month(s) of relief.      The patient is seen by themselves.    Patient's past medical, surgical, social, and family histories were reviewed today and no changes are noted.    REVIEW OF SYSTEMS:  Constitutional: NEGATIVE for fever, chills, change in weight  Skin: NEGATIVE for worrisome rashes, moles or lesions  GI/: NEGATIVE for bowel or bladder changes  Neuro: NEGATIVE for weakness, dizziness or paresthesias    OBJECTIVE:  BP (!) 151/88   Ht 1.619 m (5' 3.75\")   Wt (!) 152.9 kg (337 lb)   BMI 58.30 kg/m     General: healthy, alert and in no distress  HEENT: no scleral icterus or conjunctival erythema  Skin: no suspicious lesions or rash. No jaundice.  CV: regular rhythm by palpation, no pedal edema  Resp: normal respiratory effort without conversational dyspnea   Psych: normal mood and affect  Gait: normal steady gait with appropriate coordination and balance  Neuro: normal light touch sensory exam of the extremities.    MSK:  Bilateral KNEE  Inspection:    Normal alignment; no edema, erythema, or ecchymosis present  Palpation:    Tender about the lateral patellar facet, medial patellar facet and medial/lateral joint line. Remainder of bony and ligamentous landmarks are nontender.    Trace effusion is present    Patellofemoral crepitus is Absent  Range of Motion:     00 extension to 900 flexion  Strength:    Quadriceps 5-/5 and hamstrings 5-/5    Extensor mechanism intact  Special Tests:    Positive: Alondra's in the " right    Negative: MCL/valgus stress (0 & 30 deg), LCL/varus stress (0 & 30 deg), Lachman's, anterior drawer, posterior drawer    Independent visualization of the below image:  Large Joint Injection/Arthocentesis: bilateral knee    Date/Time: 11/3/2020 8:27 AM  Performed by: Yeo, Albert, MD  Authorized by: Yeo, Albert, MD     Indications:  Pain and osteoarthritis  Needle Size:  25 G  Guidance: ultrasound    Approach:  Superolateral  Location:  Knee  Laterality:  Bilateral      Medications (Right):  80 mg methylPREDNISolone 40 MG/ML  Medications (Left):  80 mg methylPREDNISolone 40 MG/ML  Outcome:  Tolerated well, no immediate complications  Procedure discussed: discussed risks, benefits, and alternatives    Consent Given by:  Patient  Timeout: timeout called immediately prior to procedure    Prep: patient was prepped and draped in usual sterile fashion            Patient's conditions were thoroughly discussed during today's visit with greater than 50% of the visit spent counseling the patient with total time spent face-to-face with the patient being 27 minutes.    Albert Yeo MD, [facility]            Again, thank you for allowing me to participate in the care of your patient.        Sincerely,        Albert Yeo, MD

## 2020-11-03 NOTE — PROGRESS NOTES
ASSESSMENT & PLAN  Patient Instructions     1. Primary osteoarthritis of right knee    2. Primary osteoarthritis of left knee    3. Complex tear of medial meniscus of right knee as current injury, subsequent encounter      -Patient is following up for bilateral knee pain due to arthritis and meniscus tear  -Patient tolerated cortisone injection today of bilateral knees without complications.  Patient was given postprocedure instructions  -Patient was informed that she cannot have cortisone 2-3 times a year for many years.    -We will get prior authorization for viscosupplementation  -We also discussed need for total knee replacement in the near future.  However, patient will need to lose considerable weight to minimize complications of surgery.  I do recommend she seriously pursue gastric bypass surgery to help her achieve a healthier body weight  -I reviewed the surgical consult notes of Dr. Kendrick who evaluated her for possible arthroscopy for extensive medial meniscus complex tearing.  However, he does not feel she can undergo arthroscopy due to her arthritis.  We will try to delay her total knee replacement surgery for as long as possible  -Coni Brar to follow up with Primary Care provider regarding elevated blood pressure.  -Patient is requesting handicap placard for her car.  She may drop off forms to be filled out.  -Call direct clinic number [[phone]] at any time with questions or concerns.    Albert Yeo MD [facility] Orthopedics and Sports Medicine  [facility] [place]          -----    SUBJECTIVE:  Coni Newberry is a 45 year old female who is seen in follow-up for bilateral knee pain and swelling due to arthritis and meniscus tear.They were last seen 07/17//2020.     Since their last visit reports worsening pain. States feels like there is glass in her right knee and feels like her left knee is going to give out. States pain has been getting worse over the last 2 months.  They  "indicate that their current pain level is 5/10. They have tried Tylenol, other medications: nabumetone, tizanidine, home exercises and corticosteroid injection (most recent date: 07/17/2020) that provided  2 month(s) of relief.      The patient is seen by themselves.    Patient's past medical, surgical, social, and family histories were reviewed today and no changes are noted.    REVIEW OF SYSTEMS:  Constitutional: NEGATIVE for fever, chills, change in weight  Skin: NEGATIVE for worrisome rashes, moles or lesions  GI/: NEGATIVE for bowel or bladder changes  Neuro: NEGATIVE for weakness, dizziness or paresthesias    OBJECTIVE:  BP (!) 151/88   Ht 1.619 m (5' 3.75\")   Wt (!) 152.9 kg (337 lb)   BMI 58.30 kg/m     General: healthy, alert and in no distress  HEENT: no scleral icterus or conjunctival erythema  Skin: no suspicious lesions or rash. No jaundice.  CV: regular rhythm by palpation, no pedal edema  Resp: normal respiratory effort without conversational dyspnea   Psych: normal mood and affect  Gait: normal steady gait with appropriate coordination and balance  Neuro: normal light touch sensory exam of the extremities.    MSK:  Bilateral KNEE  Inspection:    Normal alignment; no edema, erythema, or ecchymosis present  Palpation:    Tender about the lateral patellar facet, medial patellar facet and medial/lateral joint line. Remainder of bony and ligamentous landmarks are nontender.    Trace effusion is present    Patellofemoral crepitus is Absent  Range of Motion:     00 extension to 900 flexion  Strength:    Quadriceps 5-/5 and hamstrings 5-/5    Extensor mechanism intact  Special Tests:    Positive: Alondra's in the right    Negative: MCL/valgus stress (0 & 30 deg), LCL/varus stress (0 & 30 deg), Lachman's, anterior drawer, posterior drawer    Independent visualization of the below image:  Large Joint Injection/Arthocentesis: bilateral knee    Date/Time: 11/3/2020 8:27 AM  Performed by: Yeo, Albert, " MD  Authorized by: Yeo, Albert, MD     Indications:  Pain and osteoarthritis  Needle Size:  25 G  Guidance: ultrasound    Approach:  Superolateral  Location:  Knee  Laterality:  Bilateral      Medications (Right):  80 mg methylPREDNISolone 40 MG/ML  Medications (Left):  80 mg methylPREDNISolone 40 MG/ML  Outcome:  Tolerated well, no immediate complications  Procedure discussed: discussed risks, benefits, and alternatives    Consent Given by:  Patient  Timeout: timeout called immediately prior to procedure    Prep: patient was prepped and draped in usual sterile fashion            Patient's conditions were thoroughly discussed during today's visit with greater than 50% of the visit spent counseling the patient with total time spent face-to-face with the patient being 27 minutes.    Albert Yeo MD, [facility]

## 2020-11-03 NOTE — PATIENT INSTRUCTIONS
1. Primary osteoarthritis of right knee    2. Primary osteoarthritis of left knee    3. Complex tear of medial meniscus of right knee as current injury, subsequent encounter      -Patient is following up for bilateral knee pain due to arthritis and meniscus tear  -Patient tolerated cortisone injection today of bilateral knees without complications.  Patient was given postprocedure instructions  -Patient was informed that she cannot have cortisone 2-3 times a year for many years.    -We will get prior authorization for viscosupplementation  -We also discussed need for total knee replacement in the near future.  However, patient will need to lose considerable weight to minimize complications of surgery.  I do recommend she seriously pursue gastric bypass surgery to help her achieve a healthier body weight  -I reviewed the surgical consult notes of Dr. Kendrick who evaluated her for possible arthroscopy for extensive medial meniscus complex tearing.  However, he does not feel she can undergo arthroscopy due to her arthritis.  We will try to delay her total knee replacement surgery for as long as possible  -Coni Brar to follow up with Primary Care provider regarding elevated blood pressure.  -Patient is requesting handicap placard for her car.  She may drop off forms to be filled out.  -Call direct clinic number [[phone]] at any time with questions or concerns.    Albert Yeo MD [facility] Orthopedics and Sports Medicine  [facility] [place]

## 2020-11-06 ENCOUNTER — TELEPHONE (OUTPATIENT)
Dept: ORTHOPEDICS | Facility: CLINIC | Age: 45
End: 2020-11-06

## 2020-11-06 NOTE — TELEPHONE ENCOUNTER
Reason for Call:  Form, our goal is to have forms completed with 7 days, however, some forms may require a visit or additional information.    Type of letter, form or note:  Handicap parking permit     Who is the form from?: Patient    Where did the form come from: Patient or family brought in       Phone number of person requesting form: [phone]  Can we leave a detailed message on this number:  YES    Desired completion date of form: ASAP      How will form be returned?:  picked up in clinic by patient    Has the patient signed a consent form for release of information (may be included with form)? Not Applicable    Additional comments:     Form was started and place in Provider Basket for provider review/ completion at [facility].  Sarah Ramirez MS, ATC       Called patient and let her know that the form is signed and ready for her to .    Patient was appreciative of the phone call back. All questions were answered.     Sarah Ramirez MS, ATC

## 2020-11-10 DIAGNOSIS — L30.0 NUMMULAR ECZEMA: ICD-10-CM

## 2020-11-11 RX ORDER — TRIAMCINOLONE ACETONIDE 1 MG/G
OINTMENT TOPICAL
Qty: 80 G | Refills: 1 | Status: SHIPPED | OUTPATIENT
Start: 2020-11-11 | End: 2021-06-03

## 2020-12-05 DIAGNOSIS — M25.561 ACUTE PAIN OF RIGHT KNEE: ICD-10-CM

## 2020-12-07 ENCOUNTER — OFFICE VISIT (OUTPATIENT)
Dept: ORTHOPEDICS | Facility: CLINIC | Age: 45
End: 2020-12-07
Payer: COMMERCIAL

## 2020-12-07 DIAGNOSIS — M17.12 PRIMARY OSTEOARTHRITIS OF LEFT KNEE: ICD-10-CM

## 2020-12-07 DIAGNOSIS — M17.11 PRIMARY OSTEOARTHRITIS OF RIGHT KNEE: Primary | ICD-10-CM

## 2020-12-07 PROCEDURE — 20611 DRAIN/INJ JOINT/BURSA W/US: CPT | Mod: 50 | Performed by: FAMILY MEDICINE

## 2020-12-07 RX ORDER — HYALURONATE SODIUM 10 MG/ML
20 SYRINGE (ML) INTRAARTICULAR
Status: DISCONTINUED | OUTPATIENT
Start: 2020-12-07 | End: 2021-09-17

## 2020-12-07 RX ADMIN — Medication 20 MG: at 16:23

## 2020-12-07 NOTE — TELEPHONE ENCOUNTER
Patient has appointment today at 4pm for left knee injection.     Routing request to provider to discuss at her appointment today.     BECK Rene RN

## 2020-12-07 NOTE — PROGRESS NOTES
ASSESSMENT & PLAN  Patient Instructions     1. Primary osteoarthritis of right knee    2. Primary osteoarthritis of left knee      -Patient is following up for bilateral knee pain due to arthritis  -Patient tolerated Euflexxa injections of bilateral knees without complications.  Patient given procedure instruction  -Patient will follow up when pain returns for reevaluation and repeat treatment  -Call direct clinic number [[phone]] at any time with questions or concerns.    Albert Yeo MD [facility] Orthopedics and Sports Medicine  [facility]          -----    SUBJECTIVE:  Coni Newberry is a 45 year old female who is seen for bilateral Euflexxa injections.    Large Joint Injection/Arthocentesis: bilateral knee    Date/Time: 12/7/2020 4:23 PM  Performed by: Yeo, Albert, MD  Authorized by: Yeo, Albert, MD     Indications:  Pain and osteoarthritis  Needle Size:  25 G  Guidance: ultrasound    Approach:  Superolateral  Location:  Knee  Laterality:  Bilateral      Medications (Right):  20 mg sodium hyaluronate 20 MG/2ML  Medications (Left):  20 mg sodium hyaluronate 20 MG/2ML  Outcome:  Tolerated well, no immediate complications  Procedure discussed: discussed risks, benefits, and alternatives    Consent Given by:  Patient  Timeout: timeout called immediately prior to procedure    Prep: patient was prepped and draped in usual sterile fashion              Albert Yeo MD, [facility]

## 2020-12-07 NOTE — PATIENT INSTRUCTIONS
1. Primary osteoarthritis of right knee    2. Primary osteoarthritis of left knee      -Patient is following up for bilateral knee pain due to arthritis  -Patient tolerated Euflexxa injections of bilateral knees without complications.  Patient given procedure instruction  -Patient will follow up when pain returns for reevaluation and repeat treatment  -Call direct clinic number [[phone]] at any time with questions or concerns.    Albert Yeo MD [facility]  [facility] [place]

## 2020-12-07 NOTE — LETTER
12/7/2020         RE: Coni Newberry  [address]  [place] [address]        Dear Colleague,    Thank you for referring your patient, Coni Newberry, to the [facility]IN[facility] [place]. Please see a copy of my visit note below.    ASSESSMENT & PLAN  Patient Instructions     1. Primary osteoarthritis of right knee    2. Primary osteoarthritis of left knee      -Patient is following up for bilateral knee pain due to arthritis  -Patient tolerated Euflexxa injections of bilateral knees without complications.  Patient given procedure instruction  -Patient will follow up when pain returns for reevaluation and repeat treatment  -Call direct clinic number [[phone]] at any time with questions or concerns.    Albert Yeo MD [facility] Orthopedics and Sports Medicine  [facility]          -----    SUBJECTIVE:  Coni Newberry is a 45 year old female who is seen for bilateral Euflexxa injections.    Large Joint Injection/Arthocentesis: bilateral knee    Date/Time: 12/7/2020 4:23 PM  Performed by: Yeo, Albert, MD  Authorized by: Yeo, Albert, MD     Indications:  Pain and osteoarthritis  Needle Size:  25 G  Guidance: ultrasound    Approach:  Superolateral  Location:  Knee  Laterality:  Bilateral      Medications (Right):  20 mg sodium hyaluronate 20 MG/2ML  Medications (Left):  20 mg sodium hyaluronate 20 MG/2ML  Outcome:  Tolerated well, no immediate complications  Procedure discussed: discussed risks, benefits, and alternatives    Consent Given by:  Patient  Timeout: timeout called immediately prior to procedure    Prep: patient was prepped and draped in usual sterile fashion              Albert Yeo MD, [facility]        Again, thank you for allowing me to participate in the care of your patient.        Sincerely,        Albert Yeo, MD

## 2020-12-20 ENCOUNTER — HEALTH MAINTENANCE LETTER (OUTPATIENT)
Age: 45
End: 2020-12-20

## 2021-01-05 ENCOUNTER — TELEPHONE (OUTPATIENT)
Dept: ORTHOPEDICS | Facility: CLINIC | Age: 46
End: 2021-01-05

## 2021-01-05 NOTE — TELEPHONE ENCOUNTER
Patient is having R knee pain and would like to discuss options.  She can be reached at:  [phone]

## 2021-01-05 NOTE — TELEPHONE ENCOUNTER
Discussed knee pain with patient. She is reporting her left knee pain has increased since the HA injections. Typically her right knee is worse then her left knee. She feels she may have had better relief from the steroid injection on 11/3/20 than the Gel injections on 12/7/20. She also feels she may have torn something or damaged the knee more despite having any new reported injury. She states the knee feels unstable and she has to use a knee brace for support. Per last not recommendations from Dr Yeo were to follow up in clinic for recurring pain. Patient was in agreement of a follow up appointment. Patient was scheduled with Dr Yeo on 1/20/20 for bilateral knee pain follow up.    Joseph Arguello ATC, LAT

## 2021-01-20 ENCOUNTER — OFFICE VISIT (OUTPATIENT)
Dept: ORTHOPEDICS | Facility: CLINIC | Age: 46
End: 2021-01-20
Payer: COMMERCIAL

## 2021-01-20 VITALS
BODY MASS INDEX: 50.02 KG/M2 | HEIGHT: 64 IN | DIASTOLIC BLOOD PRESSURE: 88 MMHG | SYSTOLIC BLOOD PRESSURE: 142 MMHG | WEIGHT: 293 LBS

## 2021-01-20 DIAGNOSIS — M17.11 PRIMARY OSTEOARTHRITIS OF RIGHT KNEE: Primary | ICD-10-CM

## 2021-01-20 DIAGNOSIS — M17.12 PRIMARY OSTEOARTHRITIS OF LEFT KNEE: ICD-10-CM

## 2021-01-20 PROCEDURE — 99214 OFFICE O/P EST MOD 30 MIN: CPT | Performed by: FAMILY MEDICINE

## 2021-01-20 RX ORDER — TRAMADOL HYDROCHLORIDE 50 MG/1
50 TABLET ORAL EVERY 12 HOURS PRN
Qty: 30 TABLET | Refills: 0 | Status: SHIPPED | OUTPATIENT
Start: 2021-01-20 | End: 2021-06-28

## 2021-01-20 RX ORDER — DICLOFENAC SODIUM 75 MG/1
75 TABLET, DELAYED RELEASE ORAL 2 TIMES DAILY PRN
Qty: 60 TABLET | Refills: 1 | Status: SHIPPED | OUTPATIENT
Start: 2021-01-20 | End: 2021-03-19

## 2021-01-20 ASSESSMENT — MIFFLIN-ST. JEOR: SCORE: 2141.04

## 2021-01-20 NOTE — PROGRESS NOTES
"ASSESSMENT & PLAN  Patient Instructions     1. Primary osteoarthritis of right knee    2. Primary osteoarthritis of left knee      -Patient is following up for bilateral knee pain due to arthritis  -However, patient reports left knee pain is much worse than it has been in the past and she states the pain feels very different  -Patient will get an MRI of the left for further evaluation.  -Your MRI has been ordered. You may go down to the first floor, [address] to schedule the MRI in person, or call [phone] to schedule over the phone. Once you know the date of your MRI, please call my office and schedule a follow-up phone visit for 2 days after that.  -Patient will start Diclofenac and Tramadol as needed for pain.  -Call direct clinic number [[phone]] at any time with questions or concerns.    Albert Yeo MD [facility] Orthopedics and Sports Medicine  [facility]          -----    SUBJECTIVE:  Coni Newberry is a 45 year old female who is seen in follow-up for bilateral knee pain due to arthritis and meniscus tear.They were last seen 12/7/2020 for Euflexxa injections in both knees .     Since their last visit reports worsening pain.States gels injections only helped for a few a weeks.  is now wearing a knee brace on the left knee, and walking with cane. They indicate that their current pain level is 10/10. They have tried ibuprofen, other medications: nabuatome and viscosupplementation injection (most recent date: 12/7/2020).      The patient is seen by themselves.    Patient's past medical, surgical, social, and family histories were reviewed today and no changes are noted.    REVIEW OF SYSTEMS:  Constitutional: NEGATIVE for fever, chills, change in weight  Skin: NEGATIVE for worrisome rashes, moles or lesions  GI/: NEGATIVE for bowel or bladder changes  Neuro: NEGATIVE for weakness, dizziness or paresthesias    OBJECTIVE:  BP (!) 142/88   Ht 1.619 m (5' 3.75\")   Wt (!) " 151.5 kg (334 lb)   BMI 57.78 kg/m     General: healthy, alert and in no distress  HEENT: no scleral icterus or conjunctival erythema  Skin: no suspicious lesions or rash. No jaundice.  CV: regular rhythm by palpation, no pedal edema  Resp: normal respiratory effort without conversational dyspnea   Psych: normal mood and affect  Gait: normal steady gait with appropriate coordination and balance  Neuro: normal light touch sensory exam of the extremities.    MSK:  Bilateral KNEE  Inspection:    Normal alignment; no edema, erythema, or ecchymosis present  Palpation:    Tender about the lateral patellar facet, medial patellar facet and medial/lateral joint line. Remainder of bony and ligamentous landmarks are nontender.    Trace effusion is present    Patellofemoral crepitus is Absent  Range of Motion:     00 extension to 900 flexion  Strength:    Quadriceps 5-/5 and hamstrings 5-/5    Extensor mechanism intact  Special Tests:    Positive: none    Negative: MCL/valgus stress (0 & 30 deg), LCL/varus stress (0 & 30 deg), Lachman's, anterior drawer, posterior drawer        Albert Yeo MD, [facility]

## 2021-01-20 NOTE — LETTER
1/20/2021         RE: Coni Newberry  [address]  [facility] [address]        Dear Colleague,    Thank you for referring your patient, Coni Newberry, to the [facility] [place]. Please see a copy of my visit note below.    ASSESSMENT & PLAN  Patient Instructions     1. Primary osteoarthritis of right knee    2. Primary osteoarthritis of left knee      -Patient is following up for bilateral knee pain due to arthritis  -However, patient reports left knee pain is much worse than it has been in the past and she states the pain feels very different  -Patient will get an MRI of the left for further evaluation.  -Your MRI has been ordered. You may go down to the first floor, [address] to schedule the MRI in person, or call [phone] to schedule over the phone. Once you know the date of your MRI, please call my office and schedule a follow-up phone visit for 2 days after that.  -Patient will start Diclofenac and Tramadol as needed for pain.  -Call direct clinic number [[phone]] at any time with questions or concerns.    Albert Yeo MD [facility] Orthopedics and Sports Medicine  [facility]          -----    SUBJECTIVE:  Coni Newberry is a 45 year old female who is seen in follow-up for bilateral knee pain due to arthritis and meniscus tear.They were last seen 12/7/2020 for Euflexxa injections in both knees .     Since their last visit reports worsening pain.States gels injections only helped for a few a weeks.  is now wearing a knee brace on the left knee, and walking with cane. They indicate that their current pain level is 10/10. They have tried ibuprofen, other medications: nabuatome and viscosupplementation injection (most recent date: 12/7/2020).      The patient is seen by themselves.    Patient's past medical, surgical, social, and family histories were reviewed today and no changes are noted.    REVIEW OF  "SYSTEMS:  Constitutional: NEGATIVE for fever, chills, change in weight  Skin: NEGATIVE for worrisome rashes, moles or lesions  GI/: NEGATIVE for bowel or bladder changes  Neuro: NEGATIVE for weakness, dizziness or paresthesias    OBJECTIVE:  BP (!) 142/88   Ht 1.619 m (5' 3.75\")   Wt (!) 151.5 kg (334 lb)   BMI 57.78 kg/m     General: healthy, alert and in no distress  HEENT: no scleral icterus or conjunctival erythema  Skin: no suspicious lesions or rash. No jaundice.  CV: regular rhythm by palpation, no pedal edema  Resp: normal respiratory effort without conversational dyspnea   Psych: normal mood and affect  Gait: normal steady gait with appropriate coordination and balance  Neuro: normal light touch sensory exam of the extremities.    MSK:  Bilateral KNEE  Inspection:    Normal alignment; no edema, erythema, or ecchymosis present  Palpation:    Tender about the lateral patellar facet, medial patellar facet and medial/lateral joint line. Remainder of bony and ligamentous landmarks are nontender.    Trace effusion is present    Patellofemoral crepitus is Absent  Range of Motion:     00 extension to 900 flexion  Strength:    Quadriceps 5-/5 and hamstrings 5-/5    Extensor mechanism intact  Special Tests:    Positive: none    Negative: MCL/valgus stress (0 & 30 deg), LCL/varus stress (0 & 30 deg), Lachman's, anterior drawer, posterior drawer        Albert Yeo MD, [facility] Sports and Orthopedic Care            Again, thank you for allowing me to participate in the care of your patient.        Sincerely,        Albert Yeo, MD    "

## 2021-01-20 NOTE — PATIENT INSTRUCTIONS
1. Primary osteoarthritis of right knee    2. Primary osteoarthritis of left knee      -Patient is following up for bilateral knee pain due to arthritis  -However, patient reports left knee pain is much worse than it has been in the past and she states the pain feels very different  -Patient will get an MRI of the left for further evaluation.  -Your MRI has been ordered. You may go down to the first floor, [address] to schedule the MRI in person, or call [phone] to schedule over the phone. Once you know the date of your MRI, please call my office and schedule a follow-up phone visit for 2 days after that.  -Patient will start Diclofenac and Tramadol as needed for pain.  -Coni Brar to follow up with Primary Care provider regarding elevated blood pressure.    -Call direct clinic number [[phone]] at any time with questions or concerns.    Albert Yeo MD [facility]  [facility] [place]

## 2021-01-24 ENCOUNTER — HOSPITAL ENCOUNTER (OUTPATIENT)
Dept: MRI IMAGING | Facility: CLINIC | Age: 46
Discharge: HOME | End: 2021-01-24
Attending: FAMILY MEDICINE | Admitting: FAMILY MEDICINE
Payer: COMMERCIAL

## 2021-01-24 DIAGNOSIS — M17.12 PRIMARY OSTEOARTHRITIS OF LEFT KNEE: ICD-10-CM

## 2021-01-24 PROCEDURE — 73721 MRI JNT OF LWR EXTRE W/O DYE: CPT | Mod: 26 | Performed by: RADIOLOGY

## 2021-01-24 PROCEDURE — 73721 MRI JNT OF LWR EXTRE W/O DYE: CPT | Mod: LT

## 2021-02-01 DIAGNOSIS — I10 ESSENTIAL HYPERTENSION WITH GOAL BLOOD PRESSURE LESS THAN 140/90: ICD-10-CM

## 2021-02-02 RX ORDER — CHLORTHALIDONE 25 MG/1
TABLET ORAL
Qty: 90 TABLET | Refills: 1 | Status: SHIPPED | OUTPATIENT
Start: 2021-02-02 | End: 2021-07-28

## 2021-02-02 NOTE — TELEPHONE ENCOUNTER
Routing refill request to provider for review/approval because:  BP    BP Readings from Last 3 Encounters:   01/20/21 (!) 142/88   11/03/20 (!) 151/88   10/12/20 (!) 151/88

## 2021-02-10 DIAGNOSIS — I10 ESSENTIAL HYPERTENSION WITH GOAL BLOOD PRESSURE LESS THAN 140/90: ICD-10-CM

## 2021-02-11 RX ORDER — LISINOPRIL 20 MG/1
TABLET ORAL
Qty: 90 TABLET | Refills: 1 | Status: SHIPPED | OUTPATIENT
Start: 2021-02-11 | End: 2021-07-28

## 2021-02-11 NOTE — TELEPHONE ENCOUNTER
Pending Prescriptions:                       Disp   Refills    lisinopril (ZESTRIL) 20 MG tablet [Pharmac*90 tab*1        Sig: TAKE 1 TABLET BY MOUTH EVERY DAY    Routing refill request to provider for review/approval because:  Blood pressures out of range      BP Readings from Last 3 Encounters:   01/20/21 (!) 142/88   11/03/20 (!) 151/88   10/12/20 (!) 151/88

## 2021-02-14 DIAGNOSIS — E78.5 HYPERLIPIDEMIA WITH TARGET LDL LESS THAN 70: ICD-10-CM

## 2021-02-15 DIAGNOSIS — E11.9 TYPE 2 DIABETES MELLITUS WITHOUT COMPLICATION, WITHOUT LONG-TERM CURRENT USE OF INSULIN (H): ICD-10-CM

## 2021-02-16 RX ORDER — ATORVASTATIN CALCIUM 10 MG/1
TABLET, FILM COATED ORAL
Qty: 90 TABLET | Refills: 0 | Status: SHIPPED | OUTPATIENT
Start: 2021-02-16 | End: 2021-05-18

## 2021-02-17 ENCOUNTER — OFFICE VISIT (OUTPATIENT)
Dept: ORTHOPEDICS | Facility: CLINIC | Age: 46
End: 2021-02-17
Payer: COMMERCIAL

## 2021-02-17 ENCOUNTER — TELEPHONE (OUTPATIENT)
Dept: ORTHOPEDICS | Facility: CLINIC | Age: 46
End: 2021-02-17

## 2021-02-17 VITALS
SYSTOLIC BLOOD PRESSURE: 138 MMHG | HEIGHT: 64 IN | DIASTOLIC BLOOD PRESSURE: 88 MMHG | BODY MASS INDEX: 50.02 KG/M2 | WEIGHT: 293 LBS

## 2021-02-17 DIAGNOSIS — M17.12 PRIMARY OSTEOARTHRITIS OF LEFT KNEE: Primary | ICD-10-CM

## 2021-02-17 DIAGNOSIS — S83.231D COMPLEX TEAR OF MEDIAL MENISCUS OF RIGHT KNEE AS CURRENT INJURY, SUBSEQUENT ENCOUNTER: ICD-10-CM

## 2021-02-17 DIAGNOSIS — M17.11 PRIMARY OSTEOARTHRITIS OF RIGHT KNEE: ICD-10-CM

## 2021-02-17 DIAGNOSIS — S83.242D OTHER TEAR OF MEDIAL MENISCUS OF LEFT KNEE AS CURRENT INJURY, SUBSEQUENT ENCOUNTER: ICD-10-CM

## 2021-02-17 PROCEDURE — 99214 OFFICE O/P EST MOD 30 MIN: CPT | Mod: 25 | Performed by: FAMILY MEDICINE

## 2021-02-17 RX ORDER — TRAMADOL HYDROCHLORIDE 50 MG/1
50 TABLET ORAL
Qty: 30 TABLET | Refills: 0 | Status: SHIPPED | OUTPATIENT
Start: 2021-02-17 | End: 2021-09-17

## 2021-02-17 ASSESSMENT — MIFFLIN-ST. JEOR: SCORE: 2154.19

## 2021-02-17 NOTE — PATIENT INSTRUCTIONS
1. Primary osteoarthritis of left knee    2. Primary osteoarthritis of right knee    3. Complex tear of medial meniscus of right knee as current injury, subsequent encounter    4. Other tear of medial meniscus of left knee as current injury, subsequent encounter      -Patient is following up for bilateral knee pain due to arthritis and meniscus tearing  -MRI of the left knee was reviewed today which showed a medial meniscus tear and tricompartment arthritis  -Patient tolerated bilateral cortisone injection today without complications.  Patient was given postprocedure instructions  -We once again talked about the need for bilateral total knee replacement surgeries.  Patient currently is not a good candidate for surgery due to her young age and excessive weight.  Patient needs to bring her BMI down to 40 to minimize postoperative complications.  -Patient is interested in considering gastric bypass surgery to help in weight loss for overall health and work towards getting surgery for her knees.  -Patient was given refill for tramadol for severe pain  -Patient will follow up when pain returns for reevaluation and repeat treatment  -Call direct clinic number [[phone]] at any time with questions or concerns.    Albert Yeo MD [facility]  [facility] [place]

## 2021-02-17 NOTE — TELEPHONE ENCOUNTER
Prior Authorization Specialty Medication Request    Medication/Dose: Tramadol hcl 50 mg  ICD code (if different than what is on RX):  Previously Tried and Failed:  Cortisone injections, gel injections, ice, heat, ibuprofen, flexeril, tylenol      Insurance Name: MED[employer] VANTAGE[employer]US FULLY INSURED   Insurance ID: [id number]  Insurance Phone Number: [phone]     Pharmacy Information (if different than what is on RX)  Name:  [facility]   Phone:  [phone]

## 2021-02-17 NOTE — PROGRESS NOTES
ASSESSMENT & PLAN  Patient Instructions     1. Primary osteoarthritis of left knee    2. Primary osteoarthritis of right knee    3. Complex tear of medial meniscus of right knee as current injury, subsequent encounter    4. Other tear of medial meniscus of left knee as current injury, subsequent encounter      -Patient is following up for bilateral knee pain due to arthritis and meniscus tearing  -MRI of the left knee was reviewed today which showed a medial meniscus tear and tricompartment arthritis  -Patient tolerated bilateral cortisone injection today without complications.  Patient was given postprocedure instructions  -We once again talked about the need for bilateral total knee replacement surgeries.  Patient currently is not a good candidate for surgery due to her young age and excessive weight.  Patient needs to bring her BMI down to 40 to minimize postoperative complications.  -Patient is interested in considering gastric bypass surgery to help in weight loss for overall health and work towards getting surgery for her knees.  -Patient was given refill for tramadol for severe pain  -Patient will follow up when pain returns for reevaluation and repeat treatment  -Call direct clinic number [[phone]] at any time with questions or concerns.    Albert Yeo MD [facility] Orthopedics and Sports Medicine  [facility] [place]          -----    SUBJECTIVE:  Coni Newberry is a 46 year old female who is seen in follow-up for bilateral knee pain due to arthritis.They were last seen 1/20/2021.     Since their last visit reports 0% - (About the same as last time). They indicate that their current pain level is 9/10. They have tried ibuprofen, other medications: Tramadol (Ultram) and diclofenac, previous imaging (MRI 1/24/21 of the left knee) and viscosupplementation injection (most recent date: 12/7/20).      The patient is seen by themselves.    Patient's past medical, surgical, social, and  "family histories were reviewed today and no changes are noted.    REVIEW OF SYSTEMS:  Constitutional: NEGATIVE for fever, chills, change in weight  Skin: NEGATIVE for worrisome rashes, moles or lesions  GI/: NEGATIVE for bowel or bladder changes  Neuro: NEGATIVE for weakness, dizziness or paresthesias    OBJECTIVE:  /88   Ht 1.619 m (5' 3.75\")   Wt (!) 153.3 kg (338 lb)   BMI 58.47 kg/m     General: healthy, alert and in no distress  HEENT: no scleral icterus or conjunctival erythema  Skin: no suspicious lesions or rash. No jaundice.  CV: regular rhythm by palpation, no pedal edema  Resp: normal respiratory effort without conversational dyspnea   Psych: normal mood and affect  Gait: normal steady gait with appropriate coordination and balance  Neuro: normal light touch sensory exam of the extremities.    MSK:  Bilateral KNEE  Inspection:    Normal alignment; no edema, erythema, or ecchymosis present  Palpation:    Tender about the lateral patellar facet, medial patellar facet and medial/lateral joint line. Remainder of bony and ligamentous landmarks are nontender.    Trace effusion is present    Patellofemoral crepitus is Absent  Range of Motion:     00 extension to 900 flexion  Strength:    Quadriceps 5-/5 and hamstrings 5-/5    Extensor mechanism intact  Special Tests:    Positive: none    Negative: MCL/valgus stress (0 & 30 deg), LCL/varus stress (0 & 30 deg), Lachman's, anterior drawer, posterior drawer    Independent visualization of the below image:  MR Knee Left w/o Contrast [[id number]] Resulted: 01/25/21 0755   Order Status: Completed Updated: 01/25/21 0756   Narrative:     MR left knee without contrast 1/25/2021 7:41 AM     Techniques: Multiplanar multisequence imaging of the left knee was   obtained without administration of intra-articular or intravenous   contrast using routing protocol.     History: Knee pain, chronic, osteoarthritis suspected; Primary   osteoarthritis of left knee "     Comparison: Radiographs dated 2/5/2020     Findings:     MENISCI:   Medial meniscus: There is a high-grade radial tear at the junction of   the posterior horn and posterior root attachment (series 6, image 24).   The body of the medial meniscus is extruded.     Lateral meniscus: Mild free edge fraying and intrasubstance   degeneration of the lateral meniscus is noted. No displaced flap or   fragment.     LIGAMENTS   Cruciate ligaments: Intact.   Medial supporting structures: The deep and superficial portions of the   medial collateral ligament are intact. There is mildly increased   intrasubstance signal in the proximal medial collateral ligament,   likely sequela of prior injury.   Lateral supporting structures: The iliotibial band, the lateral   collateral ligament, the biceps femoris and popliteus tendons are   intact and unremarkable.     EXTENSOR MECHANISM   Intact.     FLUID   No significant joint effusion. No substantial Baker's cyst.     OSSEOUS and ARTICULAR STRUCTURES   Bones: No fracture, contusion, or osseous lesion is seen.     Patellofemoral compartment: The articular cartilaginous surfaces of   the patella reveal partial thickness fissuring and ulcerations with   mild reactive edema extending from the lateral patella facet through   the median ridge, small focal area of cartilage delamination is   suspected. Partial-thickness fissuring of the trochlea centered about   the central groove.     Medial compartment: Scattered areas of focal near full-thickness   cartilage fissuring and cartilage ulcerations, no larger defects are   noted..     Lateral compartment: Focal areas of superficial articular cartilage   fissuring, no full-thickness cartilage defects..     ANCILLARY FINDINGS   Mild edema surrounding the muscles and tendons of the pes anserine as   can be seen in pes anserine bursitis.    Impression:     Impression:   1. High-grade radial tear of the medial meniscus at the junction of   the  posterior horn to the posterior root attachment. Associated mild   to moderate grade extrusion of the meniscus.   2. 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   3. Mild pes anserine bursitis. 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   4. No evidence of acute osseous abnormalities, intact ACL posterior   cruciate ligament, lateral supporting structures.     JUTTA ELLERMANN, MD     Large Joint Injection/Arthocentesis: bilateral knee    Date/Time: 3/2/2021 5:36 PM  Performed by: Yeo, Albert, MD  Authorized by: Yeo, Albert, MD     Indications:  Pain and osteoarthritis  Needle Size:  25 G  Guidance: ultrasound    Approach:  Superolateral  Location:  Knee  Laterality:  Bilateral      Medications (Right):  40 mg methylPREDNISolone 40 MG/ML  Medications (Left):  40 mg methylPREDNISolone 40 MG/ML  Outcome:  Tolerated well, no immediate complications  Procedure discussed: discussed risks, benefits, and alternatives    Consent Given by:  Patient  Timeout: timeout called immediately prior to procedure    Prep: patient was prepped and draped in usual sterile fashion            Albert Yeo MD, [facility] Sports and Orthopedic [facility]

## 2021-02-17 NOTE — LETTER
2/17/2021         RE: Coni Newberry  [address]  [facility] [address]        Dear Colleague,    Thank you for referring your patient, Coni Newberry, to the [facility] [place]. Please see a copy of my visit note below.    ASSESSMENT & PLAN  Patient Instructions     1. Primary osteoarthritis of left knee    2. Primary osteoarthritis of right knee    3. Complex tear of medial meniscus of right knee as current injury, subsequent encounter    4. Other tear of medial meniscus of left knee as current injury, subsequent encounter      -Patient is following up for bilateral knee pain due to arthritis and meniscus tearing  -MRI of the left knee was reviewed today which showed a medial meniscus tear and tricompartment arthritis  -Patient tolerated bilateral cortisone injection today without complications.  Patient was given postprocedure instructions  -We once again talked about the need for bilateral total knee replacement surgeries.  Patient currently is not a good candidate for surgery due to her young age and excessive weight.  Patient needs to bring her BMI down to 40 to minimize postoperative complications.  -Patient is interested in considering gastric bypass surgery to help in weight loss for overall health and work towards getting surgery for her knees.  -Patient was given refill for tramadol for severe pain  -Patient will follow up when pain returns for reevaluation and repeat treatment  -Call direct clinic number [[phone]] at any time with questions or concerns.    Albert Yeo MD [facility] Orthopedics and Sports Medicine  [facility]          -----    SUBJECTIVE:  Coni Newberry is a 46 year old female who is seen in follow-up for bilateral knee pain due to arthritis.They were last seen 1/20/2021.     Since their last visit reports 0% - (About the same as last time). They indicate that their current pain level is  "9/10. They have tried ibuprofen, other medications: Tramadol (Ultram) and diclofenac, previous imaging (MRI 1/24/21 of the left knee) and viscosupplementation injection (most recent date: 12/7/20).      The patient is seen by themselves.    Patient's past medical, surgical, social, and family histories were reviewed today and no changes are noted.    REVIEW OF SYSTEMS:  Constitutional: NEGATIVE for fever, chills, change in weight  Skin: NEGATIVE for worrisome rashes, moles or lesions  GI/: NEGATIVE for bowel or bladder changes  Neuro: NEGATIVE for weakness, dizziness or paresthesias    OBJECTIVE:  /88   Ht 1.619 m (5' 3.75\")   Wt (!) 153.3 kg (338 lb)   BMI 58.47 kg/m     General: healthy, alert and in no distress  HEENT: no scleral icterus or conjunctival erythema  Skin: no suspicious lesions or rash. No jaundice.  CV: regular rhythm by palpation, no pedal edema  Resp: normal respiratory effort without conversational dyspnea   Psych: normal mood and affect  Gait: normal steady gait with appropriate coordination and balance  Neuro: normal light touch sensory exam of the extremities.    MSK:  Bilateral KNEE  Inspection:    Normal alignment; no edema, erythema, or ecchymosis present  Palpation:    Tender about the lateral patellar facet, medial patellar facet and medial/lateral joint line. Remainder of bony and ligamentous landmarks are nontender.    Trace effusion is present    Patellofemoral crepitus is Absent  Range of Motion:     00 extension to 900 flexion  Strength:    Quadriceps 5-/5 and hamstrings 5-/5    Extensor mechanism intact  Special Tests:    Positive: none    Negative: MCL/valgus stress (0 & 30 deg), LCL/varus stress (0 & 30 deg), Lachman's, anterior drawer, posterior drawer    Independent visualization of the below image:  MR Knee Left w/o Contrast [[id number]] Resulted: 01/25/21 0755   Order Status: Completed Updated: 01/25/21 0756   Narrative:     MR left knee without contrast 1/25/2021 " 7:41 AM     Techniques: Multiplanar multisequence imaging of the left knee was   obtained without administration of intra-articular or intravenous   contrast using routing protocol.     History: Knee pain, chronic, osteoarthritis suspected; Primary   osteoarthritis of left knee     Comparison: Radiographs dated 2/5/2020     Findings:     MENISCI:   Medial meniscus: There is a high-grade radial tear at the junction of   the posterior horn and posterior root attachment (series 6, image 24).   The body of the medial meniscus is extruded.     Lateral meniscus: Mild free edge fraying and intrasubstance   degeneration of the lateral meniscus is noted. No displaced flap or   fragment.     LIGAMENTS   Cruciate ligaments: Intact.   Medial supporting structures: The deep and superficial portions of the   medial collateral ligament are intact. There is mildly increased   intrasubstance signal in the proximal medial collateral ligament,   likely sequela of prior injury.   Lateral supporting structures: The iliotibial band, the lateral   collateral ligament, the biceps femoris and popliteus tendons are   intact and unremarkable.     EXTENSOR MECHANISM   Intact.     FLUID   No significant joint effusion. No substantial Baker's cyst.     OSSEOUS and ARTICULAR STRUCTURES   Bones: No fracture, contusion, or osseous lesion is seen.     Patellofemoral compartment: The articular cartilaginous surfaces of   the patella reveal partial thickness fissuring and ulcerations with   mild reactive edema extending from the lateral patella facet through   the median ridge, small focal area of cartilage delamination is   suspected. Partial-thickness fissuring of the trochlea centered about   the central groove.     Medial compartment: Scattered areas of focal near full-thickness   cartilage fissuring and cartilage ulcerations, no larger defects are   noted..     Lateral compartment: Focal areas of superficial articular cartilage   fissuring, no  full-thickness cartilage defects..     ANCILLARY FINDINGS   Mild edema surrounding the muscles and tendons of the pes anserine as   can be seen in pes anserine bursitis.    Impression:     Impression:   1. High-grade radial tear of the medial meniscus at the junction of   the posterior horn to the posterior root attachment. Associated mild   to moderate grade extrusion of the meniscus.   2. 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   3. Mild pes anserine bursitis. 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   4. No evidence of acute osseous abnormalities, intact ACL posterior   cruciate ligament, lateral supporting structures.     JUTTA ELLERMANN, MD Albert Yeo MD, [facility] Sports and Orthopedic Care              Again, thank you for allowing me to participate in the care of your patient.        Sincerely,        Albert Yeo, MD

## 2021-02-28 ENCOUNTER — HEALTH MAINTENANCE LETTER (OUTPATIENT)
Age: 46
End: 2021-02-28

## 2021-03-02 PROCEDURE — 20611 DRAIN/INJ JOINT/BURSA W/US: CPT | Mod: 50 | Performed by: FAMILY MEDICINE

## 2021-03-02 RX ORDER — METHYLPREDNISOLONE ACETATE 40 MG/ML
40 INJECTION, SUSPENSION INTRA-ARTICULAR; INTRALESIONAL; INTRAMUSCULAR; SOFT TISSUE
Status: DISCONTINUED | OUTPATIENT
Start: 2021-03-02 | End: 2021-09-17

## 2021-03-02 RX ADMIN — METHYLPREDNISOLONE ACETATE 40 MG: 40 INJECTION, SUSPENSION INTRA-ARTICULAR; INTRALESIONAL; INTRAMUSCULAR; SOFT TISSUE at 17:36

## 2021-03-11 DIAGNOSIS — M17.11 PRIMARY OSTEOARTHRITIS OF RIGHT KNEE: ICD-10-CM

## 2021-03-12 NOTE — TELEPHONE ENCOUNTER
Medication Request for: diclofenac (VOLTAREN) 75 MG EC tablet          Prescription last written on 1/20/21 by Dr. Yeo  Sig: Take 1 tablet (75 mg) by mouth 2 times daily as needed for moderate pain   Last Fill Quantity: 60 with # refills: 1     Last Office Visit by provider: 2/17/21  Procedure Performed (if applicable): US guided bilateral knee CSI  Future Office Visit:   None scheduled  Patient desires to have faxed or E-prescribe to pharmacy if available  Pharmacy selected in [employer].    Phone number patient can be reached at: Cell number on file:    Telephone Information:   Mobile [phone]       Can we leave a detailed message on this number? YES    Medication requests may take up to 3 business days for a response  Has patient been notified of this No      LVM for patient requesting call back to confirm if refill is needed and what other medication she is currently taking. Provided triage number for call back.    Nan Rodriguez MBA, ATC

## 2021-03-16 DIAGNOSIS — E11.9 TYPE 2 DIABETES MELLITUS WITHOUT COMPLICATION, WITHOUT LONG-TERM CURRENT USE OF INSULIN (H): ICD-10-CM

## 2021-03-17 NOTE — TELEPHONE ENCOUNTER
Left 2nd message asking patient to return call and let us know if she requested the refill. Also asked if she was still taking Diclofenac and any other medication she was taking for knee pain.     BECK Rene RN

## 2021-03-18 RX ORDER — GLYBURIDE 5 MG/1
TABLET ORAL
Qty: 90 TABLET | Refills: 0 | Status: SHIPPED | OUTPATIENT
Start: 2021-03-18 | End: 2021-06-18

## 2021-03-18 NOTE — TELEPHONE ENCOUNTER
Pending Prescriptions:                       Disp   Refills    glyBURIDE (DIABETA /MICRONASE) 5 MG tablet*90 tab*0        Sig: Take 1 tablet by mouth once daily with breakfast    Routing refill request to provider for review/approval because:  Lab Results   Component Value Date    A1C 7.8 08/24/2020    A1C 7.3 02/22/2020    A1C 7.4 06/29/2019    A1C 6.7 11/15/2018    A1C 6.0 12/29/2017

## 2021-03-18 NOTE — TELEPHONE ENCOUNTER
Phone call to patient and she did request the refill for Diclofenac.   She is taking it twice daily.   The bilateral cortisone injections she received on 2/17/21 provided only 1-2 weeks of relief and then the pain returned.   In further discussing with patient, she has also been taking 600 to 800mg of Advil twice daily in addition to the Diclofenac as well as Tramadol as needed. She states the pharmacy has been giving her only 7 tablets at a time of Tramadol and she has a couple left and was going to request another refill from the pharmacy.     Informed that she is not to be taking any additional Advil, Motrin, Ibuprofin, Aspirin, Excedrin, or NSAIDS in addition to the Diclofenac as it can have potential harmful side effects.   Suggested she could take Tylenol 1000mg up to 3 times daily max in a 24hr period.   Patient was frustrated as she states the Diclofenac is helpful but she continues to have pain and inflammation. She does not feel like she is going to be able to lose any weight soon and has a sedentary job.     Discussed that the Tramadol is something that we usually do not prescribed long term. Will discuss with provider regarding refill request and get back with her.   She can be reached at work before 4, or we can leave a message on her cell phone.    Phone call to [facility]. Patients insurance was only allowing a 7 day supply at a time and she has been filling it weekly. Patient has #9 tablets left as a refill and they will get ready for patient.     Please advise on what the patient should be taking long term and in the meantime before she is able to have surgery. When should patient follow up for a reevaluation?      BCEK Rene RN

## 2021-03-18 NOTE — TELEPHONE ENCOUNTER
03-18 7:14am- patient called back, said you can reach her at work number until 4pm today. [phone]  Down to 7 pills. After 4 you can call her on her cell

## 2021-03-19 RX ORDER — DICLOFENAC SODIUM 75 MG/1
75 TABLET, DELAYED RELEASE ORAL 2 TIMES DAILY PRN
Qty: 60 TABLET | Refills: 1 | Status: SHIPPED | OUTPATIENT
Start: 2021-03-19 | End: 2021-04-16

## 2021-03-19 NOTE — TELEPHONE ENCOUNTER
Phone call to patient and informed Diclofenac prescription sent to pharmacy. Cautioned her on the risk of GI bleed with Diclofenac as well as with any additional NSAIDS and to only take the Diclofenac.   Patient did purchase Acetaminophen 500mg and tried it last night. She did get heartburn but is not sure if it was from the salad and lemonade that she ingested with it or not.   She will try it again to see how she does.     BECK Rene RN

## 2021-04-05 ENCOUNTER — IMMUNIZATION (OUTPATIENT)
Dept: NURSING | Facility: CLINIC | Age: 46
End: 2021-04-05
Payer: COMMERCIAL

## 2021-04-05 PROCEDURE — 0001A PR COVID VAC PFIZER DIL RECON 30 MCG/0.3 ML IM: CPT

## 2021-04-05 PROCEDURE — 91300 PR COVID VAC PFIZER DIL RECON 30 MCG/0.3 ML IM: CPT

## 2021-04-12 ENCOUNTER — OFFICE VISIT (OUTPATIENT)
Dept: ORTHOPEDICS | Facility: CLINIC | Age: 46
End: 2021-04-12
Payer: COMMERCIAL

## 2021-04-12 VITALS
DIASTOLIC BLOOD PRESSURE: 80 MMHG | HEIGHT: 64 IN | SYSTOLIC BLOOD PRESSURE: 138 MMHG | WEIGHT: 293 LBS | BODY MASS INDEX: 50.02 KG/M2

## 2021-04-12 DIAGNOSIS — M17.12 PRIMARY OSTEOARTHRITIS OF LEFT KNEE: ICD-10-CM

## 2021-04-12 DIAGNOSIS — M17.11 PRIMARY OSTEOARTHRITIS OF RIGHT KNEE: Primary | ICD-10-CM

## 2021-04-12 PROCEDURE — 99214 OFFICE O/P EST MOD 30 MIN: CPT | Performed by: FAMILY MEDICINE

## 2021-04-12 RX ORDER — TRAMADOL HYDROCHLORIDE 50 MG/1
50 TABLET ORAL EVERY 6 HOURS PRN
Qty: 30 TABLET | Refills: 0 | Status: SHIPPED | OUTPATIENT
Start: 2021-04-12 | End: 2021-09-17

## 2021-04-12 ASSESSMENT — MIFFLIN-ST. JEOR: SCORE: 2154.19

## 2021-04-12 NOTE — LETTER
4/12/2021         RE: Coni Newberry  [address]  [place] [address]        Dear Colleague,    Thank you for referring your patient, Coni Newberry, to the [facility] [place]. Please see a copy of my visit note below.    ASSESSMENT & PLAN  Patient Instructions     1. Primary osteoarthritis of right knee    2. Primary osteoarthritis of left knee      -Patient is following up for bilateral knee pain due to severe arthritis and meniscus tears  -Patient is unable to have surgery for a long time due to excess BMI preventing possible surgery  -Patient will start Celebrex 200mg once daily.  She can continue with 2 tablets of Tylenol as needed for breakthrough pain. Patient will continue with Tramadol for severe pain.  -Patient will be referred to Pain Management for long term oral pain medications until she can have total knee replacement surgeries.    -Order for custom knee braces was placed today.  -Call direct clinic number [[phone]] at any time with questions or concerns.    Albert Yeo MD [facility]  [facility]          -----    SUBJECTIVE:  Coni Newberry is a 46 year old female who is seen in follow-up for bilateral knee pain due to arthritis and meniscus tearing.They were last seen 2/17/2021. And had bilat cortisone injections. Had first covid vaccine on 4/5/21     Since their last visit reports worsening pain. They indicate that their current pain level is 9/10. They have tried Tylenol, other medications: diclofenac, home exercises and corticosteroid injection (most recent date: 2/17/21) that provided  2 week(s) of relief.      The patient is seen by themselves.    Patient's past medical, surgical, social, and family histories were reviewed today and no changes are noted.    REVIEW OF SYSTEMS:  Constitutional: NEGATIVE for fever, chills, change in weight  Skin: NEGATIVE for  "worrisome rashes, moles or lesions  GI/: NEGATIVE for bowel or bladder changes  Neuro: NEGATIVE for weakness, dizziness or paresthesias    OBJECTIVE:  /80   Ht 1.619 m (5' 3.75\")   Wt (!) 153.3 kg (338 lb)   BMI 58.47 kg/m     General: healthy, alert and in no distress  HEENT: no scleral icterus or conjunctival erythema  Skin: no suspicious lesions or rash. No jaundice.  CV: regular rhythm by palpation, no pedal edema  Resp: normal respiratory effort without conversational dyspnea   Psych: normal mood and affect  Gait: normal steady gait with appropriate coordination and balance  Neuro: normal light touch sensory exam of the extremities.    MSK:  Bilateral KNEE  Inspection:    Normal alignment; no edema, erythema, or ecchymosis present  Palpation:    Tender about the lateral patellar facet, medial patellar facet and medial/lateral joint line. Remainder of bony and ligamentous landmarks are nontender.    Trace effusion is present    Patellofemoral crepitus is Absent  Range of Motion:     00 extension to 900 flexion  Strength:    Quadriceps 5-/5 and hamstrings 5-/5    Extensor mechanism intact  Special Tests:    Positive: none    Negative: MCL/valgus stress (0 & 30 deg), LCL/varus stress (0 & 30 deg), Lachman's, anterior drawer, posterior drawer  Independent visualization of the below image:        Albert Yeo MD, [facility]            Again, thank you for allowing me to participate in the care of your patient.        Sincerely,        Albert Yeo, MD    "

## 2021-04-12 NOTE — PATIENT INSTRUCTIONS
1. Primary osteoarthritis of right knee    2. Primary osteoarthritis of left knee      -Patient is following up for bilateral knee pain due to severe arthritis and meniscus tears  -Patient is unable to have surgery for a long time due to excess BMI preventing possible surgery  -Patient will start Celebrex 200mg once daily.  She can continue with 2 tablets of Tylenol as needed for breakthrough pain. Patient will continue with Tramadol for severe pain.  -Patient will be referred to Pain Management for long term oral pain medications until she can have total knee replacement surgeries.    -Order for custom knee braces was placed today.  -Call direct clinic number [[phone]] at any time with questions or concerns.    Albert Yeo MD [facility]  [facility] [place]

## 2021-04-12 NOTE — PROGRESS NOTES
"ASSESSMENT & PLAN  Patient Instructions     1. Primary osteoarthritis of right knee    2. Primary osteoarthritis of left knee      -Patient is following up for bilateral knee pain due to severe arthritis and meniscus tears  -Patient is unable to have surgery for a long time due to excess BMI preventing possible surgery  -Patient will start Celebrex 200mg once daily.  She can continue with 2 tablets of Tylenol as needed for breakthrough pain. Patient will continue with Tramadol for severe pain.  -Patient will be referred to Pain Management for long term oral pain medications until she can have total knee replacement surgeries.    -Order for custom knee braces was placed today.  -Call direct clinic number [[phone]] at any time with questions or concerns.    Albert Yeo MD [facility]  [facility]          -----    SUBJECTIVE:  Coni Newberry is a 46 year old female who is seen in follow-up for bilateral knee pain due to arthritis and meniscus tearing.They were last seen 2/17/2021. And had bilat cortisone injections. Had first covid vaccine on 4/5/21     Since their last visit reports worsening pain. They indicate that their current pain level is 9/10. They have tried Tylenol, other medications: diclofenac, home exercises and corticosteroid injection (most recent date: 2/17/21) that provided  2 week(s) of relief.      The patient is seen by themselves.    Patient's past medical, surgical, social, and family histories were reviewed today and no changes are noted.    REVIEW OF SYSTEMS:  Constitutional: NEGATIVE for fever, chills, change in weight  Skin: NEGATIVE for worrisome rashes, moles or lesions  GI/: NEGATIVE for bowel or bladder changes  Neuro: NEGATIVE for weakness, dizziness or paresthesias    OBJECTIVE:  /80   Ht 1.619 m (5' 3.75\")   Wt (!) 153.3 kg (338 lb)   BMI 58.47 kg/m     General: healthy, alert and in no distress  HEENT: no " scleral icterus or conjunctival erythema  Skin: no suspicious lesions or rash. No jaundice.  CV: regular rhythm by palpation, no pedal edema  Resp: normal respiratory effort without conversational dyspnea   Psych: normal mood and affect  Gait: normal steady gait with appropriate coordination and balance  Neuro: normal light touch sensory exam of the extremities.    MSK:  Bilateral KNEE  Inspection:    Normal alignment; no edema, erythema, or ecchymosis present  Palpation:    Tender about the lateral patellar facet, medial patellar facet and medial/lateral joint line. Remainder of bony and ligamentous landmarks are nontender.    Trace effusion is present    Patellofemoral crepitus is Absent  Range of Motion:     00 extension to 900 flexion  Strength:    Quadriceps 5-/5 and hamstrings 5-/5    Extensor mechanism intact  Special Tests:    Positive: none    Negative: MCL/valgus stress (0 & 30 deg), LCL/varus stress (0 & 30 deg), Lachman's, anterior drawer, posterior drawer  Independent visualization of the below image:        Albert Yeo MD, CAM  [facility]

## 2021-04-16 ENCOUNTER — E-VISIT (OUTPATIENT)
Dept: URGENT CARE | Facility: CLINIC | Age: 46
End: 2021-04-16
Payer: COMMERCIAL

## 2021-04-16 DIAGNOSIS — N39.0 ACUTE UTI (URINARY TRACT INFECTION): Primary | ICD-10-CM

## 2021-04-16 PROCEDURE — 99421 OL DIG E/M SVC 5-10 MIN: CPT | Mod: 95 | Performed by: NURSE PRACTITIONER

## 2021-04-16 RX ORDER — NITROFURANTOIN 25; 75 MG/1; MG/1
100 CAPSULE ORAL 2 TIMES DAILY
Qty: 10 CAPSULE | Refills: 0 | Status: SHIPPED | OUTPATIENT
Start: 2021-04-16 | End: 2021-04-21

## 2021-04-16 NOTE — PATIENT INSTRUCTIONS
Dear Coni Newberry    After reviewing your responses, I've been able to diagnose you with a urinary tract infection, which is a common infection of the bladder with bacteria.  This is not a sexually transmitted infection, though urinating immediately after intercourse can help prevent infections.  Drinking lots of fluids is also helpful to clear your current infection and prevent the next one.      I have sent a prescription for antibiotics to your pharmacy to treat this infection.    It is important that you take all of your prescribed medication even if your symptoms are improving after a few doses.  Taking all of your medicine helps prevent the symptoms from returning.     If your symptoms worsen, you develop pain in your back or stomach, develop fevers, or are not improving in 5 days, please contact your primary care provider for an appointment or visit any of our convenient Walk-in or Urgent Care Centers to be seen, which can be found on our website here.    Thanks again for choosing us as your health care partner,    DANYELL Tan CNP

## 2021-04-24 ENCOUNTER — HEALTH MAINTENANCE LETTER (OUTPATIENT)
Age: 46
End: 2021-04-24

## 2021-04-26 ENCOUNTER — IMMUNIZATION (OUTPATIENT)
Dept: NURSING | Facility: CLINIC | Age: 46
End: 2021-04-26
Attending: INTERNAL MEDICINE
Payer: COMMERCIAL

## 2021-04-26 PROCEDURE — 91300 PR COVID VAC PFIZER DIL RECON 30 MCG/0.3 ML IM: CPT

## 2021-04-26 PROCEDURE — 0002A PR COVID VAC PFIZER DIL RECON 30 MCG/0.3 ML IM: CPT

## 2021-04-26 NOTE — PROGRESS NOTES
Coni Brar is a 46 year old who is being evaluated via a billable video visit.      How would you like to obtain your AVS? [employer]  If the video visit is dropped, the invitation should be resent by: Other e-mail: MyChart  Will anyone else be joining your video visit? No        [facility]                              [facility] Consultation    Date of visit: 4/26/2021      Assessment:  Coni Newberry is a 46 year old with past medical history including: DM 2, Asthma, HTN, Obesity who presents for evaluation and treatment of the following chronic pain conditions:    1. Primary Bilateral Osteoarthritis with meniscal tearing: Patient has severe bilateral knee pain as a result of the above. Bilateral medial meniscus tears noted on imaging. She is not a candidate for meniscectomy due to the arthritis burden and not a candidate for TKA due to age and weight. Due to socio-economic factors she is unable to participate in a weight loss clinic program.    She has tried conservative treatments including PT, NSAIDs, Tramadol, steroid and euflexxa injections with no sustained releif.      Plan:  The following recommendations were given to the patient. Diagnosis, treatment options, risks, benefits, and alternatives were discussed, and all questions were answered. The patient expressed understanding of the plan for management.     I am recommending a multidisciplinary treatment plan to help this patient better manage her pain.  This includes:     1. Physical Therapy: Will discuss with Sophie chronic pain PT.  2. Clinical Health Pain Psychologist: Defer, coping well.  3. Self Care Recommendations: Liza progressive exercise that does not increase pain - gradually increase daily walking program.  Take mini breaks - 5 minutes of mindfullness a couple times a day.   4. Diagnostic Studies: Reviewed bilateral knee MRI, no further studies  indicated.  5. Medication Management:   1. Change celebrex to 100mg BID  2. Continue tylenol 650mg q6h prn  3. Apply lidocaine patches to the knees nightly.  4. Stop Celebrex 200mg  5. Stop tramadol  6. Consider gabapentin  6. Further procedures recommended: genicular nerve block and RFA ordered.  7. Follow up: Patient to call in 2 weeks or mychart to discuss efficacy of the medication changes recommended. May add gabapentin at that time.      Archie Jones,   [facility]          Reason for consultation:    Coni Newberry is a 46 year old female who is seen in consultation today at the request of Dr. Yeo.     Consultation and Evaluation for: bilateral knee pain    Review of [place] Prescription Monitoring Program (): Today I have also reviewed the patient's history of controlled substance use, as provided by [place] licensed pharmacies and prescriber dispensers.     Review of Electronic Chart: Today I have also reviewed available medical information in the patient's medical record at [facility] ([facility]), including relevant provider notes, laboratory work, and imaging.     Coni Newberry has not been seen at a pain clinic in the past.      Chief Complaint:    No chief complaint on file.      Pain history:  Coni Newberry is a 46 year old female who presents for initial evaluation of chief pain complaint of bilateral knee pain.     Coni Brar began having right knee pain about a year and a half ago when she bent down to feed her cat, her body twisted the wrong way while she was bearing weight on her right knee. She had severe right knee pain and was diagnosed with a meniscal tear and chondromalacia. She was seen by a surgeon who recommended a total knee replacement but because of her age and BMI they did not proceed. She has been bracing her right knee and this helps when she is doing less activity but not for longer distances.    Unfortunately, over the past year she has had  gradually worsening left knee pain. She experiences a lot of popping and clicking in this knee. She has intermittent locking sensations. When the pain is severe it can feel like it is radiating up and down from her knee. She was diagnosed with a medial meniscal tear on an MRI for her left knee as well.    Onset: early 2020 for right knee, late 2020 for left knee  Location/Radiation: bilateral knee  Quality: aching  Severity/Intensity: 8/10 on average  Aggravating factors include: bending, activity, walking  Relieving factors include: rest, icing  Red Flags: The patient denies bowel or bladder incontinence, parasthesias, weakness, saddle anesthesia, unintentional weight loss, or fever/chills/sweats.         Pain Treatments:  1. Medications:       Current pain medications:  -Celebrex 200mg - once daily - wears off after about 5 hours  -Tramadol 50mg q6h prn - 2-3 tabs daily  -Tylenol 650mg prn       Previous pain medications:  -Nabumetone - nh  2. Physical Therapy: made pain on left side worse  3. Pain psychology: hasn't tried  4. Surgery: none  5. Injections:    -Two weeks of relief with euflexxa injections for the knees - 12/7/20   -Two weeks of relief with steroid injections for knees - 11/3/20  6. Alternative Therapies:    Chiropractic: hasn't tried    Acupuncture: hasn't tried      Diagnostic tests:  MRI of Left Knee was completed on 1/24/21 and shows:                                                                     Impression:  1. High-grade radial tear of the medial meniscus at the junction of  the posterior horn to the posterior root attachment. Associated mild  to moderate grade extrusion of the meniscus.  2. 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  3. Mild pes anserine bursitis. These findings are consistent  with  tricompartmental osteoarthritis.  4. No evidence of acute osseous abnormalities, intact ACL posterior  cruciate ligament, lateral supporting structures.     JUTTA ELLERMANN, MD    MRI of Right Knee was completed on 2/7/20 and shows:    Impression:  1. Multidirectional tearing of the junction of the posterior horn and  posterior root ligament of the medial meniscus.     2. 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     3. Small joint effusion with synovitis.     4. At the proximal edge of the field-of-view on sagittal imaging there  is increased signal within the quadriceps tendon, possibly  artifactual. If there is concern for quadriceps tendon pathology,  femur/thigh MRI could be performed.     4. Low-grade MCL sprain.     TED OTOOLE MD (Joe)        Labs:   Lab Results   Component Value Date    WBC 11.0 02/22/2020     Lab Results   Component Value Date    RBC 5.11 02/22/2020     Lab Results   Component Value Date    HGB 13.2 02/22/2020     Lab Results   Component Value Date    HCT 42.3 02/22/2020     Lab Results   Component Value Date    MCV 83 02/22/2020     Lab Results   Component Value Date    MCH 25.8 02/22/2020     Lab Results   Component Value Date    MCHC 31.2 02/22/2020     Lab Results   Component Value Date    RDW 15.2 02/22/2020     Lab Results   Component Value Date     02/22/2020     Last Comprehensive Metabolic Panel:  Sodium   Date Value Ref Range Status   08/24/2020 135 133 - 144 mmol/L Final     Potassium   Date Value Ref Range Status   08/24/2020 3.8 3.4 - 5.3 mmol/L Final     Chloride   Date Value Ref Range Status   08/24/2020 103 94 - 109 mmol/L Final     Carbon Dioxide   Date Value Ref Range Status   08/24/2020 24 20 - 32 mmol/L Final     Anion Gap   Date Value Ref Range Status   08/24/2020 8 3 - 14 mmol/L Final     Glucose   Date Value Ref Range Status   08/24/2020 272 (H) 70 - 99 mg/dL Final     Comment:     Non Fasting     Urea  Nitrogen   Date Value Ref Range Status   08/24/2020 21 7 - 30 mg/dL Final     Creatinine   Date Value Ref Range Status   08/24/2020 0.75 0.52 - 1.04 mg/dL Final     GFR Estimate   Date Value Ref Range Status   08/24/2020 >90 >60 mL/min/[1.73_m2] Final     Comment:     Non  GFR Calc  Starting 12/18/2018, serum creatinine based estimated GFR (eGFR) will be   calculated using the Chronic Kidney Disease Epidemiology Collaboration   (CKD-EPI) equation.       Calcium   Date Value Ref Range Status   08/24/2020 8.9 8.5 - 10.1 mg/dL Final     Bilirubin Total   Date Value Ref Range Status   08/24/2020 0.5 0.2 - 1.3 mg/dL Final     Alkaline Phosphatase   Date Value Ref Range Status   08/24/2020 81 40 - 150 U/L Final     ALT   Date Value Ref Range Status   08/24/2020 30 0 - 50 U/L Final     AST   Date Value Ref Range Status   08/24/2020 16 0 - 45 U/L Final                 Past Medical History:  Past Medical History:   Diagnosis Date     Diabetes (H)      Heart murmur      HTN (hypertension)     No cardiologist     Hyperlipidaemia LDL goal <100      Mild persistent asthma 7/29/2014     Nummular eczema        Past Surgical History:  Past Surgical History:   Procedure Laterality Date     CYSTECTOMY OVARIAN BENIGN Left 2/23/2016    Procedure: CYSTECTOMY OVARIAN BENIGN;  Surgeon: Marisela Lopez MD;  Location: [facility] OR     CYSTOSCOPY N/A 1/3/2018    Procedure: CYSTOSCOPY;;  Surgeon: Lissy Maya DO;  Location: [facility] OR     DILATE CERVIX, HYSTEROSCOPY, ABLATE ENDOMETRIUM, COMBINED N/A 1/3/2018    Procedure: COMBINED DILATE CERVIX, HYSTEROSCOPY, ABLATE ENDOMETRIUM;  Hysteroscopy, cervical dilation, and cystoscopy;  Surgeon: Lissy Maya DO;  Location: [facility] OR     NONE OF THE ABOVE CORE MEASURE DIAGNOSES       SALPINGO-OOPHORECTOMY, COMBINED Left 2/23/2016    Procedure: COMBINED SALPINGO-OOPHORECTOMY;  Surgeon: Marisela Lopez MD;  Location: [facility] OR       Medications:  Current Outpatient Medications   Medication  Sig Dispense Refill     albuterol (PROAIR HFA/PROVENTIL HFA/VENTOLIN HFA) 108 (90 Base) MCG/ACT inhaler Inhale 2 puffs into the lungs every 6 hours respiclick did not work for patient 1 Inhaler 11     albuterol (PROVENTIL) (2.5 MG/3ML) 0.083% neb solution Take 1 vial (2.5 mg) by nebulization every 6 hours as needed for shortness of breath / dyspnea or wheezing Needs refill due to asthma exacerbation 2 Box 11     atorvastatin (LIPITOR) 10 MG tablet Take 1 tablet by mouth once daily 90 tablet 0     beclomethasone HFA (QVAR REDIHALER) 80 MCG/ACT inhaler Inhale 2 puffs into the lungs 2 times daily 1 Inhaler 11     blood glucose monitoring (ONE TOUCH ULTRA 2) meter device kit Use to test blood sugars 1 times daily or as directed. 1 kit 0     blood glucose monitoring (ONE TOUCH ULTRA) test strip Use to test blood sugars 1 times daily or as directed. 3 Box 1     blood glucose test strip Use to test blood sugar 6 times daily or as directed. 600 strip 3     celecoxib (CELEBREX) 200 MG capsule Take 1 capsule (200 mg) by mouth daily as needed for moderate pain 30 capsule 2     cetirizine-psuedoePHEDrine (ZYRTEC-D) 5-120 MG per tablet Take 1 tablet by mouth daily Reported on 3/31/2017       chlorthalidone (HYGROTON) 25 MG tablet TAKE 1 TABLET BY MOUTH EVERY DAY 90 tablet 1     cyclobenzaprine (FLEXERIL) 10 MG tablet TAKE 1 TABLET BY MOUTH NIGHTLY AS NEEDED FOR MUSCLE SPASM 90 tablet 3     fluticasone (FLONASE) 50 MCG/ACT nasal spray Spray 2 sprays into both nostrils daily 3 Package 3     glyBURIDE (DIABETA /MICRONASE) 5 MG tablet Take 1 tablet by mouth once daily with breakfast 90 tablet 0     ibuprofen (ADVIL/MOTRIN) 800 MG tablet Take 1 tablet (800 mg) by mouth every 6 hours as needed for moderate pain 30 tablet 0     LANCETS ULTRA FINE MISC 1 each 6 times daily 300 each 2     lisinopril (ZESTRIL) 20 MG tablet TAKE 1 TABLET BY MOUTH EVERY DAY 90 tablet 1     metFORMIN (GLUCOPHAGE) 500 MG tablet TAKE 2 & 1/2 (TWO & ONE-HALF)  TABLETS BY MOUTH TWICE DAILY WITH MEALS 450 tablet 0     montelukast (SINGULAIR) 10 MG tablet TAKE 1 TABLET BY MOUTH AT BEDTIME 90 tablet 3     nabumetone (RELAFEN) 750 MG tablet TAKE 1 TABLET BY MOUTH TWICE DAILY AS NEEDED FOR MODERATE PAIN 30 tablet 0     norethindrone (MICRONOR) 0.35 MG tablet Take 1 tablet (0.35 mg) by mouth daily 84 tablet 3     nystatin (MYCOSTATIN) 244050 UNIT/GM external ointment APPLY  OINTMENT TOPICALLY TWICE DAILY AS NEEDED TO SKIN FOLD RASH 30 g 3     omeprazole (PRILOSEC) 20 MG DR capsule TAKE 1 TABLET BY MOUTH ONCE DAILY 30-60 MINUTES BEFORE A MEAL 90 capsule 3     order for DME Equipment being ordered: tennis elbow brace 1 Device 0     order for DME Equipment being ordered: Nebulizer and tubing 1 Device 0     predniSONE (DELTASONE) 10 MG tablet Take 2 tablets (20 mg) by mouth 2 times daily 70 tablet 0     tiZANidine (ZANAFLEX) 4 MG capsule TAKE 1 CAPSULE BY MOUTH NIGHTLY AS NEEDED FOR MUSCLE SPASM 90 capsule 3     traMADol (ULTRAM) 50 MG tablet Take 1 tablet (50 mg) by mouth every 6 hours as needed for moderate to severe pain 30 tablet 0     traMADol (ULTRAM) 50 MG tablet Take 1 tablet (50 mg) by mouth nightly as needed for moderate to severe pain (Patient not taking: Reported on 4/12/2021) 30 tablet 0     traMADol (ULTRAM) 50 MG tablet Take 1 tablet (50 mg) by mouth every 12 hours as needed for moderate to severe pain (Patient not taking: Reported on 4/12/2021) 30 tablet 0     triamcinolone (KENALOG) 0.1 % external ointment APPLY OINTMENT SPARINGLY TO AFFECTED AREA THREE TIMES DAILY AS NEEDED FOR ECZEMA RASH 80 g 1       Allergies:     Allergies   Allergen Reactions     Maxzide [Hydrochlorothiazide W/Triamterene] Nausea and Vomiting     Seasonal Allergies        Social History:  Home situation: Lives in [place]  Occupation/Schooling:   Tobacco use: denies  Drug use: occasional  Alcohol use: denies  History of chemical dependency treatment: denies  Mental health  admissions: denies    Family history:  Family History   Problem Relation Age of Onset     Diabetes Mother      Diabetes Father      Hypertension Father      Diabetes Sister      Cancer Paternal Grandfather      Diabetes Brother        Review of Systems:    POSTIVE IN BOLD  GENERAL: fever/chills, fatigue, general unwell feeling, weight gain/loss.  HEAD/EYES:  headache, dizziness, or vision changes.    EARS/NOSE/THROAT:  Nosebleeds, hearing loss, sinus infection, earache, tinnitus.  IMMUNE:  Allergies, cancer, immune deficiency, or infections.  SKIN:  Urticaria, rash, hives  HEME/Lymphatic:   anemia, easy bruising, easy bleeding.  RESPIRATORY:  cough, wheezing, or shortness of breath  CARDIOVASCULAR/Circulation:  Extremity edema, syncope, hypertension, tachycardia, or angina.  GASTROINTESTINAL:  abdominal pain, nausea/emesis, diarrhea, constipation,  hematochezia, or melena.  ENDOCRINE:  Diabetes, steroid use,  thyroid disease or osteoporosis.  MUSCULOSKELETAL: neck pain, back pain, arthralgia, arthritis, or gout.  GENITOURINARY:  frequency, urgency, dysuria, difficulty voiding, hematuria or incontinence.  NEUROLOGIC:  weakness, numbness, paresthesias, seizure, tremor, stroke or memory loss.  PSYCHIATRIC:  depression, anxiety, stress, suicidal thoughts or mood swings.       Video Start Time: 758am  Video-Visit Details    Type of service:  Video Visit    Video End Time:8:31 AM    Originating Location (pt. Location): Home    Distant Location (provider location):  [facility] [place]     Platform used for Video Visit: IMImobile    BILLING TIME DOCUMENTATION:   The total TIME spent on this patient on the date of the encounter/appointment was 50 minutes.      TOTAL TIME includes:   Time spent preparing to see the patient (reviewing records and tests)   Time spent face to face (or over the phone) with the patient   Time spent ordering tests, medications, procedures and referrals   Time spent  documenting clinical information in Epic     DO [facility]

## 2021-04-27 ENCOUNTER — VIRTUAL VISIT (OUTPATIENT)
Dept: PALLIATIVE MEDICINE | Facility: CLINIC | Age: 46
End: 2021-04-27
Attending: FAMILY MEDICINE
Payer: COMMERCIAL

## 2021-04-27 DIAGNOSIS — M17.12 PRIMARY OSTEOARTHRITIS OF LEFT KNEE: ICD-10-CM

## 2021-04-27 DIAGNOSIS — M17.11 PRIMARY OSTEOARTHRITIS OF RIGHT KNEE: Primary | ICD-10-CM

## 2021-04-27 PROCEDURE — 99204 OFFICE O/P NEW MOD 45 MIN: CPT | Mod: GT | Performed by: PHYSICAL MEDICINE & REHABILITATION

## 2021-04-27 RX ORDER — CELECOXIB 100 MG/1
100 CAPSULE ORAL 2 TIMES DAILY
Qty: 60 CAPSULE | Refills: 0 | Status: SHIPPED | OUTPATIENT
Start: 2021-04-27 | End: 2021-09-17

## 2021-04-27 ASSESSMENT — PAIN SCALES - GENERAL: PAINLEVEL: SEVERE PAIN (7)

## 2021-04-27 NOTE — Clinical Note
Coni Brar was referred for chronic bilateral knee pain. She is not a surgical candidate. We will try to get genicular block and RFA approved, if denied will continue med management.    Thanks for the referral,    Archie Jones DO  [facility]

## 2021-04-27 NOTE — PATIENT INSTRUCTIONS
1. Stop taking 200mg celebrex and start Celebrex 100mg twice daily.    2. Continue tylenol 650mg every 6 hours as needed.    3. Apply lidocaine patches at night    3. See how you do with this regimen for the next 2 weeks, if no improvement, we'll add gabapentin.    4. I ordered the knee injections we discussed, this is called a genicular nerve block. If the blocks are helpful on 2 different dates, we will do the radiofrequency ablation (cauterizing the nerves).    5. I will talk to our chronic pain therapist about PT.    Take care,    Archie Jones,   [facility]        ----------------------------------------------------------------  Clinic Number:  [phone]     Call with any questions about your care and for scheduling assistance.     Calls are returned Monday through Friday between 8 AM and 4:30 PM. We usually get back to you within 2 business days depending on the issue/request.    If we are prescribing your medications:    For opioid medication refills, call the clinic or send a ShoutWire message 7 days in advance.  Please include:    Name of requested medication    Name of the pharmacy.    For non-opioid medications, call your pharmacy directly to request a refill. Please allow 3-4 days to be processed.     Per [place] State Law:    All controlled substance prescriptions must be filled within 30 days of being written.      For those controlled substances allowing refills, pickup must occur within 30 days of last fill.      We believe regular attendance is key to your success in our program!      Any time you are unable to keep your appointment we ask that you call us at least 24 hours in advance to cancel.This will allow us to offer the appointment time to another patient.     Multiple missed appointments may lead to dismissal from the clinic.

## 2021-04-28 ENCOUNTER — MYC MEDICAL ADVICE (OUTPATIENT)
Dept: PALLIATIVE MEDICINE | Facility: CLINIC | Age: 46
End: 2021-04-28

## 2021-04-28 DIAGNOSIS — J45.40 MODERATE PERSISTENT ASTHMA WITHOUT COMPLICATION: ICD-10-CM

## 2021-04-28 NOTE — TELEPHONE ENCOUNTER
Letter was faxed to [phone].    Right fax confirmed.      Christin Khan MA  [facility] [place]

## 2021-04-28 NOTE — TELEPHONE ENCOUNTER
[employer] message from patient on 04/28/2021 at 0809:  Need doctor note for the being alound to get 5 minute break every hour or other hour to stretch and move around .   Please face that to [phone]  ____________________________________________    [employer] message sent to patient:  Good Morning Coni Brar,    Have you talked to your employer about this?  Are they not allowing you to get up and move around every hour or so?  What are you current duties at work that would limit you to get up and move around?  Once you have responded to these questions I can forward this to Dr Jones to review.    Take Care,    Petra Soni RN  Care Coordinator  [facility]

## 2021-04-28 NOTE — TELEPHONE ENCOUNTER
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 Jones for review.     Jocelin Kent, MADDYN, RN-BC  Patient Care Supervisor  [facility] [place]

## 2021-04-28 NOTE — LETTER
April 28, 2021      RE: Vaibhav Newberry  [address]  [place] [address]        To whom it may concern:    Vaibhav Newberry is under my professional care for chronic bilateral knee pain. She can continue to work with the following restrictions: Please allow a 5 minute break every hour for vaibhav Brar to do her stretches and exercises to help with her chronic pain. No other restrictions recommended at this time.        Sincerely,        Archie Jones, DO

## 2021-04-29 ENCOUNTER — TELEPHONE (OUTPATIENT)
Dept: PALLIATIVE MEDICINE | Facility: CLINIC | Age: 46
End: 2021-04-29

## 2021-04-29 DIAGNOSIS — Z20.822 ENCOUNTER FOR LABORATORY TESTING FOR COVID-19 VIRUS: Primary | ICD-10-CM

## 2021-04-29 NOTE — TELEPHONE ENCOUNTER
Order received for bilateral knee genicular nerve blocks.    Routing to review.    Sugey ROME    [facility]

## 2021-04-29 NOTE — TELEPHONE ENCOUNTER
SUDHAKAR to schedule bilateral knee genicular nerve blocks        Kit DOLAN    [facility] [place]

## 2021-04-29 NOTE — TELEPHONE ENCOUNTER
Screening questions for MBB Injections:    Injection to be done at which interventional clinic site? [facility]    If [facility], tell patient that this procedure requires a COVID-19 lab test be done within 4 days of the procedure. Would you still like to move forward with scheduling the procedure?  No  If YES, let patient know that someone will call them to schedule the COVID-19 test. Route to nursing to enter order.      Instruct patient to arrive as directed prior to the scheduled appointment time:    [place] and [place]: 30 minutes before      Procedure ordered by Dr. Jones     Procedure ordered? Knee Medial Branch Block    What insurance would patient like us to bill for this procedure? [employer]       Worker's comp- Any injection DO NOT SCHEDULE and route to Fernanda Hernandez.      [employer] insurance - If scheduling an SI joint injection DO NOT SCHEDULE and route to Fernanda Hernandez.       MBBs must be scheduled with elapsed time interval of at least 2 weeks and not more than 6 months between the First MBB and the Second MBB for insurance purposes       [employer] - Any injection besides hip/shoulder/knee joint DO NOT SCHEDULE and route to Fernanda Hernandez. She will obtain PA and call pt back to schedule procedure or notify pt of denial.       [employer]- PA required for all MBB's      **BCBS- ALL need to be routed to Fernanda for review if a PA is needed**    IF SCHEDULING IN [place] AND NEEDS A PA, IT IS OKAY TO SCHEDULE. [place] HANDLES THEIR OWN PA'S AFTER THE PATIENT IS SCHEDULED. PLEASE SCHEDULE AT LEAST 1 WEEK OUT SO A PA CAN BE OBTAINED.    Any chance of pregnancy? NO   If YES, do NOT schedule and route to RN pool    Is an  needed? No     Patient has a drive home? (mandatory) Yes     Is patient taking any blood thinners (plavix, coumadin, jantoven, warfarin, heparin, pradaxa or dabigatran )? No    If hold needed, do NOT schedule, route to RN pool     Is patient taking any aspirin  products? No     If more than 325mg/day, OK to schedule; Instruct pt to decrease to less than 325 mg for 7 days AND route to RN pool    For CERVICAL procedures, hold all aspirin products for 6 days.     Tell pt that if aspirin product is not held for 6 days, the procedure WILL BE cancelled.      Does the patient have a bleeding or clotting disorder? No    If YES, okay to schedule AND route to RN nurse pool    **For any patients with platelet count <100, must be forwarded to provider**    Is patient diabetic? Yes If YES, have them bring their glucometer.    Does patient have an active infection or treated for one within the past week? No    Is patient currently taking any antibiotics?  No    For patients on chronic, preventative, or prophylactic antibiotics, procedures may be scheduled.     For patients on antibiotics for active or recent infection:antibiotic course must have been completed for 4 days    Is patient currently taking any steroid medications? (i.e. Prednisone, Medrol)  No     For patients on steroid medications, course must have been completed for 4 days    Is patient actively being treated for cancer or immunocompromised? No   If YES, do NOT schedule and route to RN    Are you able to get on and off an exam table with minimal or no assistance? Yes   If NO, do NOT schedule and route to RN    Are you able to roll over and lay on your stomach with minimal or no assistance? Yes   If NO, do NOT schedule and route to RN    Any allergies to contrast dye, iodine, shellfish, or numbing and steroid medications? No  (If so, inform nursing and note in scheduling comments.)    Allergies: Maxzide [hydrochlorothiazide w/triamterene] and Seasonal allergies     Does patient have an MRI/CT?  YES: 2021  Check Procedure Scheduling Grid to see if required.      Was the MRI done within the last 3 years?  Yes- [facility]     If yes, [facility], [place], [facility] etc?       If no,  do not schedule and route to nursing    If MRI was not done at [place], [facility] or [facility] do NOT schedule and route to nursing.      If pt has an imaging disc, the injection MAY be scheduled but pt has to bring disc to appt.     If they show up without the disc the injection cannot be done      Medial Branch Block Pre-Procedure Instructions    It is okay to take long acting pain medications (if you are on them) the day of the procedure but try not to take any short acting medications unless absolutely necessary.    YES: Informed    Long acting meds would include: Gabapentin (Neurontin), MS Contin, Oxycontin        Short acting meds would include:  Percocet, Oxycodone, Vicodin, Ibuprofen     The day of the procedure, you should try to do things that provoke your pain, since the injection is being done to see if it will relieve your pain . YES: Informed      If your pain level is a 4 out of 10 or less on the day of the procedu re, please call [phone] to reschedule.  NO     Reminders:      If you are started on any steroids or antibiotics between now and your appointment, you must contact us because it may affect our ability to perform your procedure Informed       Instructed pt to arrive 30 minutes early for IV start if required. (Check Procedure Scheduling Grid) DARLEEN       If this is for a cervical MBB aspirin needs to be held for 6 days.  Not Applicable       Do not schedule procedures requiring IV placement in the first appointment of the day or first appointment after lunch. Do NOT schedule at 0745, 0815 or 1245.        For patients 85 or older we recommend having an adult stay w/ them for the remainder of the day.      Does the patient have any questions?

## 2021-04-30 RX ORDER — BECLOMETHASONE DIPROPIONATE HFA 80 UG/1
AEROSOL, METERED RESPIRATORY (INHALATION)
Qty: 11 G | Refills: 0 | Status: SHIPPED | OUTPATIENT
Start: 2021-04-30 | End: 2021-06-03

## 2021-04-30 NOTE — TELEPHONE ENCOUNTER
Pending Prescriptions:                       Disp   Refills    QVAR REDIHALER 80 MCG/ACT inhaler [Pharmac*11 g   0        Sig: Inhale 2 puffs by mouth twice daily    Routing refill request to provider for review/approval because:  ACT Total Scores 6/1/2020 8/18/2020 9/16/2020   ACT TOTAL SCORE - - -   ASTHMA ER VISITS - - -   ASTHMA HOSPITALIZATIONS - - -   ACT TOTAL SCORE (Goal Greater than or Equal to 20) 6 13 11   In the past 12 months, how many times did you visit the emergency room for your asthma without being admitted to the hospital? 0 0 0   In the past 12 months, how many times were you hospitalized overnight because of your asthma? 0 0 0

## 2021-05-04 ENCOUNTER — TELEPHONE (OUTPATIENT)
Dept: PALLIATIVE MEDICINE | Facility: CLINIC | Age: 46
End: 2021-05-04

## 2021-05-04 NOTE — TELEPHONE ENCOUNTER
Spoke to patient, reminded patient of date, time and location of appointment.      Reminded patient to eat and drink as usual.    Remained to hold any blood thinners or pain medications that they were asked to hold per nurse.     Reminded patient they will need a  for this appointment.      Reminded patient that both patient and  must wear a mask during their visit.        Christin Khan MA  [facility] [place]

## 2021-05-05 ENCOUNTER — RADIOLOGY INJECTION OFFICE VISIT (OUTPATIENT)
Dept: PALLIATIVE MEDICINE | Facility: CLINIC | Age: 46
End: 2021-05-05
Attending: PHYSICAL MEDICINE & REHABILITATION
Payer: COMMERCIAL

## 2021-05-05 VITALS — HEART RATE: 89 BPM | OXYGEN SATURATION: 98 % | SYSTOLIC BLOOD PRESSURE: 153 MMHG | DIASTOLIC BLOOD PRESSURE: 102 MMHG

## 2021-05-05 DIAGNOSIS — M17.12 PRIMARY OSTEOARTHRITIS OF LEFT KNEE: Primary | ICD-10-CM

## 2021-05-05 DIAGNOSIS — M17.11 PRIMARY OSTEOARTHRITIS OF RIGHT KNEE: ICD-10-CM

## 2021-05-05 PROCEDURE — 64454 NJX AA&/STRD GNCLR NRV BRNCH: CPT | Mod: 50 | Performed by: PHYSICAL MEDICINE & REHABILITATION

## 2021-05-05 NOTE — PATIENT INSTRUCTIONS
[facility]   Genicular Nerve Block Discharge Instructions      Your procedure was performed by:   Dr. Archie Jones       Medications used: lidocaine, bupivicaine      You will need to complete the Pain Scale Log form and return it to us as soon as possible.  Once we have received the form, we will review it and call you to determine the next steps.     The form can be faxed to [phone] or mailed to:   [facility] [place] [facility] [address], [address] [id number]      You may resume your regular activity after the injection.    Be cautious with walking since you may have numbness and/or weakness in the lower extremities for up to 6-8 hours after the procedure due to the effects of the local anesthetic.    Avoid driving for 6 hours. The local anesthetic could slow your reflexes    You may shower, however no swimming or tub baths or hot tubs for 24 hours following your procedure.    Your pain will return after the numbing medications have worn off.  You may use your current pain medications as needed.    Unless you have been directed to avoid the use of anti-inflammatory medications (NSAIDS), you may use medications such as ibuprofen, Aleve or Tylenol for pain control if needed. Some people find it helpful to alternate ibuprofen and Tylenol every 3 hours for a couple of days.    You may use ice packs 10-15 minutes three to four times a day at the injection site for comfort.     Do not use heat to painful areas for 6 to 8 hours. This will give the local anesthetic time to wear off and prevent you from accidentally burning your skin.     If you experience any of the following, call the Pain Clinic during work hours (Monday through Friday 8 am-4:30 pm) at [phone] or the Provider Line after hours at [phone]:  -Fever over 100 degree F  -Swelling, bleeding, redness, drainage, warmth at the injection  site  -Progressive weakness or numbness in your legs   -Unusual new onset of pain that is not improving

## 2021-05-05 NOTE — NURSING NOTE
"Pre-procedure Intake    Have you been fasting? No    If yes, for how long?     Are you taking a prescribed blood thinner such as coumadin, Plavix, Xarelto?    No    If yes, when did you take your last dose?     Do you take aspirin?   NO    If cervical procedure, have you held aspirin for 6 days?   NA    Do you have any allergies to contrast dye, iodine, steroid and/or numbing medications?  NO    Are you currently taking antibiotics or have an active infection?  NO    Have you had a fever/elevated temperature within the past week? NO    Are you currently taking oral steroids? NO    Do you have a ? Yes       Are you pregnant or breastfeeding? No     Have you received the COVID-19 vaccine? Yes     If yes, was it your 1st, 2nd or only dose needed? 2nd dose  \"about  a month ago\"    Are the vital signs normal?  Yes      "

## 2021-05-05 NOTE — NURSING NOTE
Discharge Information    IV Discontiued Time:  NA    Amount of Fluid Infused:  NA    Discharge Criteria = When patient returns to baseline or as per MD order    Consciousness:  Pt is fully awake    Circulation:  BP +/- 20% of pre-procedure level    Respiration:  Patient is able to breathe deeply    O2 Sat:  Patient is able to maintain O2 Sat >92% on room air    Activity:  Moves 4 extremities on command    Ambulation:  Patient is able to stand and walk or stand and pivot into wheelchair    Dressing:  Clean/dry or No Dressing    Notes:   Discharge instructions and AVS given to patient    Patient meets criteria for discharge?  YES    Admitted to PCU?  No    Responsible adult present to accompany patient home?  Yes    Signature/Title:    Petra Soni RN Care Coordinator  [facility] [place]

## 2021-05-05 NOTE — PROGRESS NOTES
[facility] - Procedure Note    Date of Visit: 5/5/2021    Procedure performed: Bilateral Genicular Nerve Block # 1  Diagnosis: Bilateral knee osteoarthritis/Bilateral knee pain  : Archie Jones DO & Andrade Cordova DO (Fellow)   Anesthesia: none     Indications: Coni Newberry is a 46 year old female who is seen for Bilateral Genicular nerve block. The patient describes chronic bilateral knee pain. The patient has been exhibiting symptoms consistent with knee osteoarthritis.  Symptoms have been persistent, disabling, and intermittently severe. The patient reports minimal improvement with conservative treatment, including oral medications, exercises, steroid and gel knee injections.    Left knee MRI on 1/24/21:                                                                   Impression:  1. High-grade radial tear of the medial meniscus at the junction of  the posterior horn to the posterior root attachment. Associated mild  to moderate grade extrusion of the meniscus.  2. 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  3. Mild pes anserine bursitis. These findings are consistent with  tricompartmental osteoarthritis.  4. No evidence of acute osseous abnormalities, intact ACL posterior  cruciate ligament, lateral supporting structures.     JUTTA ELLERMANN, MD      Right Knee MRI on 2/7/20:  Impression:  1. Multidirectional tearing of the junction of the posterior horn and  posterior root ligament of the medial meniscus.     2. 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     3. Small joint effusion with synovitis.     4. At the proximal edge of the field-of-view on sagittal imaging there  is increased signal within the quadriceps tendon,  "possibly  artifactual. If there is concern for quadriceps tendon pathology,  femur/thigh MRI could be performed.     4. Low-grade MCL sprain.     TED OTOOLE MD (Joe)       Allergies:      Allergies   Allergen Reactions     Maxzide [Hydrochlorothiazide W/Triamterene] Nausea and Vomiting     Seasonal Allergies         Vitals:  BP (!) 153/102 (BP Location: Left arm)   Pulse 89   SpO2 98%     Review of Systems: The patient denies recent fever, chills, illness, use of antibiotics or anticoagulants. All other 10-point review of systems negative.     Procedure:  Side: Bilateral  Medication solution (injectate): 0.5 mL of 0.5% bupivacaine (per site)  Sterile prep/cleansing solution: ChloraPrep    The procedure and risks were explained, and informed written consent was obtained from the patient. Risks include but are not limited to: infection, bleeding, increased pain, and damage to soft tissue, nerve, muscle, and vasculature structures. The procedure site was marked with a disposable pen. Immediately prior to the start of the procedure, a \"time out\" safety check was conducted to confirm correct patient, procedure, and laterality.     The patient was placed in a semi recumbent position on the fluoroscopy table with a pillow placed under the knee in order to elevate it slightly off the table. The skin was cleansed and draped in sterile fashion. The RIGHT knee was visualized with fluoroscopy in AP view. A 30-gauge, 1.5-inch needle was used to anesthetize the skin and subcutaneous tissue entry site with 0.5 ml of 1% lidocaine (per needle-stick site). Under fluoroscopic guidance, a 27-gauge, 3.5 inch Quincke spinal needle was advanced parallel to the x-ray beam towards the superolateral geniculate nerve at the junction of the lateral femoral shaft and the lateral epicondyle of the femur. Similarly, a 27-gauge, 3.5 inch Quincke spinal needle was advanced towards the superomedial geniculate nerve at the junction of the " medial femoral shaft and the medial epicondyle of the femur. A third needle was placed along the inferomedial geniculate nerve at the junction of the medial tibial shaft and the medial epicondyle of the tibia. Lateral fluoroscopy was used to verify that all three needle tips were placed at a depth approximately 50% of the diaphysis.  Contrast was injected in each needle and was negative for any vascular uptake. Following negative aspiration for heme and air, 0.5 ml of 0.5% bupivacaine was injected at each site. The stylets were reinserted and the needles were withdrawn.    The exact procedure was then repeated on the left knee.    The patient tolerated the procedure well, and there was no evidence of procedural complications. The patient denied significant pain, sensory changes, or weakness during or after needle placement. The patient was stable post-procedure and was able to ambulate on discharge home. Post-procedure instructions were provided.     Pre-procedure pain score: Left: 8/10, Right 5/10  Post-procedure pain score: Left: 2/10, Right 3/10      Assessment/Plan: Coni Newberry is a 46 year old female s/p Bilateral Genicular nerve block for chronic knee pain.     1. Following today's procedure, the patient was advised to contact the [facility] for any of the following:   Fever, chills, or night sweats   New onset of pain, numbness, or weakness   Any questions/concerns regarding the procedure  If unable to contact the Pain Center, the patient was instructed to go to a local Emergency Room for any complications.   2. The patient will receive a follow-up call in 1 week.   3. Follow-up with the referring provider in 2 weeks for post-procedure evaluation.        Archie Jones DO  [facility] [place]

## 2021-05-08 DIAGNOSIS — J45.40 MODERATE PERSISTENT ASTHMA WITHOUT COMPLICATION: ICD-10-CM

## 2021-05-11 RX ORDER — MONTELUKAST SODIUM 10 MG/1
TABLET ORAL
Qty: 90 TABLET | Refills: 0 | Status: SHIPPED | OUTPATIENT
Start: 2021-05-11 | End: 2021-08-10

## 2021-05-11 NOTE — ANESTHESIA POSTPROCEDURE EVALUATION
Patient reassessed during end of 3rd albuterol treatment. Patient remains to have improved tidal volume, minor coarse lung sounds to middle lobes. Patient remains resting in mothers lap - belly breathing present. Patient watching movie. Patient: Coni Newberry    Procedure(s):  Hysteroscopy, cervical dilation, and cystoscopy - Wound Class: II-Clean Contaminated   - Wound Class: II-Clean Contaminated    Diagnosis:Cervical stenosis, abnormal uterine bleeding, contreception management  Diagnosis Additional Information: Cervical stenosis, abnormal uterine bleeding, contraception management, Obesity Class III    Anesthesia Type:  General    Note:  Anesthesia Post Evaluation    Patient location during evaluation: PACU  Patient participation: Able to fully participate in evaluation  Level of consciousness: awake  Pain management: adequate  Airway patency: patent  Cardiovascular status: acceptable  Respiratory status: acceptable  Hydration status: acceptable  PONV: controlled     Anesthetic complications: None          Last vitals:  Vitals:    01/03/18 1006 01/03/18 1100 01/03/18 1123   BP: (!) 175/92  (!) 150/91   Pulse:      Resp: 16 16 16   Temp: 97.6  F (36.4  C)     SpO2: 100% 98% 99%         Electronically Signed By: Jarett Lopez DO  January 3, 2018  4:49 PM

## 2021-05-14 NOTE — TELEPHONE ENCOUNTER
Genicular MBB#1 pain data reviewed. Max relief obtained:                       Left at rest load:        Hr 1: 75%, Hr 2-5: 88%  Right at restload:     Hr 1: 80%, Hr 2-5: 100%    Left with activity       Hr 1: 80% Hr 2: 90%, Hr 3-5:80%  Right with activity     Hr 1:63%, Hr 2:75%, Hr 3-5:88%     Pt would like to proceed with MBB #2 to RFA.  Routing to AMY Hernandez for insurance verification.  Pt had 7 sessions of PT at [place] ISABELLA Soni RN  Care Coordinator  [facility]

## 2021-05-14 NOTE — TELEPHONE ENCOUNTER
Pt scheduled 6/9 for Genicular ablation. Pt does have questions regarding the procedure and would like a call back.    Screening Questions for RFA Procedure      Procedure ordered? Genicular ablation    What insurance are we billing for this procedure?  [employer]    IF SCHEDULING IN [place], IT IS OKAY TO SCHEDULE. [place] HANDLES THEIR OWN PA'S AFTER THE PATIENT IS SCHEDULED. PLEASE SCHEDULE AT LEAST 1 WEEK OUT SO A PA CAN BE OBTAINED.    Has patient had this injection before? No  This procedure requires that a COVID-19 lab test be done within 4 days of the procedure.   If patient asks why? We will not always be able to maintain a 6' distance, the procedure takes a long time, there will be more people in a smaller area, and use of sedation medication increases the risk of respiratory treatments.    Would you still like to move forward with scheduling the procedure?  Yes  If YES, let patient know that someone will call them to schedule the COVID-19 test and that they will only receive a call back if the result is positive. Route to nursing to enter order.   Any chance of pregnancy? NO   If YES, do NOT schedule and route to RN pool     Is  Needed?: No  Will patient have a ?  Yes   If pt is given sedation meds, no driving for 24 hours.  Is pt taking a cab or transportation service? NO        If so will need to be accompanied by an adult too (friend/family member) in order for IV sedation to be given.      Per [facility] Policy:  Outpatients are to have responsible adult or family member to accompany them at discharge and drive them home. A service providing medically trained drivers or attendants would be acceptable. Public transportation would not be acceptable unless the patient is accompanied by a responsible adult or family member.  Is patient taking any blood thinners (i.e. plavix, coumadin, jantoven, warfarin, heparin, pradaxa or dabigatran, etc)? No   If YES, do NOT schedule, and route to RN  pool    Is patient taking any aspirin products? No     If more than 325mg/day, OK to schedule; Instruct pt to decrease to less than 325 mg for 7 days AND route to RN pool    For CERVICAL procedures, hold all aspirin products for 6 days.     Tell pt that if aspirin product is not held for 6 days, the procedure WILL BE cancelled.      Does the patient have a bleeding or clotting disorder? No     If YES, it it OKAY to schedule AND route to RN pool    **For any patients with platelet count <100, must be forwarded to provider**    Is patient diabetic? Yes If YES, have them bring their glucometer.    Does patient have an active infection or treated for one within the past week? No   If YES, do NOT schedule and route to RN nurse pool     Is patient currently taking any antibiotics?  No    For patients on chronic, preventative, or prophylactic antibiotics, procedures may be scheduled.     For patients on antibiotics for active or recent infection:antibiotic course must have been completed for 4 days    Is patient currently taking any steroid medications? (i.e. Prednisone, Medrol)  No     For patients on steroid medications, course must have been completed for 4 days    Is patient actively being treated for cancer or immunocompromised, including the spleen having been removed? No  If YES, do NOT schedule and route to RN pool     Any history of complications with sedation medications?  NO   If YES, OK to schedule AND route to RN pool     Any history of sleep apnea?  NO   If YES, OK to schedule AND route to RN pool     Any cardiac history?  YES   If YES, OK to schedule AND route to RN pool     Do you have an implanted pacemaker, ICD (implanted cardiac device) or AICD (automatic implanted cardiac device)?  NO    If YES, do NOT schedule AND route to RN pool.     Obtain name of device :       Obtain name of cardiologist:       Do you have an implanted stimulator?  NO    If YES, OK to schedule AND route to nursing.      Instruct patient to bring in the remote to the appointment and it will need to be turned off.       Does patient have an allergy to contrast dye, iodine or shellfish?  No   If YES, OK to schedule. Route to RN pool AND add allergy information to appointment notes    Are you able to get on and off an exam table with minimal or no assistance? Yes   If NO, do NOT schedule and route to RN pool    Are you able to roll over and lay on your stomach with minimal or no assistance? Yes   If NO, do NOT schedule and route to RN pool    Reminders:    If you are started on any steroids or antibiotics between now and your appointment, you must contact us because it may affect our ability to perform your procedure.  Yes    Informed patient that s/he needs to fast for 6 hours before procedure?  YES    Informed patient that it is OK to take normal medications with sips of water, especially blood pressure medications, before the procedure and must hold blood thinners as instructed.  Yes    Informed patient to arrive 30 minutes before procedure time to have an IV inserted.  reviewed   Do NOT schedule at 0745, 0815 or 1245.  reviewed     All radiofrequency ablations are in a 90 minute time slot.  reviewed   Sugey ROME    [facility]

## 2021-05-14 NOTE — TELEPHONE ENCOUNTER
[employer] RFA REQUIREMENTS- NO PA REQUIRED    [employer]  o 6 months failed conservative treatment (meds, injections, etc.);   o 4 weeks of PT within the last 6 months (or an unfavorable evaluation);   o at least 1 successful workup resulting in significant pain relief          Okay to schedule Genicular ablation      Fernanda RIVERA    [facility]

## 2021-05-15 DIAGNOSIS — E11.9 TYPE 2 DIABETES MELLITUS WITHOUT COMPLICATION, WITHOUT LONG-TERM CURRENT USE OF INSULIN (H): ICD-10-CM

## 2021-05-15 DIAGNOSIS — R10.13 ABDOMINAL PAIN, EPIGASTRIC: ICD-10-CM

## 2021-05-15 DIAGNOSIS — E78.5 HYPERLIPIDEMIA WITH TARGET LDL LESS THAN 70: ICD-10-CM

## 2021-05-15 DIAGNOSIS — Z11.59 NEED FOR HEPATITIS C SCREENING TEST: ICD-10-CM

## 2021-05-15 DIAGNOSIS — Z12.39 BREAST SCREENING: Primary | ICD-10-CM

## 2021-05-17 NOTE — TELEPHONE ENCOUNTER
Called Coni Brar.  06/09/2021 Genicular RFA scheduled with Dr Jones.  She wanted to know if she could go back to work the next day. Advised we want her to avoid strenuous activity for 24 hours. 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  Also advised she can have pain or discomfort for 2 weeks after the procedure. It can take up to 8 weeks before you get the full benefit from the procedure.  She can shower but no swimming or baths for 24 hours.       COVID order placed.  If they don't hear from the scheduling department by 06/02/2021, they will call [phone] to get their COVID test scheduled. Gave reminder to arrive 30 minutes early for her IV.  If she becomes sick or exposed to COVID, she needs to call and cancel the RFA.  She will need a .  Pt already had both of her COVID vaccines.      Petra Soni RN  Care Coordinator  [facility]

## 2021-05-17 NOTE — TELEPHONE ENCOUNTER
Routing refill request to provider for review/approval because:  Labs not current:  Ldl, a1c  Patient needs to be seen because:  Needs DM appt  Adina Hale RN, BSN

## 2021-05-18 RX ORDER — ATORVASTATIN CALCIUM 10 MG/1
TABLET, FILM COATED ORAL
Qty: 90 TABLET | Refills: 0 | Status: SHIPPED | OUTPATIENT
Start: 2021-05-18 | End: 2021-08-17

## 2021-05-20 DIAGNOSIS — Z30.41 ENCOUNTER FOR SURVEILLANCE OF CONTRACEPTIVE PILLS: ICD-10-CM

## 2021-05-20 RX ORDER — ACETAMINOPHEN AND CODEINE PHOSPHATE 120; 12 MG/5ML; MG/5ML
SOLUTION ORAL
Qty: 84 TABLET | Refills: 1 | Status: SHIPPED | OUTPATIENT
Start: 2021-05-20 | End: 2021-09-17

## 2021-05-20 NOTE — TELEPHONE ENCOUNTER
Pending Prescriptions:                       Disp   Refills    norethindrone (MICRONOR) 0.35 MG tablet [*84 tab*1            Sig: TAKE 1 TABLET BY MOUTH EVERY DAY    Prescription approved per [facility] Refill Protocol.

## 2021-05-30 DIAGNOSIS — L30.0 NUMMULAR ECZEMA: ICD-10-CM

## 2021-05-30 DIAGNOSIS — I10 ESSENTIAL HYPERTENSION WITH GOAL BLOOD PRESSURE LESS THAN 140/90: ICD-10-CM

## 2021-06-01 DIAGNOSIS — J45.40 MODERATE PERSISTENT ASTHMA WITHOUT COMPLICATION: ICD-10-CM

## 2021-06-01 DIAGNOSIS — R06.02 SOB (SHORTNESS OF BREATH): ICD-10-CM

## 2021-06-03 ENCOUNTER — TELEPHONE (OUTPATIENT)
Dept: INTERNAL MEDICINE | Facility: CLINIC | Age: 46
End: 2021-06-03

## 2021-06-03 DIAGNOSIS — J45.31 MILD PERSISTENT ASTHMA WITH ACUTE EXACERBATION: Primary | ICD-10-CM

## 2021-06-03 RX ORDER — ALBUTEROL SULFATE 0.83 MG/ML
SOLUTION RESPIRATORY (INHALATION)
Qty: 150 ML | Refills: 0 | Status: SHIPPED | OUTPATIENT
Start: 2021-06-03 | End: 2021-09-01

## 2021-06-03 RX ORDER — BECLOMETHASONE DIPROPIONATE HFA 80 UG/1
AEROSOL, METERED RESPIRATORY (INHALATION)
Qty: 11 G | Refills: 0 | Status: SHIPPED | OUTPATIENT
Start: 2021-06-03 | End: 2021-08-31

## 2021-06-03 RX ORDER — TRIAMCINOLONE ACETONIDE 1 MG/G
OINTMENT TOPICAL
Qty: 80 G | Refills: 1 | Status: SHIPPED | OUTPATIENT
Start: 2021-06-03 | End: 2021-10-26

## 2021-06-03 RX ORDER — LISINOPRIL 20 MG/1
TABLET ORAL
Qty: 90 TABLET | Refills: 1 | OUTPATIENT
Start: 2021-06-03

## 2021-06-03 NOTE — TELEPHONE ENCOUNTER
Pending Prescriptions:                       Disp   Refills    triamcinolone (KENALOG) 0.1 % external oin*80 g   1        Sig: APPLY OINTMENT SPARINGLY TO AFFECTED AREA THREE TIMES           DAILY AS NEEDED FOR ECZEMA RASH    Routing refill request to provider for review/approval because:  Not noted in provider's last note

## 2021-06-03 NOTE — TELEPHONE ENCOUNTER
Pending Prescriptions:                       Disp   Refills    albuterol (PROVENTIL) (2.5 MG/3ML) 0.083% *150 mL 0        Sig: USE 1 VIAL IN NEBULIZER EVERY 6 HOURS AS NEEDED FOR           SHORTNESS OF BREATH OR WHEEZING    QVAR REDIHALER 80 MCG/ACT inhaler [Pharmac*11 g   0        Sig: Inhale 2 puffs by mouth twice daily    Routing refill request to provider for review/approval because:  ACT Total Scores 6/1/2020 8/18/2020 9/16/2020   ACT TOTAL SCORE - - -   ASTHMA ER VISITS - - -   ASTHMA HOSPITALIZATIONS - - -   ACT TOTAL SCORE (Goal Greater than or Equal to 20) 6 13 11   In the past 12 months, how many times did you visit the emergency room for your asthma without being admitted to the hospital? 0 0 0   In the past 12 months, how many times were you hospitalized overnight because of your asthma? 0 0 0

## 2021-06-03 NOTE — TELEPHONE ENCOUNTER
Patient is calling because she needs a new hose and mouth piece for her nebulizer machine. She is asking for two of each so she can have one at home and one at her dad's house in [place]. Please fax the order to [facility] [phone]

## 2021-06-04 ENCOUNTER — TELEPHONE (OUTPATIENT)
Dept: INTERNAL MEDICINE | Facility: CLINIC | Age: 46
End: 2021-06-04

## 2021-06-04 NOTE — TELEPHONE ENCOUNTER
Fax received from [facility] - Chart Notes Request for Nebulizer for review and signature.  Put in Farida Palencia's in basket.

## 2021-06-05 ENCOUNTER — HOSPITAL ENCOUNTER (OUTPATIENT)
Dept: LAB | Facility: CLINIC | Age: 46
Discharge: HOME | End: 2021-06-05
Attending: PHYSICAL MEDICINE & REHABILITATION | Admitting: PHYSICAL MEDICINE & REHABILITATION
Payer: COMMERCIAL

## 2021-06-05 DIAGNOSIS — Z20.822 ENCOUNTER FOR LABORATORY TESTING FOR COVID-19 VIRUS: ICD-10-CM

## 2021-06-05 LAB
SARS-COV-2 RNA RESP QL NAA+PROBE: NORMAL
SPECIMEN SOURCE: NORMAL

## 2021-06-05 PROCEDURE — U0005 INFEC AGEN DETEC AMPLI PROBE: HCPCS | Performed by: PHYSICAL MEDICINE & REHABILITATION

## 2021-06-05 PROCEDURE — U0003 INFECTIOUS AGENT DETECTION BY NUCLEIC ACID (DNA OR RNA); SEVERE ACUTE RESPIRATORY SYNDROME CORONAVIRUS 2 (SARS-COV-2) (CORONAVIRUS DISEASE [COVID-19]), AMPLIFIED PROBE TECHNIQUE, MAKING USE OF HIGH THROUGHPUT TECHNOLOGIES AS DESCRIBED BY CMS-2020-01-R: HCPCS | Performed by: PHYSICAL MEDICINE & REHABILITATION

## 2021-06-06 LAB
LABORATORY COMMENT REPORT: NORMAL
SARS-COV-2 RNA RESP QL NAA+PROBE: NEGATIVE
SPECIMEN SOURCE: NORMAL

## 2021-06-08 ENCOUNTER — TELEPHONE (OUTPATIENT)
Dept: PALLIATIVE MEDICINE | Facility: CLINIC | Age: 46
End: 2021-06-08

## 2021-06-08 NOTE — TELEPHONE ENCOUNTER
Spoke to patient , reminded patient of date, time (arrive 30 minutes early) and location of appointment.      Reminded patient to fast for at least 6 hours.    Remained to hold any blood thinners or pain medications that they were asked to hold per nurse.     Reminded patient they will need a  for this appointment.      Reminded patient that both patient and  must wear a mask during their visit.        Christin Khan MA  [facility] [place]

## 2021-06-09 ENCOUNTER — RADIOLOGY INJECTION OFFICE VISIT (OUTPATIENT)
Dept: PALLIATIVE MEDICINE | Facility: CLINIC | Age: 46
End: 2021-06-09
Payer: COMMERCIAL

## 2021-06-09 VITALS
OXYGEN SATURATION: 100 % | RESPIRATION RATE: 11 BRPM | SYSTOLIC BLOOD PRESSURE: 159 MMHG | DIASTOLIC BLOOD PRESSURE: 104 MMHG | HEART RATE: 88 BPM

## 2021-06-09 DIAGNOSIS — M25.561 CHRONIC PAIN OF BOTH KNEES: Primary | ICD-10-CM

## 2021-06-09 DIAGNOSIS — M25.561 ACUTE PAIN OF RIGHT KNEE: ICD-10-CM

## 2021-06-09 DIAGNOSIS — G89.29 CHRONIC PAIN OF BOTH KNEES: Primary | ICD-10-CM

## 2021-06-09 DIAGNOSIS — G89.18 PAIN ASSOCIATED WITH SURGICAL PROCEDURE: ICD-10-CM

## 2021-06-09 DIAGNOSIS — M25.562 CHRONIC PAIN OF BOTH KNEES: Primary | ICD-10-CM

## 2021-06-09 PROCEDURE — 99152 MOD SED SAME PHYS/QHP 5/>YRS: CPT | Performed by: PHYSICAL MEDICINE & REHABILITATION

## 2021-06-09 PROCEDURE — 64624 DSTRJ NULYT AGT GNCLR NRV: CPT | Mod: 50 | Performed by: PHYSICAL MEDICINE & REHABILITATION

## 2021-06-09 PROCEDURE — 99153 MOD SED SAME PHYS/QHP EA: CPT | Performed by: PHYSICAL MEDICINE & REHABILITATION

## 2021-06-09 RX ORDER — CYCLOBENZAPRINE HCL 10 MG
10 TABLET ORAL 3 TIMES DAILY PRN
Qty: 90 TABLET | Refills: 3 | Status: SHIPPED | OUTPATIENT
Start: 2021-06-09 | End: 2022-03-16

## 2021-06-09 RX ORDER — FENTANYL CITRATE 50 UG/ML
50 INJECTION, SOLUTION INTRAMUSCULAR; INTRAVENOUS EVERY 5 MIN PRN
Status: DISCONTINUED | OUTPATIENT
Start: 2021-06-09 | End: 2021-09-17

## 2021-06-09 RX ADMIN — FENTANYL CITRATE 25 MCG: 50 INJECTION, SOLUTION INTRAMUSCULAR; INTRAVENOUS at 13:58

## 2021-06-09 RX ADMIN — FENTANYL CITRATE 25 MCG: 50 INJECTION, SOLUTION INTRAMUSCULAR; INTRAVENOUS at 14:04

## 2021-06-09 NOTE — NURSING NOTE
IV placed at the Left Metacarpal with a 24 gauge needle. Good blood return. Site flushed with 5 ml Normal Saline. Site secured with Tegaderm.    Discharge Information    IV Discontiued Time:  1510 Catheter intact. Site secured with coban.    Amount of Fluid Infused:  250 ml Normal Saline    Discharge Criteria = When patient returns to baseline or as per MD order    Consciousness:  Pt is fully awake    Circulation:  BP +/- 20% of pre-procedure level    Respiration:  Patient is able to breathe deeply    O2 Sat:  Patient is able to maintain O2 Sat >92% on room air    Activity:  Moves 4 extremities on command    Ambulation:  Patient is able to stand and walk or stand and pivot into wheelchair    Dressing:  Clean/dry or No Dressing    Notes:   Discharge instructions and AVS given to patient    Patient meets criteria for discharge?  YES    Admitted to PCU?  No    Responsible adult present to accompany patient home?  Yes    Signature/Title:    Petra Soni RN Care Coordinator  [facility] [place]

## 2021-06-09 NOTE — PATIENT INSTRUCTIONS
[facility] Procedure Discharge Instructions       Today you saw:     Dr. Archie Jones,      Your procedure:  Radiofrequency Nerve Ablation     Procedural Medications:  Lidocaine (anesthetic)     Sedation medications:  Fentanyl - pain.           You have received sedation during your procedure; for the next 24 hours you should not:   -Drive   -Operate machinery   -Drink alcohol   -Sign any legal documents         You may resume your normal diet and medications.     Avoid strenuous activity for the first 24 hours and resume regular activities after that.     Be cautious with walking as numbness and/or weakness in the lower extremities up to 6-8 hours may occur due to effect of local anesthetic     You may shower, however no swimming or tub baths or hot tubs for 24 hours following your procedure     Anticipate pain for up to 2 weeks after this procedure.    You may use ice packs 10-15 minutes three to four times a day at the injection site for comfort     You may use anti-inflammatory medications (such as Ibuprofen or Aleve or Advil) or Tylenol for pain control if necessary           It may take up to 8 weeks to receive relief from the RFA    If you experience any of the following, call the pain center line during work hours at [phone] or on call physician after hours at [phone]:  -Fever over 100 degree F   -Swelling, bleeding, redness, drainage, warmth at the injection site   -Progressive weakness or numbness in your legs   -Unusual new onset of pain that is not improving

## 2021-06-09 NOTE — NURSING NOTE
MD Time IN: 1345   Sedation start time:  1358  Sedation end time:  1435  MD OUT: 1510  Medications given: fentanyl 50 mcg IV; versed 0 mg IV  Intravenous fluids were administered, normal saline 250 cc's.  Sedation Level Achieved:  Moderate (conscious) sedation    Petra Soni RN  Care Coordinator  [facility]

## 2021-06-09 NOTE — PROGRESS NOTES
[facility] - Procedure Note    Date of Visit: 6/9/2021    Procedure performed: Bilateral Radiofrequency Ablation for Genicular Neurotomy  Diagnosis: Bilatearl knee osteoarthritis/Bilateral knee pain  : Archie Jones DO   Anesthesia: Moderate sedation with 50 mg fentanyl    Indications: Coni Newberry is a 46 year old female who is seen for bilateral Genicular nerve RFA. The patient describes chronic bilateral knee pain that is worse with weight breaing. The patient has been exhibiting symptoms consistent with knee osteoarthritis.  Symptoms have been persistent, disabling, and intermittently severe. The patient reports minimal improvement with conservative treatment, including oral medications and exercises.    Exam:   Mallampati Class 1  Respiratory:  CTA bilaterally  CV: RRR no murmurs rubs or gallops    Left knee MRI on 1/24/21:                                                                   Impression:  1. High-grade radial tear of the medial meniscus at the junction of  the posterior horn to the posterior root attachment. Associated mild  to moderate grade extrusion of the meniscus.  2. 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  3. Mild pes anserine bursitis. These findings are consistent with  tricompartmental osteoarthritis.  4. No evidence of acute osseous abnormalities, intact ACL posterior  cruciate ligament, lateral supporting structures.     JUTTA ELLERMANN, MD        Right Knee MRI on 2/7/20:  Impression:  1. Multidirectional tearing of the junction of the posterior horn and  posterior root ligament of the medial meniscus.     2. 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     3. Small joint effusion with synovitis.     4. At  "the proximal edge of the field-of-view on sagittal imaging there  is increased signal within the quadriceps tendon, possibly  artifactual. If there is concern for quadriceps tendon pathology,  femur/thigh MRI could be performed.     4. Low-grade MCL sprain.     TED OTOOLE MD (Joe)    Allergies:      Allergies   Allergen Reactions     Maxzide [Hydrochlorothiazide W/Triamterene] Nausea and Vomiting     Seasonal Allergies         Vitals:  BP (!) 152/86   Pulse 90   Resp 11   SpO2 100%     Review of Systems: The patient denies recent fever, chills, illness, use of antibiotics or anticoagulants. All other 10-point review of systems negative.     Procedure:  Side: Bilateral  Medication solution (injectate): 3 mL of 1% lidocaine (per site, for skin/subcutaneous tissue); 0.8 mL of 2% lidocaine (per site, prior to lesioning)  Sterile prep/cleansing solution: ChloraPrep    The procedure and risks were explained, and informed written consent was obtained from the patient. Risks include but are not limited to: infection, bleeding, increased pain, and damage to soft tissue, nerve, muscle, and vasculature structures. The patient was brought into the procedure suite and was placed in a supine position on the procedure table.  A 22-gauge IV was placed in the patient's  hand for administration of anesthesia. A BP cuff and oxygen saturation monitor were attached, and the patient was monitored throughout the procedure. Immediately prior to the start of the procedure, a \"time out\" safety check was conducted to confirm correct patient, procedure, and laterality.     The patient was placed in a semi recumbent position on the fluoroscopy table with a pillow placed under the knee in order to elevate it slightly off the table. The skin was cleansed and draped in sterile fashion. The right knee was visualized with fluoroscopy in AP view. A 25-gauge, 1.5-inch needle was used to anesthetize the skin and subcutaneous tissue entry " site with 1% lidocaine. Under fluoroscopic guidance, a 20-gauge, 100 mm CVD RFL cannulated needle with a 10-mm active tip was advanced parallel to the x-ray beam towards the superolateral geniculate nerve at the junction of the lateral femoral shaft and the lateral epicondyle of the femur. A second cannulated needle was then advanced towards the superomedial geniculate nerve at the junction of the medial femoral shaft and the medial epicondyle of the femur. Finally, a third cannulated needle was placed along the inferomedial geniculate nerve at the junction of the medial tibial shaft and the medial epicondyle of the tibia. No pain or paresthesias were noted during needle placement. Lateral fluoroscopy was used to verify that all three needle tips were placed at a depth approximately 50% of the diaphysis.     At each site, the stylet was removed and the radiofrequency probe was inserted through the cannula. The impedance was between 300 and 700 ohms.  Motor stimulation at 2 Hz was carried out, with no evidence of motor stimulation in the quadriceps, hamstring, or calf muscles at 2 V. After negative aspiration for heme, 1 mL of 2% lidocaine was injected through each RFL needle. Subsequently, denervation was performed for 90 seconds at 80 degrees Celsius, without complication. The needles were then rotated 180 degrees and a second denervation was performed. During this procedure, the patient denied any radiation of pain to the lower extremity. The needles were removed.    The exact procedure was then repeated at the left knee.     The patient tolerated the procedure well, and there was no evidence of procedural complications. The patient denied significant pain, sensory changes, or weakness during or after needle placement. The patient was stable post-procedure and was able to ambulate on discharge home. Post-procedure instructions were provided.     Pre-procedure pain score: 9/10  Post-procedure pain score:  5/10    Sedation start time:  1358pm  Sedation end time:  1510pm    Assessment/Plan: Coni Newberry is a 46 year old female s/p Bilateral genicular nerve RFA for chronic knee pain.     1. Following today's procedure, the patient was advised to contact the [facility] for any of the following:   Fever, chills, or night sweats   New onset of pain, numbness, or weakness   Any questions/concerns regarding the procedure  If unable to contact the Pain Center, the patient was instructed to go to a local Emergency Room for any complications.   2. Follow-up with their referring provider in 4-6 weeks for post-procedure evaluation.      Archie Jones DO  [facility]

## 2021-06-09 NOTE — NURSING NOTE
Pre-procedure Intake    Have you been fasting? NA    If yes, for how long? 6 hours    Are you taking a prescribed blood thinner such as coumadin, Plavix, Xarelto?    No    If yes, when did you take your last dose?     Do you take aspirin?  No    If cervical procedure, have you held aspirin for 6 days?   NA    Do you have any allergies to contrast dye, iodine, steroid and/or numbing medications?  NO    Are you currently taking antibiotics or have an active infection?  NO    Have you had a fever/elevated temperature within the past week? NO    Are you currently taking oral steroids? NO    Do you have a ? Yes       Are you pregnant or breastfeeding?  Not Applicable    Have you received the COVID-19 vaccine? Yes       If yes, was it your 1st, 2nd or only dose needed? 2nd    Date of most recent vaccine: 04/26/21    Vitals: B/P 137/98    Notify provider and RNs if systolic BP >170, diastolic BP >100, P >100 or O2 sats < 90%      Christin Khan MA  [facility] [place]

## 2021-06-10 ENCOUNTER — TELEPHONE (OUTPATIENT)
Dept: PALLIATIVE MEDICINE | Facility: CLINIC | Age: 46
End: 2021-06-10

## 2021-06-10 NOTE — TELEPHONE ENCOUNTER
Patient called and stated she would like a doctors note for today due to calling out from yesterdays procedure         Libia Humphrey    [facility]

## 2021-06-10 NOTE — LETTER
Coni Newberry was seen for a procedure in the [facility] pain clinic on 6/10/2021. She may return to work on 6/11/21 with prior restrictions in place.      If you have any questions or concerns, please don't hesitate to call.      Archie Jones, DO  [facility]

## 2021-06-11 NOTE — TELEPHONE ENCOUNTER
Bilat Genicular RFA completed 06/09/2021. Closing encounter    Petra Soni RN  Care Coordinator  [facility]

## 2021-06-14 NOTE — TELEPHONE ENCOUNTER
Spoke to patient. She gave me her work fax number [phone].     Patient stated she woke up the next day from the procedure and didn't feel well but was fine later that day.     Letter faxed.     Verified by [employer] Fax.     Unable to sign encounter because some medications are flagged for removal. Routing to provider to close.     Perla Lopez [facility]

## 2021-06-14 NOTE — TELEPHONE ENCOUNTER
Letter saved in letters tab. Call patient and see where they would like this faxed.    Thanks,    Archie Jones, DO [facility]

## 2021-06-17 DIAGNOSIS — E11.9 TYPE 2 DIABETES MELLITUS WITHOUT COMPLICATION, WITHOUT LONG-TERM CURRENT USE OF INSULIN (H): ICD-10-CM

## 2021-06-17 NOTE — LETTER
June 18, 2021      Coni Newberry  [address]  [place] [address]              Dear Coni Brar,      Please schedule a follow up appointment prior to your next med refill.     Sincerely,      Farida Palencia MD

## 2021-06-18 RX ORDER — GLYBURIDE 5 MG/1
TABLET ORAL
Qty: 90 TABLET | Refills: 0 | Status: SHIPPED | OUTPATIENT
Start: 2021-06-18 | End: 2021-09-17

## 2021-06-18 NOTE — TELEPHONE ENCOUNTER
"Requested Prescriptions   Pending Prescriptions Disp Refills     glyBURIDE (DIABETA /MICRONASE) 5 MG tablet [Pharmacy Med Name: glyBURIDE 5 MG Oral Tablet] 90 tablet 0     Sig: Take 1 tablet by mouth once daily with breakfast       Sulfonylurea Agents Failed - 6/17/2021  7:34 PM        Failed - Patient has documented A1c within the specified period of time.     If HgbA1C is 8 or greater, it needs to be on file within the past 3 months.  If less than 8, must be on file within the past 6 months.     Recent Labs   Lab Test 08/24/20  1525   A1C 7.8*             Failed - Recent (6 mo) or future (30 days) visit within the authorizing provider's specialty     Patient had office visit in the last 6 months or has a visit in the next 30 days with authorizing provider or within the authorizing provider's specialty.  See \"Patient Info\" tab in inbasket, or \"Choose Columns\" in Meds & Orders section of the refill encounter.            Passed - Medication is active on med list        Passed - Patient is age 18 or older        Passed - No active pregnancy on record        Passed - Patient has a recent creatinine (normal) within the past 12 mos.     Recent Labs   Lab Test 08/24/20  1525   CR 0.75       Ok to refill medication if creatinine is low          Passed - Patient has not had a positive pregnancy test within the past 12 mos.             "

## 2021-06-18 NOTE — TELEPHONE ENCOUNTER
Medication is being filled for 1 time refill only due to:  patient is due for an appointment     Please call patient or mail a letter

## 2021-06-28 ENCOUNTER — HOSPITAL ENCOUNTER (OUTPATIENT)
Dept: LAB | Facility: CLINIC | Age: 46
Discharge: HOME | End: 2021-06-28
Attending: NURSE PRACTITIONER | Admitting: NURSE PRACTITIONER
Payer: COMMERCIAL

## 2021-06-28 ENCOUNTER — TELEPHONE (OUTPATIENT)
Dept: INTERNAL MEDICINE | Facility: CLINIC | Age: 46
End: 2021-06-28

## 2021-06-28 DIAGNOSIS — Z11.52 ENCOUNTER FOR SCREENING FOR COVID-19: Primary | ICD-10-CM

## 2021-06-28 DIAGNOSIS — Z11.52 ENCOUNTER FOR SCREENING FOR COVID-19: ICD-10-CM

## 2021-06-28 LAB
SARS-COV-2 RNA RESP QL NAA+PROBE: NORMAL
SPECIMEN SOURCE: NORMAL

## 2021-06-28 PROCEDURE — U0003 INFECTIOUS AGENT DETECTION BY NUCLEIC ACID (DNA OR RNA); SEVERE ACUTE RESPIRATORY SYNDROME CORONAVIRUS 2 (SARS-COV-2) (CORONAVIRUS DISEASE [COVID-19]), AMPLIFIED PROBE TECHNIQUE, MAKING USE OF HIGH THROUGHPUT TECHNOLOGIES AS DESCRIBED BY CMS-2020-01-R: HCPCS | Performed by: NURSE PRACTITIONER

## 2021-06-28 PROCEDURE — U0005 INFEC AGEN DETEC AMPLI PROBE: HCPCS | Performed by: NURSE PRACTITIONER

## 2021-07-25 DIAGNOSIS — I10 ESSENTIAL HYPERTENSION WITH GOAL BLOOD PRESSURE LESS THAN 140/90: ICD-10-CM

## 2021-07-28 RX ORDER — CHLORTHALIDONE 25 MG/1
TABLET ORAL
Qty: 90 TABLET | Refills: 1 | Status: SHIPPED | OUTPATIENT
Start: 2021-07-28 | End: 2021-09-17

## 2021-07-28 RX ORDER — LISINOPRIL 20 MG/1
TABLET ORAL
Qty: 90 TABLET | Refills: 1 | Status: SHIPPED | OUTPATIENT
Start: 2021-07-28 | End: 2021-09-17

## 2021-07-28 NOTE — TELEPHONE ENCOUNTER
Pending Prescriptions:                       Disp   Refills    lisinopril (ZESTRIL) 20 MG tablet [Pharmac*90 tab*1        Sig: TAKE 1 TABLET BY MOUTH EVERY DAY    chlorthalidone (HYGROTON) 25 MG tablet [Ph*90 tab*1        Sig: TAKE 1 TABLET BY MOUTH EVERY DAY    Routing refill request to provider for review/approval because:  BP Readings from Last 3 Encounters:   06/09/21 (!) 159/104   05/05/21 (!) 153/102   04/12/21 138/80

## 2021-07-29 ENCOUNTER — TELEPHONE (OUTPATIENT)
Dept: INTERNAL MEDICINE | Facility: CLINIC | Age: 46
End: 2021-07-29

## 2021-07-29 NOTE — TELEPHONE ENCOUNTER
Patient Quality Outreach      Summary:    Patient has the following on her problem list/HM:   Asthma review       ACT Total Scores 9/16/2020   ACT TOTAL SCORE -   ASTHMA ER VISITS -   ASTHMA HOSPITALIZATIONS -   ACT TOTAL SCORE (Goal Greater than or Equal to 20) 11   In the past 12 months, how many times did you visit the emergency room for your asthma without being admitted to the hospital? 0   In the past 12 months, how many times were you hospitalized overnight because of your asthma? 0          Diabetes    Last A1C:  Lab Results   Component Value Date    A1C 7.8 08/24/2020    A1C 7.3 02/22/2020       Last LDL:    Lab Results   Component Value Date    LDL 74 02/22/2020       Is the patient on a Statin? Yes          Is the patient on Aspirin? No    Medications     HMG CoA Reductase Inhibitors     atorvastatin (LIPITOR) 10 MG tablet             Last three blood pressure readings:  BP Readings from Last 3 Encounters:   06/09/21 (!) 159/104   05/05/21 (!) 153/102   04/12/21 138/80            Tobacco Use      Smoking status: Never Smoker      Smokeless tobacco: Never Used          Hypertension   Last three blood pressure readings:  BP Readings from Last 3 Encounters:   06/09/21 (!) 159/104   05/05/21 (!) 153/102   04/12/21 138/80     Blood pressure: Failed    HTN Guidelines:  ? 139/89     Patient is due/failing the following:   A1C, LDL (Fasting) and Microalbumin, ACT needed, Asthma follow-up visit and AAP, BP check and Hypertension follow-up visit, Breast Cancer Screening - Mammogram, Cervical Cancer Screening - PAP Needed and Annual wellness, date due: now    Type of outreach:    Left detailed message for pt per consent to communicate.CIERA BAUTISTA LPN        Questions for provider review:    None                                                                                                                                     .CIERA BAUTISTA LPN       Chart routed to none.

## 2021-08-08 DIAGNOSIS — R10.13 ABDOMINAL PAIN, EPIGASTRIC: ICD-10-CM

## 2021-08-08 DIAGNOSIS — J45.40 MODERATE PERSISTENT ASTHMA WITHOUT COMPLICATION: ICD-10-CM

## 2021-08-10 RX ORDER — MONTELUKAST SODIUM 10 MG/1
TABLET ORAL
Qty: 90 TABLET | Refills: 0 | Status: SHIPPED | OUTPATIENT
Start: 2021-08-10 | End: 2021-09-17

## 2021-08-10 NOTE — TELEPHONE ENCOUNTER
Routing refill request to provider for review/approval because:  Patient needs to be seen because:  due for appt    MyChart reminder sent to pt

## 2021-08-13 ENCOUNTER — LAB (OUTPATIENT)
Dept: LAB | Facility: CLINIC | Age: 46
End: 2021-08-13
Payer: COMMERCIAL

## 2021-08-13 DIAGNOSIS — E11.9 TYPE 2 DIABETES MELLITUS WITHOUT COMPLICATION, WITHOUT LONG-TERM CURRENT USE OF INSULIN (H): ICD-10-CM

## 2021-08-13 DIAGNOSIS — Z11.59 NEED FOR HEPATITIS C SCREENING TEST: ICD-10-CM

## 2021-08-13 DIAGNOSIS — E78.5 HYPERLIPIDEMIA WITH TARGET LDL LESS THAN 70: ICD-10-CM

## 2021-08-13 LAB
BASOPHILS # BLD AUTO: 0 10E3/UL (ref 0–0.2)
BASOPHILS NFR BLD AUTO: 0 %
EOSINOPHIL # BLD AUTO: 0.6 10E3/UL (ref 0–0.7)
EOSINOPHIL NFR BLD AUTO: 7 %
ERYTHROCYTE [DISTWIDTH] IN BLOOD BY AUTOMATED COUNT: 14 % (ref 10–15)
HBA1C MFR BLD: 7 % (ref 0–5.6)
HCT VFR BLD AUTO: 38.7 % (ref 35–47)
HGB BLD-MCNC: 12.3 G/DL (ref 11.7–15.7)
LYMPHOCYTES # BLD AUTO: 2.1 10E3/UL (ref 0.8–5.3)
LYMPHOCYTES NFR BLD AUTO: 22 %
MCH RBC QN AUTO: 27.1 PG (ref 26.5–33)
MCHC RBC AUTO-ENTMCNC: 31.8 G/DL (ref 31.5–36.5)
MCV RBC AUTO: 85 FL (ref 78–100)
MONOCYTES # BLD AUTO: 0.5 10E3/UL (ref 0–1.3)
MONOCYTES NFR BLD AUTO: 6 %
NEUTROPHILS # BLD AUTO: 6.2 10E3/UL (ref 1.6–8.3)
NEUTROPHILS NFR BLD AUTO: 65 %
PLATELET # BLD AUTO: 197 10E3/UL (ref 150–450)
RBC # BLD AUTO: 4.54 10E6/UL (ref 3.8–5.2)
WBC # BLD AUTO: 9.5 10E3/UL (ref 4–11)

## 2021-08-13 PROCEDURE — 80061 LIPID PANEL: CPT

## 2021-08-13 PROCEDURE — 83036 HEMOGLOBIN GLYCOSYLATED A1C: CPT

## 2021-08-13 PROCEDURE — 86803 HEPATITIS C AB TEST: CPT

## 2021-08-13 PROCEDURE — 82043 UR ALBUMIN QUANTITATIVE: CPT

## 2021-08-13 PROCEDURE — 80050 GENERAL HEALTH PANEL: CPT

## 2021-08-13 PROCEDURE — 36415 COLL VENOUS BLD VENIPUNCTURE: CPT

## 2021-08-14 LAB
ALBUMIN SERPL-MCNC: 3.5 G/DL (ref 3.4–5)
ALP SERPL-CCNC: 91 U/L (ref 40–150)
ALT SERPL W P-5'-P-CCNC: 39 U/L (ref 0–50)
ANION GAP SERPL CALCULATED.3IONS-SCNC: 5 MMOL/L (ref 3–14)
AST SERPL W P-5'-P-CCNC: 30 U/L (ref 0–45)
BILIRUB SERPL-MCNC: 0.5 MG/DL (ref 0.2–1.3)
BUN SERPL-MCNC: 20 MG/DL (ref 7–30)
CALCIUM SERPL-MCNC: 9.1 MG/DL (ref 8.5–10.1)
CHLORIDE BLD-SCNC: 106 MMOL/L (ref 94–109)
CHOLEST SERPL-MCNC: 167 MG/DL
CO2 SERPL-SCNC: 26 MMOL/L (ref 20–32)
CREAT SERPL-MCNC: 1.21 MG/DL (ref 0.52–1.04)
CREAT UR-MCNC: 143 MG/DL
FASTING STATUS PATIENT QL REPORTED: YES
GFR SERPL CREATININE-BSD FRML MDRD: 54 ML/MIN/1.73M2
GLUCOSE BLD-MCNC: 129 MG/DL (ref 70–99)
HDLC SERPL-MCNC: 52 MG/DL
LDLC SERPL CALC-MCNC: 94 MG/DL
MICROALBUMIN UR-MCNC: 6 MG/L
MICROALBUMIN/CREAT UR: 4.2 MG/G CR (ref 0–25)
NONHDLC SERPL-MCNC: 115 MG/DL
POTASSIUM BLD-SCNC: 3.9 MMOL/L (ref 3.4–5.3)
PROT SERPL-MCNC: 7.2 G/DL (ref 6.8–8.8)
SODIUM SERPL-SCNC: 137 MMOL/L (ref 133–144)
TRIGL SERPL-MCNC: 106 MG/DL
TSH SERPL DL<=0.005 MIU/L-ACNC: 1.07 MU/L (ref 0.4–4)

## 2021-08-15 DIAGNOSIS — E11.9 TYPE 2 DIABETES MELLITUS WITHOUT COMPLICATION, WITHOUT LONG-TERM CURRENT USE OF INSULIN (H): ICD-10-CM

## 2021-08-15 DIAGNOSIS — E78.5 HYPERLIPIDEMIA WITH TARGET LDL LESS THAN 70: ICD-10-CM

## 2021-08-16 DIAGNOSIS — R79.89 ELEVATED SERUM CREATININE: Primary | ICD-10-CM

## 2021-08-16 LAB — HCV AB SERPL QL IA: NONREACTIVE

## 2021-08-17 RX ORDER — ATORVASTATIN CALCIUM 10 MG/1
TABLET, FILM COATED ORAL
Qty: 90 TABLET | Refills: 0 | Status: SHIPPED | OUTPATIENT
Start: 2021-08-17 | End: 2021-09-17

## 2021-08-28 DIAGNOSIS — J45.40 MODERATE PERSISTENT ASTHMA WITHOUT COMPLICATION: ICD-10-CM

## 2021-08-29 DIAGNOSIS — R06.02 SOB (SHORTNESS OF BREATH): ICD-10-CM

## 2021-08-29 DIAGNOSIS — J45.40 MODERATE PERSISTENT ASTHMA WITHOUT COMPLICATION: ICD-10-CM

## 2021-08-30 NOTE — TELEPHONE ENCOUNTER
Routing refill request to provider for review/approval because:  Labs not current:  act  Adina Hale RN, BSN

## 2021-08-31 RX ORDER — BECLOMETHASONE DIPROPIONATE HFA 80 UG/1
AEROSOL, METERED RESPIRATORY (INHALATION)
Qty: 11 G | Refills: 0 | Status: SHIPPED | OUTPATIENT
Start: 2021-08-31 | End: 2021-09-17

## 2021-09-01 RX ORDER — ALBUTEROL SULFATE 0.83 MG/ML
SOLUTION RESPIRATORY (INHALATION)
Qty: 150 ML | Refills: 0 | Status: SHIPPED | OUTPATIENT
Start: 2021-09-01 | End: 2022-02-15

## 2021-09-01 NOTE — TELEPHONE ENCOUNTER
Routing refill request to provider for review/approval because:  ACT Total Scores 6/1/2020 8/18/2020 9/16/2020   ACT TOTAL SCORE - - -   ASTHMA ER VISITS - - -   ASTHMA HOSPITALIZATIONS - - -   ACT TOTAL SCORE (Goal Greater than or Equal to 20) 6 13 11   In the past 12 months, how many times did you visit the emergency room for your asthma without being admitted to the hospital? 0 0 0   In the past 12 months, how many times were you hospitalized overnight because of your asthma? 0 0 0     Adina Hale RN, BSN

## 2021-09-17 ENCOUNTER — OFFICE VISIT (OUTPATIENT)
Dept: INTERNAL MEDICINE | Facility: CLINIC | Age: 46
End: 2021-09-17
Payer: COMMERCIAL

## 2021-09-17 VITALS
OXYGEN SATURATION: 100 % | RESPIRATION RATE: 20 BRPM | TEMPERATURE: 98.9 F | BODY MASS INDEX: 50.02 KG/M2 | HEART RATE: 109 BPM | SYSTOLIC BLOOD PRESSURE: 104 MMHG | DIASTOLIC BLOOD PRESSURE: 68 MMHG | WEIGHT: 293 LBS | HEIGHT: 64 IN

## 2021-09-17 DIAGNOSIS — I10 ESSENTIAL HYPERTENSION WITH GOAL BLOOD PRESSURE LESS THAN 140/90: ICD-10-CM

## 2021-09-17 DIAGNOSIS — Z30.41 ENCOUNTER FOR SURVEILLANCE OF CONTRACEPTIVE PILLS: ICD-10-CM

## 2021-09-17 DIAGNOSIS — J45.40 MODERATE PERSISTENT ASTHMA WITHOUT COMPLICATION: ICD-10-CM

## 2021-09-17 DIAGNOSIS — J45.30 MILD PERSISTENT ASTHMA WITHOUT COMPLICATION: ICD-10-CM

## 2021-09-17 DIAGNOSIS — E11.9 TYPE 2 DIABETES MELLITUS WITHOUT COMPLICATION, WITHOUT LONG-TERM CURRENT USE OF INSULIN (H): ICD-10-CM

## 2021-09-17 DIAGNOSIS — E78.5 HYPERLIPIDEMIA WITH TARGET LDL LESS THAN 70: ICD-10-CM

## 2021-09-17 DIAGNOSIS — B37.2 YEAST INFECTION OF THE SKIN: ICD-10-CM

## 2021-09-17 DIAGNOSIS — R10.13 ABDOMINAL PAIN, EPIGASTRIC: ICD-10-CM

## 2021-09-17 PROCEDURE — 99214 OFFICE O/P EST MOD 30 MIN: CPT | Performed by: NURSE PRACTITIONER

## 2021-09-17 RX ORDER — MONTELUKAST SODIUM 10 MG/1
1 TABLET ORAL AT BEDTIME
Qty: 90 TABLET | Refills: 3 | Status: SHIPPED | OUTPATIENT
Start: 2021-09-17 | End: 2022-05-19

## 2021-09-17 RX ORDER — ACETAMINOPHEN AND CODEINE PHOSPHATE 120; 12 MG/5ML; MG/5ML
0.35 SOLUTION ORAL DAILY
Qty: 84 TABLET | Refills: 3 | Status: SHIPPED | OUTPATIENT
Start: 2021-09-17 | End: 2022-05-19

## 2021-09-17 RX ORDER — ATORVASTATIN CALCIUM 10 MG/1
10 TABLET, FILM COATED ORAL DAILY
Qty: 90 TABLET | Refills: 3 | Status: SHIPPED | OUTPATIENT
Start: 2021-09-17 | End: 2021-11-02

## 2021-09-17 RX ORDER — CETIRIZINE HYDROCHLORIDE, PSEUDOEPHEDRINE HYDROCHLORIDE 5; 120 MG/1; MG/1
1 TABLET, FILM COATED, EXTENDED RELEASE ORAL 2 TIMES DAILY
Qty: 180 TABLET | Refills: 3 | Status: SHIPPED | OUTPATIENT
Start: 2021-09-17 | End: 2022-05-19

## 2021-09-17 RX ORDER — LISINOPRIL 20 MG/1
20 TABLET ORAL DAILY
Qty: 90 TABLET | Refills: 3 | Status: SHIPPED | OUTPATIENT
Start: 2021-09-17 | End: 2022-05-19

## 2021-09-17 RX ORDER — NYSTATIN 100000 U/G
OINTMENT TOPICAL
Qty: 30 G | Refills: 3 | Status: SHIPPED | OUTPATIENT
Start: 2021-09-17 | End: 2022-05-19

## 2021-09-17 RX ORDER — GLYBURIDE 5 MG/1
TABLET ORAL
Qty: 90 TABLET | Refills: 3 | Status: SHIPPED | OUTPATIENT
Start: 2021-09-17 | End: 2022-05-19

## 2021-09-17 RX ORDER — BECLOMETHASONE DIPROPIONATE HFA 80 UG/1
AEROSOL, METERED RESPIRATORY (INHALATION)
Qty: 11 G | Refills: 11 | Status: SHIPPED | OUTPATIENT
Start: 2021-09-17 | End: 2022-05-19

## 2021-09-17 RX ORDER — CHLORTHALIDONE 25 MG/1
25 TABLET ORAL DAILY
Qty: 90 TABLET | Refills: 3 | Status: SHIPPED | OUTPATIENT
Start: 2021-09-17 | End: 2022-05-19

## 2021-09-17 RX ORDER — ALBUTEROL SULFATE 90 UG/1
2 AEROSOL, METERED RESPIRATORY (INHALATION) EVERY 6 HOURS
Qty: 54 G | Refills: 11 | Status: SHIPPED | OUTPATIENT
Start: 2021-09-17 | End: 2022-05-19

## 2021-09-17 ASSESSMENT — MIFFLIN-ST. JEOR: SCORE: 2045.32

## 2021-09-17 NOTE — LETTER
[facility]  [address]  [place] [address]  [phone]          September 17, 2021    RE:  Coni Newberry                                                                                                                                                       [address]  [place] [address]            To whom it may concern:    Coni Newberry was seen in clinic today.          Sincerely,        Farida Palencia CNP

## 2021-09-17 NOTE — NURSING NOTE
"Chief Complaint   Patient presents with     Recheck Medication     initial /68   Pulse 109   Temp 98.9  F (37.2  C) (Tympanic)   Resp 20   Ht 1.619 m (5' 3.75\")   Wt 142.4 kg (314 lb)   SpO2 100%   BMI 54.32 kg/m   Estimated body mass index is 54.32 kg/m  as calculated from the following:    Height as of this encounter: 1.619 m (5' 3.75\").    Weight as of this encounter: 142.4 kg (314 lb)..  bp completed using cuff size large forearm  CIERA BAUTISTA LPN  "

## 2021-09-17 NOTE — PROGRESS NOTES
Assessment & Plan     Type 2 diabetes mellitus without complication, without long-term current use of insulin (H)  Lab Results   Component Value Date    A1C 7.0 08/13/2021    A1C 7.8 08/24/2020    A1C 7.3 02/22/2020    A1C 7.4 06/29/2019    A1C 6.7 11/15/2018    A1C 6.0 12/29/2017   Good control     - glyBURIDE (DIABETA /MICRONASE) 5 MG tablet; Take 1 tablet by mouth once daily with breakfast  - metFORMIN (GLUCOPHAGE) 500 MG tablet; TAKE 2 & 1/2 (TWO & ONE-HALF) TABLETS BY MOUTH TWICE DAILY WITH MEALS    Mild persistent asthma without complication  Worse lately as adopted 3 kittens     - albuterol (PROAIR HFA/PROVENTIL HFA/VENTOLIN HFA) 108 (90 Base) MCG/ACT inhaler; Inhale 2 puffs into the lungs every 6 hours respiclick did not work for patient  - Nebulizer and Supplies Order for DME - ONLY FOR DME    Hyperlipidemia with target LDL less than 70  tolerating medication   - atorvastatin (LIPITOR) 10 MG tablet; Take 1 tablet (10 mg) by mouth daily    Essential hypertension with goal blood pressure less than 140/90  In good range   - chlorthalidone (HYGROTON) 25 MG tablet; Take 1 tablet (25 mg) by mouth daily  - lisinopril (ZESTRIL) 20 MG tablet; Take 1 tablet (20 mg) by mouth daily    Moderate persistent asthma without complication    - montelukast (SINGULAIR) 10 MG tablet; Take 1 tablet (10 mg) by mouth At Bedtime  - beclomethasone HFA (QVAR REDIHALER) 80 MCG/ACT inhaler; Inhale 2 puffs by mouth twice daily    Encounter for surveillance of contraceptive pills    - norethindrone (MICRONOR) 0.35 MG tablet; Take 1 tablet (0.35 mg) by mouth daily    Yeast infection of the skin    - nystatin (MYCOSTATIN) 653478 UNIT/GM external ointment; APPLY  OINTMENT TOPICALLY TWICE DAILY AS NEEDED TO SKIN FOLD RASH    Abdominal pain, epigastric    - omeprazole (PRILOSEC) 20 MG DR capsule; TAKE 1 CAPSULE BY MOUTH ONCE DAILY 30-60 MINUTES BEFORE A MEAL        BMI:   Estimated body mass index is 54.32 kg/m  as calculated from the  "following:    Height as of this encounter: 1.619 m (5' 3.75\").    Weight as of this encounter: 142.4 kg (314 lb).       Patient Instructions   DME for neb tubing     Medication refilled         No follow-ups on file.    DANYELL Spencer CNP  [facility]    Bossman Brar is a 46 year old who presents for the following health issues     HPI     Diabetes Follow-up    How often are you checking your blood sugar? One time daily  What time of day are you checking your blood sugars (select all that apply)?  Before meals  Have you had any blood sugars above 200?  No  Have you had any blood sugars below 70?  No    What symptoms do you notice when your blood sugar is low?  None    What concerns do you have today about your diabetes? None     Do you have any of these symptoms? (Select all that apply)  Excessive thirst    Have you had a diabetic eye exam in the last 12 months? Yes- Date of last eye exam: summer 2020,  [facility]: [facility]          Hyperlipidemia Follow-Up      Are you regularly taking any medication or supplement to lower your cholesterol?   Yes- atorvastatin    Are you having muscle aches or other side effects that you think could be caused by your cholesterol lowering medication?  No    Hypertension Follow-up      Do you check your blood pressure regularly outside of the clinic? No     Are you following a low salt diet? Yes    Are your blood pressures ever more than 140 on the top number (systolic) OR more   than 90 on the bottom number (diastolic), for example 140/90? No    BP Readings from Last 2 Encounters:   06/09/21 (!) 159/104   05/05/21 (!) 153/102     Hemoglobin A1C (%)   Date Value   08/13/2021 7.0 (H)   08/24/2020 7.8 (H)   02/22/2020 7.3 (H)     LDL Cholesterol Calculated (mg/dL)   Date Value   08/13/2021 94   02/22/2020 74   07/19/2017 62         How many servings of fruits and vegetables do you eat daily?  0-1    On average, how many sweetened beverages do you " drink each day (Examples: soda, juice, sweet tea, etc.  Do NOT count diet or artificially sweetened beverages)?   0    How many days per week do you exercise enough to make your heart beat faster? 3 or less    How many minutes a day do you exercise enough to make your heart beat faster? 9 or less  How many days per week do you miss taking your medication? Once or twice a month    What makes it hard for you to take your medications?  remembering to take    Doing well   Lab reviewed and great     Needs medication refills     Asthma not as good today as she recently adopted 3 kittens - getting used to them and then should be back to her normal     Review of Systems   Constitutional, HEENT, cardiovascular, pulmonary, GI, , musculoskeletal, neuro, skin, endocrine and psych systems are negative, except as otherwise noted.      Objective    There were no vitals taken for this visit.  There is no height or weight on file to calculate BMI.  Physical Exam   GENERAL: alert and no distress  RESP: lungs clear   CV: regular rate and rhythm  ABDOMEN: soft, nontender, no hepatosplenomegaly, no masses and bowel sounds normal  MS: no gross musculoskeletal defects noted, no edema  NEURO: Normal strength and tone, mentation intact and speech normal  PSYCH: mentation appears normal, affect normal/bright    Reviewed lab

## 2021-09-18 ASSESSMENT — ASTHMA QUESTIONNAIRES: ACT_TOTALSCORE: 14

## 2021-09-20 ENCOUNTER — OFFICE VISIT (OUTPATIENT)
Dept: PALLIATIVE MEDICINE | Facility: CLINIC | Age: 46
End: 2021-09-20
Payer: COMMERCIAL

## 2021-09-20 VITALS — SYSTOLIC BLOOD PRESSURE: 137 MMHG | DIASTOLIC BLOOD PRESSURE: 84 MMHG | HEART RATE: 101 BPM | OXYGEN SATURATION: 99 %

## 2021-09-20 DIAGNOSIS — M17.11 PRIMARY OSTEOARTHRITIS OF RIGHT KNEE: ICD-10-CM

## 2021-09-20 DIAGNOSIS — M25.562 CHRONIC PAIN OF BOTH KNEES: Primary | ICD-10-CM

## 2021-09-20 DIAGNOSIS — M17.12 PRIMARY OSTEOARTHRITIS OF LEFT KNEE: ICD-10-CM

## 2021-09-20 DIAGNOSIS — G89.29 CHRONIC PAIN OF BOTH KNEES: Primary | ICD-10-CM

## 2021-09-20 DIAGNOSIS — M25.561 CHRONIC PAIN OF BOTH KNEES: Primary | ICD-10-CM

## 2021-09-20 PROCEDURE — 99213 OFFICE O/P EST LOW 20 MIN: CPT | Performed by: PHYSICAL MEDICINE & REHABILITATION

## 2021-09-20 ASSESSMENT — PAIN SCALES - GENERAL: PAINLEVEL: NO PAIN (0)

## 2021-09-20 NOTE — PROGRESS NOTES
"She had genicular RFA in June. She reports 100% improvement. She does still not some stiffness and swelling in the evening.   She does not wear knee brace, concerned for skin breakdown around  She reports improved sleep. She does not have trouble getting comfortable overnight after RFA.   -She has occasional limp, but not as bad as it was before.   -When left knee \"pops\" intermittently, getting up from sitting position, hurts for 5-10 mins of pain, describes pain as \"sharp stinging.\" Tries to immobilize knee until pain subsides.   -She takes muscle relaxer (flexeril) in the evening or at bedtime. She takes 1-2 advil tablets per day.     -She went to [place] for first 2 weeks of July to see father, she had a fall. She describes \"not able to feel legs\" and was not wearing brace. She had pain afterwards, but that resolved fairly quickly. She also notes it was hot outside and thinks this may have contributed. She had gotten out of the car and was walking when it happened.       "

## 2021-09-20 NOTE — PATIENT INSTRUCTIONS
1. Ok to continue Ibuprofen/Advil as needed. Limit this to max 400mg twice daily. Try to limit to 3-4 days/week.    2. Continue Tylenol 650mg every 6 hours as needed.    3. Follow-up as needed.    ----------------------------------------------------------------  Clinic Number:  [phone]     Call with any questions about your care and for scheduling assistance.     Calls are returned Monday through Friday between 8 AM and 4:30 PM. We usually get back to you within 2 business days depending on the issue/request.    If we are prescribing your medications:    For opioid medication refills, call the clinic or send a [employer] message 7 days in advance.  Please include:    Name of requested medication    Name of the pharmacy.    For non-opioid medications, call your pharmacy directly to request a refill. Please allow 3-4 days to be processed.     Per [place] State Law:    All controlled substance prescriptions must be filled within 30 days of being written.      For those controlled substances allowing refills, pickup must occur within 30 days of last fill.      We believe regular attendance is key to your success in our program!      Any time you are unable to keep your appointment we ask that you call us at least 24 hours in advance to cancel.This will allow us to offer the appointment time to another patient.     Multiple missed appointments may lead to dismissal from the clinic.

## 2021-09-20 NOTE — PROGRESS NOTES
[facility]    Date of visit: 9/20/2021         Assessment:  Coni Newberry is a 46 year old with past medical history including: DM 2, Asthma, HTN, Obesity who presents for evaluation and treatment of the following chronic pain conditions:     1. Primary Bilateral Osteoarthritis with meniscal tearing: Patient has severe bilateral knee pain as a result of the above. Bilateral medial meniscus tears noted on imaging. She is not a candidate for meniscectomy due to the arthritis burden and not a candidate for TKA due to age and weight. Due to socio-economic factors she is unable to participate in a weight loss clinic program. They had previously tried conservative treatments including PT, NSAIDs, Tramadol, steroid and euflexxa injections with no sustained releif. Bilateral genicular RFA completed with significant improvement to date.             Plan:  The following recommendations were given to the patient. Diagnosis, treatment options, risks, benefits, and alternatives were discussed, and all questions were answered. The patient expressed understanding of the plan for management.      I am recommending a multidisciplinary treatment plan to help this patient better manage her pain.  This includes:      1. Physical Therapy: Continue prior exercises.  2. Clinical Health Pain Psychologist: Defer, coping well.  3. Self Care Recommendations: Gentle progressive exercise that does not increase pain - gradually increase daily walking program.  Take mini breaks - 5 minutes of mindfullness a couple times a day.   4. Diagnostic Studies: None  5. Medication Management:   1. Continue ibuprofen 400mg BID prn, max 4 days/week.  2. Continue tylenol 650mg q6h prn  3. Continue lidocaine patches to the knees nightly as needed.  6. Further procedures recommended: genicular nerve block and RFA ordered.  7. Follow up: As needed.      DO [facility]           Chief complaint:   Chief Complaint   Patient presents with     Pain       Interval history:  Coni Newberry is a 46 year old female last seen by me on June 2021 for RFA.        She had genicular RFA in June. She reports 100% improvement. She does still not some stiffness and swelling in the evening. She has appreciated improvement with sleep, notes she does not struggle to get comfortable   She does not wear knee brace, concerned for skin breakdown around  She reports improved sleep. She does not have trouble getting comfortable overnight after RFA.   -She has occasional limp, but not as bad as it was before.   -When left knee \"pops\" intermittently, getting up from sitting position, hurts for 5-10 mins of pain, describes pain as \"sharp stinging.\" Tries to immobilize knee until pain subsides.   -She takes muscle relaxer (flexeril) in the evening or at bedtime. She takes 1-2 advil tablets per day.     -Functional improvement noted, easier to go up and down stairs to complete household chores.    -She went to [place] for first 2 weeks of July to see father, she had a fall. She describes \"not able to feel legs\" and was not wearing brace. She had gotten out of the car and was walking when it happened, fell on concrete. She had pain afterwards, \"fell hard,\" but that resolved fairly quickly. She also notes it was hot outside and thinks this may have contributed.       Pain scores:  Pain intensity on average is 2 on a scale of 0-10. Swells towards the evenings when pain increases to 2/10, otherwise even less during the day.    Pain Treatments:  1. Medications:       Current pain medications:  -Ibuprofen 400mg bid prn  -Tylenol 650mg prn       Previous pain medications:  -Nabumetone - nh  -Celebrex 200mg - once daily - wears off after about 5 hours  -Tramadol 50mg q6h prn - 2-3 tabs daily  2. Physical Therapy: made pain on left side worse  3. Pain psychology: hasn't tried  4. Surgery: none  5. " Injections:               -Two weeks of relief with euflexxa injections for the knees - 12/7/20              -Two weeks of relief with steroid injections for knees - 11/3/20  6. Alternative Therapies:               Chiropractic: hasn't tried               Acupuncture: hasn't tried  Side Effects: no side effect    Medications:  Current Outpatient Medications   Medication Sig Dispense Refill     albuterol (PROAIR HFA/PROVENTIL HFA/VENTOLIN HFA) 108 (90 Base) MCG/ACT inhaler Inhale 2 puffs into the lungs every 6 hours respiclick did not work for patient 54 g 11     albuterol (PROVENTIL) (2.5 MG/3ML) 0.083% neb solution USE 1 VIAL IN NEBULIZER EVERY 6 HOURS AS NEEDED FOR SHORTNESS OF BREATH OR FOR WHEEZING 150 mL 0     atorvastatin (LIPITOR) 10 MG tablet Take 1 tablet (10 mg) by mouth daily 90 tablet 3     beclomethasone HFA (QVAR REDIHALER) 80 MCG/ACT inhaler Inhale 2 puffs by mouth twice daily 11 g 11     blood glucose monitoring (ONE TOUCH ULTRA 2) meter device kit Use to test blood sugars 1 times daily or as directed. 1 kit 0     blood glucose monitoring (ONE TOUCH ULTRA) test strip Use to test blood sugars 1 times daily or as directed. 3 Box 1     blood glucose test strip Use to test blood sugar 6 times daily or as directed. 600 strip 3     cetirizine-pseudoePHEDrine ER (ZYRTEC-D) 5-120 MG 12 hr tablet Take 1 tablet by mouth 2 times daily Reported on 3/31/2017 180 tablet 3     chlorthalidone (HYGROTON) 25 MG tablet Take 1 tablet (25 mg) by mouth daily 90 tablet 3     cyclobenzaprine (FLEXERIL) 10 MG tablet Take 1 tablet (10 mg) by mouth 3 times daily as needed for muscle spasms (Patient taking differently: Take 10 mg by mouth 3 times daily as needed for muscle spasms Takes 1 qd) 90 tablet 3     fluticasone (FLONASE) 50 MCG/ACT nasal spray Spray 2 sprays into both nostrils daily 3 Package 3     glyBURIDE (DIABETA /MICRONASE) 5 MG tablet Take 1 tablet by mouth once daily with breakfast 90 tablet 3     LANCETS ULTRA  FINE MISC 1 each 6 times daily 300 each 2     lisinopril (ZESTRIL) 20 MG tablet Take 1 tablet (20 mg) by mouth daily 90 tablet 3     metFORMIN (GLUCOPHAGE) 500 MG tablet TAKE 2 & 1/2 (TWO & ONE-HALF) TABLETS BY MOUTH TWICE DAILY WITH MEALS 450 tablet 3     montelukast (SINGULAIR) 10 MG tablet Take 1 tablet (10 mg) by mouth At Bedtime 90 tablet 3     norethindrone (MICRONOR) 0.35 MG tablet Take 1 tablet (0.35 mg) by mouth daily 84 tablet 3     nystatin (MYCOSTATIN) 308454 UNIT/GM external ointment APPLY  OINTMENT TOPICALLY TWICE DAILY AS NEEDED TO SKIN FOLD RASH 30 g 3     omeprazole (PRILOSEC) 20 MG DR capsule TAKE 1 CAPSULE BY MOUTH ONCE DAILY 30-60 MINUTES BEFORE A MEAL 90 capsule 3     order for DME Equipment being ordered: tennis elbow brace (Patient not taking: Reported on 9/17/2021) 1 Device 0     order for DME Equipment being ordered: Nebulizer and tubing 1 Device 0     triamcinolone (KENALOG) 0.1 % external ointment APPLY OINTMENT SPARINGLY TO AFFECTED AREA THREE TIMES DAILY AS NEEDED FOR ECZEMA RASH 80 g 1       Medical History: any changes in medical history since they were last seen? No    Review of Systems:  Positive for: arthritis  Any bowel or bladder problems: denies  Mood: denies    Physical Exam:  not currently breastfeeding.  General: nad, pleasant  Gait: Antalgic  MSK exam: Mild bilateral medial joint line tenderness at the knees. Strength in the lower extremities is 5/5 and symmetric.      I saw and examined the patient with the CNP student, Charline Godinez. I have reviewed and agree with the resident's note and plan of care and made changes and corrections directly to the body of the note.    TIME SPENT:  BY CNP student ALONE 20 MIN  BY MYSELF WITHOUT THE Student 15 MIN          BILLING TIME DOCUMENTATION:   The total TIME spent on this patient on the date of the encounter/appointment was 20 minutes.      TOTAL TIME includes:   Time spent preparing to see the patient (reviewing records and tests)    Time spent face to face (or over the phone) with the patient   Time spent ordering tests, medications, procedures and referrals   Time spent Referring and communicating with other healthcare professionals   Time spent documenting clinical information in Epic         Archie Jones DO  [facility]  9/20/2021

## 2021-10-03 ENCOUNTER — HEALTH MAINTENANCE LETTER (OUTPATIENT)
Age: 46
End: 2021-10-03

## 2021-10-31 DIAGNOSIS — E78.5 HYPERLIPIDEMIA WITH TARGET LDL LESS THAN 70: ICD-10-CM

## 2021-11-02 RX ORDER — ATORVASTATIN CALCIUM 10 MG/1
TABLET, FILM COATED ORAL
Qty: 90 TABLET | Refills: 2 | Status: SHIPPED | OUTPATIENT
Start: 2021-11-02 | End: 2022-05-19

## 2021-12-02 ENCOUNTER — TELEPHONE (OUTPATIENT)
Dept: PALLIATIVE MEDICINE | Facility: CLINIC | Age: 46
End: 2021-12-02
Payer: COMMERCIAL

## 2021-12-02 DIAGNOSIS — M17.11 PRIMARY OSTEOARTHRITIS OF RIGHT KNEE: ICD-10-CM

## 2021-12-02 DIAGNOSIS — M17.12 PRIMARY OSTEOARTHRITIS OF LEFT KNEE: ICD-10-CM

## 2021-12-03 RX ORDER — TRAMADOL HYDROCHLORIDE 50 MG/1
50 TABLET ORAL EVERY 6 HOURS PRN
Qty: 30 TABLET | OUTPATIENT
Start: 2021-12-03

## 2021-12-03 NOTE — TELEPHONE ENCOUNTER
Received refill request for Tramadol 50mg.     Per chart review, patient was last seen on 4/12/21 by Dr. Yeo an referred to Pain Management for long term oral pain medications until she can have total knee replacement surgeries.     Last Pain Management office visit note of 9/20/21 states the following plan:       1. Physical Therapy: Continue prior exercises.  2. Clinical Health Pain Psychologist: Defer, coping well.  3. Self Care Recommendations: Gentle progressive exercise that does not increase pain - gradually increase daily walking program.  Take mini breaks - 5 minutes of mindfullness a couple times a day.   4. Diagnostic Studies: None  5. Medication Management:   1. Continue ibuprofen 400mg BID prn, max 4 days/week.  2. Continue tylenol 650mg q6h prn  3. Continue lidocaine patches to the knees nightly as needed.  6. Further procedures recommended: genicular nerve block and RFA ordered.  7. Follow up: As needed.    Patient has a call out to Pain Management. Refill request denied.     There is a consent to communicate on file. Left voicemail informing patient we received a refill request and that Dr. Yeo had referred her to Pain Management for oral pain medication management. Asked that she contact their office.     TAURUS De La Fuente RN

## 2021-12-06 NOTE — TELEPHONE ENCOUNTER
Called pt. LM to call back to discuss, also advised to also make follow up prior to being transferred to nursing.     Orquidea IRIZARRY, RN Care Coordinator  [facility]  [facility]

## 2021-12-30 ENCOUNTER — OFFICE VISIT (OUTPATIENT)
Dept: PALLIATIVE MEDICINE | Facility: CLINIC | Age: 46
End: 2021-12-30
Payer: COMMERCIAL

## 2021-12-30 VITALS — OXYGEN SATURATION: 96 % | HEART RATE: 104 BPM

## 2021-12-30 DIAGNOSIS — M17.12 PRIMARY OSTEOARTHRITIS OF LEFT KNEE: ICD-10-CM

## 2021-12-30 DIAGNOSIS — M25.561 CHRONIC PAIN OF BOTH KNEES: ICD-10-CM

## 2021-12-30 DIAGNOSIS — M25.562 CHRONIC PAIN OF BOTH KNEES: ICD-10-CM

## 2021-12-30 DIAGNOSIS — M17.11 PRIMARY OSTEOARTHRITIS OF RIGHT KNEE: Primary | ICD-10-CM

## 2021-12-30 DIAGNOSIS — G89.29 CHRONIC PAIN OF BOTH KNEES: ICD-10-CM

## 2021-12-30 PROCEDURE — 99214 OFFICE O/P EST MOD 30 MIN: CPT | Performed by: PHYSICAL MEDICINE & REHABILITATION

## 2021-12-30 RX ORDER — CELECOXIB 200 MG/1
200 CAPSULE ORAL DAILY PRN
Qty: 30 CAPSULE | Refills: 0 | Status: SHIPPED | OUTPATIENT
Start: 2021-12-30 | End: 2022-01-25

## 2021-12-30 ASSESSMENT — PAIN SCALES - GENERAL: PAINLEVEL: SEVERE PAIN (6)

## 2021-12-30 NOTE — PATIENT INSTRUCTIONS
1. I ordered Celebrex 200mg daily as needed. Take this on your more difficult pain nights and see if this helps.    2. If there is limited improvement with celebrex, we can try tramadol for your pain. I would prescribe 15 tabs/month.    3. Try the following yoga videos for your back pain.  [email or web address]    4. Follow up in 2 months.    Take care,    Archie Jones [facility]        ----------------------------------------------------------------  Clinic Number:  [phone]     Call with any questions about your care and for scheduling assistance.     Calls are returned Monday through Friday between 8 AM and 4:30 PM. We usually get back to you within 2 business days depending on the issue/request.    If we are prescribing your medications:    For opioid medication refills, call the clinic or send a [employer] message 7 days in advance.  Please include:    Name of requested medication    Name of the pharmacy.    For non-opioid medications, call your pharmacy directly to request a refill. Please allow 3-4 days to be processed.     Per [place] State Law:    All controlled substance prescriptions must be filled within 30 days of being written.      For those controlled substances allowing refills, pickup must occur within 30 days of last fill.      We believe regular attendance is key to your success in our program!      Any time you are unable to keep your appointment we ask that you call us at least 24 hours in advance to cancel.This will allow us to offer the appointment time to another patient.     Multiple missed appointments may lead to dismissal from the clinic.

## 2021-12-30 NOTE — PROGRESS NOTES
[facility]    Date of visit: 12/30/21         Assessment:  Coni Newberry is a 46 year old with past medical history including: DM 2, Asthma, HTN, Obesity who presents for evaluation and treatment of the following chronic pain conditions:     1. Primary Bilateral Osteoarthritis with meniscal tearing: Patient has severe bilateral knee pain as a result of the above. Bilateral medial meniscus tears noted on imaging. She is not a candidate for meniscectomy due to the arthritis burden and not a candidate for TKA due to age and weight. Due to socio-economic factors she is unable to participate in a weight loss clinic program. They had previously tried conservative treatments including PT, NSAIDs, Tramadol, steroid and euflexxa injections with no sustained releif. Bilateral genicular RFA completed on 6/9/21 with significant improvement to date. Coni Brar reports ongoing relief with RFA but that knee pain has been interfering with activity and occasionally their sleep as well.              Plan:  The following recommendations were given to the patient. Diagnosis, treatment options, risks, benefits, and alternatives were discussed, and all questions were answered. The patient expressed understanding of the plan for management.      I am recommending a multidisciplinary treatment plan to help this patient better manage her pain.  This includes:      1. Physical Therapy: Continue prior exercises.  2. Clinical Health Pain Psychologist: Defer, coping well.  3. Self Care Recommendations: Gentle progressive exercise that does not increase pain - gradually increase daily walking program.  Take mini breaks - 5 minutes of mindfullness a couple times a day.   4. Diagnostic Studies: None  5. Medication Management:   1. Continue ibuprofen 400mg BID prn, max 4 days/week.  2. Celebrex 200mg at night as needed. Avoid ibuprofen on these days.  3. Continue tylenol 650mg q6h  "prn  4. Continue lidocaine patches to the knees nightly as needed.  5. If improvement with celebrex is limited, could consider small amounts of tramadol as needed. Discussed this would be limited to 15 tabs/month.  6. Further procedures recommended: genicular RFA can be completed every 6+ months as needed.  7. Follow up: 2 months.      DO KELSI Mcgregor [facility]           Chief complaint:   No chief complaint on file.      Interval history:  Coni Newberry is a 46 year old female last seen by me on J9/20/2021.      -Coni reports that the genicular RFA is still helping about 50-75% but they're starting to have more discomfort with activity.    -They are also having discomfort at night because of heaviness and throbbing around the knees. They've been using muscle relaxants and a lidocaine patch with minimal improvement.    -They are trying to avoid ibuprofen due to hypertension. Tylenol did not provide much relief, they've been taking tylenol 250mg 4-6 tablets total daily.    -They have tried tramadol in the past and this was helpful for their pain.    -They feel their knee pain is starting to get worse because of the cold weather. They're having more \"creaking\" and \"popping\" lately.        Pain scores:  Pain intensity on average is 6 on a scale of 0-10.     Pain Treatments:  1. Medications:       Current pain medications:  -Ibuprofen 400mg bid prn  -Tylenol 650mg prn  -Flexeril 10mg TID prn       Previous pain medications:  -Nabumetone - nh  -Celebrex 200mg - once daily - wears off after about 5 hours  -Tramadol 50mg q6h prn - 2-3 tabs daily  2. Physical Therapy: made pain on left side worse  3. Pain psychology: hasn't tried  4. Surgery: none  5. Injections:               -Two weeks of relief with euflexxa injections for the knees - 12/7/20              -Two weeks of relief with steroid injections for knees - 11/3/20  6. Alternative Therapies:               Chiropractic: hasn't " tried               Acupuncture: hasn't tried  Side Effects: no side effect    Medications:  Current Outpatient Medications   Medication Sig Dispense Refill     albuterol (PROAIR HFA/PROVENTIL HFA/VENTOLIN HFA) 108 (90 Base) MCG/ACT inhaler Inhale 2 puffs into the lungs every 6 hours respiclick did not work for patient 54 g 11     albuterol (PROVENTIL) (2.5 MG/3ML) 0.083% neb solution USE 1 VIAL IN NEBULIZER EVERY 6 HOURS AS NEEDED FOR SHORTNESS OF BREATH OR FOR WHEEZING 150 mL 0     atorvastatin (LIPITOR) 10 MG tablet Take 1 tablet by mouth once daily 90 tablet 2     beclomethasone HFA (QVAR REDIHALER) 80 MCG/ACT inhaler Inhale 2 puffs by mouth twice daily 11 g 11     blood glucose monitoring (ONE TOUCH ULTRA 2) meter device kit Use to test blood sugars 1 times daily or as directed. 1 kit 0     blood glucose monitoring (ONE TOUCH ULTRA) test strip Use to test blood sugars 1 times daily or as directed. 3 Box 1     blood glucose test strip Use to test blood sugar 6 times daily or as directed. 600 strip 3     cetirizine-pseudoePHEDrine ER (ZYRTEC-D) 5-120 MG 12 hr tablet Take 1 tablet by mouth 2 times daily Reported on 3/31/2017 180 tablet 3     chlorthalidone (HYGROTON) 25 MG tablet Take 1 tablet (25 mg) by mouth daily 90 tablet 3     cyclobenzaprine (FLEXERIL) 10 MG tablet Take 1 tablet (10 mg) by mouth 3 times daily as needed for muscle spasms (Patient taking differently: Take 10 mg by mouth 3 times daily as needed for muscle spasms Takes 1 qd) 90 tablet 3     fluticasone (FLONASE) 50 MCG/ACT nasal spray Spray 2 sprays into both nostrils daily 3 Package 3     glyBURIDE (DIABETA /MICRONASE) 5 MG tablet Take 1 tablet by mouth once daily with breakfast 90 tablet 3     LANCETS ULTRA FINE MISC 1 each 6 times daily 300 each 2     lisinopril (ZESTRIL) 20 MG tablet Take 1 tablet (20 mg) by mouth daily 90 tablet 3     metFORMIN (GLUCOPHAGE) 500 MG tablet TAKE 2 & 1/2 (TWO & ONE-HALF) TABLETS BY MOUTH TWICE DAILY WITH MEALS  450 tablet 3     montelukast (SINGULAIR) 10 MG tablet Take 1 tablet (10 mg) by mouth At Bedtime 90 tablet 3     norethindrone (MICRONOR) 0.35 MG tablet Take 1 tablet (0.35 mg) by mouth daily 84 tablet 3     nystatin (MYCOSTATIN) 031119 UNIT/GM external ointment APPLY  OINTMENT TOPICALLY TWICE DAILY AS NEEDED TO SKIN FOLD RASH 30 g 3     omeprazole (PRILOSEC) 20 MG DR capsule TAKE 1 CAPSULE BY MOUTH ONCE DAILY 30-60 MINUTES BEFORE A MEAL 90 capsule 3     order for DME Equipment being ordered: tennis elbow brace (Patient not taking: Reported on 9/17/2021) 1 Device 0     order for DME Equipment being ordered: Nebulizer and tubing 1 Device 0     triamcinolone (KENALOG) 0.1 % external ointment APPLY OINTMENT SPARINGLY TO AFFECTED AREA THREE TIMES DAILY AS NEEDED FOR ECZEMA RASH 80 g 1       Medical History: any changes in medical history since they were last seen? No    Review of Systems:  Positive for: arthritis  Any bowel or bladder problems: denies  Mood: denies    Physical Exam:  Pulse 104, SpO2 96 %, not currently breastfeeding.  General: nad, pleasant  Gait: Antalgic  MSK exam: Bilateral medial joint line tenderness at the knees. Strength in the lower extremities is 5/5 and symmetric.        BILLING TIME DOCUMENTATION:   The total TIME spent on this patient on the date of the encounter/appointment was 25 minutes.      TOTAL TIME includes:   Time spent preparing to see the patient (reviewing records and tests)   Time spent face to face (or over the phone) with the patient   Time spent ordering tests, medications, procedures and referrals   Time spent Referring and communicating with other healthcare professionals   Time spent documenting clinical information in Epic         Archie Jones [facility]

## 2022-01-25 DIAGNOSIS — M25.561 CHRONIC PAIN OF BOTH KNEES: ICD-10-CM

## 2022-01-25 DIAGNOSIS — M17.11 PRIMARY OSTEOARTHRITIS OF RIGHT KNEE: ICD-10-CM

## 2022-01-25 DIAGNOSIS — G89.29 CHRONIC PAIN OF BOTH KNEES: ICD-10-CM

## 2022-01-25 DIAGNOSIS — M25.562 CHRONIC PAIN OF BOTH KNEES: ICD-10-CM

## 2022-01-25 DIAGNOSIS — M17.12 PRIMARY OSTEOARTHRITIS OF LEFT KNEE: ICD-10-CM

## 2022-01-25 RX ORDER — CELECOXIB 200 MG/1
200 CAPSULE ORAL DAILY PRN
Qty: 30 CAPSULE | Refills: 3 | Status: SHIPPED | OUTPATIENT
Start: 2022-01-25 | End: 2022-05-12

## 2022-01-25 NOTE — TELEPHONE ENCOUNTER
Received fax from pharmacy requesting refill(s) for celecoxib (CELEBREX) 200 MG capsule     Last refilled on 12/31/21    Pt last seen on 12/30/21  Next appt scheduled for 02/24/22    E-prescribe to:     [facility]/PHARMACY #[id number] - [place], [place] - [address]     Will facilitate refill.      Christin Khan MA  [facility] [place]

## 2022-01-25 NOTE — TELEPHONE ENCOUNTER
Celebrex refill approved.    Archie Jones DO  [facility]

## 2022-02-12 DIAGNOSIS — J45.40 MODERATE PERSISTENT ASTHMA WITHOUT COMPLICATION: ICD-10-CM

## 2022-02-12 DIAGNOSIS — R06.02 SOB (SHORTNESS OF BREATH): ICD-10-CM

## 2022-02-15 RX ORDER — ALBUTEROL SULFATE 0.83 MG/ML
SOLUTION RESPIRATORY (INHALATION)
Qty: 150 ML | Refills: 0 | Status: SHIPPED | OUTPATIENT
Start: 2022-02-15 | End: 2022-04-06

## 2022-02-15 NOTE — TELEPHONE ENCOUNTER
Pending Prescriptions:                       Disp   Refills    albuterol (PROVENTIL) (2.5 MG/3ML) 0.083% *150 mL 0        Sig: USE 1 VIAL IN NEBULIZER EVERY 6 HOURS AS NEEDED FOR           SHORTNESS OF BREATH FOR WHEEZING    Routing refill request to provider for review/approval because:  ACT Total Scores 8/18/2020 9/16/2020 9/17/2021   ACT TOTAL SCORE - - -   ASTHMA ER VISITS - - -   ASTHMA HOSPITALIZATIONS - - -   ACT TOTAL SCORE (Goal Greater than or Equal to 20) 13 11 14   In the past 12 months, how many times did you visit the emergency room for your asthma without being admitted to the hospital? 0 0 0   In the past 12 months, how many times were you hospitalized overnight because of your asthma? 0 0 0

## 2022-03-14 NOTE — PROGRESS NOTES
[facility]    Date of visit: 3/15/21         Assessment:  Coni Newberry is a 46 year old with past medical history including: DM 2, Asthma, HTN, Obesity who presents for evaluation and treatment of the following chronic pain conditions:     1. Primary Bilateral Osteoarthritis with meniscal tearing: Patient has severe bilateral knee pain as a result of the above. Bilateral medial meniscus tears noted on imaging. She is not a candidate for meniscectomy due to the arthritis burden and not a candidate for TKA due to age and weight. They had previously tried conservative treatments including PT, NSAIDs, Tramadol, steroid and euflexxa injections with no sustained releif. Bilateral genicular RFA completed on 6/9/21 with moderate improvement to date. Coni Brar reports ongoing relief with RFA but that knee pain has been interfering with activity and occasionally their sleep as well.              Plan:  The following recommendations were given to the patient. Diagnosis, treatment options, risks, benefits, and alternatives were discussed, and all questions were answered. The patient expressed understanding of the plan for management.      I am recommending a multidisciplinary treatment plan to help this patient better manage her pain.  This includes:      1. Physical Therapy: Continue prior exercises.  2. Clinical Health Pain Psychologist: Defer, coping well.  3. Self Care Recommendations: Gentle progressive exercise that does not increase pain - gradually increase daily walking program.  Take mini breaks - 5 minutes of mindfullness a couple times a day.   1. Weight management clinic referral placed.  4. Diagnostic Studies: BMP  5. Medication Management:   1. Celebrex 200mg at night as needed. Avoid ibuprofen on these days. Repeat BMP ordered today to re-evaluate creatinine.  2. Continue tylenol 1000mg TID prn  3. Continue lidocaine patches to the knees nightly as  needed.  6. Further procedures recommended: genicular RFA can be completed every 6+ months as needed.  7. Follow up: 2 months.      Archie Jones DO  [facility]           Chief complaint:   No chief complaint on file.      Interval history:  Coni Newberry is a 46 year old female last seen by me on 12/30/21.      -Coni reports that the genicular RFA is still helping about 50% but they're starting to have more discomfort with activity and when they're sitting in the same position for a long time.    -They had a cold that lasted about a 1.5 weeks this started 3 weeks ago. During this period their diabetes has been poorly controlled (180-200) and they've been waking up more frequently at night to urinate. Their pain has been worse because of this.    -They stopped tylenol during their cold and were taking ibuprofen during the day and celebrex at night.    -Flexeril at night also helps calm down some of the muscle pain in the legs and also helps with sleep.    -They did not have their BMP re-checked last September as ordered by PCP, weren't aware this was ordered.    Pain scores:  Pain intensity on average is 6 on a scale of 0-10.     Pain Treatments:  1. Medications:       Current pain medications:  -Ibuprofen 400mg bid prn  -Tylenol 650mg prn  -Celebrex 200mg once daily prn  -Flexeril 10mg BID prn       Previous pain medications:  -Nabumetone - nh  -Celebrex 200mg - once daily - wears off after about 5 hours  -Tramadol 50mg q6h prn - 2-3 tabs daily  2. Physical Therapy: made pain on left side worse  3. Pain psychology: hasn't tried  4. Surgery: none  5. Injections:               -Two weeks of relief with euflexxa injections for the knees - 12/7/20              -Two weeks of relief with steroid injections for knees - 11/3/20  6. Alternative Therapies:               Chiropractic: hasn't tried               Acupuncture: hasn't tried  Side Effects: no side effect    Medications:  Current  Outpatient Medications   Medication Sig Dispense Refill     albuterol (PROAIR HFA/PROVENTIL HFA/VENTOLIN HFA) 108 (90 Base) MCG/ACT inhaler Inhale 2 puffs into the lungs every 6 hours respiclick did not work for patient 54 g 11     albuterol (PROVENTIL) (2.5 MG/3ML) 0.083% neb solution USE 1 VIAL IN NEBULIZER EVERY 6 HOURS AS NEEDED FOR SHORTNESS OF BREATH FOR WHEEZING 150 mL 0     atorvastatin (LIPITOR) 10 MG tablet Take 1 tablet by mouth once daily 90 tablet 2     beclomethasone HFA (QVAR REDIHALER) 80 MCG/ACT inhaler Inhale 2 puffs by mouth twice daily 11 g 11     blood glucose monitoring (ONE TOUCH ULTRA 2) meter device kit Use to test blood sugars 1 times daily or as directed. 1 kit 0     blood glucose monitoring (ONE TOUCH ULTRA) test strip Use to test blood sugars 1 times daily or as directed. 3 Box 1     blood glucose test strip Use to test blood sugar 6 times daily or as directed. 600 strip 3     celecoxib (CELEBREX) 200 MG capsule Take 1 capsule (200 mg) by mouth daily as needed for moderate pain 30 capsule 3     cetirizine-pseudoePHEDrine ER (ZYRTEC-D) 5-120 MG 12 hr tablet Take 1 tablet by mouth 2 times daily Reported on 3/31/2017 180 tablet 3     chlorthalidone (HYGROTON) 25 MG tablet Take 1 tablet (25 mg) by mouth daily 90 tablet 3     cyclobenzaprine (FLEXERIL) 10 MG tablet Take 1 tablet (10 mg) by mouth 3 times daily as needed for muscle spasms (Patient taking differently: Take 10 mg by mouth 3 times daily as needed for muscle spasms Takes 1 qd) 90 tablet 3     fluticasone (FLONASE) 50 MCG/ACT nasal spray Spray 2 sprays into both nostrils daily 3 Package 3     glyBURIDE (DIABETA /MICRONASE) 5 MG tablet Take 1 tablet by mouth once daily with breakfast 90 tablet 3     LANCETS ULTRA FINE MISC 1 each 6 times daily 300 each 2     lisinopril (ZESTRIL) 20 MG tablet Take 1 tablet (20 mg) by mouth daily 90 tablet 3     metFORMIN (GLUCOPHAGE) 500 MG tablet TAKE 2 & 1/2 (TWO & ONE-HALF) TABLETS BY MOUTH TWICE  DAILY WITH MEALS 450 tablet 3     montelukast (SINGULAIR) 10 MG tablet Take 1 tablet (10 mg) by mouth At Bedtime 90 tablet 3     norethindrone (MICRONOR) 0.35 MG tablet Take 1 tablet (0.35 mg) by mouth daily 84 tablet 3     nystatin (MYCOSTATIN) 794663 UNIT/GM external ointment APPLY  OINTMENT TOPICALLY TWICE DAILY AS NEEDED TO SKIN FOLD RASH 30 g 3     omeprazole (PRILOSEC) 20 MG DR capsule TAKE 1 CAPSULE BY MOUTH ONCE DAILY 30-60 MINUTES BEFORE A MEAL 90 capsule 3     order for DME Equipment being ordered: tennis elbow brace 1 Device 0     order for DME Equipment being ordered: Nebulizer and tubing 1 Device 0     triamcinolone (KENALOG) 0.1 % external ointment APPLY OINTMENT SPARINGLY TO AFFECTED AREA THREE TIMES DAILY AS NEEDED FOR ECZEMA RASH 80 g 1       Medical History: any changes in medical history since they were last seen? No    Review of Systems:  Positive for: arthritis, knee pain  Any bowel or bladder problems: denies  Mood: denies    Physical Exam:  Blood pressure 120/81, pulse 95, SpO2 97 %, not currently breastfeeding.  General: nad, pleasant  Gait: Antalgic  MSK exam: Bilateral medial joint line tenderness at the knees. Strength in the lower extremities is 5/5 and symmetric.        BILLING TIME DOCUMENTATION:   The total TIME spent on this patient on the date of the encounter/appointment was 25 minutes.      TOTAL TIME includes:   Time spent preparing to see the patient (reviewing records and tests)   Time spent face to face (or over the phone) with the patient   Time spent ordering tests, medications, procedures and referrals   Time spent Referring and communicating with other healthcare professionals   Time spent documenting clinical information in Epic         Archie Jones [facility]

## 2022-03-15 ENCOUNTER — OFFICE VISIT (OUTPATIENT)
Dept: PALLIATIVE MEDICINE | Facility: CLINIC | Age: 47
End: 2022-03-15
Payer: COMMERCIAL

## 2022-03-15 ENCOUNTER — LAB (OUTPATIENT)
Dept: LAB | Facility: CLINIC | Age: 47
End: 2022-03-15
Payer: COMMERCIAL

## 2022-03-15 ENCOUNTER — E-VISIT (OUTPATIENT)
Dept: FAMILY MEDICINE | Facility: CLINIC | Age: 47
End: 2022-03-15

## 2022-03-15 VITALS — OXYGEN SATURATION: 97 % | HEART RATE: 95 BPM | DIASTOLIC BLOOD PRESSURE: 81 MMHG | SYSTOLIC BLOOD PRESSURE: 120 MMHG

## 2022-03-15 DIAGNOSIS — M17.12 PRIMARY OSTEOARTHRITIS OF LEFT KNEE: ICD-10-CM

## 2022-03-15 DIAGNOSIS — N39.0 ACUTE UTI (URINARY TRACT INFECTION): Primary | ICD-10-CM

## 2022-03-15 DIAGNOSIS — R79.89 ELEVATED SERUM CREATININE: ICD-10-CM

## 2022-03-15 DIAGNOSIS — M17.11 PRIMARY OSTEOARTHRITIS OF RIGHT KNEE: ICD-10-CM

## 2022-03-15 DIAGNOSIS — E66.09 OBESITY DUE TO EXCESS CALORIES, UNSPECIFIED CLASSIFICATION, UNSPECIFIED WHETHER SERIOUS COMORBIDITY PRESENT: Primary | ICD-10-CM

## 2022-03-15 PROCEDURE — 99421 OL DIG E/M SVC 5-10 MIN: CPT | Performed by: PHYSICIAN ASSISTANT

## 2022-03-15 PROCEDURE — 99214 OFFICE O/P EST MOD 30 MIN: CPT | Performed by: PHYSICAL MEDICINE & REHABILITATION

## 2022-03-15 PROCEDURE — 80048 BASIC METABOLIC PNL TOTAL CA: CPT

## 2022-03-15 PROCEDURE — 36415 COLL VENOUS BLD VENIPUNCTURE: CPT

## 2022-03-15 RX ORDER — NITROFURANTOIN 25; 75 MG/1; MG/1
100 CAPSULE ORAL 2 TIMES DAILY
Qty: 10 CAPSULE | Refills: 0 | Status: SHIPPED | OUTPATIENT
Start: 2022-03-15 | End: 2022-03-20

## 2022-03-15 ASSESSMENT — PAIN SCALES - GENERAL: PAINLEVEL: MODERATE PAIN (5)

## 2022-03-15 NOTE — PATIENT INSTRUCTIONS
Dear Coni Newberry    After reviewing your responses, I've been able to diagnose you with a urinary tract infection, which is a common infection of the bladder with bacteria.  This is not a sexually transmitted infection, though urinating immediately after intercourse can help prevent infections.  Drinking lots of fluids is also helpful to clear your current infection and prevent the next one.      I have sent a prescription for antibiotics to your pharmacy to treat this infection.    It is important that you take all of your prescribed medication even if your symptoms are improving after a few doses.  Taking all of your medicine helps prevent the symptoms from returning.     If your symptoms worsen, you develop pain in your back or stomach, develop fevers, or are not improving in 5 days, please contact your primary care provider for an appointment or visit any of our convenient Walk-in or Urgent Care Centers to be seen, which can be found on our website here.    Thanks again for choosing us as your health care partner,    Bridgette Osuna PA-C    Urinary Tract Infections in Women  Urinary tract infections (UTIs) are most often caused by bacteria. These bacteria enter the urinary tract. The bacteria may come from inside the body. Or they may travel from the skin outside the rectum or vagina into the urethra. Female anatomy makes it easy for bacteria from the bowel to enter a woman s urinary tract, which is the most common source of UTI. This means women develop UTIs more often than men. Pain in or around the urinary tract is a common UTI symptom. But the only way to know for sure if you have a UTI for the healthcare provider to test your urine. The two tests that may be done are the urinalysis and urine culture.     Types of UTIs    Cystitis. A bladder infection (cystitis) is the most common UTI in women. You may have urgent or frequent need to pee. You may also have pain, burning when you pee, and bloody  urine.    Urethritis. This is an inflamed urethra, which is the tube that carries urine from the bladder to outside the body. You may have lower stomach or back pain. You may also have urgent or frequent need to pee.    Pyelonephritis. This is a kidney infection. If not treated, it can be serious and damage your kidneys. In severe cases, you may need to stay in the hospital. You may have a fever and lower back pain.    Medicines to treat a UTI  Most UTIs are treated with antibiotics. These kill the bacteria. The length of time you need to take them depends on the type of infection. It may be as short as 3 days. If you have repeated UTIs, you may need a low-dose antibiotic for several months. Take antibiotics exactly as directed. Don t stop taking them until all of the medicine is gone. If you stop taking the antibiotic too soon, the infection may not go away. You may also develop a resistance to the antibiotic. This can make it much harder to treat.   Lifestyle changes to treat and prevent UTIs   The lifestyle changes below will help get rid of your UTI. They may also help prevent future UTIs.     Drink plenty of fluids. This includes water, juice, or other caffeine-free drinks. Fluids help flush bacteria out of your body.    Empty your bladder. Always empty your bladder when you feel the urge to pee. And always pee before going to sleep. Urine that stays in your bladder can lead to infection. Try to pee before and after sex as well.    Practice good personal hygiene. Wipe yourself from front to back after using the toilet. This helps keep bacteria from getting into the urethra.    Use condoms during sex. These help prevent UTIs caused by sexually transmitted bacteria. Also don't use spermicides during sex. These can increase the risk for UTIs. Choose other forms of birth control instead. For women who tend to get UTIs after sex, a low-dose of a preventive antibiotic may be used. Be sure to discuss this option with  your healthcare provider.    Follow up with your healthcare provider as directed. He or she may test to make sure the infection has cleared. If needed, more treatment may be started.  Edin last reviewed this educational content on 7/1/2019 2000-2021 [employer]. All rights reserved. This information is not intended as a substitute for professional medical care. Always follow your healthcare professional's instructions.

## 2022-03-15 NOTE — PATIENT INSTRUCTIONS
1. I ordered a basic metabolic panel, you can go to the hospital lab to get this done.    2. Do not take any other nsaids (ibuprofen, naproxen) on days you take celebrex.    3. You can restart tylenol 1000mg three times daily as needed.    4. I placed a weight management referral.    5. Follow up in 6-8 weeks.    Take care,    Archie Jones DO  [facility]        ----------------------------------------------------------------  Clinic Number:  [phone]     Call with any questions about your care and for scheduling assistance.     Calls are returned Monday through Friday between 8 AM and 4:30 PM. We usually get back to you within 2 business days depending on the issue/request.    If we are prescribing your medications:    For opioid medication refills, call the clinic or send a [employer] message 7 days in advance.  Please include:    Name of requested medication    Name of the pharmacy.    For non-opioid medications, call your pharmacy directly to request a refill. Please allow 3-4 days to be processed.     Per [place] State Law:    All controlled substance prescriptions must be filled within 30 days of being written.      For those controlled substances allowing refills, pickup must occur within 30 days of last fill.      We believe regular attendance is key to your success in our program!      Any time you are unable to keep your appointment we ask that you call us at least 24 hours in advance to cancel.This will allow us to offer the appointment time to another patient.     Multiple missed appointments may lead to dismissal from the clinic.

## 2022-03-16 DIAGNOSIS — M25.561 ACUTE PAIN OF RIGHT KNEE: ICD-10-CM

## 2022-03-16 LAB
ANION GAP SERPL CALCULATED.3IONS-SCNC: 10 MMOL/L (ref 3–14)
BUN SERPL-MCNC: 20 MG/DL (ref 7–30)
CALCIUM SERPL-MCNC: 9 MG/DL (ref 8.5–10.1)
CHLORIDE BLD-SCNC: 105 MMOL/L (ref 94–109)
CO2 SERPL-SCNC: 24 MMOL/L (ref 20–32)
CREAT SERPL-MCNC: 1.15 MG/DL (ref 0.52–1.04)
GFR SERPL CREATININE-BSD FRML MDRD: 59 ML/MIN/1.73M2
GLUCOSE BLD-MCNC: 161 MG/DL (ref 70–99)
POTASSIUM BLD-SCNC: 3.4 MMOL/L (ref 3.4–5.3)
SODIUM SERPL-SCNC: 139 MMOL/L (ref 133–144)

## 2022-03-16 NOTE — TELEPHONE ENCOUNTER
Received fax request from   [facility]/pharmacy #[id number]  [address] [place] [address]  Phone: [phone] Fax: [phone]    Pharmacy requesting refill(s) for cyclobenzaprine (FLEXERIL) 10 MG tablet    Last refilled on 09/12/21    Pt last seen on 03/15/22 (yesterday)    Next appt scheduled for 05/12/22    Will facilitate refill.    Perla Lopez [facility] [place]

## 2022-03-16 NOTE — RESULT ENCOUNTER NOTE
Repeated BMP, creatinine continues to be elevated. Recommend PCP follow-up and stopping all NSAIDs.    Archie Jones DO  [facility]

## 2022-03-17 RX ORDER — CYCLOBENZAPRINE HCL 10 MG
10 TABLET ORAL 3 TIMES DAILY PRN
Qty: 90 TABLET | Refills: 3 | Status: SHIPPED | OUTPATIENT
Start: 2022-03-17 | End: 2022-07-26

## 2022-03-17 NOTE — TELEPHONE ENCOUNTER
Flexeril refill approved.    Archie Jones [facility]

## 2022-03-20 ENCOUNTER — HEALTH MAINTENANCE LETTER (OUTPATIENT)
Age: 47
End: 2022-03-20

## 2022-04-04 DIAGNOSIS — J45.40 MODERATE PERSISTENT ASTHMA WITHOUT COMPLICATION: ICD-10-CM

## 2022-04-04 DIAGNOSIS — R06.02 SOB (SHORTNESS OF BREATH): ICD-10-CM

## 2022-04-06 RX ORDER — ALBUTEROL SULFATE 0.83 MG/ML
SOLUTION RESPIRATORY (INHALATION)
Qty: 150 ML | Refills: 0 | Status: SHIPPED | OUTPATIENT
Start: 2022-04-06 | End: 2022-05-19

## 2022-04-06 NOTE — TELEPHONE ENCOUNTER
----- Message from Daphney Links sent at 12/13/2018  1:29 PM EST -----  Regarding: NP Klim/Refill  Pt requesting refill on medications Metformin  mg (Pt has one pill remaining) and Xopenex (not sure dosage) Pharmacy [facility] (on file) Best Contact [phone] Routing refill request to provider for review/approval because:  Labs out of range:  ACT  Adina HER RN, BSN

## 2022-04-13 ENCOUNTER — E-VISIT (OUTPATIENT)
Dept: URGENT CARE | Facility: CLINIC | Age: 47
End: 2022-04-13
Payer: COMMERCIAL

## 2022-04-13 DIAGNOSIS — N39.0 ACUTE UTI (URINARY TRACT INFECTION): Primary | ICD-10-CM

## 2022-04-13 PROCEDURE — 99421 OL DIG E/M SVC 5-10 MIN: CPT | Performed by: PHYSICIAN ASSISTANT

## 2022-04-13 RX ORDER — NITROFURANTOIN 25; 75 MG/1; MG/1
100 CAPSULE ORAL 2 TIMES DAILY
Qty: 10 CAPSULE | Refills: 0 | Status: SHIPPED | OUTPATIENT
Start: 2022-04-13 | End: 2022-04-18

## 2022-04-13 NOTE — PATIENT INSTRUCTIONS
Dear Coni Newberry    After reviewing your responses, I've been able to diagnose you with a urinary tract infection, which is a common infection of the bladder with bacteria.  This is not a sexually transmitted infection, though urinating immediately after intercourse can help prevent infections.  Drinking lots of fluids is also helpful to clear your current infection and prevent the next one.      I have sent a prescription for antibiotics to your pharmacy to treat this infection.    It is important that you take all of your prescribed medication even if your symptoms are improving after a few doses.  Taking all of your medicine helps prevent the symptoms from returning.     If your symptoms worsen, you develop pain in your back or stomach, develop fevers, or are not improving in 5 days, please contact your primary care provider for an appointment or visit any of our convenient Walk-in or Urgent Care Centers to be seen, which can be found on our website here.    Thanks again for choosing us as your health care partner,    Rai Lord PA-C    Urinary Tract Infections in Women  Urinary tract infections (UTIs) are most often caused by bacteria. These bacteria enter the urinary tract. The bacteria may come from inside the body. Or they may travel from the skin outside the rectum or vagina into the urethra. Female anatomy makes it easy for bacteria from the bowel to enter a woman s urinary tract, which is the most common source of UTI. This means women develop UTIs more often than men. Pain in or around the urinary tract is a common UTI symptom. But the only way to know for sure if you have a UTI for the healthcare provider to test your urine. The two tests that may be done are the urinalysis and urine culture.     Types of UTIs    Cystitis. A bladder infection (cystitis) is the most common UTI in women. You may have urgent or frequent need to pee. You may also have pain, burning when you pee, and bloody  urine.    Urethritis. This is an inflamed urethra, which is the tube that carries urine from the bladder to outside the body. You may have lower stomach or back pain. You may also have urgent or frequent need to pee.    Pyelonephritis. This is a kidney infection. If not treated, it can be serious and damage your kidneys. In severe cases, you may need to stay in the hospital. You may have a fever and lower back pain.    Medicines to treat a UTI  Most UTIs are treated with antibiotics. These kill the bacteria. The length of time you need to take them depends on the type of infection. It may be as short as 3 days. If you have repeated UTIs, you may need a low-dose antibiotic for several months. Take antibiotics exactly as directed. Don t stop taking them until all of the medicine is gone. If you stop taking the antibiotic too soon, the infection may not go away. You may also develop a resistance to the antibiotic. This can make it much harder to treat.   Lifestyle changes to treat and prevent UTIs   The lifestyle changes below will help get rid of your UTI. They may also help prevent future UTIs.     Drink plenty of fluids. This includes water, juice, or other caffeine-free drinks. Fluids help flush bacteria out of your body.    Empty your bladder. Always empty your bladder when you feel the urge to pee. And always pee before going to sleep. Urine that stays in your bladder can lead to infection. Try to pee before and after sex as well.    Practice good personal hygiene. Wipe yourself from front to back after using the toilet. This helps keep bacteria from getting into the urethra.    Use condoms during sex. These help prevent UTIs caused by sexually transmitted bacteria. Also don't use spermicides during sex. These can increase the risk for UTIs. Choose other forms of birth control instead. For women who tend to get UTIs after sex, a low-dose of a preventive antibiotic may be used. Be sure to discuss this option with  your healthcare provider.    Follow up with your healthcare provider as directed. He or she may test to make sure the infection has cleared. If needed, more treatment may be started.  Edin last reviewed this educational content on 7/1/2019 2000-2021 [employer]. All rights reserved. This information is not intended as a substitute for professional medical care. Always follow your healthcare professional's instructions.

## 2022-04-19 ENCOUNTER — TELEPHONE (OUTPATIENT)
Dept: INTERNAL MEDICINE | Facility: CLINIC | Age: 47
End: 2022-04-19

## 2022-04-19 DIAGNOSIS — J45.31 MILD PERSISTENT ASTHMA WITH ACUTE EXACERBATION: Primary | ICD-10-CM

## 2022-04-19 NOTE — TELEPHONE ENCOUNTER
Patient is calling to get an order for a new nebulizer because hers broke last night. She was supposed to see Farida today but we canceled her appointment. Please fax it to the [facility] medical supply at [phone].

## 2022-04-28 ENCOUNTER — E-VISIT (OUTPATIENT)
Dept: INTERNAL MEDICINE | Facility: CLINIC | Age: 47
End: 2022-04-28
Payer: COMMERCIAL

## 2022-04-28 ENCOUNTER — TELEPHONE (OUTPATIENT)
Dept: INTERNAL MEDICINE | Facility: CLINIC | Age: 47
End: 2022-04-28

## 2022-04-28 DIAGNOSIS — N18.31 CHRONIC KIDNEY DISEASE, STAGE 3A (H): ICD-10-CM

## 2022-04-28 DIAGNOSIS — R30.0 DYSURIA: Primary | ICD-10-CM

## 2022-04-28 DIAGNOSIS — E78.5 HYPERLIPIDEMIA WITH TARGET LDL LESS THAN 70: ICD-10-CM

## 2022-04-28 DIAGNOSIS — E66.01 OBESITY, CLASS III, BMI 40-49.9 (MORBID OBESITY) (H): ICD-10-CM

## 2022-04-28 DIAGNOSIS — I10 ESSENTIAL HYPERTENSION WITH GOAL BLOOD PRESSURE LESS THAN 140/90: ICD-10-CM

## 2022-04-28 DIAGNOSIS — E11.9 TYPE 2 DIABETES MELLITUS TREATED WITHOUT INSULIN (H): ICD-10-CM

## 2022-04-28 DIAGNOSIS — Z00.00 LABORATORY EXAMINATION ORDERED AS PART OF A ROUTINE GENERAL MEDICAL EXAMINATION: Primary | ICD-10-CM

## 2022-04-28 DIAGNOSIS — N39.0 ACUTE UTI (URINARY TRACT INFECTION): ICD-10-CM

## 2022-04-28 PROCEDURE — 99421 OL DIG E/M SVC 5-10 MIN: CPT | Performed by: NURSE PRACTITIONER

## 2022-04-28 RX ORDER — NITROFURANTOIN 25; 75 MG/1; MG/1
100 CAPSULE ORAL 2 TIMES DAILY
Qty: 14 CAPSULE | Refills: 0 | Status: SHIPPED | OUTPATIENT
Start: 2022-04-28 | End: 2022-05-19

## 2022-04-28 NOTE — PATIENT INSTRUCTIONS
Dear Coni Newberry    After reviewing your responses, I've been able to diagnose you with a urinary tract infection, which is a common infection of the bladder with bacteria.  This is not a sexually transmitted infection, though urinating immediately after intercourse can help prevent infections.  Drinking lots of fluids is also helpful to clear your current infection and prevent the next one.      I have sent a prescription for antibiotics to your pharmacy to treat this infection.    It is important that you take all of your prescribed medication even if your symptoms are improving after a few doses.  Taking all of your medicine helps prevent the symptoms from returning.     If your symptoms worsen, you develop pain in your back or stomach, develop fevers, or are not improving in 5 days, please contact your primary care provider for an appointment or visit any of our convenient Walk-in or Urgent Care Centers to be seen, which can be found on our website here.    Thanks again for choosing us as your health care partner,    DANYELL Spencer CNP  I am sending antibiotic in. Also placed an order for urine testing

## 2022-04-28 NOTE — TELEPHONE ENCOUNTER
Patient would like lab orders put in for blood drawn and also stated she had a e-visit with Rai Lord in urgent care for UTI and still having symptoms. Patient would like to get another prescription for antibiotics to her pharmacy.

## 2022-05-09 ENCOUNTER — TRANSFERRED RECORDS (OUTPATIENT)
Dept: HEALTH INFORMATION MANAGEMENT | Facility: CLINIC | Age: 47
End: 2022-05-09
Payer: COMMERCIAL

## 2022-05-09 LAB — RETINOPATHY: NEGATIVE

## 2022-05-12 ENCOUNTER — OFFICE VISIT (OUTPATIENT)
Dept: PALLIATIVE MEDICINE | Facility: CLINIC | Age: 47
End: 2022-05-12
Payer: COMMERCIAL

## 2022-05-12 VITALS — SYSTOLIC BLOOD PRESSURE: 127 MMHG | HEART RATE: 89 BPM | DIASTOLIC BLOOD PRESSURE: 84 MMHG | OXYGEN SATURATION: 98 %

## 2022-05-12 DIAGNOSIS — G89.29 CHRONIC PAIN OF BOTH KNEES: ICD-10-CM

## 2022-05-12 DIAGNOSIS — M17.11 PRIMARY OSTEOARTHRITIS OF RIGHT KNEE: ICD-10-CM

## 2022-05-12 DIAGNOSIS — M25.562 CHRONIC PAIN OF BOTH KNEES: ICD-10-CM

## 2022-05-12 DIAGNOSIS — M17.12 PRIMARY OSTEOARTHRITIS OF LEFT KNEE: Primary | ICD-10-CM

## 2022-05-12 DIAGNOSIS — M25.561 CHRONIC PAIN OF BOTH KNEES: ICD-10-CM

## 2022-05-12 PROCEDURE — 99213 OFFICE O/P EST LOW 20 MIN: CPT | Performed by: PHYSICAL MEDICINE & REHABILITATION

## 2022-05-12 ASSESSMENT — PAIN SCALES - GENERAL: PAINLEVEL: SEVERE PAIN (6)

## 2022-05-12 NOTE — PROGRESS NOTES
[facility]    Date of visit: 5/12/22         Assessment:  Coni Newberry is a 46 year old with past medical history including: DM 2, Asthma, HTN, Obesity who presents for evaluation and treatment of the following chronic pain conditions:     1. Primary Bilateral Osteoarthritis with meniscal tearing: Patient has severe bilateral knee pain as a result of the above. Bilateral medial meniscus tears noted on imaging. She is not a candidate for meniscectomy due to the arthritis burden and not a candidate for TKA due to age and weight. They had previously tried conservative treatments including PT, NSAIDs (cant take right now due to reduced renal function), Tramadol, steroid and euflexxa injections with no sustained releif. Bilateral genicular RFA completed on 6/9/21 with moderate improvement to date. Coni Brar reports ongoing relief with RFA but that knee pain has been interfering with activity and occasionally their sleep as well.              Plan:  The following recommendations were given to the patient. Diagnosis, treatment options, risks, benefits, and alternatives were discussed, and all questions were answered. The patient expressed understanding of the plan for management.      I am recommending a multidisciplinary treatment plan to help this patient better manage her pain.  This includes:      1. Physical Therapy: Continue prior exercises.  2. Clinical Health Pain Psychologist: Defer, coping well.  3. Self Care Recommendations: Gentle progressive exercise that does not increase pain - gradually increase daily walking program.  Take mini breaks - 5 minutes of mindfullness a couple times a day.   1. Weight management clinic referral placed.  4. Diagnostic Studies: none  5. Medication Management:   1. Stop all nsaids due to elevated Cr.  2. Continue tylenol 1000mg TID prn  3. Continue flexeril 10mg TID prn  4. Continue lidocaine patches to the knees  nightly as needed.  6. Further procedures recommended: genicular RFA can be completed every 6+ months as needed.  7. Follow up: as needed.      Archie Jones DO  [facility]           Chief complaint:   Chief Complaint   Patient presents with     Pain       Interval history:  Coni Newberry is a 46 year old female last seen by me on 3/15/21.      -Coni reports that the genicular RFA is still helping about 50%.    -They haven't been taking any NSAIDs due to the change in their renal function. Will have follow up with PCP regarding this.    -Not having to use brace or crutches right now, last year when the knee pain was bad they had to use this.    -Flexeril continues to helps calm down some of the muscle pain in the legs and also helps with sleep.    -They stopped using salon pas patches due to concern this would affect their kidneys.    -They take tylenol two tablets twice daily.        Pain scores:  Pain intensity on average is 6 on a scale of 0-10.     Pain Treatments:  1. Medications:       Current pain medications:  -Tylenol 650mg prn  -Flexeril 10mg BID prn       Previous pain medications:  -Nabumetone - nh  -Celebrex 200mg - once daily - wears off after about 5 hours, NSAIDs stopped due to CKD  -Tramadol 50mg q6h prn - 2-3 tabs daily  2. Physical Therapy: made pain on left side worse  3. Pain psychology: hasn't tried  4. Surgery: none  5. Injections:               -Two weeks of relief with euflexxa injections for the knees - 12/7/20              -Two weeks of relief with steroid injections for knees - 11/3/20  6. Alternative Therapies:               Chiropractic: hasn't tried               Acupuncture: hasn't tried  Side Effects: no side effect    Medications:  Current Outpatient Medications   Medication Sig Dispense Refill     albuterol (PROAIR HFA/PROVENTIL HFA/VENTOLIN HFA) 108 (90 Base) MCG/ACT inhaler Inhale 2 puffs into the lungs every 6 hours respiclick did not work for  patient 54 g 11     albuterol (PROVENTIL) (2.5 MG/3ML) 0.083% neb solution USE 1 VIAL IN NEBULIZER EVERY 6 HOURS AS NEEDED FOR SHORTNESS OF BREATH OR WHEEZING 150 mL 0     atorvastatin (LIPITOR) 10 MG tablet Take 1 tablet by mouth once daily 90 tablet 2     beclomethasone HFA (QVAR REDIHALER) 80 MCG/ACT inhaler Inhale 2 puffs by mouth twice daily 11 g 11     blood glucose monitoring (ONE TOUCH ULTRA 2) meter device kit Use to test blood sugars 1 times daily or as directed. 1 kit 0     blood glucose monitoring (ONE TOUCH ULTRA) test strip Use to test blood sugars 1 times daily or as directed. 3 Box 1     blood glucose test strip Use to test blood sugar 6 times daily or as directed. 600 strip 3     cetirizine-pseudoePHEDrine ER (ZYRTEC-D) 5-120 MG 12 hr tablet Take 1 tablet by mouth 2 times daily Reported on 3/31/2017 180 tablet 3     chlorthalidone (HYGROTON) 25 MG tablet Take 1 tablet (25 mg) by mouth daily 90 tablet 3     cyclobenzaprine (FLEXERIL) 10 MG tablet Take 1 tablet (10 mg) by mouth 3 times daily as needed for muscle spasms 90 tablet 3     fluticasone (FLONASE) 50 MCG/ACT nasal spray Spray 2 sprays into both nostrils daily 3 Package 3     glyBURIDE (DIABETA /MICRONASE) 5 MG tablet Take 1 tablet by mouth once daily with breakfast 90 tablet 3     LANCETS ULTRA FINE MISC 1 each 6 times daily 300 each 2     lisinopril (ZESTRIL) 20 MG tablet Take 1 tablet (20 mg) by mouth daily 90 tablet 3     metFORMIN (GLUCOPHAGE) 500 MG tablet TAKE 2 & 1/2 (TWO & ONE-HALF) TABLETS BY MOUTH TWICE DAILY WITH MEALS 450 tablet 3     montelukast (SINGULAIR) 10 MG tablet Take 1 tablet (10 mg) by mouth At Bedtime 90 tablet 3     norethindrone (MICRONOR) 0.35 MG tablet Take 1 tablet (0.35 mg) by mouth daily 84 tablet 3     nystatin (MYCOSTATIN) 219399 UNIT/GM external ointment APPLY  OINTMENT TOPICALLY TWICE DAILY AS NEEDED TO SKIN FOLD RASH 30 g 3     omeprazole (PRILOSEC) 20 MG DR capsule TAKE 1 CAPSULE BY MOUTH ONCE DAILY 30-60  MINUTES BEFORE A MEAL 90 capsule 3     order for DME Equipment being ordered: tennis elbow brace 1 Device 0     order for DME Equipment being ordered: Nebulizer and tubing 1 Device 0     triamcinolone (KENALOG) 0.1 % external ointment APPLY OINTMENT SPARINGLY TO AFFECTED AREA THREE TIMES DAILY AS NEEDED FOR ECZEMA RASH 80 g 0     nitroFURantoin macrocrystal-monohydrate (MACROBID) 100 MG capsule Take 1 capsule (100 mg) by mouth 2 times daily (Patient not taking: Reported on 5/12/2022) 14 capsule 0       Medical History: any changes in medical history since they were last seen? No    Review of Systems:  Positive for: arthritis, knee pain  Any bowel or bladder problems: denies  Mood: denies    Physical Exam:  Blood pressure 127/84, pulse 89, SpO2 98 %, not currently breastfeeding.  General: nad, pleasant  Gait: Antalgic  MSK exam: Mild medial joint line tenderness bilaterally at the knees. Strength in the lower extremities is 5/5 and symmetric.        BILLING TIME DOCUMENTATION:   The total TIME spent on this patient on the date of the encounter/appointment was 25 minutes.      TOTAL TIME includes:   Time spent preparing to see the patient (reviewing records and tests)   Time spent face to face (or over the phone) with the patient   Time spent ordering tests, medications, procedures and referrals   Time spent Referring and communicating with other healthcare professionals   Time spent documenting clinical information in Epic         DO [facility]

## 2022-05-12 NOTE — PATIENT INSTRUCTIONS
You can take tylenol 650-1000mg four times daily as needed.  You can continue the flexeril.  You can restart using lidocaine/salon pas patches at night.  You can call the number below to request the knee ablation procedure.  You can also call the number below to schedule a follow.    ----------------------------------------------------------------  Clinic Number:  [phone]   Call with any questions about your care and for scheduling assistance.   Calls are returned Monday through Friday between 8 AM and 4:30 PM. We usually get back to you within 2 business days depending on the issue/request.    If we are prescribing your medications:  For opioid medication refills, call the clinic or send a Synapticon message 7 days in advance.  Please include:  Name of requested medication  Name of the pharmacy.  For non-opioid medications, call your pharmacy directly to request a refill. Please allow 3-4 days to be processed.   Per [place] State Law:  All controlled substance prescriptions must be filled within 30 days of being written.    For those controlled substances allowing refills, pickup must occur within 30 days of last fill.      We believe regular attendance is key to your success in our program!    Any time you are unable to keep your appointment we ask that you call us at least 24 hours in advance to cancel.This will allow us to offer the appointment time to another patient.   Multiple missed appointments may lead to dismissal from the clinic.

## 2022-05-15 ENCOUNTER — HEALTH MAINTENANCE LETTER (OUTPATIENT)
Age: 47
End: 2022-05-15

## 2022-05-15 ENCOUNTER — LAB (OUTPATIENT)
Dept: LAB | Facility: CLINIC | Age: 47
End: 2022-05-15
Payer: COMMERCIAL

## 2022-05-15 DIAGNOSIS — E11.9 TYPE 2 DIABETES MELLITUS TREATED WITHOUT INSULIN (H): ICD-10-CM

## 2022-05-15 DIAGNOSIS — R30.0 DYSURIA: ICD-10-CM

## 2022-05-15 DIAGNOSIS — I10 ESSENTIAL HYPERTENSION WITH GOAL BLOOD PRESSURE LESS THAN 140/90: ICD-10-CM

## 2022-05-15 DIAGNOSIS — E78.5 HYPERLIPIDEMIA WITH TARGET LDL LESS THAN 70: ICD-10-CM

## 2022-05-15 DIAGNOSIS — E66.01 OBESITY, CLASS III, BMI 40-49.9 (MORBID OBESITY) (H): ICD-10-CM

## 2022-05-15 DIAGNOSIS — Z00.00 LABORATORY EXAMINATION ORDERED AS PART OF A ROUTINE GENERAL MEDICAL EXAMINATION: ICD-10-CM

## 2022-05-15 LAB
ALBUMIN UR-MCNC: NEGATIVE MG/DL
APPEARANCE UR: CLEAR
BASOPHILS # BLD AUTO: 0 10E3/UL (ref 0–0.2)
BASOPHILS NFR BLD AUTO: 0 %
BILIRUB UR QL STRIP: NEGATIVE
COLOR UR AUTO: YELLOW
EOSINOPHIL # BLD AUTO: 0.3 10E3/UL (ref 0–0.7)
EOSINOPHIL NFR BLD AUTO: 3 %
ERYTHROCYTE [DISTWIDTH] IN BLOOD BY AUTOMATED COUNT: 14 % (ref 10–15)
GLUCOSE UR STRIP-MCNC: NEGATIVE MG/DL
HBA1C MFR BLD: 6.8 % (ref 0–5.6)
HCT VFR BLD AUTO: 40.1 % (ref 35–47)
HGB BLD-MCNC: 13.3 G/DL (ref 11.7–15.7)
HGB UR QL STRIP: NEGATIVE
KETONES UR STRIP-MCNC: ABNORMAL MG/DL
LEUKOCYTE ESTERASE UR QL STRIP: NEGATIVE
LYMPHOCYTES # BLD AUTO: 2.2 10E3/UL (ref 0.8–5.3)
LYMPHOCYTES NFR BLD AUTO: 20 %
MCH RBC QN AUTO: 27.7 PG (ref 26.5–33)
MCHC RBC AUTO-ENTMCNC: 33.2 G/DL (ref 31.5–36.5)
MCV RBC AUTO: 84 FL (ref 78–100)
MONOCYTES # BLD AUTO: 0.7 10E3/UL (ref 0–1.3)
MONOCYTES NFR BLD AUTO: 6 %
NEUTROPHILS # BLD AUTO: 7.7 10E3/UL (ref 1.6–8.3)
NEUTROPHILS NFR BLD AUTO: 71 %
NITRATE UR QL: NEGATIVE
PH UR STRIP: 6.5 [PH] (ref 5–7)
PLATELET # BLD AUTO: 253 10E3/UL (ref 150–450)
RBC # BLD AUTO: 4.8 10E6/UL (ref 3.8–5.2)
SP GR UR STRIP: 1.02 (ref 1–1.03)
UROBILINOGEN UR STRIP-ACNC: 0.2 E.U./DL
WBC # BLD AUTO: 10.9 10E3/UL (ref 4–11)

## 2022-05-15 PROCEDURE — 36415 COLL VENOUS BLD VENIPUNCTURE: CPT

## 2022-05-15 PROCEDURE — 83036 HEMOGLOBIN GLYCOSYLATED A1C: CPT

## 2022-05-15 PROCEDURE — 80061 LIPID PANEL: CPT

## 2022-05-15 PROCEDURE — 87086 URINE CULTURE/COLONY COUNT: CPT

## 2022-05-15 PROCEDURE — 80050 GENERAL HEALTH PANEL: CPT

## 2022-05-15 PROCEDURE — 81003 URINALYSIS AUTO W/O SCOPE: CPT

## 2022-05-16 LAB
ALBUMIN SERPL-MCNC: 4 G/DL (ref 3.4–5)
ALP SERPL-CCNC: 87 U/L (ref 40–150)
ALT SERPL W P-5'-P-CCNC: 44 U/L (ref 0–50)
ANION GAP SERPL CALCULATED.3IONS-SCNC: 10 MMOL/L (ref 3–14)
AST SERPL W P-5'-P-CCNC: 25 U/L (ref 0–45)
BILIRUB SERPL-MCNC: 0.4 MG/DL (ref 0.2–1.3)
BUN SERPL-MCNC: 18 MG/DL (ref 7–30)
CALCIUM SERPL-MCNC: 9.5 MG/DL (ref 8.5–10.1)
CHLORIDE BLD-SCNC: 108 MMOL/L (ref 94–109)
CHOLEST SERPL-MCNC: 143 MG/DL
CO2 SERPL-SCNC: 22 MMOL/L (ref 20–32)
CREAT SERPL-MCNC: 1.05 MG/DL (ref 0.52–1.04)
FASTING STATUS PATIENT QL REPORTED: NO
GFR SERPL CREATININE-BSD FRML MDRD: 66 ML/MIN/1.73M2
GLUCOSE BLD-MCNC: 181 MG/DL (ref 70–99)
HDLC SERPL-MCNC: 51 MG/DL
LDLC SERPL CALC-MCNC: 57 MG/DL
NONHDLC SERPL-MCNC: 92 MG/DL
POTASSIUM BLD-SCNC: 3.5 MMOL/L (ref 3.4–5.3)
PROT SERPL-MCNC: 7.8 G/DL (ref 6.8–8.8)
SODIUM SERPL-SCNC: 140 MMOL/L (ref 133–144)
TRIGL SERPL-MCNC: 173 MG/DL
TSH SERPL DL<=0.005 MIU/L-ACNC: 1.35 MU/L (ref 0.4–4)

## 2022-05-17 LAB — BACTERIA UR CULT: NORMAL

## 2022-05-19 ENCOUNTER — TELEPHONE (OUTPATIENT)
Dept: INTERNAL MEDICINE | Facility: CLINIC | Age: 47
End: 2022-05-19

## 2022-05-19 ENCOUNTER — OFFICE VISIT (OUTPATIENT)
Dept: INTERNAL MEDICINE | Facility: CLINIC | Age: 47
End: 2022-05-19
Payer: COMMERCIAL

## 2022-05-19 VITALS
SYSTOLIC BLOOD PRESSURE: 128 MMHG | BODY MASS INDEX: 50.02 KG/M2 | HEART RATE: 115 BPM | OXYGEN SATURATION: 98 % | RESPIRATION RATE: 22 BRPM | WEIGHT: 293 LBS | HEIGHT: 64 IN | TEMPERATURE: 98.4 F | DIASTOLIC BLOOD PRESSURE: 80 MMHG

## 2022-05-19 DIAGNOSIS — L30.0 NUMMULAR ECZEMA: ICD-10-CM

## 2022-05-19 DIAGNOSIS — Z79.899 ENCOUNTER FOR LONG-TERM (CURRENT) USE OF MEDICATIONS: ICD-10-CM

## 2022-05-19 DIAGNOSIS — B37.2 YEAST INFECTION OF THE SKIN: ICD-10-CM

## 2022-05-19 DIAGNOSIS — N18.31 CHRONIC KIDNEY DISEASE, STAGE 3A (H): ICD-10-CM

## 2022-05-19 DIAGNOSIS — J45.30 MILD PERSISTENT ASTHMA WITHOUT COMPLICATION: ICD-10-CM

## 2022-05-19 DIAGNOSIS — Z12.11 SCREEN FOR COLON CANCER: ICD-10-CM

## 2022-05-19 DIAGNOSIS — Z12.31 ENCOUNTER FOR SCREENING MAMMOGRAM FOR BREAST CANCER: ICD-10-CM

## 2022-05-19 DIAGNOSIS — E66.01 OBESITY, CLASS III, BMI 40-49.9 (MORBID OBESITY) (H): ICD-10-CM

## 2022-05-19 DIAGNOSIS — E11.9 TYPE 2 DIABETES MELLITUS WITHOUT COMPLICATION, WITHOUT LONG-TERM CURRENT USE OF INSULIN (H): Primary | ICD-10-CM

## 2022-05-19 DIAGNOSIS — Z30.41 ENCOUNTER FOR SURVEILLANCE OF CONTRACEPTIVE PILLS: ICD-10-CM

## 2022-05-19 DIAGNOSIS — E78.5 HYPERLIPIDEMIA WITH TARGET LDL LESS THAN 70: ICD-10-CM

## 2022-05-19 DIAGNOSIS — Z12.11 COLON CANCER SCREENING: ICD-10-CM

## 2022-05-19 DIAGNOSIS — I10 ESSENTIAL HYPERTENSION WITH GOAL BLOOD PRESSURE LESS THAN 140/90: ICD-10-CM

## 2022-05-19 DIAGNOSIS — Z12.31 VISIT FOR SCREENING MAMMOGRAM: ICD-10-CM

## 2022-05-19 DIAGNOSIS — R10.13 ABDOMINAL PAIN, EPIGASTRIC: ICD-10-CM

## 2022-05-19 PROCEDURE — 99213 OFFICE O/P EST LOW 20 MIN: CPT | Performed by: NURSE PRACTITIONER

## 2022-05-19 RX ORDER — MONTELUKAST SODIUM 10 MG/1
1 TABLET ORAL AT BEDTIME
Qty: 90 TABLET | Refills: 3 | Status: SHIPPED | OUTPATIENT
Start: 2022-05-19 | End: 2023-07-29

## 2022-05-19 RX ORDER — ALBUTEROL SULFATE 90 UG/1
2 AEROSOL, METERED RESPIRATORY (INHALATION) EVERY 6 HOURS
Qty: 54 G | Refills: 11 | Status: SHIPPED | OUTPATIENT
Start: 2022-05-19 | End: 2023-07-14

## 2022-05-19 RX ORDER — LISINOPRIL 20 MG/1
20 TABLET ORAL DAILY
Qty: 90 TABLET | Refills: 3 | Status: SHIPPED | OUTPATIENT
Start: 2022-05-19 | End: 2022-10-10

## 2022-05-19 RX ORDER — TRIAMCINOLONE ACETONIDE 1 MG/G
OINTMENT TOPICAL
Qty: 80 G | Refills: 11 | Status: SHIPPED | OUTPATIENT
Start: 2022-05-19 | End: 2022-05-19

## 2022-05-19 RX ORDER — ALBUTEROL SULFATE 0.83 MG/ML
SOLUTION RESPIRATORY (INHALATION)
Qty: 150 ML | Refills: 11 | Status: SHIPPED | OUTPATIENT
Start: 2022-05-19 | End: 2023-08-25

## 2022-05-19 RX ORDER — ACETAMINOPHEN AND CODEINE PHOSPHATE 120; 12 MG/5ML; MG/5ML
0.35 SOLUTION ORAL DAILY
Qty: 84 TABLET | Refills: 3 | Status: SHIPPED | OUTPATIENT
Start: 2022-05-19 | End: 2022-09-13

## 2022-05-19 RX ORDER — ATORVASTATIN CALCIUM 10 MG/1
10 TABLET, FILM COATED ORAL DAILY
Qty: 90 TABLET | Refills: 3 | Status: SHIPPED | OUTPATIENT
Start: 2022-05-19 | End: 2022-11-08

## 2022-05-19 RX ORDER — NYSTATIN 100000 U/G
OINTMENT TOPICAL
Qty: 30 G | Refills: 6 | Status: SHIPPED | OUTPATIENT
Start: 2022-05-19 | End: 2023-06-28

## 2022-05-19 RX ORDER — GLYBURIDE 5 MG/1
TABLET ORAL
Qty: 90 TABLET | Refills: 3 | Status: SHIPPED | OUTPATIENT
Start: 2022-05-19 | End: 2023-08-09

## 2022-05-19 RX ORDER — CHLORTHALIDONE 25 MG/1
25 TABLET ORAL DAILY
Qty: 90 TABLET | Refills: 3 | Status: SHIPPED | OUTPATIENT
Start: 2022-05-19 | End: 2022-10-10

## 2022-05-19 RX ORDER — NYSTATIN 100000 U/G
OINTMENT TOPICAL
Qty: 30 G | Refills: 6 | Status: SHIPPED | OUTPATIENT
Start: 2022-05-19 | End: 2022-05-19

## 2022-05-19 RX ORDER — CETIRIZINE HYDROCHLORIDE, PSEUDOEPHEDRINE HYDROCHLORIDE 5; 120 MG/1; MG/1
1 TABLET, FILM COATED, EXTENDED RELEASE ORAL 2 TIMES DAILY
Qty: 180 TABLET | Refills: 3 | Status: SHIPPED | OUTPATIENT
Start: 2022-05-19 | End: 2023-06-15

## 2022-05-19 RX ORDER — TRIAMCINOLONE ACETONIDE 1 MG/G
OINTMENT TOPICAL
Qty: 80 G | Refills: 11 | Status: SHIPPED | OUTPATIENT
Start: 2022-05-19 | End: 2023-08-14

## 2022-05-19 RX ORDER — BECLOMETHASONE DIPROPIONATE HFA 80 UG/1
AEROSOL, METERED RESPIRATORY (INHALATION)
Qty: 11 G | Refills: 11 | Status: SHIPPED | OUTPATIENT
Start: 2022-05-19 | End: 2023-07-14

## 2022-05-19 NOTE — PROGRESS NOTES
Assessment & Plan     Type 2 diabetes mellitus without complication, without long-term current use of insulin (H)  In good range   Lab Results   Component Value Date    A1C 6.8 05/15/2022    A1C 7.0 08/13/2021    A1C 7.8 08/24/2020    A1C 7.3 02/22/2020    A1C 7.4 06/29/2019    A1C 6.7 11/15/2018    A1C 6.0 12/29/2017       - glyBURIDE (DIABETA /MICRONASE) 5 MG tablet; Take 1 tablet by mouth once daily with breakfast  - metFORMIN (GLUCOPHAGE) 500 MG tablet; TAKE 2 & 1/2 (TWO & ONE-HALF) TABLETS BY MOUTH TWICE DAILY WITH MEALS    Mild persistent asthma without complication  Stable on medication   Weather making worse right now   - albuterol (PROAIR HFA/PROVENTIL HFA/VENTOLIN HFA) 108 (90 Base) MCG/ACT inhaler; Inhale 2 puffs into the lungs every 6 hours respiclick did not work for patient    Essential hypertension with goal blood pressure less than 140/90  In good range   - chlorthalidone (HYGROTON) 25 MG tablet; Take 1 tablet (25 mg) by mouth daily  - lisinopril (ZESTRIL) 20 MG tablet; Take 1 tablet (20 mg) by mouth daily    Yeast infection of the skin  Under folds - uses as needed     Nummular eczema  Uses as needed     Encounter for long-term (current) use of medications      Screen for colon cancer  Willing to do cologuard       Visit for screening mammogram  May do today   - MA Screen Bilateral w/Jared; Future    Obesity, Class III, BMI 40-49.9 (morbid obesity) (H)  Discussed     Chronic kidney disease, stage 3a (H)  In level 2 last check     Colon cancer screening    - COLOGUARD([employer])    Encounter for screening mammogram for breast cancer      Hyperlipidemia with target LDL less than 70    - atorvastatin (LIPITOR) 10 MG tablet; Take 1 tablet (10 mg) by mouth daily    Encounter for surveillance of contraceptive pills    - norethindrone (MICRONOR) 0.35 MG tablet; Take 1 tablet (0.35 mg) by mouth daily    Abdominal pain, epigastric  Working well   - omeprazole (PRILOSEC) 20 MG DR capsule; TAKE 1 CAPSULE  "BY MOUTH ONCE DAILY 30-60 MINUTES BEFORE A MEAL      21 minutes spent on the date of the encounter doing chart review, history and exam, documentation and further activities per the note       BMI:   Estimated body mass index is 53.77 kg/m  as calculated from the following:    Height as of this encounter: 1.619 m (5' 3.75\").    Weight as of this encounter: 141 kg (310 lb 12.8 oz).   Weight management plan: Discussed healthy diet and exercise guidelines    Patient Instructions   Medication refills       No follow-ups on file.    DANYELL Spencer CNP  [facility]   Coni Brar is a 47 year old who presents for the following health issues  accompanied by her self.    HPI     Diabetes Follow-up    How often are you checking your blood sugar? One time daily  What time of day are you checking your blood sugars (select all that apply)?  Before meals  Have you had any blood sugars above 200?  No  Have you had any blood sugars below 70?  No    What symptoms do you notice when your blood sugar is low?  Dizzy and headache    What concerns do you have today about your diabetes? Getting infections often     Do you have any of these symptoms? (Select all that apply)  Excessive thirst and Weight loss    Eye exam last week   change in vision - no diabetic concern      Hyperlipidemia Follow-Up      Are you regularly taking any medication or supplement to lower your cholesterol?   Yes- Atorvastatin    Are you having muscle aches or other side effects that you think could be caused by your cholesterol lowering medication?  Yes- Muscle aches but not is from medication or not    Hypertension Follow-up      Do you check your blood pressure regularly outside of the clinic? No     Are you following a low salt diet? Yes    Are your blood pressures ever more than 140 on the top number (systolic) OR more   than 90 on the bottom number (diastolic), for example 140/90? No    BP Readings from Last 2 " Encounters:   05/19/22 128/80   05/12/22 127/84     Hemoglobin A1C POCT (%)   Date Value   08/24/2020 7.8 (H)   02/22/2020 7.3 (H)     Hemoglobin A1C (%)   Date Value   05/15/2022 6.8 (H)   08/13/2021 7.0 (H)     LDL Cholesterol Calculated (mg/dL)   Date Value   05/15/2022 57   08/13/2021 94   02/22/2020 74   07/19/2017 62         How many servings of fruits and vegetables do you eat daily?  0-1    On average, how many sweetened beverages do you drink each day (Examples: soda, juice, sweet tea, etc.  Do NOT count diet or artificially sweetened beverages)?   1 serving per week    How many days per week do you exercise enough to make your heart beat faster? 3 or less    How many minutes a day do you exercise enough to make your heart beat faster? 9 or less    How many days per week do you miss taking your medication? Once in a while will miss a dose due to not remembering        Review of Systems   Constitutional, HEENT, cardiovascular, pulmonary, GI, , musculoskeletal, neuro, skin, endocrine and psych systems are negative, except as otherwise noted.      Objective    There were no vitals taken for this visit.  There is no height or weight on file to calculate BMI.  Physical Exam   GENERAL: alert and no distress  RESP: lungs clear to auscultation - no rales, rhonchi or wheezes  CV: regular rate and rhythm  ABDOMEN: soft, nontender, exam limited by body habitus  and bowel sounds normal  MS: no gross musculoskeletal defects noted, no edema  NEURO: Normal strength and tone, mentation intact and speech normal  PSYCH: mentation appears normal, affect normal/bright    Lab

## 2022-05-19 NOTE — TELEPHONE ENCOUNTER
Call received from pharmacy requesting clarification of Nystatin and Triamcinolone prescriptions. Per pharmacist specific areas of body are needed in order for number of day supply to be calculated. Please advise or send updated prescription.

## 2022-05-19 NOTE — TELEPHONE ENCOUNTER
The nystatin says for skin fold rash - she is heavy and has skin folds in abdomnen and under breast and so it is defined     I wrote definition of other cream

## 2022-05-27 ENCOUNTER — HOSPITAL ENCOUNTER (OUTPATIENT)
Dept: MAMMOGRAPHY | Facility: CLINIC | Age: 47
Discharge: HOME | End: 2022-05-27
Attending: NURSE PRACTITIONER | Admitting: NURSE PRACTITIONER
Payer: COMMERCIAL

## 2022-05-27 DIAGNOSIS — Z12.31 VISIT FOR SCREENING MAMMOGRAM: ICD-10-CM

## 2022-05-27 PROCEDURE — 77067 SCR MAMMO BI INCL CAD: CPT

## 2022-06-02 ENCOUNTER — TELEPHONE (OUTPATIENT)
Dept: PALLIATIVE MEDICINE | Facility: CLINIC | Age: 47
End: 2022-06-02
Payer: COMMERCIAL

## 2022-06-02 NOTE — TELEPHONE ENCOUNTER
Last OV: 5/12/22    Next OV: n/a    [place] plan: PRN for injections with TBD and clinic if needed with Elsy DUBOSE RN Care Coordinator  [facility]'

## 2022-06-14 ENCOUNTER — TELEPHONE (OUTPATIENT)
Dept: PALLIATIVE MEDICINE | Facility: CLINIC | Age: 47
End: 2022-06-14
Payer: COMMERCIAL

## 2022-06-14 DIAGNOSIS — M15.9 OSTEOARTHRITIS OF MULTIPLE JOINTS, UNSPECIFIED OSTEOARTHRITIS TYPE: Primary | ICD-10-CM

## 2022-06-14 NOTE — TELEPHONE ENCOUNTER
Pt requesting a repeat bilateral knee genicular nerve blocks.      Kit Parekh    [facility] [place]

## 2022-06-14 NOTE — TELEPHONE ENCOUNTER
"Routing for order from covering providers in Robert's absence if appropriate     Call placed to pt regarding request for repeat bilat genicular RFA    Coni Brar states she is looking to repeat bilateral synvisc injections at this time and not RFA process.She feels like both of her knees are \"bone on bone and locking up when she walks\" and she would like to try this instead of RFA first.       Last OV note 5/12/22: Injections:  Two weeks of relief with euflexxa injections for the knees - 12/7/20    Lexi DUBOSE RN Care Coordinator  [facility]    "

## 2022-06-15 NOTE — TELEPHONE ENCOUNTER
Call placed to pt, updated that Ortho will reach out for Synvisc scheduling and CAM auth    Lexi DUBOSE RN Care Coordinator  [facility]

## 2022-06-20 ENCOUNTER — OFFICE VISIT (OUTPATIENT)
Dept: INTERNAL MEDICINE | Facility: CLINIC | Age: 47
End: 2022-06-20
Payer: COMMERCIAL

## 2022-06-20 VITALS
HEIGHT: 64 IN | OXYGEN SATURATION: 99 % | SYSTOLIC BLOOD PRESSURE: 130 MMHG | BODY MASS INDEX: 50.02 KG/M2 | DIASTOLIC BLOOD PRESSURE: 79 MMHG | WEIGHT: 293 LBS | TEMPERATURE: 98.2 F | HEART RATE: 120 BPM | RESPIRATION RATE: 16 BRPM

## 2022-06-20 DIAGNOSIS — J30.9 ALLERGIC RHINITIS, UNSPECIFIED SEASONALITY, UNSPECIFIED TRIGGER: Primary | ICD-10-CM

## 2022-06-20 PROCEDURE — 99213 OFFICE O/P EST LOW 20 MIN: CPT | Performed by: INTERNAL MEDICINE

## 2022-06-20 RX ORDER — FEXOFENADINE HCL AND PSEUDOEPHEDRINE HCL 180; 240 MG/1; MG/1
1 TABLET, EXTENDED RELEASE ORAL DAILY
Qty: 90 TABLET | Refills: 3 | Status: SHIPPED | OUTPATIENT
Start: 2022-06-20 | End: 2023-06-15

## 2022-06-20 NOTE — NURSING NOTE
"/79 (BP Location: Right arm, Patient Position: Sitting, Cuff Size: Adult Regular)   Pulse 120   Temp 98.2  F (36.8  C) (Oral)   Resp 16   Ht 1.619 m (5' 3.75\")   Wt 140.1 kg (308 lb 14.4 oz)   LMP  (LMP Unknown)   SpO2 99%   BMI 53.44 kg/m      "

## 2022-06-20 NOTE — PROGRESS NOTES
"  Assessment & Plan     Allergic rhinitis, unspecified seasonality, unspecified trigger  Will try changing her antihistamine   - fexofenadine-pseudoePHEDrine (ALLEGRA-D 24) 180-240 MG 24 hr tablet; Take 1 tablet by mouth daily             No follow-ups on file.    Diana Sanders MD  [facility]    Bossman Brar is a 47 year old, presenting for the following health issues:  Ear Problem (Popping in ears for the last month)      HPI     She has no pain in her ears. No decreased hearing.  No sore throat but she does have seasonal allergies with nasal drainage, intermittent congestion  watery eyes. She has cats at home but has had them for over a year. No other new pets plants etc.       Review of Systems   Constitutional, HEENT, cardiovascular, pulmonary, gi and gu systems are negative, except as otherwise noted.      Objective    /79 (BP Location: Right arm, Patient Position: Sitting, Cuff Size: Adult Regular)   Pulse 120   Temp 98.2  F (36.8  C) (Oral)   Resp 16   Ht 1.619 m (5' 3.75\")   Wt 140.1 kg (308 lb 14.4 oz)   LMP  (LMP Unknown)   SpO2 99%   BMI 53.44 kg/m    Body mass index is 53.44 kg/m .  Physical Exam   GENERAL: healthy, alert and no distress  EYES: Eyes grossly normal to inspection, PERRL and conjunctivae and sclerae normal  HENT: ear canals and TM's normal, nose and mouth without ulcers or lesions  NECK: no adenopathy, no asymmetry, masses, or scars and thyroid normal to palpation  RESP: lungs clear to auscultation - no rales, rhonchi or wheezes              .  ..  " Normal

## 2022-07-06 ENCOUNTER — TELEPHONE (OUTPATIENT)
Dept: ORTHOPEDICS | Facility: CLINIC | Age: 47
End: 2022-07-06
Payer: COMMERCIAL

## 2022-07-06 DIAGNOSIS — M17.0 PRIMARY OSTEOARTHRITIS OF BOTH KNEES: Primary | ICD-10-CM

## 2022-07-06 NOTE — TELEPHONE ENCOUNTER
Patient scheduled for appointment on 7/11/22 for discussion of viscosupplementation injection vs steroid injection of bilateral knee.      Euflexxa injection last completed 12/7/2020.       Prior authorization referral for Durolane injection pended.     Please advise.    Brenda Shi, ATC

## 2022-07-06 NOTE — PROGRESS NOTES
"ASSESSMENT & PLAN  Patient Instructions     1. Primary osteoarthritis of both knees    2. Osteoarthritis of multiple joints, unspecified osteoarthritis type      -Patient is following up for chronic bilateral knee pain due to arthritis  -Patient tolerated bilateral cortisone injections today without complications.  Patient was given postprocedure instructions  -Patient had bilateral genicular nerve blocks last year which is still providing some pain relief.  Patient will call us when pain increases to reorder the nerve ablation again  -Patient will follow up when he returns  -Call direct clinic number [[phone]] at any time with questions or concerns.    Albert Yeo MD [facility] Orthopedics and Sports Medicine  [facility]          -----    SUBJECTIVE:  Coni Newberry is a 47 year old female who is seen in follow-up for bilateral knee pain, left currently worse than right.They were last seen 4/12/2021    Since their last visit reports 0% - (About the same as last time). They indicate that their current pain level is 5/10. Notes she still has \"scraping\" in her knee which bothers her, but pain is doing okay since having genicular RFA last year.  They have tried corticosteroid injection (most recent date: 2/17/21) that provided  2 week(s) of relief, bilateral knee genicular RFA on 6/9/21 with moderate improvement to date. Has one knee she uses from knee to knee.  Takes Cyclobenzaprine as needed, SalonPas/Icyhot as needed    The patient is seen by themselves.    Patient's past medical, surgical, social, and family histories were reviewed today and no changes are noted.    REVIEW OF SYSTEMS:  Constitutional: NEGATIVE for fever, chills, change in weight  Skin: NEGATIVE for worrisome rashes, moles or lesions  GI/: NEGATIVE for bowel or bladder changes  Neuro: NEGATIVE for weakness, dizziness or paresthesias    OBJECTIVE:  /86   Ht 1.619 m (5' 3.75\")   Wt 139.5 kg (307 lb 9.6 oz)  "  LMP  (LMP Unknown)   BMI 53.21 kg/m     General: healthy, alert and in no distress  HEENT: no scleral icterus or conjunctival erythema  Skin: no suspicious lesions or rash. No jaundice.  CV: regular rhythm by palpation, no pedal edema  Resp: normal respiratory effort without conversational dyspnea   Psych: normal mood and affect  Gait: normal steady gait with appropriate coordination and balance  Neuro: normal light touch sensory exam of the extremities.    MSK:  Bilateral KNEE  Inspection:    Normal alignment; no edema, erythema, or ecchymosis present  Palpation:    Tender about the lateral patellar facet, medial patellar facet and medial/lateral joint line. Remainder of bony and ligamentous landmarks are nontender.    Trace effusion is present    Patellofemoral crepitus is Absent  Range of Motion:     00 extension to 900 flexion  Strength:    Quadriceps 5-/5 and hamstrings 5-/5    Extensor mechanism intact  Special Tests:    Positive: none    Negative: MCL/valgus stress (0 & 30 deg), LCL/varus stress (0 & 30 deg), Lachman's, anterior drawer, posterior drawer    Independent visualization of the below image:  Large Joint Injection/Arthocentesis: bilateral knee    Date/Time: 7/11/2022 3:51 PM  Performed by: Yeo, Albert, MD  Authorized by: Yeo, Albert, MD     Indications:  Pain and osteoarthritis  Needle Size:  22 G  Guidance: ultrasound    Location:  Knee  Laterality:  Bilateral      Medications (Right):  60 mg sodium hyaluronate 60 MG/3ML  Medications (Left):  60 mg sodium hyaluronate 60 MG/3ML  Outcome:  Tolerated well, no immediate complications  Procedure discussed: discussed risks, benefits, and alternatives    Consent Given by:  Patient  Prep: patient was prepped and draped in usual sterile fashion          Patient's conditions were thoroughly discussed during today's visit with total time spent face-to-face with the patient and documentation being 24 minutes.    Albert Yeo MD, [facility]  [email or web address]

## 2022-07-06 NOTE — TELEPHONE ENCOUNTER
Received message from PA team that Durolane is not approved from insurance, new PA submitted for Synvisc One    Brenda Shi, ATC

## 2022-07-11 ENCOUNTER — OFFICE VISIT (OUTPATIENT)
Dept: ORTHOPEDICS | Facility: CLINIC | Age: 47
End: 2022-07-11
Payer: COMMERCIAL

## 2022-07-11 VITALS
HEIGHT: 64 IN | WEIGHT: 293 LBS | SYSTOLIC BLOOD PRESSURE: 118 MMHG | DIASTOLIC BLOOD PRESSURE: 86 MMHG | BODY MASS INDEX: 50.02 KG/M2

## 2022-07-11 DIAGNOSIS — M17.0 PRIMARY OSTEOARTHRITIS OF BOTH KNEES: Primary | ICD-10-CM

## 2022-07-11 DIAGNOSIS — M15.9 OSTEOARTHRITIS OF MULTIPLE JOINTS, UNSPECIFIED OSTEOARTHRITIS TYPE: ICD-10-CM

## 2022-07-11 PROCEDURE — 20611 DRAIN/INJ JOINT/BURSA W/US: CPT | Mod: 50 | Performed by: FAMILY MEDICINE

## 2022-07-11 PROCEDURE — 99213 OFFICE O/P EST LOW 20 MIN: CPT | Mod: 25 | Performed by: FAMILY MEDICINE

## 2022-07-11 NOTE — PATIENT INSTRUCTIONS
1. Primary osteoarthritis of both knees    2. Osteoarthritis of multiple joints, unspecified osteoarthritis type      -Patient is following up for chronic bilateral knee pain due to arthritis  -Patient tolerated bilateral cortisone injections today without complications.  Patient was given postprocedure instructions  -Patient had bilateral genicular nerve blocks last year which is still providing some pain relief.  Patient will call us when pain increases to reorder the nerve ablation again  -Patient will follow up when he returns  -Call direct clinic number [[phone]] at any time with questions or concerns.    Albert Yeo MD [facility]  [facility] [place]

## 2022-07-11 NOTE — LETTER
"    7/11/2022         RE: Coni Newberry  [address]  [facility] [place] [address]        Dear Colleague,    Thank you for referring your patient, Coni Newberry, to the [facility] [place]. Please see a copy of my visit note below.    ASSESSMENT & PLAN  Patient Instructions     1. Primary osteoarthritis of both knees    2. Osteoarthritis of multiple joints, unspecified osteoarthritis type      -Patient is following up for chronic bilateral knee pain due to arthritis  -Patient tolerated bilateral cortisone injections today without complications.  Patient was given postprocedure instructions  -Patient had bilateral genicular nerve blocks last year which is still providing some pain relief.  Patient will call us when pain increases to reorder the nerve ablation again  -Patient will follow up when he returns  -Call direct clinic number [[phone]] at any time with questions or concerns.    Albert Yeo MD [facility] Orthopedics and Sports Medicine  [facility]          -----    SUBJECTIVE:  Coni Newberry is a 47 year old female who is seen in follow-up for bilateral knee pain, left currently worse than right.They were last seen 4/12/2021    Since their last visit reports 0% - (About the same as last time). They indicate that their current pain level is 5/10. Notes she still has \"scraping\" in her knee which bothers her, but pain is doing okay since having genicular RFA last year.  They have tried corticosteroid injection (most recent date: 2/17/21) that provided  2 week(s) of relief, bilateral knee genicular RFA on 6/9/21 with moderate improvement to date. Has one knee she uses from knee to knee.  Takes Cyclobenzaprine as needed, SalonPas/Icyhot as needed    The patient is seen by themselves.    Patient's past medical, surgical, social, and family histories were reviewed today and no changes are noted.    REVIEW OF " "SYSTEMS:  Constitutional: NEGATIVE for fever, chills, change in weight  Skin: NEGATIVE for worrisome rashes, moles or lesions  GI/: NEGATIVE for bowel or bladder changes  Neuro: NEGATIVE for weakness, dizziness or paresthesias    OBJECTIVE:  /86   Ht 1.619 m (5' 3.75\")   Wt 139.5 kg (307 lb 9.6 oz)   LMP  (LMP Unknown)   BMI 53.21 kg/m     General: healthy, alert and in no distress  HEENT: no scleral icterus or conjunctival erythema  Skin: no suspicious lesions or rash. No jaundice.  CV: regular rhythm by palpation, no pedal edema  Resp: normal respiratory effort without conversational dyspnea   Psych: normal mood and affect  Gait: normal steady gait with appropriate coordination and balance  Neuro: normal light touch sensory exam of the extremities.    MSK:  Bilateral KNEE  Inspection:    Normal alignment; no edema, erythema, or ecchymosis present  Palpation:    Tender about the lateral patellar facet, medial patellar facet and medial/lateral joint line. Remainder of bony and ligamentous landmarks are nontender.    Trace effusion is present    Patellofemoral crepitus is Absent  Range of Motion:     00 extension to 900 flexion  Strength:    Quadriceps 5-/5 and hamstrings 5-/5    Extensor mechanism intact  Special Tests:    Positive: none    Negative: MCL/valgus stress (0 & 30 deg), LCL/varus stress (0 & 30 deg), Lachman's, anterior drawer, posterior drawer    Independent visualization of the below image:  Large Joint Injection/Arthocentesis: bilateral knee    Date/Time: 7/11/2022 3:51 PM  Performed by: Yeo, Albert, MD  Authorized by: Yeo, Albert, MD     Indications:  Pain and osteoarthritis  Needle Size:  22 G  Guidance: ultrasound    Location:  Knee  Laterality:  Bilateral      Medications (Right):  60 mg sodium hyaluronate 60 MG/3ML  Medications (Left):  60 mg sodium hyaluronate 60 MG/3ML  Outcome:  Tolerated well, no immediate complications  Procedure discussed: discussed risks, benefits, and " alternatives    Consent Given by:  Patient  Prep: patient was prepped and draped in usual sterile fashion          Patient's conditions were thoroughly discussed during today's visit with total time spent face-to-face with the patient and documentation being 24 minutes.    Albert Yeo MD, [facility] Orthopedic Care            Again, thank you for allowing me to participate in the care of your patient.        Sincerely,        Albert Yeo, MD

## 2022-07-26 DIAGNOSIS — M25.561 ACUTE PAIN OF RIGHT KNEE: ICD-10-CM

## 2022-07-26 RX ORDER — CYCLOBENZAPRINE HCL 10 MG
10 TABLET ORAL 3 TIMES DAILY PRN
Qty: 90 TABLET | Refills: 3 | Status: SHIPPED | OUTPATIENT
Start: 2022-07-26 | End: 2022-11-23

## 2022-07-26 NOTE — TELEPHONE ENCOUNTER
Fax received from: [facility]/pharmacy #[id number] - [place], [place]  Refill authorization requested    Drug: cyclobenzaprine (FLEXERIL) 10 MG tablet  Qty: 90  Last filled 06/29/22  Last seen: 3/15/22  Next appointment none      Christine Ferrari MA

## 2022-09-10 ENCOUNTER — HEALTH MAINTENANCE LETTER (OUTPATIENT)
Age: 47
End: 2022-09-10

## 2022-09-23 LAB — NONINV COLON CA DNA+OCC BLD SCRN STL QL: NEGATIVE

## 2022-10-09 DIAGNOSIS — I10 ESSENTIAL HYPERTENSION WITH GOAL BLOOD PRESSURE LESS THAN 140/90: ICD-10-CM

## 2022-10-10 RX ORDER — LISINOPRIL 20 MG/1
TABLET ORAL
Qty: 90 TABLET | Refills: 3 | Status: SHIPPED | OUTPATIENT
Start: 2022-10-10 | End: 2023-08-25

## 2022-10-10 RX ORDER — CHLORTHALIDONE 25 MG/1
TABLET ORAL
Qty: 90 TABLET | Refills: 3 | Status: SHIPPED | OUTPATIENT
Start: 2022-10-10 | End: 2023-08-25

## 2022-10-19 ENCOUNTER — TELEPHONE (OUTPATIENT)
Dept: ORTHOPEDICS | Facility: CLINIC | Age: 47
End: 2022-10-19

## 2022-10-21 NOTE — TELEPHONE ENCOUNTER
Handicap form filled out by Dr.Yeo    Called patient to inform her of above. No answer, left detailed voicemail informing her form would be available at [facility] . Also in message told patient to call us back if she wants to to mail form to her    Form placed at     Brenda Shi ATC

## 2022-10-25 NOTE — PROGRESS NOTES
"ASSESSMENT & PLAN  Patient Instructions     1. Primary osteoarthritis of both knees    2. Morbid obesity (H)      -Patient is following up for bilateral knee pain due to severe arthritis  -Patient tolerated bilateral Synvisc 1 injections today without complications.  Patient was given postprocedure instructions  -Patient has lost approximately 18 pounds but still needs to lose substantial weight to improve her pain, slow down progression of her arthritis and also be eligible to undergo elective knee surgeries.  Had an in-depth discussion regarding bariatric surgery to help her lose weight and be healthier.  Patient has had friends who have had significant complications and also  from those complications and so was very apprehensive about the surgery.  Patient is hoping that she could undergo a gastric sleeve rather than a bypass due to the reversibility of the surgery and lower perceived risks.  -Patient states she got approximately 1 year of relief from the geniculate nerve block which she would consider doing in the future when pain returns.  Patient will call us if she would like to have this done again in the future  -Patient will follow up when pain returns  -Call direct clinic number [[phone]] at any time with questions or concerns.    Albert Yeo MD [facility] Orthopedics and Sports Medicine  [facility]          -----    SUBJECTIVE:  Coni Newberry is a 47 year old female who is seen in follow-up for bilateral knee pain.They were last seen 2022.    Since their last visit reports worsening pain. They indicate that their current pain level is 8/10. Patient states knees are \"still popping in and out\". They have tried corticosteroid injection (most recent date: 2022) that provided  1 month(s) of relief, bilateral knee genicular RFA on 21 with moderate improvement to date. Has one knee she uses from knee to knee. Takes Tylenol and Flexeril.  Has had bilateral " "knee genicular RFA on 6/9/21 with moderate improvement which provided ~1 year of relief.     The patient is seen by themselves.    Patient's past medical, surgical, social, and family histories were reviewed today and no changes are noted.    REVIEW OF SYSTEMS:  Constitutional: NEGATIVE for fever, chills, change in weight  Skin: NEGATIVE for worrisome rashes, moles or lesions  GI/: NEGATIVE for bowel or bladder changes  Neuro: NEGATIVE for weakness, dizziness or paresthesias    OBJECTIVE:  Ht 1.619 m (5' 3.74\")   Wt 140.6 kg (310 lb)   BMI 53.65 kg/m     General: healthy, alert and in no distress  HEENT: no scleral icterus or conjunctival erythema  Skin: no suspicious lesions or rash. No jaundice.  CV: regular rhythm by palpation, no pedal edema  Resp: normal respiratory effort without conversational dyspnea   Psych: normal mood and affect  Gait: normal steady gait with appropriate coordination and balance  Neuro: normal light touch sensory exam of the extremities.    MSK:    Bilateral KNEE  Inspection:    Normal alignment; no edema, erythema, or ecchymosis present  Palpation:    Tender about the lateral patellar facet, medial patellar facet and medial/lateral joint line. Remainder of bony and ligamentous landmarks are nontender.    Trace effusion is present    Patellofemoral crepitus is Absent  Range of Motion:     00 extension to 900 flexion  Strength:    Quadriceps 5-/5 and hamstrings 5-/5    Extensor mechanism intact  Special Tests:    Positive: none    Negative: MCL/valgus stress (0 & 30 deg), LCL/varus stress (0 & 30 deg), Lachman's, anterior drawer, posterior drawer    Independent visualization of the below image:  Large Joint Injection/Arthocentesis: bilateral knee    Date/Time: 10/31/2022 4:17 PM  Performed by: Yeo, Albert, MD  Authorized by: Yeo, Albert, MD     Indications:  Pain and osteoarthritis  Needle Size:  22 G  Guidance: ultrasound    Approach:  Lateral  Location:  Knee  Laterality:  " Bilateral      Medications (Right):  48 mg hylan 48 MG/6ML  Medications (Left):  48 mg hylan 48 MG/6ML  Outcome:  Tolerated well, no immediate complications  Procedure discussed: discussed risks, benefits, and alternatives    Consent Given by:  Patient  Prep: patient was prepped and draped in usual sterile fashion          Patient's conditions were thoroughly discussed during today's visit with total time spent face-to-face with the patient and documentation being 30 minutes.    Albert Yeo MD, [facility] [place]

## 2022-10-31 ENCOUNTER — OFFICE VISIT (OUTPATIENT)
Dept: ORTHOPEDICS | Facility: CLINIC | Age: 47
End: 2022-10-31
Payer: COMMERCIAL

## 2022-10-31 VITALS — BODY MASS INDEX: 50.02 KG/M2 | WEIGHT: 293 LBS | HEIGHT: 64 IN

## 2022-10-31 DIAGNOSIS — E66.01 MORBID OBESITY (H): ICD-10-CM

## 2022-10-31 DIAGNOSIS — M17.0 PRIMARY OSTEOARTHRITIS OF BOTH KNEES: Primary | ICD-10-CM

## 2022-10-31 PROCEDURE — 99214 OFFICE O/P EST MOD 30 MIN: CPT | Mod: 25 | Performed by: FAMILY MEDICINE

## 2022-10-31 PROCEDURE — 20611 DRAIN/INJ JOINT/BURSA W/US: CPT | Mod: 50 | Performed by: FAMILY MEDICINE

## 2022-10-31 NOTE — LETTER
"    10/31/2022         RE: Coni Newberry  [address]  [place] [address]        Dear Colleague,    Thank you for referring your patient, Coni Newberry, to the [facility] [place]. Please see a copy of my visit note below.    ASSESSMENT & PLAN  Patient Instructions     1. Primary osteoarthritis of both knees    2. Morbid obesity (H)      -Patient is following up for bilateral knee pain due to severe arthritis  -Patient tolerated bilateral Synvisc 1 injections today without complications.  Patient was given postprocedure instructions  -Patient has lost approximately 18 pounds but still needs to lose substantial weight to improve her pain, slow down progression of her arthritis and also be eligible to undergo elective knee surgeries.  Had an in-depth discussion regarding bariatric surgery to help her lose weight and be healthier.  Patient has had friends who have had significant complications and also  from those complications and so was very apprehensive about the surgery.  Patient is hoping that she could undergo a gastric sleeve rather than a bypass due to the reversibility of the surgery and lower perceived risks.  -Patient states she got approximately 1 year of relief from the geniculate nerve block which she would consider doing in the future when pain returns.  Patient will call us if she would like to have this done again in the future  -Patient will follow up when pain returns  -Call direct clinic number [[phone]] at any time with questions or concerns.    Albert Yeo MD [facility] Orthopedics and Sports Medicine  [facility] [place]          -----    SUBJECTIVE:  Coni Newberry is a 47 year old female who is seen in follow-up for bilateral knee pain.They were last seen 2022.    Since their last visit reports worsening pain. They indicate that their current pain level is 8/10. Patient states knees are \"still " "popping in and out\". They have tried corticosteroid injection (most recent date: 7/11/2022) that provided  1 month(s) of relief, bilateral knee genicular RFA on 6/9/21 with moderate improvement to date. Has one knee she uses from knee to knee. Takes Tylenol and Flexeril.  Has had bilateral knee genicular RFA on 6/9/21 with moderate improvement which provided ~1 year of relief.     The patient is seen by themselves.    Patient's past medical, surgical, social, and family histories were reviewed today and no changes are noted.    REVIEW OF SYSTEMS:  Constitutional: NEGATIVE for fever, chills, change in weight  Skin: NEGATIVE for worrisome rashes, moles or lesions  GI/: NEGATIVE for bowel or bladder changes  Neuro: NEGATIVE for weakness, dizziness or paresthesias    OBJECTIVE:  Ht 1.619 m (5' 3.74\")   Wt 140.6 kg (310 lb)   BMI 53.65 kg/m     General: healthy, alert and in no distress  HEENT: no scleral icterus or conjunctival erythema  Skin: no suspicious lesions or rash. No jaundice.  CV: regular rhythm by palpation, no pedal edema  Resp: normal respiratory effort without conversational dyspnea   Psych: normal mood and affect  Gait: normal steady gait with appropriate coordination and balance  Neuro: normal light touch sensory exam of the extremities.    MSK:    Bilateral KNEE  Inspection:    Normal alignment; no edema, erythema, or ecchymosis present  Palpation:    Tender about the lateral patellar facet, medial patellar facet and medial/lateral joint line. Remainder of bony and ligamentous landmarks are nontender.    Trace effusion is present    Patellofemoral crepitus is Absent  Range of Motion:     00 extension to 900 flexion  Strength:    Quadriceps 5-/5 and hamstrings 5-/5    Extensor mechanism intact  Special Tests:    Positive: none    Negative: MCL/valgus stress (0 & 30 deg), LCL/varus stress (0 & 30 deg), Lachman's, anterior drawer, posterior drawer    Independent visualization of the below " image:  Large Joint Injection/Arthocentesis: bilateral knee    Date/Time: 10/31/2022 4:17 PM  Performed by: Yeo, Albert, MD  Authorized by: Yeo, Albert, MD     Indications:  Pain and osteoarthritis  Needle Size:  22 G  Guidance: ultrasound    Approach:  Lateral  Location:  Knee  Laterality:  Bilateral      Medications (Right):  48 mg hylan 48 MG/6ML  Medications (Left):  48 mg hylan 48 MG/6ML  Outcome:  Tolerated well, no immediate complications  Procedure discussed: discussed risks, benefits, and alternatives    Consent Given by:  Patient  Prep: patient was prepped and draped in usual sterile fashion          Patient's conditions were thoroughly discussed during today's visit with total time spent face-to-face with the patient and documentation being 30 minutes.    Albert Yeo MD, [facility] Orthopedic Care            Again, thank you for allowing me to participate in the care of your patient.        Sincerely,        Albert Yeo, MD

## 2022-10-31 NOTE — PATIENT INSTRUCTIONS
1. Primary osteoarthritis of both knees    2. Morbid obesity (H)      -Patient is following up for bilateral knee pain due to severe arthritis  -Patient tolerated bilateral Synvisc 1 injections today without complications.  Patient was given postprocedure instructions  -Patient has lost approximately 18 pounds but still needs to lose substantial weight to improve her pain, slow down progression of her arthritis and also be eligible to undergo elective knee surgeries.  Had an in-depth discussion regarding bariatric surgery to help her lose weight and be healthier.  Patient has had friends who have had significant complications and also  from those complications and so was very apprehensive about the surgery.  Patient is hoping that she could undergo a gastric sleeve rather than a bypass due to the reversibility of the surgery and lower perceived risks.  -Patient states she got approximately 1 year of relief from the geniculate nerve block which she would consider doing in the future when pain returns.  Patient will call us if she would like to have this done again in the future  -Patient will follow up when pain returns  -Call direct clinic number [[phone]] at any time with questions or concerns.    Albert Yeo MD [facility] Orthopedics and Sports Medicine  [facility] [place]

## 2022-11-07 ENCOUNTER — OFFICE VISIT (OUTPATIENT)
Dept: INTERNAL MEDICINE | Facility: CLINIC | Age: 47
End: 2022-11-07
Payer: COMMERCIAL

## 2022-11-07 VITALS
WEIGHT: 293 LBS | DIASTOLIC BLOOD PRESSURE: 86 MMHG | HEART RATE: 119 BPM | TEMPERATURE: 98.4 F | BODY MASS INDEX: 53.13 KG/M2 | SYSTOLIC BLOOD PRESSURE: 126 MMHG | RESPIRATION RATE: 16 BRPM | OXYGEN SATURATION: 100 %

## 2022-11-07 DIAGNOSIS — J06.9 UPPER RESPIRATORY TRACT INFECTION, UNSPECIFIED TYPE: Primary | ICD-10-CM

## 2022-11-07 PROCEDURE — U0003 INFECTIOUS AGENT DETECTION BY NUCLEIC ACID (DNA OR RNA); SEVERE ACUTE RESPIRATORY SYNDROME CORONAVIRUS 2 (SARS-COV-2) (CORONAVIRUS DISEASE [COVID-19]), AMPLIFIED PROBE TECHNIQUE, MAKING USE OF HIGH THROUGHPUT TECHNOLOGIES AS DESCRIBED BY CMS-2020-01-R: HCPCS | Performed by: PHYSICIAN ASSISTANT

## 2022-11-07 PROCEDURE — 99213 OFFICE O/P EST LOW 20 MIN: CPT | Mod: CS | Performed by: PHYSICIAN ASSISTANT

## 2022-11-07 PROCEDURE — U0005 INFEC AGEN DETEC AMPLI PROBE: HCPCS | Performed by: PHYSICIAN ASSISTANT

## 2022-11-07 ASSESSMENT — PAIN SCALES - GENERAL: PAINLEVEL: EXTREME PAIN (8)

## 2022-11-07 NOTE — PROGRESS NOTES
Assessment & Plan     Upper respiratory tract infection, unspecified type  COVID PCR pending. Discussed that viral etiology is most likely. We discussed home cares and OTC treatments for symptoms (sinus rinses, Afrin, Flonase); discussed ways to soothe sore throat. Advise follow-up if not improving over the next 9-10 days, sooner if any red flag symptoms develop.  - Symptomatic; Yes; 11/6/2022 COVID-19 Virus (Coronavirus) by PCR Nasopharyngeal    17 minutes spent on the date of the encounter doing chart review, history and exam, documentation and further activities per the note       No follow-ups on file.    Елена Paulino PA-C  [facility]    Bossman Brar is a 47 year old, presenting for the following health issues:  Sinus Problem (Started last night and getting worse. )      Sinus Problem      Onset of symptoms: yesterday  Sore throat  Postnasal drainage  Not a lot of nasal congestion up to this point  Hurts behind eyes  Some ear pressure, L might be worse  Eyes feel dry  No fevers  Feels a little run down  Little bit of cough  Took Dayquil last night. Helped a little    Friend with RSV. They attended a concert together on Friday, but friend wore a mask the whole time    Reports h/o sinus infections    Review of Systems   Constitutional, HEENT, cardiovascular, pulmonary, gi and gu systems are negative, except as otherwise noted.      Objective    /86   Pulse 119   Temp 98.4  F (36.9  C) (Tympanic)   Resp 16   Wt 139.3 kg (307 lb)   SpO2 100%   BMI 53.13 kg/m    Body mass index is 53.13 kg/m .  Physical Exam   GENERAL: healthy, alert and no distress  EYES: Eyes grossly normal to inspection, PERRL and conjunctivae and sclerae normal  HENT: normal cephalic/atraumatic, ear canals and TM's normal, oral mucous membranes moist and mild oropharyngeal erythema  NECK: no adenopathy, no asymmetry, masses, or scars and thyroid normal to palpation  RESP: lungs clear to  auscultation - no rales, rhonchi or wheezes  CV: regular rate and rhythm, normal S1 S2, no S3 or S4, no murmur, click or rub, no peripheral edema and peripheral pulses strong  MS: no gross musculoskeletal defects noted, no edema  SKIN: no suspicious lesions or rashes    No results found for this or any previous visit (from the past 24 hour(s)).

## 2022-11-07 NOTE — PATIENT INSTRUCTIONS
Recommend twice daily sinus rinses  Afrin nasal spray twice a day for 3 days if nasal congestion worsens  Recommend starting Flonase now (2 sprays in each nostril once a day). Can use Flonase for 2-3 weeks.    For sore throat: recommend lozenges, hot/cold liquids, salt water gargles

## 2022-11-08 LAB — SARS-COV-2 RNA RESP QL NAA+PROBE: NEGATIVE

## 2022-11-10 ENCOUNTER — ANCILLARY PROCEDURE (OUTPATIENT)
Dept: GENERAL RADIOLOGY | Facility: CLINIC | Age: 47
End: 2022-11-10
Attending: INTERNAL MEDICINE
Payer: COMMERCIAL

## 2022-11-10 ENCOUNTER — OFFICE VISIT (OUTPATIENT)
Dept: INTERNAL MEDICINE | Facility: CLINIC | Age: 47
End: 2022-11-10
Payer: COMMERCIAL

## 2022-11-10 VITALS
DIASTOLIC BLOOD PRESSURE: 92 MMHG | TEMPERATURE: 100.3 F | HEART RATE: 110 BPM | WEIGHT: 293 LBS | SYSTOLIC BLOOD PRESSURE: 149 MMHG | HEIGHT: 63 IN | BODY MASS INDEX: 51.91 KG/M2 | OXYGEN SATURATION: 95 %

## 2022-11-10 DIAGNOSIS — R05.1 ACUTE COUGH: ICD-10-CM

## 2022-11-10 DIAGNOSIS — R05.1 ACUTE COUGH: Primary | ICD-10-CM

## 2022-11-10 PROCEDURE — 71046 X-RAY EXAM CHEST 2 VIEWS: CPT | Mod: TC | Performed by: RADIOLOGY

## 2022-11-10 PROCEDURE — 99213 OFFICE O/P EST LOW 20 MIN: CPT | Performed by: INTERNAL MEDICINE

## 2022-11-10 ASSESSMENT — ASTHMA QUESTIONNAIRES
ACT_TOTALSCORE: 10
QUESTION_2 LAST FOUR WEEKS HOW OFTEN HAVE YOU HAD SHORTNESS OF BREATH: MORE THAN ONCE A DAY
QUESTION_5 LAST FOUR WEEKS HOW WOULD YOU RATE YOUR ASTHMA CONTROL: SOMEWHAT CONTROLLED
QUESTION_3 LAST FOUR WEEKS HOW OFTEN DID YOUR ASTHMA SYMPTOMS (WHEEZING, COUGHING, SHORTNESS OF BREATH, CHEST TIGHTNESS OR PAIN) WAKE YOU UP AT NIGHT OR EARLIER THAN USUAL IN THE MORNING: TWO OR THREE NIGHTS A WEEK
QUESTION_1 LAST FOUR WEEKS HOW MUCH OF THE TIME DID YOUR ASTHMA KEEP YOU FROM GETTING AS MUCH DONE AT WORK, SCHOOL OR AT HOME: MOST OF THE TIME
QUESTION_4 LAST FOUR WEEKS HOW OFTEN HAVE YOU USED YOUR RESCUE INHALER OR NEBULIZER MEDICATION (SUCH AS ALBUTEROL): ONE OR TWO TIMES PER DAY
ACT_TOTALSCORE: 10

## 2022-11-10 ASSESSMENT — PAIN SCALES - GENERAL: PAINLEVEL: NO PAIN (0)

## 2022-11-10 NOTE — PROGRESS NOTES
"  ASSESSMENT/PLAN                                                      (R05.1) Acute cough  (primary encounter diagnosis)  Comment: recent COVID-19 testing negative; suspect viral URI.  Plan: CXR today to evaluate for pulmonary pathology like pneumonia; if negative, likely viral URI - may take 2-3 weeks for symptoms to fully resolve; continue supportive care measures recommended.    Charline Crisostomo MD   [facility]  [address] [address]  T: [phone], F: [phone]    SUBJECTIVE                                                      Coni Newberry is a 47 year old female who presents with cold symptoms:    Ongoing for the last several days.  Was seen by a provider earlier this week and tested negative for COVID-19.  Supportive care measures recommended. Patient reports a continued, mostly dry cough that does not respond to OTC cough medication and albuterol. Associated, mild sore throat and sinus congestion.    No subjective fevers or chills though she has measured low-grade fevers at home (~100).  No headaches or body aches.  No runny nose or sinus pain or pressure.    PMH significant for NIDDM and moderate persistent asthma.    OBJECTIVE                                                      BP (!) 149/92 (BP Location: Right arm, Patient Position: Sitting, Cuff Size: Adult Regular)   Pulse 110   Temp 100.3  F (37.9  C) (Tympanic)   Ht 1.6 m (5' 3\")   Wt 136.2 kg (300 lb 3.2 oz)   SpO2 95%   BMI 53.18 kg/m    Constitutional: well-appearing  Respiratory: normal respiratory effort; clear to auscultation bilaterally - no wheezing, occasional cough  Psych: normal judgment and insight; normal mood and affect; recent and remote memory intact    ---    (Note documentation was completed, in part, with [employer] voice-recognition software. Documentation was reviewed, but some grammatical, spelling, and word errors may remain.)    "

## 2022-11-10 NOTE — LETTER
11/10/22      Coni Newberry  1975  [address]  [facility] [address]        To whom it may concern,    Please excuse Ms. Matti Newberry from work today and yesterday. She will be needing time to rest and recuperate from an illness that I am seeing her for. She may return to work tomorrow without restrictions.    Please contact me with any question or concerns.        Charline Crisostomo MD   [facility]  [address] [place] [address]  T: [phone], F: [phone]

## 2022-11-11 ENCOUNTER — E-VISIT (OUTPATIENT)
Dept: URGENT CARE | Facility: CLINIC | Age: 47
End: 2022-11-11
Payer: COMMERCIAL

## 2022-11-11 DIAGNOSIS — H10.13 ALLERGIC CONJUNCTIVITIS OF BOTH EYES: Primary | ICD-10-CM

## 2022-11-11 PROCEDURE — 99421 OL DIG E/M SVC 5-10 MIN: CPT | Performed by: PHYSICIAN ASSISTANT

## 2022-11-11 NOTE — PATIENT INSTRUCTIONS
Coni Newberry,    Your photo and description of symptoms is consistent with pink eye caused by an allergy. I sent a prescription for antihistamine eye drops to your pharmacy.     If your symptoms are getting worse or not improving in 4 days, be seen in person for further evaluation.    You'll be feeling better soon!    Nadeen Quinn PA-C  [facility]        Conjunctivitis, Allergic    Conjunctivitis is an irritation of the thin membrane covering the eye and the inside of the eyelid. This membrane is called the conjunctiva. The condition is often called pink eye or red eye because the eye looks pink or red. The eye may also be swollen, itchy, burning, or tearing. A watery or mucous discharge may occur. This type of conjunctivitis is not contagious.   Allergic conjunctivitis is caused by an allergen. Allergens are substances that cause the body to react with certain symptoms. Allergens that cause eye irritation include things such as house dust, smoke, or pollen in the air. This can occur seasonally, most often in the spring. Other possible allergens that may cause symptoms include cosmetics, perfumes, animal saliva or dander, chlorine in swimming pools, or contact lens.   Home care    Eye drops may be prescribed to reduce itching and redness. Use these as directed. Otherwise, over-the-counter lubricating eye drops, sometimes referred to as artificial tears, may be used.    Apply a cool compress (towel soaked in cool water) to the affected eye 3 to 4 times a day to reduce swelling and itching.    It's common to have mucus drainage during the night, causing the eyelids to become crusted by morning. Use a warm, wet cloth to wipe this away. You may also use saline irrigating solution or artificial tears to rinse away mucus in the eye. Don't patch the eye.    You may use acetaminophen or ibuprofen to control pain, unless another medicine was prescribed. Talk with your healthcare provider if  you have chronic liver or kidney disease before using these medicines. Also talk with your provider if you have ever had a stomach ulcer or digestive bleeding.    Don't wear contact lenses until your eyes have healed and all symptoms are gone.    Follow-up care  Follow up with your healthcare provider, or as advised.  When to seek medical advice  Call your healthcare provider or seek medical care right away if any of these occur:     Increased eyelid swelling    New or worsening drainage from the eye    Increasing redness around the eye    Facial swelling  [employer]ubit last reviewed this educational content on 4/1/2020 2000-2021 [employer]. All rights reserved. This information is not intended as a substitute for professional medical care. Always follow your healthcare professional's instructions.

## 2022-11-23 ENCOUNTER — TELEPHONE (OUTPATIENT)
Dept: PALLIATIVE MEDICINE | Facility: OTHER | Age: 47
End: 2022-11-23

## 2022-11-23 DIAGNOSIS — M25.561 ACUTE PAIN OF RIGHT KNEE: ICD-10-CM

## 2022-11-23 RX ORDER — CYCLOBENZAPRINE HCL 10 MG
10 TABLET ORAL 3 TIMES DAILY PRN
Qty: 45 TABLET | Refills: 3 | Status: SHIPPED | OUTPATIENT
Start: 2022-11-23 | End: 2023-02-03

## 2022-11-23 NOTE — TELEPHONE ENCOUNTER
Received fax request from [facility] pharmacy requesting refill(s) for Cyclobenzaprine 10 mg tab    Last refilled on 10/27/2022    Pt last seen on 5/12/2022  Next appt scheduled for PRN      Plan:  The following recommendations were given to the patient. Diagnosis, treatment options, risks, benefits, and alternatives were discussed, and all questions were answered. The patient expressed understanding of the plan for management.      I am recommending a multidisciplinary treatment plan to help this patient better manage her pain.  This includes:      1. Physical Therapy: Continue prior exercises.  2. Clinical Health Pain Psychologist: Defer, coping well.  3. Self Care Recommendations: Gentle progressive exercise that does not increase pain - gradually increase daily walking program.  Take mini breaks - 5 minutes of mindfullness a couple times a day.   1. Weight management clinic referral placed.  4. Diagnostic Studies: none  5. Medication Management:   1. Stop all nsaids due to elevated Cr.  2. Continue tylenol 1000mg TID prn  3. Continue flexeril 10mg TID prn  4. Continue lidocaine patches to the knees nightly as needed.  6. Further procedures recommended: genicular RFA can be completed every 6+ months as needed.  7. Follow up: as needed.        DO KELSI Mcgregor [facility]      Please review chart, choose rx if appropriate   KVNG Fontana [facility]

## 2022-12-07 NOTE — PROGRESS NOTES
"HISTORY OF PRESENT ILLNESS:    Coni Newberry is a 43 year old female who is seen in follow up for left ankle pain/achilles tendonitis. She wants to know if it is safe for her to go work out.  Present symptoms: pain scale: 4/10, other than that she is feeling good.  Treatments tried to this point: 1 PT visit, weaned off boot in 9/2017 - about 6-7 months without boot    PHYSICAL EXAM:  /82  Ht 5' 4\" (1.626 m)  Wt 307 lb (139.3 kg)  LMP 04/05/2018  BMI 52.7 kg/m2  Body mass index is 52.7 kg/(m^2).   GENERAL APPEARANCE: healthy, alert and no distress   SKIN: no suspicious lesions or rashes  NEURO: Normal strength and tone, mentation intact and speech normal  VASCULAR:  good pulses, and cappillary refill   LYMPH: no lymphadenopathy   PSYCH:  mentation appears normal and affect normal/bright    MSK:  Examination of her left ankle reveals no erythema, ecchymosis, nor edema.  There is no hypertrophy to the tendo calcaneal region.    IMAGING INTERPRETATION:       ASSESSMENT / PLAN: Healed Achilles tendinitis.  I told her if she is going to begin exercising to progress slowly.      Perico Pantoja MD  Dept. Orthopedic Surgery  [facility]     "
89 year old man with PMHx afib on Eliquis, PVD d/t prior DVTs, dementia (A&Ox1 at baseline), HTN, kidney stones, choledocholithiasis, bed sore presents to ED due to failure to thrive and abdominal pain. Labs consistent with poor PO intake and choledocholithiasis as well as hematuria.

## 2023-01-22 ENCOUNTER — HEALTH MAINTENANCE LETTER (OUTPATIENT)
Age: 48
End: 2023-01-22

## 2023-02-03 DIAGNOSIS — M25.561 ACUTE PAIN OF RIGHT KNEE: ICD-10-CM

## 2023-02-03 RX ORDER — CYCLOBENZAPRINE HCL 10 MG
10 TABLET ORAL 3 TIMES DAILY PRN
Qty: 45 TABLET | Refills: 3 | Status: SHIPPED | OUTPATIENT
Start: 2023-02-03 | End: 2023-08-25

## 2023-02-03 NOTE — TELEPHONE ENCOUNTER
Received fax from pharmacy requesting refill(s) for     cyclobenzaprine (FLEXERIL) 10 MG tablet    Date last filled 01.16.2023    Last Appt Date:11.23.2022    Next Appt scheduled: 0    Pharmacy:     [id number] - [place], [place] - [address] Will route for processing    Pam Gallegos  [facility] Visit Facilitator

## 2023-02-20 ENCOUNTER — TELEPHONE (OUTPATIENT)
Dept: INTERNAL MEDICINE | Facility: CLINIC | Age: 48
End: 2023-02-20
Payer: COMMERCIAL

## 2023-02-20 DIAGNOSIS — J45.30 MILD PERSISTENT ASTHMA WITHOUT COMPLICATION: Primary | ICD-10-CM

## 2023-02-20 NOTE — TELEPHONE ENCOUNTER
Patient is requesting medical supply orders for her Nebulizer; new tubing and mouth piece and filter. Or in case they're out of the supplies, can she get a whole new nebulizer?     [facility] - in the [facility].     Please call patient once completed.   Phone#: [phone]

## 2023-02-20 NOTE — TELEPHONE ENCOUNTER
Left voice message for patient to call back. Please inform her Farida approved order for new nebulizer and supplies.      DME faxed to [facility] in [place].     Orquidea Patrick RN  [facility]

## 2023-03-21 NOTE — TELEPHONE ENCOUNTER
Diagnosed In 2011      Pending Prescriptions:                       Disp   Refills    montelukast (SINGULAIR) 10 MG tablet [Phar*90 tab*0        Sig: TAKE 1 TABLET BY MOUTH AT BEDTIME    Routing refill request to provider for review/approval because:  ACT Total Scores 6/1/2020 8/18/2020 9/16/2020   ACT TOTAL SCORE - - -   ASTHMA ER VISITS - - -   ASTHMA HOSPITALIZATIONS - - -   ACT TOTAL SCORE (Goal Greater than or Equal to 20) 6 13 11   In the past 12 months, how many times did you visit the emergency room for your asthma without being admitted to the hospital? 0 0 0   In the past 12 months, how many times were you hospitalized overnight because of your asthma? 0 0 0

## 2023-05-03 ENCOUNTER — TELEPHONE (OUTPATIENT)
Dept: ORTHOPEDICS | Facility: CLINIC | Age: 48
End: 2023-05-03
Payer: COMMERCIAL

## 2023-05-03 DIAGNOSIS — M17.0 PRIMARY OSTEOARTHRITIS OF BOTH KNEES: Primary | ICD-10-CM

## 2023-05-03 NOTE — TELEPHONE ENCOUNTER
Appt Notes:    BILATERAL knee, requesting SYNVISC INJECTION, last done by Yeo, MD on 10.31.22 / Yeo, MD / [facility] / Imaging ON FILE    [facility]    Phone Message:  Is Prior Authotization needed for the Synvisc injections? If so, please CALL pt to discuss. Thank you.    Reason for Call: Other: Prior Authorization Inquiry     Action Taken: Message routed to:  Other: [facility]    Travel Screening: Not Applicable

## 2023-05-09 NOTE — TELEPHONE ENCOUNTER
Left voicemail asking for a return call. There is a consent to communicate on file.     Patient scheduled for appointment on 5/30/23 @ [facility] for discussion of viscosupplementation injection vs steroid injection of bilateral knee.        SynviscOne injection last completed 10/31/22.  Patient reports relief for approximately 5 months.       Patient has failed trial of OTC NSAIDs/Pain Medication (ibuprofen, tylenol, naproxen,...):  Yes, patient cannot take NSAIDS due to kidney disease.   Has tried Tylenol, Salon Pas, Icy Hot, and Flexeril.     Patient has also had bilateral genicular RFA on 6/9/21.        Patient has completed trial of physical therapy: approximately 2 years ago for 1-2 months.       Prior authorization referral for SynviscOne injection pended.    BECK Rene RN

## 2023-05-10 DIAGNOSIS — E78.5 HYPERLIPIDEMIA WITH TARGET LDL LESS THAN 70: ICD-10-CM

## 2023-05-10 NOTE — TELEPHONE ENCOUNTER
Attempted to call patient at the number listed and it is a non working number. Left voicemail asking patient to return our call and provide a working number.     BECK Rene RN

## 2023-05-10 NOTE — TELEPHONE ENCOUNTER
Coni Lopez is returning Shaista's call. Please call her work at [phone] ext [phone]. Thank you, Ramona

## 2023-05-10 NOTE — TELEPHONE ENCOUNTER
Patient left a message returning our call. She states she can be reached on her cell: [phone] at lunch between 11:30 and 12:30.     Phone call to patient and obtained information below for PA for Synvisc. She was informed that PA will be sent and we will contact her if there is an issue. Otherwise, suggested she call the Friday before her appointment. She verbalized understanding.     She did provided a correct work number of: [phone].     BECK Rene RN

## 2023-05-11 RX ORDER — ATORVASTATIN CALCIUM 10 MG/1
TABLET, FILM COATED ORAL
Qty: 90 TABLET | Refills: 0 | Status: SHIPPED | OUTPATIENT
Start: 2023-05-11 | End: 2023-07-14

## 2023-05-18 ENCOUNTER — TRANSFERRED RECORDS (OUTPATIENT)
Dept: HEALTH INFORMATION MANAGEMENT | Facility: CLINIC | Age: 48
End: 2023-05-18
Payer: COMMERCIAL

## 2023-05-18 LAB — RETINOPATHY: NEGATIVE

## 2023-05-23 ENCOUNTER — PATIENT OUTREACH (OUTPATIENT)
Dept: CARE COORDINATION | Facility: CLINIC | Age: 48
End: 2023-05-23
Payer: COMMERCIAL

## 2023-05-30 ENCOUNTER — OFFICE VISIT (OUTPATIENT)
Dept: ORTHOPEDICS | Facility: CLINIC | Age: 48
End: 2023-05-30
Payer: COMMERCIAL

## 2023-05-30 DIAGNOSIS — M17.0 PRIMARY OSTEOARTHRITIS OF BOTH KNEES: Primary | ICD-10-CM

## 2023-05-30 PROCEDURE — 20611 DRAIN/INJ JOINT/BURSA W/US: CPT | Mod: 50 | Performed by: FAMILY MEDICINE

## 2023-05-30 NOTE — PATIENT INSTRUCTIONS
1. Primary osteoarthritis of both knees      -Patient is following up for chronic bilateral knee pain due to arthritis  -Patient tolerated bilateral Synvisc 1 injection today without complications.  Patient was given postprocedure instructions  -Follow-up when pain returns  -Call direct clinic number [[phone]] at any time with questions or concerns.    Albert Yeo MD [facility]  [facility] [place]

## 2023-05-30 NOTE — LETTER
5/30/2023         RE: Coni Newberry  [address]  [facility] [address]        Dear Colleague,    Thank you for referring your patient, Coni Newberry, to the [facility] [place]. Please see a copy of my visit note below.    ASSESSMENT & PLAN  Patient Instructions     1. Primary osteoarthritis of both knees      -Patient is following up for chronic bilateral knee pain due to arthritis  -Patient tolerated bilateral Synvisc 1 injection today without complications.  Patient was given postprocedure instructions  -Follow-up when pain returns  -Call direct clinic number [[phone]] at any time with questions or concerns.    Albert Yeo MD [facility]  [facility]          -----    SUBJECTIVE:  Coni Newberry is a 48 year old female who is seen for bilateral Synvisc injections.      Patient rates pain as 6/10 pre-procedure. Patient rates pain as 6/10 post-procedure.      Albert Yeo MD, [facility]  Large Joint Injection/Arthocentesis: bilateral knee    Date/Time: 5/30/2023 2:43 PM    Performed by: Yeo, Albert, MD  Authorized by: Yeo, Albert, MD    Needle Size:  22 G  Guidance: ultrasound    Location:  Knee  Laterality:  Bilateral      Medications (Right):  48 mg hylan 48 MG/6ML  Medications (Left):  48 mg hylan 48 MG/6ML  Outcome:  Tolerated well, no immediate complications  Consent Given by:  Patient  Timeout: timeout called immediately prior to procedure    Prep: patient was prepped and draped in usual sterile fashion              Again, thank you for allowing me to participate in the care of your patient.        Sincerely,        Albert Yeo, MD

## 2023-05-30 NOTE — PROGRESS NOTES
ASSESSMENT & PLAN  Patient Instructions     1. Primary osteoarthritis of both knees      -Patient is following up for chronic bilateral knee pain due to arthritis  -Patient tolerated bilateral Synvisc 1 injection today without complications.  Patient was given postprocedure instructions  -Follow-up when pain returns  -Call direct clinic number [[phone]] at any time with questions or concerns.    Albert Yeo MD [facility] Medicine  [facility]          -----    SUBJECTIVE:  Coni Newberry is a 48 year old female who is seen for bilateral Synvisc injections.      Patient rates pain as 6/10 pre-procedure. Patient rates pain as 6/10 post-procedure.      Albert Yeo MD, [facility]  Large Joint Injection/Arthocentesis: bilateral knee    Date/Time: 5/30/2023 2:43 PM    Performed by: Yeo, Albert, MD  Authorized by: Yeo, Albert, MD    Needle Size:  22 G  Guidance: ultrasound    Location:  Knee  Laterality:  Bilateral      Medications (Right):  48 mg hylan 48 MG/6ML  Medications (Left):  48 mg hylan 48 MG/6ML  Outcome:  Tolerated well, no immediate complications  Consent Given by:  Patient  Timeout: timeout called immediately prior to procedure    Prep: patient was prepped and draped in usual sterile fashion

## 2023-06-03 ENCOUNTER — HEALTH MAINTENANCE LETTER (OUTPATIENT)
Age: 48
End: 2023-06-03

## 2023-06-07 ENCOUNTER — TELEPHONE (OUTPATIENT)
Dept: INTERNAL MEDICINE | Facility: CLINIC | Age: 48
End: 2023-06-07
Payer: COMMERCIAL

## 2023-06-07 NOTE — TELEPHONE ENCOUNTER
Patient Quality Outreach    Patient is due for the following:   Diabetes -  A1C, Microalbumin and Foot Exam  Asthma  -  ACT needed and AAP  Cervical Cancer Screening - PAP Needed  Physical Preventive Adult Physical    Next Steps:   Schedule a Adult Preventative    Type of outreach:    Sent [employer] message.      Questions for provider review:    None           Sahra Vazquez LPN  Chart routed to none.

## 2023-06-14 DIAGNOSIS — J30.9 ALLERGIC RHINITIS, UNSPECIFIED SEASONALITY, UNSPECIFIED TRIGGER: ICD-10-CM

## 2023-06-15 ENCOUNTER — OFFICE VISIT (OUTPATIENT)
Dept: URGENT CARE | Facility: URGENT CARE | Age: 48
End: 2023-06-15
Payer: COMMERCIAL

## 2023-06-15 VITALS
TEMPERATURE: 98.8 F | SYSTOLIC BLOOD PRESSURE: 132 MMHG | DIASTOLIC BLOOD PRESSURE: 80 MMHG | BODY MASS INDEX: 53.14 KG/M2 | WEIGHT: 293 LBS | RESPIRATION RATE: 14 BRPM | HEART RATE: 98 BPM | OXYGEN SATURATION: 97 %

## 2023-06-15 DIAGNOSIS — R05.1 ACUTE COUGH: ICD-10-CM

## 2023-06-15 DIAGNOSIS — J45.31 MILD PERSISTENT ASTHMA WITH ACUTE EXACERBATION: Primary | ICD-10-CM

## 2023-06-15 PROCEDURE — 99214 OFFICE O/P EST MOD 30 MIN: CPT | Performed by: PHYSICIAN ASSISTANT

## 2023-06-15 RX ORDER — FEXOFENADINE HYDROCHLORIDE AND PSEUDOEPHEDRINE HYDROCHLORIDE 180; 240 MG/1; MG/1
TABLET, FILM COATED, EXTENDED RELEASE ORAL
Qty: 90 TABLET | Refills: 0 | Status: SHIPPED | OUTPATIENT
Start: 2023-06-15 | End: 2023-08-25

## 2023-06-15 RX ORDER — PREDNISONE 20 MG/1
40 TABLET ORAL DAILY
Qty: 10 TABLET | Refills: 0 | Status: SHIPPED | OUTPATIENT
Start: 2023-06-15 | End: 2023-06-20

## 2023-06-15 RX ORDER — BENZONATATE 200 MG/1
200 CAPSULE ORAL 3 TIMES DAILY PRN
Qty: 30 CAPSULE | Refills: 0 | Status: SHIPPED | OUTPATIENT
Start: 2023-06-15 | End: 2023-07-14

## 2023-06-15 ASSESSMENT — ENCOUNTER SYMPTOMS
RHINORRHEA: 1
SHORTNESS OF BREATH: 1
FEVER: 0
COUGH: 1
WHEEZING: 1

## 2023-06-15 NOTE — LETTER
Darling 15, 2023      Coni Newberry  [address]  [facility] [address]        To Whom It May Concern:    Coni Newberry  was seen today due to medical condition.  Please excuse her  from work for today and tomorrow.         Sincerely,        Leslie Lobo PA-C

## 2023-06-15 NOTE — PROGRESS NOTES
Assessment & Plan:        ICD-10-CM    1. Mild persistent asthma with acute exacerbation  J45.31 predniSONE (DELTASONE) 20 MG tablet      2. Acute cough  R05.1 benzonatate (TESSALON) 200 MG capsule            Plan/Clinical Decision Making:    Patient with acute asthma exacerbation this past week. Using steroid inhaler, nasal spray, antihistamine regularly. Continue with regular medications.   Lungs on exam today. Will add course of oral prednisone, patient aware of side effects.   Can take Tessalon as needed for cough. Work note done.       Return if symptoms worsen or fail to improve, for in 3-5 days.     At the end of the encounter, I discussed results, diagnosis, medications. Discussed red flags for immediate return to clinic/ER, as well as indications for follow up if no improvement. Patient understood and agreed to plan. Patient was stable for discharge.        Leslie Lobo PA-C on 6/15/2023 at 10:43 AM          Subjective:     HPI:    Coni Brar is a 48 year old female who presents to clinic today for the following health issues:  Chief Complaint   Patient presents with     Sick     Dry Cough when she lays down, wheezing, sneezing, SOB, fatigue  x 4 days- has asthma -- took some Musinex, diabetic tussin - DID not work     Letter Out     Letter for work for Thursday and Friday     HPI    Patient developed cough on Monday. Has had wheezing, sneezing, SOB. No fever. Mild nasal symptoms. No phlegm. Has asthma. Took Mucinex.     Meds for asthma and allergies: albuterol, QVAR- bid and albuterol neb. Taking Nasacort, Allegra-D.  Singulair.    History obtained from the patient.    Review of Systems   Constitutional: Negative for fever.   HENT: Positive for congestion and rhinorrhea.    Respiratory: Positive for cough, shortness of breath and wheezing.          Patient Active Problem List   Diagnosis     Mild persistent asthma     Nummular eczema     Hyperglycemia     Health Care Home     Type 2 diabetes mellitus  treated without insulin (H)     Hyperlipidemia with target LDL less than 70     Lumbago     Thoracic or lumbosacral neuritis or radiculitis, unspecified     Nonallopathic lesion of lumbar region     Nonallopathic lesion of thoracic region     Obesity     History of left salpingo-oophorectomy     Hemorrhagic cyst of ovary     Essential hypertension with goal blood pressure less than 140/90     Obesity, Class III, BMI 40-49.9 (morbid obesity) (H)     Abnormal uterine bleeding (AUB)     Chronic kidney disease, stage 3a (H)        Past Medical History:   Diagnosis Date     Diabetes (H)      Heart murmur      HTN (hypertension)     No cardiologist     Hyperlipidaemia LDL goal <100      Mild persistent asthma 7/29/2014     Nummular eczema        Social History     Tobacco Use     Smoking status: Never     Smokeless tobacco: Never   Vaping Use     Vaping status: Never Used   Substance Use Topics     Alcohol use: Yes     Alcohol/week: 0.0 standard drinks of alcohol     Comment: 1-2 drinks yearly             Objective:     Vitals:    06/15/23 1008   BP: 132/80   BP Location: Right arm   Patient Position: Chair   Cuff Size: Adult Large   Pulse: 98   Resp: 14   Temp: 98.8  F (37.1  C)   TempSrc: Oral   SpO2: 97%   Weight: 136.1 kg (300 lb)         Physical Exam   EXAM:   Pleasant, alert, appropriate appearance. NAD.  Head Exam: Normocephalic, atraumatic.  Eye Exam:   non icteric/injection.    Ear Exam: TMs grey without bulging. Normal canals.  Normal pinna.  Nose Exam: Normal external nose.    OroPharynx Exam:  Moist mucous membranes. No erythema, pharynx without exudate or hypertrophy.  Neck/Thyroid Exam:  No LAD.  No nodules or enlargement.  Chest/Respiratory Exam: CTAB.  Cardiovascular Exam: RRR. No murmur or rubs.      Results:  No results found for any visits on 06/15/23.

## 2023-06-22 ENCOUNTER — E-VISIT (OUTPATIENT)
Dept: URGENT CARE | Facility: CLINIC | Age: 48
End: 2023-06-22
Payer: COMMERCIAL

## 2023-06-22 ENCOUNTER — TELEPHONE (OUTPATIENT)
Dept: INTERNAL MEDICINE | Facility: CLINIC | Age: 48
End: 2023-06-22

## 2023-06-22 DIAGNOSIS — N39.0 ACUTE UTI (URINARY TRACT INFECTION): Primary | ICD-10-CM

## 2023-06-22 PROCEDURE — 99207 PR NON-BILLABLE SERV PER CHARTING: CPT | Performed by: PHYSICIAN ASSISTANT

## 2023-06-22 RX ORDER — SULFAMETHOXAZOLE/TRIMETHOPRIM 800-160 MG
1 TABLET ORAL 2 TIMES DAILY
Qty: 6 TABLET | Refills: 0 | Status: SHIPPED | OUTPATIENT
Start: 2023-06-22 | End: 2023-06-25

## 2023-06-22 NOTE — TELEPHONE ENCOUNTER
General Call      Reason for Call:  Note for employer - UTI -  evisit with Viktoria CLINE    What are your questions or concerns:  Pt needs to  note at clinic to bring to employer due to absence today. Please call her as to when she can pick it up.    Date of last appointment with provider: 6/22    Could we send this information to you in [employer] or would you prefer to receive a phone call?:   Please call

## 2023-06-22 NOTE — LETTER
[facility]  [address]  [address]  [facility] [address]  Phone: [phone]    June 22, 2023        Coni Newberry  [address]  [facility] [address]          To whom it may concern:    RE: Coni Newberry    Patient was evaluated today. Please excuse her from work missed June 22, 2023.    Please contact me for questions or concerns.      Sincerely,      Nadeen Quinn PA-C  [facility]

## 2023-06-22 NOTE — PATIENT INSTRUCTIONS
Dear Coni Newberry    After reviewing your responses, I've been able to diagnose you with a urinary tract infection, which is a common infection of the bladder with bacteria.  This is not a sexually transmitted infection, though urinating immediately after intercourse can help prevent infections.  Drinking lots of fluids is also helpful to clear your current infection and prevent the next one.      I have sent a prescription for antibiotics to your pharmacy to treat this infection.    It is important that you take all of your prescribed medication even if your symptoms are improving after a few doses.  Taking all of your medicine helps prevent the symptoms from returning.     If your symptoms worsen, you develop pain in your back or stomach, develop fevers, or are not improving in 5 days, please contact your primary care provider for an appointment or visit any of our convenient Walk-in or Urgent Care Centers to be seen, which can be found on our website here.    Thanks again for choosing us as your health care partner,    Viktoria Quinn PA-C    Urinary Tract Infections in Women  Urinary tract infections (UTIs) are most often caused by bacteria. These bacteria enter the urinary tract. The bacteria may come from inside the body. Or they may travel from the skin outside the rectum or vagina into the urethra. Female anatomy makes it easy for bacteria from the bowel to enter a woman s urinary tract, which is the most common source of UTI. This means women develop UTIs more often than men. Pain in or around the urinary tract is a common UTI symptom. But the only way to know for sure if you have a UTI for the healthcare provider to test your urine. The two tests that may be done are the urinalysis and urine culture.    Types of UTIs    Cystitis. A bladder infection (cystitis) is the most common UTI in women. You may have urgent or frequent need to pee. You may also have pain, burning when you pee, and bloody  urine.    Urethritis. This is an inflamed urethra, which is the tube that carries urine from the bladder to outside the body. You may have lower stomach or back pain. You may also have urgent or frequent need to pee.    Pyelonephritis. This is a kidney infection. If not treated, it can be serious and damage your kidneys. In severe cases, you may need to stay in the hospital. You may have a fever and lower back pain.    Medicines to treat a UTI  Most UTIs are treated with antibiotics. These kill the bacteria. The length of time you need to take them depends on the type of infection. It may be as short as 3 days. If you have repeated UTIs, you may need a low-dose antibiotic for several months. Take antibiotics exactly as directed. Don t stop taking them until all of the medicine is gone, even if you feel better. If you stop taking the antibiotic too soon, the infection may not go away. You may also develop a resistance to the antibiotic. This can make it much harder to treat.  Lifestyle changes to treat and prevent UTIs  The lifestyle changes below will help get rid of your UTI. They may also help prevent future UTIs.    Drink plenty of fluids. This includes water, juice, or other caffeine-free drinks. Fluids help flush bacteria out of your body.    Empty your bladder. Always empty your bladder when you feel the urge to pee. And always pee before going to sleep. Urine that stays in your bladder can lead to infection. Try to pee before and after sex as well.    Practice good personal hygiene. Wipe yourself from front to back after using the toilet. This helps keep bacteria from getting into the urethra.    Wear cotton underwear. Don't wear synthetic or tight-fitting underwear that can trap moisture. Change out of wet bathing suits and workout clothing quickly.    Take showers. Showers are better than baths for preventing UTIs.    Use condoms during sex. These help prevent UTIs caused by sexually transmitted bacteria.  Also don't use spermicides during sex. These can increase the risk for UTIs. Choose other forms of birth control instead. For women who tend to get UTIs after sex, a low-dose of a preventive antibiotic may be used. Be sure to discuss this option with your healthcare provider.    Follow up with your healthcare provider as directed. They may test to make sure the infection has cleared. If needed, more treatment may be started.  [employer] [place] [employer] last reviewed this educational content on 9/1/2021 2000-2022 [employer]. All rights reserved. This information is not intended as a substitute for professional medical care. Always follow your healthcare professional's instructions.

## 2023-06-28 DIAGNOSIS — B37.2 YEAST INFECTION OF THE SKIN: ICD-10-CM

## 2023-06-28 RX ORDER — NYSTATIN 100000 U/G
OINTMENT TOPICAL
Qty: 30 G | Refills: 0 | Status: SHIPPED | OUTPATIENT
Start: 2023-06-28 | End: 2023-08-14

## 2023-06-28 NOTE — TELEPHONE ENCOUNTER
Prescription approved per [facility] Refill Protocol.    Ian Ralph, Triage RN [facility]  3:17 PM 6/28/2023

## 2023-06-30 ENCOUNTER — E-VISIT (OUTPATIENT)
Dept: URGENT CARE | Facility: CLINIC | Age: 48
End: 2023-06-30
Payer: COMMERCIAL

## 2023-06-30 DIAGNOSIS — R30.0 DYSURIA: Primary | ICD-10-CM

## 2023-06-30 PROCEDURE — 99207 PR NON-BILLABLE SERV PER CHARTING: CPT | Performed by: PREVENTIVE MEDICINE

## 2023-06-30 NOTE — PATIENT INSTRUCTIONS
Dear Coni Newberry,     After reviewing your responses, I would like you to come in for a urine test to make sure we treat you correctly. This urine test is to evaluate you for a possible urinary tract infection, and should be scheduled for today or tomorrow. Schedule a Lab Only appointment here.     Lab appointments are not available at most locations on the weekends. If no Lab Only appointment is available, you should be seen in any of our convenient Walk-in or Urgent Care Centers, which can be found on our website here.     You will receive instructions with your results in Skout once they are available.     If your symptoms worsen, you develop pain in your back or stomach, develop fevers, or are not improving in 5 days, please contact your primary care provider for an appointment or visit a Walk-in or Urgent Care Center to be seen.     Thanks again for choosing us as your health care partner,     Adriel Rivera MD, MD    Dysuria with Uncertain Cause (Adult)    The urethra is the tube that allows urine to pass out of the body. In a woman, the urethra is the opening above the vagina. In men, the urethra is the opening on the tip of the penis. Dysuria is the feeling of pain or burning in the urethra when passing urine.   Dysuria can be caused by anything that irritates or inflames the urethra. An infection or chemical irritation can cause this reaction. A bladder infection is the most common cause of dysuria in adults. A urine test can diagnose this. A bladder infection needs antibiotic treatment.   Soaps, lotions, colognes, and feminine hygiene products can cause dysuria. So can birth control jellies, creams, and foams. It will go away 1 to 3 days after discontinuing the use of these irritants.   Sexually transmitted infections (STIs), such as chlamydia or gonorrhea, can cause dysuria. Your healthcare provider may take a culture sample. Your provider may start you on antibiotic  medicine before the culture test returns.   In women who have gone through menopause, dysuria can be from dryness in the lining of the urethra. This can be treated with hormones. Dysuria becomes long-term (chronic) when it lasts for weeks or months. You may need to see a specialist (urologist) to diagnose and treat chronic dysuria.   Home care  These home care tips may help:    Don't use any chemicals or products that you think may be causing your symptoms.    If you were given a prescription medicine, take as directed. Take it until it's all used up.    If a culture was taken, don't have sex until you have been told that it is negative. A negative culture means you don't have an infection. Then follow your healthcare provider's advice to treat your condition.  If a culture was done and it is positive:     Both you and your sexual partner may need to be treated. This is true even if your partner has no symptoms.    Contact your healthcare provider or go to an urgent care clinic or the public health department to be looked at and treated.    Don't have sex until both you and your partner have finished all antibiotics and your healthcare provider says you are no longer contagious.    Learn about and use safe sex practices. The safest sex is with a partner who has tested negative and only has sex with you. Condoms can prevent STIs from spreading, but they aren't a guarantee.  Follow-up care  Follow up with your healthcare provider, or as advised. If a culture was taken, you may call as directed for the results. If you have an STI, follow up with your provider or the public health department for a complete STI screening, including HIV testing. For more information, contact CDC-INFO at [phone].   When to call your healthcare provider   Call your healthcare provider right away if any of these occur:    You aren't better after 3 days of treatment    Fever of 100.4 F (38 C) or higher, or as directed by your healthcare  provider    Back or belly pain that gets worse    You can't urinate because of pain    New discharge from the urethra, vagina, or penis    Painful sores on the penis    Rash or joint pain    Painful lumps (lymph nodes) in the groin    Testicle pain or swelling of the scrotum  [place] last reviewed this educational content on 6/1/2022 2000-2022 [employer]. All rights reserved. This information is not intended as a substitute for professional medical care. Always follow your healthcare professional's instructions.

## 2023-07-01 ENCOUNTER — OFFICE VISIT (OUTPATIENT)
Dept: URGENT CARE | Facility: URGENT CARE | Age: 48
End: 2023-07-01
Payer: COMMERCIAL

## 2023-07-01 VITALS
BODY MASS INDEX: 56.33 KG/M2 | WEIGHT: 293 LBS | TEMPERATURE: 98.4 F | OXYGEN SATURATION: 98 % | SYSTOLIC BLOOD PRESSURE: 134 MMHG | HEART RATE: 91 BPM | DIASTOLIC BLOOD PRESSURE: 94 MMHG

## 2023-07-01 DIAGNOSIS — R30.0 DYSURIA: Primary | ICD-10-CM

## 2023-07-01 LAB
BACTERIA #/AREA URNS HPF: ABNORMAL /HPF
CLUE CELLS: ABNORMAL
RBC #/AREA URNS AUTO: ABNORMAL /HPF
SQUAMOUS #/AREA URNS AUTO: ABNORMAL /LPF
TRICHOMONAS, WET PREP: ABNORMAL
WBC #/AREA URNS AUTO: ABNORMAL /HPF
WBC'S/HIGH POWER FIELD, WET PREP: ABNORMAL
YEAST, WET PREP: ABNORMAL

## 2023-07-01 PROCEDURE — 99213 OFFICE O/P EST LOW 20 MIN: CPT | Performed by: FAMILY MEDICINE

## 2023-07-01 PROCEDURE — 87210 SMEAR WET MOUNT SALINE/INK: CPT | Performed by: FAMILY MEDICINE

## 2023-07-01 PROCEDURE — 81015 MICROSCOPIC EXAM OF URINE: CPT

## 2023-07-01 NOTE — PROGRESS NOTES
"  Assessment & Plan     Dysuria    - UA Microscopic with Reflex to Culture  - Wet prep - Clinic Collect    Patient reassured with negative UA and wet prep today.  Continue with increased fluids and rest and continued good control of her blood sugars.  Close Follow-up if no change or new or worsening sx prn.    Marisela Edgar MD  [facility]   Coni Brar is a 48 year old, presenting for the following health issues:  UTI (Pt had a evisit last week and was given two days of antibiotics for her bladder infection, medication subsided for a little bit and then return yesterday. Pt had another evisit and was told to come in to give specimen in person. Symptoms included dysuria, polyuria, itchyness on the outer vagina. Pt states she took otc meds \"Urinary pain relief\" which turned her urine orange color. )    HPI     Presents with persistent dysuria after using Bactrim last week via evisit for UTI.  Still feels symptomatic.  Used Azo for pain.  Notes itchiness in and around vagina.  Type 2 DM with hx of morbid obesity.        Review of Systems   Constitutional, HEENT, cardiovascular, pulmonary, GI, , musculoskeletal, neuro, skin, endocrine and psych systems are negative, except as otherwise noted.      Objective    BP (!) 134/94 (BP Location: Right arm, Patient Position: Sitting, Cuff Size: Adult Large)   Pulse 91   Temp 98.4  F (36.9  C)   Wt 144.2 kg (318 lb)   LMP 06/01/2023 (Approximate)   SpO2 98%   BMI 56.33 kg/m    Body mass index is 56.33 kg/m .  Physical Exam   GENERAL: healthy, alert and no distress  EYES: Eyes grossly normal to inspection, PERRL and conjunctivae and sclerae normal  MS: no gross musculoskeletal defects noted, no edema  SKIN: no suspicious lesions or rashes  PSYCH: mentation appears normal, affect normal/bright    Results for orders placed or performed in visit on 07/01/23 (from the past 24 hour(s))   UA Microscopic with Reflex to Culture "   Result Value Ref Range    Bacteria Urine None Seen None Seen /HPF    RBC Urine 2-5 (A) 0-2 /HPF /HPF    WBC Urine 0-5 0-5 /HPF /HPF    Squamous Epithelials Urine Few (A) None Seen /LPF    Narrative    Urine Culture not indicated   Wet prep - Clinic Collect    Specimen: Vagina; Swab   Result Value Ref Range    Trichomonas Absent Absent    Yeast Absent Absent    Clue Cells Absent Absent    WBCs/high power field 2+ (A) None

## 2023-07-03 ENCOUNTER — OFFICE VISIT (OUTPATIENT)
Dept: FAMILY MEDICINE | Facility: CLINIC | Age: 48
End: 2023-07-03
Payer: COMMERCIAL

## 2023-07-03 VITALS
SYSTOLIC BLOOD PRESSURE: 122 MMHG | HEART RATE: 103 BPM | WEIGHT: 293 LBS | RESPIRATION RATE: 16 BRPM | BODY MASS INDEX: 51.91 KG/M2 | DIASTOLIC BLOOD PRESSURE: 84 MMHG | OXYGEN SATURATION: 96 % | HEIGHT: 63 IN | TEMPERATURE: 97.9 F

## 2023-07-03 DIAGNOSIS — R30.0 DYSURIA: Primary | ICD-10-CM

## 2023-07-03 DIAGNOSIS — B96.89 BV (BACTERIAL VAGINOSIS): ICD-10-CM

## 2023-07-03 DIAGNOSIS — R35.0 URINARY FREQUENCY: ICD-10-CM

## 2023-07-03 DIAGNOSIS — N89.8 VAGINAL ITCHING: ICD-10-CM

## 2023-07-03 DIAGNOSIS — B37.31 CANDIDIASIS OF VAGINA: ICD-10-CM

## 2023-07-03 DIAGNOSIS — N76.0 BV (BACTERIAL VAGINOSIS): ICD-10-CM

## 2023-07-03 LAB
ALBUMIN UR-MCNC: 100 MG/DL
ANION GAP SERPL CALCULATED.3IONS-SCNC: 14 MMOL/L (ref 7–15)
APPEARANCE UR: ABNORMAL
BACTERIA #/AREA URNS HPF: ABNORMAL /HPF
BASOPHILS # BLD AUTO: 0 10E3/UL (ref 0–0.2)
BASOPHILS NFR BLD AUTO: 0 %
BILIRUB UR QL STRIP: NEGATIVE
BUN SERPL-MCNC: 15.4 MG/DL (ref 6–20)
CALCIUM SERPL-MCNC: 9.6 MG/DL (ref 8.6–10)
CHLORIDE SERPL-SCNC: 101 MMOL/L (ref 98–107)
CLUE CELLS: PRESENT
COLOR UR AUTO: YELLOW
CREAT SERPL-MCNC: 1.02 MG/DL (ref 0.51–0.95)
DEPRECATED HCO3 PLAS-SCNC: 23 MMOL/L (ref 22–29)
EOSINOPHIL # BLD AUTO: 0.3 10E3/UL (ref 0–0.7)
EOSINOPHIL NFR BLD AUTO: 4 %
ERYTHROCYTE [DISTWIDTH] IN BLOOD BY AUTOMATED COUNT: 13.4 % (ref 10–15)
GFR SERPL CREATININE-BSD FRML MDRD: 68 ML/MIN/1.73M2
GLUCOSE SERPL-MCNC: 209 MG/DL (ref 70–99)
GLUCOSE UR STRIP-MCNC: 100 MG/DL
HCT VFR BLD AUTO: 44.3 % (ref 35–47)
HGB BLD-MCNC: 14.2 G/DL (ref 11.7–15.7)
HGB UR QL STRIP: ABNORMAL
IMM GRANULOCYTES # BLD: 0 10E3/UL
IMM GRANULOCYTES NFR BLD: 0 %
KETONES UR STRIP-MCNC: ABNORMAL MG/DL
LEUKOCYTE ESTERASE UR QL STRIP: ABNORMAL
LYMPHOCYTES # BLD AUTO: 2.1 10E3/UL (ref 0.8–5.3)
LYMPHOCYTES NFR BLD AUTO: 23 %
MCH RBC QN AUTO: 26.5 PG (ref 26.5–33)
MCHC RBC AUTO-ENTMCNC: 32.1 G/DL (ref 31.5–36.5)
MCV RBC AUTO: 83 FL (ref 78–100)
MONOCYTES # BLD AUTO: 0.6 10E3/UL (ref 0–1.3)
MONOCYTES NFR BLD AUTO: 6 %
NEUTROPHILS # BLD AUTO: 6.1 10E3/UL (ref 1.6–8.3)
NEUTROPHILS NFR BLD AUTO: 67 %
NITRATE UR QL: POSITIVE
PH UR STRIP: 7 [PH] (ref 5–7)
PLATELET # BLD AUTO: 183 10E3/UL (ref 150–450)
POTASSIUM SERPL-SCNC: 3.7 MMOL/L (ref 3.4–5.3)
RBC # BLD AUTO: 5.35 10E6/UL (ref 3.8–5.2)
RBC #/AREA URNS AUTO: ABNORMAL /HPF
SODIUM SERPL-SCNC: 138 MMOL/L (ref 136–145)
SP GR UR STRIP: 1.01 (ref 1–1.03)
SQUAMOUS #/AREA URNS AUTO: ABNORMAL /LPF
TRICHOMONAS, WET PREP: ABNORMAL
UROBILINOGEN UR STRIP-ACNC: 1 E.U./DL
WBC # BLD AUTO: 9.2 10E3/UL (ref 4–11)
WBC #/AREA URNS AUTO: ABNORMAL /HPF
WBC CLUMPS #/AREA URNS HPF: PRESENT /HPF
WBC'S/HIGH POWER FIELD, WET PREP: ABNORMAL
YEAST, WET PREP: PRESENT

## 2023-07-03 PROCEDURE — 87086 URINE CULTURE/COLONY COUNT: CPT | Performed by: INTERNAL MEDICINE

## 2023-07-03 PROCEDURE — 80048 BASIC METABOLIC PNL TOTAL CA: CPT | Performed by: INTERNAL MEDICINE

## 2023-07-03 PROCEDURE — 87210 SMEAR WET MOUNT SALINE/INK: CPT | Performed by: INTERNAL MEDICINE

## 2023-07-03 PROCEDURE — 81001 URINALYSIS AUTO W/SCOPE: CPT | Performed by: INTERNAL MEDICINE

## 2023-07-03 PROCEDURE — 36415 COLL VENOUS BLD VENIPUNCTURE: CPT | Performed by: INTERNAL MEDICINE

## 2023-07-03 PROCEDURE — 99214 OFFICE O/P EST MOD 30 MIN: CPT | Performed by: INTERNAL MEDICINE

## 2023-07-03 PROCEDURE — 85025 COMPLETE CBC W/AUTO DIFF WBC: CPT | Performed by: INTERNAL MEDICINE

## 2023-07-03 RX ORDER — FLUCONAZOLE 150 MG/1
TABLET ORAL
Qty: 2 TABLET | Refills: 0 | Status: SHIPPED | OUTPATIENT
Start: 2023-07-03 | End: 2023-07-14

## 2023-07-03 RX ORDER — NITROFURANTOIN 25; 75 MG/1; MG/1
100 CAPSULE ORAL 2 TIMES DAILY
Qty: 10 CAPSULE | Refills: 0 | Status: SHIPPED | OUTPATIENT
Start: 2023-07-03 | End: 2023-07-14

## 2023-07-03 RX ORDER — METRONIDAZOLE 500 MG/1
500 TABLET ORAL 2 TIMES DAILY
Qty: 14 TABLET | Refills: 0 | Status: SHIPPED | OUTPATIENT
Start: 2023-07-03 | End: 2023-07-10

## 2023-07-03 RX ORDER — FLUCONAZOLE 150 MG/1
150 TABLET ORAL ONCE
Qty: 1 TABLET | Refills: 0 | Status: SHIPPED | OUTPATIENT
Start: 2023-07-03 | End: 2023-07-03

## 2023-07-03 ASSESSMENT — PAIN SCALES - GENERAL: PAINLEVEL: SEVERE PAIN (7)

## 2023-07-03 ASSESSMENT — ASTHMA QUESTIONNAIRES: ACT_TOTALSCORE: 17

## 2023-07-03 NOTE — PROGRESS NOTES
Assessment & Plan   Problem List Items Addressed This Visit    None  Visit Diagnoses     Dysuria    -  Primary    Relevant Medications    nitroFURantoin macrocrystal-monohydrate (MACROBID) 100 MG capsule    Other Relevant Orders    CBC with platelets and differential (Completed)    Basic metabolic panel  (Ca, Cl, CO2, Creat, Gluc, K, Na, BUN) (Completed)    UA Macroscopic with reflex to Microscopic and Culture (Completed)    Wet prep - Clinic Collect (Completed)    Urine Microscopic Exam (Completed)    Urine Culture    Urinary frequency        Relevant Medications    nitroFURantoin macrocrystal-monohydrate (MACROBID) 100 MG capsule    Other Relevant Orders    UA Macroscopic with reflex to Microscopic and Culture (Completed)    Urine Microscopic Exam (Completed)    Urine Culture    Vaginal itching        BV (bacterial vaginosis)        Relevant Medications    metroNIDAZOLE (FLAGYL) 500 MG tablet    Candidiasis of vagina        Relevant Medications    fluconazole (DIFLUCAN) 150 MG tablet         Repeat labs UA and wet prep.  Treat accordingly.  Addendum Labs shows persistent UTI and also wet prep was positive for yeast and clue cells.  Diflucan sent for positive fungal Flagyl for 7 days in addition to startingWith nitrofurantoin for  5 days patient on antibiotics given that she has underlying diabetes increases risk for infections.  Follow-up if any persistent or worsening symptoms including but not limited to flank pain fever abdominal pain vomiting lethargy etc.  All questions answered.  Hemodynamics are stable patient was discharged from clinic in stable condition slightly tachycardia advised to increase fluid intake and keep well-hydrated.  Repeat blood pressure was normal.       MED REC REQUIRED  Post Medication Reconciliation Status:   Med reconciliation done on this visit  discharge medications reconciled and changed, per note/orders  BMI:   Estimated body mass index is 56.51 kg/m  as calculated from the  "following:    Height as of this encounter: 1.6 m (5' 3\").    Weight as of this encounter: 144.7 kg (319 lb).   Weight management plan: Discussed healthy diet and exercise guidelines        Paul Hernandez MD  [facility]    Bossman Brar is a 48 year old, presenting for the following health issues:  UTI         No data to display              HPI     ED/UC Followup:    Facility:  [facility]  Date of visit: 07/01/2023  Reason for visit:      Dysuria      Current Status:     Patient presenting for persistent urinary symptoms and vaginal itching.  Denies any flank pain no fever no hematuria.  No nausea vomiting no GI symptoms associated.  Slight lower abdominal discomfort.      Review of Systems   Constitutional, HEENT, cardiovascular, pulmonary, gi and gu systems are negative, except as otherwise noted.      Objective    /84   Pulse 103   Temp 97.9  F (36.6  C) (Oral)   Resp 16   Ht 1.6 m (5' 3\")   Wt 144.7 kg (319 lb)   LMP 06/01/2023 (Approximate)   SpO2 96%   BMI 56.51 kg/m    Body mass index is 56.51 kg/m .  Physical Exam   GENERAL: healthy, alert and no distress, obese  EYES: Eyes grossly normal to inspection, PERRL and conjunctivae and sclerae normal  HENT: ear canals and TM's normal, nose and mouth without ulcers or lesions  NECK: no adenopathy, no asymmetry, masses, or scars and thyroid normal to palpation  RESP: lungs clear to auscultation - no rales, rhonchi or wheezes  CV: regular rate and rhythm, normal S1 S2, no S3 or S4, no murmur, click or rub, no peripheral edema and peripheral pulses strong  ABDOMEN: soft, nontender, obese, no hepatosplenomegaly, no masses and bowel sounds normal  MS: no gross musculoskeletal defects noted, no edema  SKIN: no suspicious lesions or rashes  NEURO: Normal strength and tone, mentation intact and speech normal  PSYCH: mentation appears normal, affect normal/bright    Office Visit on 07/01/2023 "   Component Date Value Ref Range Status     Bacteria Urine 07/01/2023 None Seen  None Seen /HPF Final     RBC Urine 07/01/2023 2-5 (A)  0-2 /HPF /HPF Final     WBC Urine 07/01/2023 0-5  0-5 /HPF /HPF Final     Squamous Epithelials Urine 07/01/2023 Few (A)  None Seen /LPF Final     Trichomonas 07/01/2023 Absent  Absent Final     Yeast 07/01/2023 Absent  Absent Final     Clue Cells 07/01/2023 Absent  Absent Final     WBCs/high power field 07/01/2023 2+ (A)  None Final

## 2023-07-04 NOTE — RESULT ENCOUNTER NOTE
Jason Brar, I tried calling you to review your lab results.  The wet prep shows evidence of bacterial vaginosis and yeast fungal infection.  I did send you prescription for Flagyl 1 tablet twice a day for 7 days to treat the bacterial vaginosis,  you already have Diflucan 1 pill for fungal infection you can repeat in 3 days if persistent symptoms.  I will send a refill for Diflucan.  Also the urine is suggestive of UTI.  Please start on the antibiotics prescribed Macrobid twice a day for 5 days in addition to increase fluid intake as there is ketones trace in the urine.  CBC shows normal blood counts no anemia, there is no systemic infection.  Rest of labs are pending  Any further questions please let me know.  Please follow-up if persistent worsening symptoms.  Dr Hernandez

## 2023-07-05 LAB — BACTERIA UR CULT: NORMAL

## 2023-07-14 ENCOUNTER — OFFICE VISIT (OUTPATIENT)
Dept: INTERNAL MEDICINE | Facility: CLINIC | Age: 48
End: 2023-07-14
Payer: COMMERCIAL

## 2023-07-14 VITALS
HEART RATE: 123 BPM | RESPIRATION RATE: 20 BRPM | TEMPERATURE: 97.7 F | BODY MASS INDEX: 51.91 KG/M2 | WEIGHT: 293 LBS | OXYGEN SATURATION: 97 % | HEIGHT: 63 IN | SYSTOLIC BLOOD PRESSURE: 122 MMHG | DIASTOLIC BLOOD PRESSURE: 80 MMHG

## 2023-07-14 DIAGNOSIS — E78.5 HYPERLIPIDEMIA WITH TARGET LDL LESS THAN 70: ICD-10-CM

## 2023-07-14 DIAGNOSIS — J45.40 MODERATE PERSISTENT ASTHMA WITHOUT COMPLICATION: ICD-10-CM

## 2023-07-14 DIAGNOSIS — R35.0 URINARY FREQUENCY: Primary | ICD-10-CM

## 2023-07-14 DIAGNOSIS — E11.9 TYPE 2 DIABETES MELLITUS TREATED WITHOUT INSULIN (H): ICD-10-CM

## 2023-07-14 DIAGNOSIS — N18.31 CHRONIC KIDNEY DISEASE, STAGE 3A (H): ICD-10-CM

## 2023-07-14 DIAGNOSIS — E66.01 MORBID OBESITY (H): ICD-10-CM

## 2023-07-14 DIAGNOSIS — J45.30 MILD PERSISTENT ASTHMA WITHOUT COMPLICATION: ICD-10-CM

## 2023-07-14 LAB
ANION GAP SERPL CALCULATED.3IONS-SCNC: 13 MMOL/L (ref 7–15)
BUN SERPL-MCNC: 26.8 MG/DL (ref 6–20)
CALCIUM SERPL-MCNC: 9.7 MG/DL (ref 8.6–10)
CHLORIDE SERPL-SCNC: 101 MMOL/L (ref 98–107)
CREAT SERPL-MCNC: 1.13 MG/DL (ref 0.51–0.95)
DEPRECATED HCO3 PLAS-SCNC: 25 MMOL/L (ref 22–29)
GFR SERPL CREATININE-BSD FRML MDRD: 60 ML/MIN/1.73M2
GLUCOSE SERPL-MCNC: 201 MG/DL (ref 70–99)
HBA1C MFR BLD: 8.5 % (ref 0–5.6)
POTASSIUM SERPL-SCNC: 3.9 MMOL/L (ref 3.4–5.3)
SODIUM SERPL-SCNC: 139 MMOL/L (ref 136–145)

## 2023-07-14 PROCEDURE — 80048 BASIC METABOLIC PNL TOTAL CA: CPT

## 2023-07-14 PROCEDURE — 83036 HEMOGLOBIN GLYCOSYLATED A1C: CPT

## 2023-07-14 PROCEDURE — 99214 OFFICE O/P EST MOD 30 MIN: CPT

## 2023-07-14 PROCEDURE — 36415 COLL VENOUS BLD VENIPUNCTURE: CPT

## 2023-07-14 RX ORDER — BECLOMETHASONE DIPROPIONATE HFA 80 UG/1
AEROSOL, METERED RESPIRATORY (INHALATION)
Qty: 11 G | Refills: 1 | Status: SHIPPED | OUTPATIENT
Start: 2023-07-14 | End: 2023-08-25

## 2023-07-14 RX ORDER — ALBUTEROL SULFATE 90 UG/1
2 AEROSOL, METERED RESPIRATORY (INHALATION) EVERY 6 HOURS
Qty: 54 G | Refills: 2 | Status: SHIPPED | OUTPATIENT
Start: 2023-07-14 | End: 2023-08-25

## 2023-07-14 RX ORDER — ATORVASTATIN CALCIUM 10 MG/1
10 TABLET, FILM COATED ORAL DAILY
Qty: 90 TABLET | Refills: 0 | Status: SHIPPED | OUTPATIENT
Start: 2023-07-14 | End: 2023-08-08

## 2023-07-14 ASSESSMENT — ENCOUNTER SYMPTOMS
PARESTHESIAS: 0
HEMATURIA: 1
ABDOMINAL PAIN: 1
DIARRHEA: 0
HEADACHES: 0
MYALGIAS: 0
NERVOUS/ANXIOUS: 0
SORE THROAT: 0
ARTHRALGIAS: 0
DYSURIA: 0
HEARTBURN: 0
FREQUENCY: 0
SHORTNESS OF BREATH: 0
PALPITATIONS: 0
DIZZINESS: 1
WEAKNESS: 0
EYE PAIN: 0
JOINT SWELLING: 0
HEMATOCHEZIA: 0
NAUSEA: 0
COUGH: 0
FEVER: 0
CONSTIPATION: 0
CHILLS: 0

## 2023-07-14 ASSESSMENT — PAIN SCALES - GENERAL: PAINLEVEL: MODERATE PAIN (5)

## 2023-07-14 NOTE — PROGRESS NOTES
Assessment & Plan     (R35.0) Urinary frequency  (primary encounter diagnosis)  Comment:   Having 2-3 UTI's per year.   She was seen on 7/3 for symptoms of UTI (hematuria, dysuria)  Denies any flank pain.   Did endorse pelvic pain.     She was treated for UTI and given Macrobid.  She feels she is urinating more frequently. Also urinating every 2-3 hours at night.  Hematuria and dysuria has resolved since. But urinary frequency has not.   She was also found to have BV and yeast infection. Was tx with Flagyl and Diflucan.     Was also treated for suspected UTI over E-visit on 6/22/23 and was treated with Bactrim. No UA was done as this was E-visit. She is interested in see Urology which I think is reasonable at this point.     We also discussed today that her uncontrolled diabetes could be causing nocturia and increased urinary frequency. I suspect A1C will be elevated today as her fasting BG is 190-230. We will update A1C today.    Plan: Adult Urology  Referral           (N18.31) Chronic kidney disease, stage 3a (H)  Plan: Albumin Random Urine Quantitative with Creat         Ratio          (E66.01) Morbid obesity (H)  (E11.9) Type 2 diabetes mellitus treated without insulin (H)  Comment:   BG running between 190-230 prior to meals. Last A1C was 6.8% in May of 2022.   She is interested in starting GLP-1, we will see what A1C is today. Does not need MTM teaching as she was on insulin in the past. We discussed side effects of GLP-1. She is currently on Glyburide 5MG and Metformin BID.   She will send me Velti message in 3-4 weeks after starting GLP-1 to let me know how she is tolerating medication.   GLP-1 will help significantly with weight loss as well as lowering A1C.    Plan: HEMOGLOBIN A1C, Basic metabolic panel  (Ca, Cl,        CO2, Creat, Gluc, K, Na, BUN)            (E78.5) Hyperlipidemia with target LDL less than 70  Plan: atorvastatin (LIPITOR) 10 MG tablet            (J45.40) Moderate persistent  "asthma without complication  Plan: beclomethasone HFA (QVAR REDIHALER) 80 MCG/ACT         inhaler            (J45.30) Mild persistent asthma without complication  Plan: albuterol (PROAIR HFA/PROVENTIL HFA/VENTOLIN         HFA) 108 (90 Base) MCG/ACT inhaler                     Pam Jansen, DANYELL CNP  [facility]   Coni Brar is a 48 year old, presenting for the following health issues:  Physical        7/14/2023     3:05 PM   Additional Questions   Roomed by Temi Boland     HPI             Objective    /80   Pulse (!) 123   Temp 97.7  F (36.5  C) (Oral)   Resp 20   Ht 1.6 m (5' 3\")   Wt 144.7 kg (318 lb 14.4 oz)   LMP 06/01/2023 (Approximate)   SpO2 97%   Breastfeeding No   BMI 56.49 kg/m    Body mass index is 56.49 kg/m .        Physical Exam  Constitutional:       General: She is not in acute distress.     Appearance: Normal appearance. She is not ill-appearing, toxic-appearing or diaphoretic.   HENT:      Head: Normocephalic and atraumatic.   Eyes:      Conjunctiva/sclera: Conjunctivae normal.   Pulmonary:      Effort: Pulmonary effort is normal.   Skin:     General: Skin is warm and dry.   Neurological:      Mental Status: She is alert and oriented to person, place, and time.   Psychiatric:         Mood and Affect: Mood normal.         Behavior: Behavior normal.         Thought Content: Thought content normal.         Judgment: Judgment normal.         "

## 2023-07-14 NOTE — PATIENT INSTRUCTIONS
Send me Wututu message in 3 weeks after starting the injectable medication and let me know how you are tolerating it    Please let me know if you have any questions.    Thanks!  DANYELL Hartley, CNP   [facility]

## 2023-07-17 PROCEDURE — 82570 ASSAY OF URINE CREATININE: CPT

## 2023-07-17 PROCEDURE — 82043 UR ALBUMIN QUANTITATIVE: CPT

## 2023-07-18 ENCOUNTER — NURSE TRIAGE (OUTPATIENT)
Dept: NURSING | Facility: CLINIC | Age: 48
End: 2023-07-18
Payer: COMMERCIAL

## 2023-07-18 LAB
CREAT UR-MCNC: 270 MG/DL
MICROALBUMIN UR-MCNC: 17.8 MG/L
MICROALBUMIN/CREAT UR: 6.59 MG/G CR (ref 0–25)

## 2023-07-18 NOTE — TELEPHONE ENCOUNTER
Called and spoke with patient to relay Pam Jansen message. Patient verbalizes understanding of instructions and indicates no further questions at this time.    Ian Ralph, Triage RN [facility]  2:55 PM 7/18/2023

## 2023-07-18 NOTE — TELEPHONE ENCOUNTER
Patient is calling to le you know that the Ozempic is going well, she is wondering if she should continue the Glyburide and the metformin also. Patient will check my chart for instructions.   Trinidad Cortez RN on 7/18/2023 at 2:28 PM    Reason for Disposition    Nursing judgment    Additional Information    Negative: Nursing judgment    Negative: Nursing judgment    Negative: Nursing judgment    Protocols used: INFORMATION ONLY CALL - NO TRIAGE-A-OH

## 2023-07-20 DIAGNOSIS — E11.9 TYPE 2 DIABETES MELLITUS WITHOUT COMPLICATION, WITHOUT LONG-TERM CURRENT USE OF INSULIN (H): ICD-10-CM

## 2023-07-28 ENCOUNTER — NURSE TRIAGE (OUTPATIENT)
Dept: NURSING | Facility: CLINIC | Age: 48
End: 2023-07-28
Payer: COMMERCIAL

## 2023-07-28 DIAGNOSIS — J45.31 MILD PERSISTENT ASTHMA WITH ACUTE EXACERBATION: Primary | ICD-10-CM

## 2023-07-28 NOTE — TELEPHONE ENCOUNTER
Nurse Triage SBAR    Is this a 2nd Level Triage? YES, LICENSED PRACTITIONER REVIEW IS REQUIRED    Situation: blurred vision     Background: Patient was started on Ozempic two weeks ago. Blood glucose is down from the high 200s to the low 200s. No change in blood sugar in the first week but it has dropped now during the second week. Patient calling due to blurry near vision. Far vision is unchanged but patient notes that at work she is needing to be closer to her computer in order to read it.   [employer] consulted:  Ophthalmic Effects  Retinopathy due to diabetes mellitus  1) General Information  a) Transient worsening of diabetic retinopathy may occur following a rapid improvement in blood glucose control [24].  b) Increased risk for diabetic retinopathy complications in patients with a history of diabetic retinopathy [24]    Patient asking if this is of concern for her, how long this typically affects people, and if she should get in with her optometrist right away or hold off for a couple weeks?    Assessment: provider input on blurred vision with Ozempic and next steps    Protocol Recommended Disposition:   Go To ED/UCC Now (Or To Office With PCP Approval), Discuss With PCP And Callback By Nurse Within 1 Hour    Recommendation: next steps.      Routed to provider    Does the patient meet one of the following criteria for ADS visit consideration? 16+ years old, with an [facility] PCP     TIP  Providers, please consider if this condition is appropriate for management at one of our Acute and Diagnostic Services sites.     If patient is a good candidate, please use dotphrase <dot>triageresponse and select Refer to ADS to document.      Paulina Helm RN   07/28/23 7:38 AM  [facility] Nurse Advisor         Reason for Disposition   Caller has URGENT medicine question about med that PCP or specialist prescribed and triager unable to answer question   Blurred vision or visual changes and present now and sudden onset or  new (e.g., minutes, hours, days)  (Exception: Seeing floaters / black specks OR previously diagnosed migraine headaches with same symptoms.)    Additional Information   Negative: Drug overdose and triager unable to answer question   Negative: Caller requesting a renewal or refill of a medicine patient is currently taking   Negative: Caller requesting information unrelated to medicine   Negative: Caller requesting information about COVID-19 Vaccine   Negative: Caller requesting information about Emergency Contraception   Negative: Caller requesting information about Combined Birth Control Pills   Negative: Caller requesting information about Progestin Birth Control Pills   Negative: Caller requesting information about Post-Op pain or medicines   Negative: Caller requesting a prescription antibiotic (such as penicillin) for Strep throat and has a positive culture result   Negative: Caller requesting a prescription anti-viral med (such as Tamiflu) and has influenza (flu) symptoms   Negative: Immunization reaction suspected   Negative: Rash while taking a medicine or within 3 days of stopping it   Negative: Asthma and having symptoms of asthma (cough, wheezing, etc.)   Negative: Symptom of illness (e.g., headache, abdominal pain, earache, vomiting) that are more than mild   Negative: Breastfeeding questions about mother's medicines and diet   Negative: MORE THAN A DOUBLE DOSE of a prescription or over-the-counter (OTC) drug   Negative: DOUBLE DOSE (an extra dose or lesser amount) of prescription drug and any symptoms (e.g., dizziness, nausea, pain, sleepiness)   Negative: DOUBLE DOSE (an extra dose or lesser amount) of over-the-counter (OTC) drug and any symptoms (e.g., dizziness, nausea, pain, sleepiness)   Negative: Took another person's prescription drug   Negative: DOUBLE DOSE (an extra dose or lesser amount) of prescription drug and NO symptoms  (Exception: A double dose of antibiotics.)   Negative: Diabetes drug  error or overdose (e.g., took wrong type of insulin or took extra dose)   Negative: Caller has medication question about med NOT prescribed by PCP and triager unable to answer question (e.g., compatibility with other med, storage)   Negative: Prescription not at pharmacy and was prescribed by PCP recently  (Exception: triager has access to EMR and prescription is recorded there. Go to Home Care and confirm for pharmacy.)   Negative: Pharmacy calling with prescription question and triager unable to answer question   Negative: Weakness of the face, arm or leg on one side of the body   Negative: Followed getting substance in the eye   Negative: Foreign body or object is or was lodged in the eye   Negative: Followed an eye injury   Negative: Followed sun lamp or sun exposure (UV keratitis)   Negative: Yellow or green discharge (pus) in the eye   Negative: Pregnant   Negative: Complete loss of vision in 1 or both eyes   Negative: SEVERE eye pain   Negative: Severe headache   Negative: Double vision    Protocols used: Medication Question Call-A-OH, Vision Loss or Change-A-OH

## 2023-07-28 NOTE — TELEPHONE ENCOUNTER
Based on increasing vision concerns, recommend follow-up with the optometrist as soon as possible.  Recommendation to hold Ozempic medication.

## 2023-07-29 RX ORDER — MONTELUKAST SODIUM 10 MG/1
TABLET ORAL
Qty: 90 TABLET | Refills: 0 | Status: SHIPPED | OUTPATIENT
Start: 2023-07-29 | End: 2023-08-25

## 2023-07-31 NOTE — TELEPHONE ENCOUNTER
Patient calls back and informed of recommendations from Dr. Parsons. Patient verbalizes understanding.     Orquidea Patrick RN  [facility]

## 2023-08-08 DIAGNOSIS — E78.5 HYPERLIPIDEMIA WITH TARGET LDL LESS THAN 70: ICD-10-CM

## 2023-08-08 RX ORDER — ATORVASTATIN CALCIUM 10 MG/1
10 TABLET, FILM COATED ORAL DAILY
Qty: 90 TABLET | Refills: 0 | Status: SHIPPED | OUTPATIENT
Start: 2023-08-08 | End: 2023-08-25

## 2023-08-09 DIAGNOSIS — E11.9 TYPE 2 DIABETES MELLITUS WITHOUT COMPLICATION, WITHOUT LONG-TERM CURRENT USE OF INSULIN (H): ICD-10-CM

## 2023-08-09 RX ORDER — GLYBURIDE 5 MG/1
TABLET ORAL
Qty: 90 TABLET | Refills: 3 | Status: SHIPPED | OUTPATIENT
Start: 2023-08-09 | End: 2023-08-25

## 2023-08-09 NOTE — TELEPHONE ENCOUNTER
Pending Prescriptions:                       Disp   Refills    glyBURIDE (DIABETA /MICRONASE) 5 MG tablet*90 tab*0        Sig: Take 1 tablet by mouth once daily with breakfast    Future Appointments 8/9/2023 - 2/5/2024        Date Visit Type Length Department Provider     8/23/2023  4:15 PM NEW UROLOGY 45 min WE UROLOGY Chavez Vasquez MD    Location Instructions:     The clinic is located at [address] [place] [address], [address]              8/25/2023  7:00 AM PREVENTATIVE ADULT 30 min RI Farida Conte APRN CNP    Location Instructions:     [facility] is in the [facility] at [address] next to [facility]. This is near the [address] and [address]  exits off of [address]. To reach the clinic from [place] [address], turn north onto [address], then turn east on [address]. Clinic parking is available next to the [facility], which is just east of the hospital s main entrance.                    Routing refill request to provider for review/approval because:  Labs out of range:  creat    Please advise, thanks.

## 2023-08-11 DIAGNOSIS — B37.2 YEAST INFECTION OF THE SKIN: ICD-10-CM

## 2023-08-14 DIAGNOSIS — Z30.41 ENCOUNTER FOR SURVEILLANCE OF CONTRACEPTIVE PILLS: ICD-10-CM

## 2023-08-14 DIAGNOSIS — L30.0 NUMMULAR ECZEMA: ICD-10-CM

## 2023-08-14 RX ORDER — NYSTATIN 100000 U/G
OINTMENT TOPICAL
Qty: 30 G | Refills: 3 | Status: SHIPPED | OUTPATIENT
Start: 2023-08-14 | End: 2023-08-25

## 2023-08-14 RX ORDER — TRIAMCINOLONE ACETONIDE 1 MG/G
OINTMENT TOPICAL
Qty: 80 G | Refills: 0 | Status: SHIPPED | OUTPATIENT
Start: 2023-08-14 | End: 2023-08-25

## 2023-08-14 RX ORDER — ACETAMINOPHEN AND CODEINE PHOSPHATE 120; 12 MG/5ML; MG/5ML
SOLUTION ORAL
Qty: 84 TABLET | Refills: 2 | Status: SHIPPED | OUTPATIENT
Start: 2023-08-14 | End: 2023-08-25

## 2023-08-14 NOTE — TELEPHONE ENCOUNTER
Medication is being filled for 1 time refill only due to:   patient has a future appointment scheduled.

## 2023-08-23 ENCOUNTER — OFFICE VISIT (OUTPATIENT)
Dept: UROLOGY | Facility: CLINIC | Age: 48
End: 2023-08-23
Payer: COMMERCIAL

## 2023-08-23 VITALS
WEIGHT: 293 LBS | SYSTOLIC BLOOD PRESSURE: 118 MMHG | DIASTOLIC BLOOD PRESSURE: 74 MMHG | HEIGHT: 63 IN | BODY MASS INDEX: 51.91 KG/M2

## 2023-08-23 DIAGNOSIS — R35.0 URINARY FREQUENCY: ICD-10-CM

## 2023-08-23 DIAGNOSIS — N39.0 RECURRENT UTI: Primary | ICD-10-CM

## 2023-08-23 PROCEDURE — 99203 OFFICE O/P NEW LOW 30 MIN: CPT | Performed by: OBSTETRICS & GYNECOLOGY

## 2023-08-23 NOTE — PATIENT INSTRUCTIONS
Thank you for coming to see me today. I hope that I was able to answer your questions. Please do not hesitate to call if you have any further questions or concerns.     We discussed the possible cause of your symptoms and what to do to prevent a recurrent bladder infection.   Supplements to prevent UTI to consider  -Probiotics  -Cranberry (for these products let them know a doctor is recommending them)   Ellura: [email or web address]   Theracran HP by [employer] [facility] [id number]  -d-mannose 500mg twice a day  -Vitamin C 500-1000mg twice a day    I also placed an order to a urinalysis and culture in case your symptoms come back. You can go to any [facility] to get that test done.

## 2023-08-23 NOTE — PROGRESS NOTES
August 23, 2023    Referring Provider: Pam Jansen, DANYELL CNP  [address] [place] [address]    Primary Care Provider: Farida Palencia    CC: recurrent UTIs    HPI:  Coni Newberry is a 48 year old female who presents for evaluation of her pelvic floor symptoms.  She has 3-4 UTIs/year. Coni Brar's symptoms are urinary urgency, frequency and burning. Her UC's have been negative.      Past Medical History:   Diagnosis Date    Diabetes (H)     Heart murmur     HTN (hypertension)     No cardiologist    Hyperlipidaemia LDL goal <100     Mild persistent asthma 7/29/2014    Nummular eczema        Past Surgical History:   Procedure Laterality Date    CYSTECTOMY OVARIAN BENIGN Left 2/23/2016    Procedure: CYSTECTOMY OVARIAN BENIGN;  Surgeon: Marisela Lopez MD;  Location: [facility] OR    CYSTOSCOPY N/A 1/3/2018    Procedure: CYSTOSCOPY;;  Surgeon: Lissy Maya DO;  Location: [facility] OR    DILATE CERVIX, HYSTEROSCOPY, ABLATE ENDOMETRIUM, COMBINED N/A 1/3/2018    Procedure: COMBINED DILATE CERVIX, HYSTEROSCOPY, ABLATE ENDOMETRIUM;  Hysteroscopy, cervical dilation, and cystoscopy;  Surgeon: Lissy Maya DO;  Location: [facility] OR    NONE OF THE ABOVE CORE MEASURE DIAGNOSES      SALPINGO-OOPHORECTOMY, COMBINED Left 2/23/2016    Procedure: COMBINED SALPINGO-OOPHORECTOMY;  Surgeon: Marisela Lopez MD;  Location: [facility] OR       Social History     Socioeconomic History    Marital status:      Spouse name: Not on file    Number of children: Not on file    Years of education: Not on file    Highest education level: Not on file   Occupational History    Not on file   Tobacco Use    Smoking status: Never     Passive exposure: Never    Smokeless tobacco: Never   Vaping Use    Vaping Use: Never used   Substance and Sexual Activity    Alcohol use: Yes     Alcohol/week: 0.0 standard drinks of alcohol     Comment: 1-2 drinks yearly    Drug use: No    Sexual activity: Yes     Partners: Male     Birth  control/protection: Pill, Implant   Other Topics Concern    Parent/sibling w/ CABG, MI or angioplasty before 65F 55M? Not Asked   Social History Narrative    Not on file     Social Determinants of Health     Financial Resource Strain: Not on file   Food Insecurity: Not on file   Transportation Needs: Not on file   Physical Activity: Not on file   Stress: Not on file   Social Connections: Not on file   Intimate Partner Violence: Not on file   Housing Stability: Not on file       Family History   Problem Relation Age of Onset    Diabetes Mother     Diabetes Father     Hypertension Father     Diabetes Sister     Diabetes Brother     Cancer Paternal Grandfather         lung       ROS    Allergies   Allergen Reactions    Seasonal Allergies        Current Outpatient Medications   Medication    albuterol (PROAIR HFA/PROVENTIL HFA/VENTOLIN HFA) 108 (90 Base) MCG/ACT inhaler    albuterol (PROVENTIL) (2.5 MG/3ML) 0.083% neb solution    ALLEGRA-D ALLERGY & CONGESTION 180-240 MG 24 hr tablet    atorvastatin (LIPITOR) 10 MG tablet    beclomethasone HFA (QVAR REDIHALER) 80 MCG/ACT inhaler    blood glucose monitoring (ONE TOUCH ULTRA 2) meter device kit    blood glucose monitoring (ONE TOUCH ULTRA) test strip    blood glucose test strip    chlorthalidone (HYGROTON) 25 MG tablet    cyclobenzaprine (FLEXERIL) 10 MG tablet    fluticasone (FLONASE) 50 MCG/ACT nasal spray    glyBURIDE (DIABETA /MICRONASE) 5 MG tablet    ketotifen (ZADITOR) 0.025 % ophthalmic solution    LANCETS ULTRA FINE MISC    lisinopril (ZESTRIL) 20 MG tablet    metFORMIN (GLUCOPHAGE) 500 MG tablet    montelukast (SINGULAIR) 10 MG tablet    norethindrone (MICRONOR) 0.35 MG tablet    nystatin (MYCOSTATIN) 476023 UNIT/GM external ointment    omeprazole (PRILOSEC) 20 MG DR capsule    order for DME    order for DME    triamcinolone (KENALOG) 0.1 % external ointment    semaglutide (OZEMPIC) 2 MG/3ML pen     Current Facility-Administered Medications   Medication    hylan  "(SYNVISC ONE) injection 48 mg    hylan (SYNVISC ONE) injection 48 mg    hylan (SYNVISC ONE) injection 48 mg    hylan (SYNVISC ONE) injection 48 mg    sodium hyaluronate (DUROLANE) injection 60 mg    sodium hyaluronate (DUROLANE) injection 60 mg       /74   Ht 1.6 m (5' 3\")   Wt 144.7 kg (319 lb)   BMI 56.51 kg/m   No LMP recorded. (Menstrual status: Birth Control). Body mass index is 56.51 kg/m .  Ms. Chino is alert, comfortable in no acute distress, non-labored breathing.     A/P: Coni Newberry is a 48 year old F with recurrent UTIs    Will check a CT urogram and cystoscopy    I spent a total of 30 minutes with  Ms. Chino  on the date of the encounter in chart review, face to face patient visit, review of tests, documentation and/or discussion with other providers about the issues documented above.    Chavez Vasquez MD  Professor, OB/GYN  Urogynecologist  CC  Patient Care Team:  Farida Palencia APRN CNP as PCP - General (Nurse Practitioner - Family)  Yeo, Albert, MD as Assigned Musculoskeletal Provider  Farida Palencia APRN CNP as Assigned PCP  Chavez Vasquez MD as MD (OB/Gyn)  Pam Jansen APRN CNP as Nurse Practitioner (Internal Medicine)  PAM JANSEN              "

## 2023-08-25 ENCOUNTER — OFFICE VISIT (OUTPATIENT)
Dept: INTERNAL MEDICINE | Facility: CLINIC | Age: 48
End: 2023-08-25
Payer: COMMERCIAL

## 2023-08-25 VITALS
DIASTOLIC BLOOD PRESSURE: 86 MMHG | WEIGHT: 293 LBS | OXYGEN SATURATION: 98 % | HEIGHT: 63 IN | SYSTOLIC BLOOD PRESSURE: 124 MMHG | HEART RATE: 111 BPM | TEMPERATURE: 98.2 F | RESPIRATION RATE: 24 BRPM | BODY MASS INDEX: 51.91 KG/M2

## 2023-08-25 DIAGNOSIS — Z00.00 ROUTINE GENERAL MEDICAL EXAMINATION AT A HEALTH CARE FACILITY: Primary | ICD-10-CM

## 2023-08-25 DIAGNOSIS — J30.9 ALLERGIC RHINITIS, UNSPECIFIED SEASONALITY, UNSPECIFIED TRIGGER: ICD-10-CM

## 2023-08-25 DIAGNOSIS — I10 ESSENTIAL HYPERTENSION WITH GOAL BLOOD PRESSURE LESS THAN 140/90: ICD-10-CM

## 2023-08-25 DIAGNOSIS — Z12.4 CERVICAL CANCER SCREENING: ICD-10-CM

## 2023-08-25 DIAGNOSIS — E11.9 TYPE 2 DIABETES MELLITUS WITHOUT COMPLICATION, WITHOUT LONG-TERM CURRENT USE OF INSULIN (H): ICD-10-CM

## 2023-08-25 DIAGNOSIS — E11.9 TYPE 2 DIABETES MELLITUS TREATED WITHOUT INSULIN (H): ICD-10-CM

## 2023-08-25 DIAGNOSIS — M25.561 ACUTE PAIN OF RIGHT KNEE: ICD-10-CM

## 2023-08-25 DIAGNOSIS — R10.13 ABDOMINAL PAIN, EPIGASTRIC: ICD-10-CM

## 2023-08-25 DIAGNOSIS — Z12.31 VISIT FOR SCREENING MAMMOGRAM: ICD-10-CM

## 2023-08-25 DIAGNOSIS — L30.0 NUMMULAR ECZEMA: ICD-10-CM

## 2023-08-25 DIAGNOSIS — E78.5 HYPERLIPIDEMIA WITH TARGET LDL LESS THAN 70: ICD-10-CM

## 2023-08-25 DIAGNOSIS — J45.30 MILD PERSISTENT ASTHMA WITHOUT COMPLICATION: ICD-10-CM

## 2023-08-25 DIAGNOSIS — Z28.39 IMMUNIZATION DEFICIENCY: ICD-10-CM

## 2023-08-25 DIAGNOSIS — B37.2 YEAST INFECTION OF THE SKIN: ICD-10-CM

## 2023-08-25 DIAGNOSIS — Z30.41 ENCOUNTER FOR SURVEILLANCE OF CONTRACEPTIVE PILLS: ICD-10-CM

## 2023-08-25 PROCEDURE — 99214 OFFICE O/P EST MOD 30 MIN: CPT | Mod: 25 | Performed by: NURSE PRACTITIONER

## 2023-08-25 PROCEDURE — 90472 IMMUNIZATION ADMIN EACH ADD: CPT | Performed by: NURSE PRACTITIONER

## 2023-08-25 PROCEDURE — 90636 HEP A/HEP B VACC ADULT IM: CPT | Performed by: NURSE PRACTITIONER

## 2023-08-25 PROCEDURE — G0145 SCR C/V CYTO,THINLAYER,RESCR: HCPCS | Performed by: NURSE PRACTITIONER

## 2023-08-25 PROCEDURE — 90677 PCV20 VACCINE IM: CPT | Performed by: NURSE PRACTITIONER

## 2023-08-25 PROCEDURE — 90471 IMMUNIZATION ADMIN: CPT | Performed by: NURSE PRACTITIONER

## 2023-08-25 PROCEDURE — 87624 HPV HI-RISK TYP POOLED RSLT: CPT | Performed by: NURSE PRACTITIONER

## 2023-08-25 PROCEDURE — 99396 PREV VISIT EST AGE 40-64: CPT | Mod: 25 | Performed by: NURSE PRACTITIONER

## 2023-08-25 RX ORDER — LANCETS 28 GAUGE
1 EACH MISCELLANEOUS
Qty: 300 EACH | Refills: 3 | Status: SHIPPED | OUTPATIENT
Start: 2023-08-25

## 2023-08-25 RX ORDER — MONTELUKAST SODIUM 10 MG/1
1 TABLET ORAL AT BEDTIME
Qty: 90 TABLET | Refills: 3 | Status: SHIPPED | OUTPATIENT
Start: 2023-08-25

## 2023-08-25 RX ORDER — FEXOFENADINE HCL AND PSEUDOEPHEDRINE HCL 180; 240 MG/1; MG/1
1 TABLET, EXTENDED RELEASE ORAL DAILY
Qty: 90 TABLET | Refills: 3 | Status: SHIPPED | OUTPATIENT
Start: 2023-08-25

## 2023-08-25 RX ORDER — LISINOPRIL 20 MG/1
20 TABLET ORAL DAILY
Qty: 90 TABLET | Refills: 3 | Status: SHIPPED | OUTPATIENT
Start: 2023-08-25 | End: 2024-09-23

## 2023-08-25 RX ORDER — CHLORTHALIDONE 25 MG/1
25 TABLET ORAL DAILY
Qty: 90 TABLET | Refills: 3 | Status: SHIPPED | OUTPATIENT
Start: 2023-08-25 | End: 2024-10-01

## 2023-08-25 RX ORDER — CYCLOBENZAPRINE HCL 10 MG
10 TABLET ORAL 3 TIMES DAILY PRN
Qty: 45 TABLET | Refills: 3 | Status: SHIPPED | OUTPATIENT
Start: 2023-08-25 | End: 2024-07-18

## 2023-08-25 RX ORDER — ALBUTEROL SULFATE 0.83 MG/ML
SOLUTION RESPIRATORY (INHALATION)
Qty: 150 ML | Refills: 11 | Status: SHIPPED | OUTPATIENT
Start: 2023-08-25 | End: 2024-08-30

## 2023-08-25 RX ORDER — ACETAMINOPHEN AND CODEINE PHOSPHATE 120; 12 MG/5ML; MG/5ML
0.35 SOLUTION ORAL DAILY
Qty: 84 TABLET | Refills: 3 | Status: SHIPPED | OUTPATIENT
Start: 2023-08-25 | End: 2024-02-20

## 2023-08-25 RX ORDER — ATORVASTATIN CALCIUM 10 MG/1
10 TABLET, FILM COATED ORAL DAILY
Qty: 90 TABLET | Refills: 3 | Status: SHIPPED | OUTPATIENT
Start: 2023-08-25 | End: 2024-09-23

## 2023-08-25 RX ORDER — NYSTATIN 100000 U/G
OINTMENT TOPICAL
Qty: 60 G | Refills: 11 | Status: SHIPPED | OUTPATIENT
Start: 2023-08-25 | End: 2024-09-04

## 2023-08-25 RX ORDER — GLYBURIDE 5 MG/1
5 TABLET ORAL
Qty: 90 TABLET | Refills: 3 | Status: SHIPPED | OUTPATIENT
Start: 2023-08-25 | End: 2024-06-19

## 2023-08-25 RX ORDER — BECLOMETHASONE DIPROPIONATE HFA 80 UG/1
AEROSOL, METERED RESPIRATORY (INHALATION)
Qty: 11 G | Refills: 3 | Status: SHIPPED | OUTPATIENT
Start: 2023-08-25 | End: 2024-04-30

## 2023-08-25 RX ORDER — TRIAMCINOLONE ACETONIDE 1 MG/G
OINTMENT TOPICAL
Qty: 80 G | Refills: 3 | Status: SHIPPED | OUTPATIENT
Start: 2023-08-25

## 2023-08-25 RX ORDER — ALBUTEROL SULFATE 90 UG/1
2 AEROSOL, METERED RESPIRATORY (INHALATION) EVERY 6 HOURS
Qty: 54 G | Refills: 11 | Status: SHIPPED | OUTPATIENT
Start: 2023-08-25 | End: 2024-08-30

## 2023-08-25 ASSESSMENT — ASTHMA QUESTIONNAIRES
ACT_TOTALSCORE: 20
QUESTION_3 LAST FOUR WEEKS HOW OFTEN DID YOUR ASTHMA SYMPTOMS (WHEEZING, COUGHING, SHORTNESS OF BREATH, CHEST TIGHTNESS OR PAIN) WAKE YOU UP AT NIGHT OR EARLIER THAN USUAL IN THE MORNING: NOT AT ALL
ACT_TOTALSCORE: 20
QUESTION_5 LAST FOUR WEEKS HOW WOULD YOU RATE YOUR ASTHMA CONTROL: WELL CONTROLLED
QUESTION_4 LAST FOUR WEEKS HOW OFTEN HAVE YOU USED YOUR RESCUE INHALER OR NEBULIZER MEDICATION (SUCH AS ALBUTEROL): TWO OR THREE TIMES PER WEEK
QUESTION_2 LAST FOUR WEEKS HOW OFTEN HAVE YOU HAD SHORTNESS OF BREATH: THREE TO SIX TIMES A WEEK
QUESTION_1 LAST FOUR WEEKS HOW MUCH OF THE TIME DID YOUR ASTHMA KEEP YOU FROM GETTING AS MUCH DONE AT WORK, SCHOOL OR AT HOME: NONE OF THE TIME

## 2023-08-25 ASSESSMENT — ENCOUNTER SYMPTOMS
HEARTBURN: 0
HEADACHES: 0
CHILLS: 0
SHORTNESS OF BREATH: 0
PARESTHESIAS: 0
BREAST MASS: 0
SORE THROAT: 0
COUGH: 0
DIARRHEA: 0
MYALGIAS: 0
ARTHRALGIAS: 1
ABDOMINAL PAIN: 0
CONSTIPATION: 0
WEAKNESS: 0
FEVER: 0
HEMATOCHEZIA: 0
NAUSEA: 0
NERVOUS/ANXIOUS: 0
HEMATURIA: 0
FREQUENCY: 0
DYSURIA: 0
JOINT SWELLING: 0
DIZZINESS: 0
PALPITATIONS: 0
EYE PAIN: 0

## 2023-08-25 NOTE — PROGRESS NOTES
SUBJECTIVE:   CC: Coni Brar is an 48 year old who presents for preventive health visit.       2023     6:52 AM   Additional Questions   Roomed by Sahra CALDERON       Healthy Habits:     Getting at least 3 servings of Calcium per day:  Yes    Bi-annual eye exam:  Yes    Dental care twice a year:  NO    Sleep apnea or symptoms of sleep apnea:  Daytime drowsiness    Diet:  Diabetic    Frequency of exercise:  2-3 days/week    Duration of exercise:  15-30 minutes    Taking medications regularly:  Yes    Medication side effects:  None    Additional concerns today:  Yes                  Have you ever done Advance Care Planning? (For example, a Health Directive, POLST, or a discussion with a medical provider or your loved ones about your wishes): No, advance care planning information given to patient to review.  Patient declined advance care planning discussion at this time.    Social History     Tobacco Use    Smoking status: Never     Passive exposure: Never    Smokeless tobacco: Never   Substance Use Topics    Alcohol use: Yes     Alcohol/week: 0.0 standard drinks of alcohol     Comment: 1-2 drinks yearly             2023     6:46 AM   Alcohol Use   Prescreen: >3 drinks/day or >7 drinks/week? Not Applicable     Reviewed orders with patient.  Reviewed health maintenance and updated orders accordingly - Yes      Breast Cancer Screenin/14/2023     3:15 PM   Breast CA Risk Assessment (FHS-7)   Do you have a family history of breast, colon, or ovarian cancer? No / Unknown           Pertinent mammograms are reviewed under the imaging tab.    History of abnormal Pap smear:       2015    12:00 AM   PAP / HPV   PAP (Historical) NIL      Reviewed and updated as needed this visit by clinical staff   Tobacco  Allergies  Meds  Problems  Med Hx  Surg Hx  Fam Hx          Reviewed and updated as needed this visit by Provider                     Review of Systems   Constitutional:  Negative for chills and  "fever.   HENT:  Negative for congestion, ear pain, hearing loss and sore throat.    Eyes:  Negative for pain and visual disturbance.   Respiratory:  Negative for cough and shortness of breath.    Cardiovascular:  Negative for chest pain, palpitations and peripheral edema.   Gastrointestinal:  Negative for abdominal pain, constipation, diarrhea, heartburn, hematochezia and nausea.   Breasts:  Negative for tenderness, breast mass and discharge.   Genitourinary:  Negative for dysuria, frequency, genital sores, hematuria, pelvic pain, urgency, vaginal bleeding and vaginal discharge.   Musculoskeletal:  Positive for arthralgias. Negative for joint swelling and myalgias.   Skin:  Negative for rash.   Neurological:  Negative for dizziness, weakness, headaches and paresthesias.   Psychiatric/Behavioral:  Negative for mood changes. The patient is not nervous/anxious.      Started ozempic and had some vision changes - had some blurry eye changes   She had eye exam and everything was ok   Her glucose did drop after starting the medication and now has gone up to previous levels      Will try oral version as in up to date no issues with eyes seen   If she has eye issues she will stop        OBJECTIVE:   /86   Pulse 111   Temp 98.2  F (36.8  C) (Oral)   Resp 24   Ht 1.6 m (5' 3\")   Wt 143.8 kg (317 lb)   LMP  (Approximate)   SpO2 98%   BMI 56.15 kg/m    Physical Exam  GENERAL: alert and no distress- obese   EYES: Eyes grossly normal to inspection, and conjunctivae and sclerae normal  HENT: ear canals and TM's normal, nose and mouth without ulcers or lesions  NECK: no adenopathy, no asymmetry, masses, or scars and thyroid normal to palpation  RESP: lungs clear to auscultation - no rales, rhonchi or wheezes  BREAST: normal without masses, tenderness or nipple discharge and no palpable axillary masses or adenopathy  CV: regular rate and rhythm, normal S1 S2, no S3 or S4, no murmur, click or rub, no peripheral edema and " peripheral pulses strong  ABDOMEN: soft, nontender, no hepatosplenomegaly, no masses and bowel sounds normal   (female): normal female external genitalia, normal urethral meatus, vaginal mucosa pink, moist, well rugated, and normal cervix/adnexa/uterus without masses or discharge  MS: no gross musculoskeletal defects noted, no edema  SKIN: no suspicious lesions or rashes  NEURO: Normal strength and tone, mentation intact and speech normal  PSYCH: mentation appears normal, affect normal/bright    Diagnostic Test Results:  Labs reviewed in Epic  Lab   PAP    ASSESSMENT/PLAN:   (Z00.00) Routine general medical examination at a health care facility  (primary encounter diagnosis)  Comment:   Plan: Pneumococcal 20 Valent Conjugate (Prevnar 20),         Comprehensive metabolic panel (BMP + Alb, Alk         Phos, ALT, AST, Total. Bili, TP), TSH with free        T4 reflex, CBC with platelets and differential,        HEP A & B ([facility])            (Z12.4) Cervical cancer screening  Comment: no abnormal   Plan: Pap Screen with HPV - recommended age 30 - 65         years            (E11.9) Type 2 diabetes mellitus treated without insulin (H)  Comment:   Lab Results   Component Value Date    A1C 8.5 07/14/2023    A1C 6.8 05/15/2022    A1C 7.0 08/13/2021    A1C 7.8 08/24/2020    A1C 7.3 02/22/2020    A1C 7.4 06/29/2019    A1C 6.7 11/15/2018    A1C 6.0 12/29/2017   Needs improvement   Did not tolerate ozempic with vision changes   In UP TO DATE oral rybelsus did not cause eye changes- we will try and see how she does and hopefully she will do well on oral medication and will help diabetes as well as weight   She is BMI 56 and this impacts her breathing and diabetes and joint pain       Plan: Lipid panel reflex to direct LDL Fasting, TSH         with free T4 reflex, CBC with platelets and         differential, Hemoglobin A1c, Semaglutide         (RYBELSUS) 3 MG tablet, blood glucose (ONETOUCH        ULTRA) test strip             (Z12.31) Visit for screening mammogram  Comment:   Plan: MA SCREENING DIGITAL BILAT - Future  (s+30)            (J45.30) Mild persistent asthma without complication  Comment: stable in med   Plan: Pneumococcal 20 Valent Conjugate (Prevnar 20),         Comprehensive metabolic panel (BMP + Alb, Alk         Phos, ALT, AST, Total. Bili, TP), CBC with         platelets and differential, albuterol (PROAIR         HFA/PROVENTIL HFA/VENTOLIN HFA) 108 (90 Base)         MCG/ACT inhaler, albuterol (PROVENTIL) (2.5         MG/3ML) 0.083% neb solution            (Z28.39) Immunization deficiency  Comment:   Plan: Pneumococcal 20 Valent Conjugate (Prevnar 20),         Semaglutide (RYBELSUS) 3 MG tablet       (J30.9) Allergic rhinitis, unspecified seasonality, unspecified trigger  Comment: medication helps   Plan: fexofenadine-pseudoePHEDrine (ALLEGRA-D ALLERGY        & CONGESTION) 180-240 MG 24 hr tablet            (E78.5) Hyperlipidemia with target LDL less than 70  Comment: tolerating medication   Plan: atorvastatin (LIPITOR) 10 MG tablet        (I10) Essential hypertension with goal blood pressure less than 140/90  Comment: in good range   Plan: chlorthalidone (HYGROTON) 25 MG tablet,         lisinopril (ZESTRIL) 20 MG tablet            (M25.561) Acute pain of right knee  Comment: uses as needed   Plan: cyclobenzaprine (FLEXERIL) 10 MG tablet            (E11.9) Type 2 diabetes mellitus without complication, without long-term current use of insulin (H)  Comment:   Plan: glyBURIDE (DIABETA /MICRONASE) 5 MG tablet,         metFORMIN (GLUCOPHAGE) 500 MG tablet            (Z30.41) Encounter for surveillance of contraceptive pills  Comment: does well on medication   Plan: norethindrone (MICRONOR) 0.35 MG tablet            (B37.2) Yeast infection of the skin  Comment: uses under breast and with heat it has been worse but medication helps   Plan: nystatin (MYCOSTATIN) 417431 UNIT/GM external         ointment            (R10.13)  Abdominal pain, epigastric  Comment: work well   Plan: omeprazole (PRILOSEC) 20 MG DR capsule            (L30.0) Nummular eczema  Comment: uses as needed   Plan: triamcinolone (KENALOG) 0.1 % external ointment            (Z68.43) BMI 50.0-59.9, adult (H)  Comment: rybelsus hopefully will help   Plan: trial rybelsus           COUNSELING:  Reviewed preventive health counseling, as reflected in patient instructions       Regular exercise       Healthy diet/nutrition       Immunizations  Vaccinated for: Hepatitis A, Hepatitis B, and Pneumococcal           Osteoporosis prevention/bone health        She reports that she has never smoked. She has never been exposed to tobacco smoke. She has never used smokeless tobacco.          DANYELL Spencer CNP  [facility]

## 2023-08-25 NOTE — NURSING NOTE
Prior to immunization administration, verified patients identity using patient s name and date of birth. Please see Immunization Activity for additional information.     Screening Questionnaire for Adult Immunization    Are you sick today?   No   Do you have allergies to medications, food, a vaccine component or latex?   No   Have you ever had a serious reaction after receiving a vaccination?   No   Do you have a long-term health problem with heart, lung, kidney, or metabolic disease (e.g., diabetes), asthma, a blood disorder, no spleen, complement component deficiency, a cochlear implant, or a spinal fluid leak?  Are you on long-term aspirin therapy?   No   Do you have cancer, leukemia, HIV/AIDS, or any other immune system problem?   No   Do you have a parent, brother, or sister with an immune system problem?   No   In the past 3 months, have you taken medications that affect  your immune system, such as prednisone, other steroids, or anticancer drugs; drugs for the treatment of rheumatoid arthritis, Crohn s disease, or psoriasis; or have you had radiation treatments?   No   Have you had a seizure, or a brain or other nervous system problem?   No   During the past year, have you received a transfusion of blood or blood    products, or been given immune (gamma) globulin or antiviral drug?   No   For women: Are you pregnant or is there a chance you could become       pregnant during the next month?   No   Have you received any vaccinations in the past 4 weeks?   No     Immunization questionnaire answers were all negative.      Patient instructed to remain in clinic for 15 minutes afterwards, and to report any adverse reactions.     Screening performed by Sahra Vazquez LPN on 8/25/2023 at 8:13 AM.

## 2023-08-25 NOTE — NURSING NOTE
"Chief Complaint   Patient presents with    Physical     Fasting,pap     initial /86   Pulse 111   Temp 98.2  F (36.8  C) (Oral)   Resp 24   Ht 1.6 m (5' 3\")   Wt 143.8 kg (317 lb)   LMP  (Approximate)   SpO2 98%   BMI 56.15 kg/m   Estimated body mass index is 56.15 kg/m  as calculated from the following:    Height as of this encounter: 1.6 m (5' 3\").    Weight as of this encounter: 143.8 kg (317 lb)..  bp completed using cuff size large forearm  CIERA BAUTISTA LPN  "

## 2023-08-25 NOTE — PATIENT INSTRUCTIONS
Lab in [address]    Rybelsus 3 mg for a month   Let me know in 3 weeks and we can increase the dose to 7 mg and then in a month increase to 14 mg daily dose     If any eye symptoms stop medication     Medication refilled

## 2023-08-30 LAB
BKR LAB AP GYN ADEQUACY: NORMAL
BKR LAB AP GYN INTERPRETATION: NORMAL
BKR LAB AP HPV REFLEX: NORMAL
BKR LAB AP PREVIOUS ABNORMAL: NORMAL
PATH REPORT.COMMENTS IMP SPEC: NORMAL
PATH REPORT.COMMENTS IMP SPEC: NORMAL
PATH REPORT.RELEVANT HX SPEC: NORMAL

## 2023-08-31 LAB
HUMAN PAPILLOMA VIRUS 16 DNA: NEGATIVE
HUMAN PAPILLOMA VIRUS 18 DNA: NEGATIVE
HUMAN PAPILLOMA VIRUS FINAL DIAGNOSIS: NORMAL
HUMAN PAPILLOMA VIRUS OTHER HR: NEGATIVE

## 2023-09-15 ENCOUNTER — HOSPITAL ENCOUNTER (OUTPATIENT)
Dept: MAMMOGRAPHY | Facility: CLINIC | Age: 48
Discharge: HOME | End: 2023-09-15
Attending: NURSE PRACTITIONER
Payer: COMMERCIAL

## 2023-09-15 ENCOUNTER — HOSPITAL ENCOUNTER (OUTPATIENT)
Dept: CT IMAGING | Facility: CLINIC | Age: 48
Discharge: HOME | End: 2023-09-15
Attending: OBSTETRICS & GYNECOLOGY
Payer: COMMERCIAL

## 2023-09-15 DIAGNOSIS — Z12.31 VISIT FOR SCREENING MAMMOGRAM: ICD-10-CM

## 2023-09-15 DIAGNOSIS — N39.0 RECURRENT UTI: ICD-10-CM

## 2023-09-15 PROCEDURE — 77067 SCR MAMMO BI INCL CAD: CPT

## 2023-09-15 PROCEDURE — 250N000009 HC RX 250: Performed by: OBSTETRICS & GYNECOLOGY

## 2023-09-15 PROCEDURE — 74178 CT ABD&PLV WO CNTR FLWD CNTR: CPT

## 2023-09-15 PROCEDURE — 250N000011 HC RX IP 250 OP 636: Performed by: OBSTETRICS & GYNECOLOGY

## 2023-09-15 RX ORDER — IOPAMIDOL 755 MG/ML
500 INJECTION, SOLUTION INTRAVASCULAR ONCE
Status: COMPLETED | OUTPATIENT
Start: 2023-09-15 | End: 2023-09-15

## 2023-09-15 RX ADMIN — IOPAMIDOL 100 ML: 755 INJECTION, SOLUTION INTRAVENOUS at 08:26

## 2023-09-15 RX ADMIN — SODIUM CHLORIDE 90 ML: 9 INJECTION, SOLUTION INTRAVENOUS at 08:26

## 2023-09-20 ENCOUNTER — OFFICE VISIT (OUTPATIENT)
Dept: UROLOGY | Facility: CLINIC | Age: 48
End: 2023-09-20
Payer: COMMERCIAL

## 2023-09-20 DIAGNOSIS — N39.0 RECURRENT UTI: Primary | ICD-10-CM

## 2023-09-20 PROCEDURE — 52000 CYSTOURETHROSCOPY: CPT | Performed by: OBSTETRICS & GYNECOLOGY

## 2023-09-20 NOTE — PROGRESS NOTES
Reason for Visit:  Cystoscopy    Clinical Data: Ms. Coni Newberry is a 48 year old female with a hx of recurrent UTI    Cystoscopy procedure:  Pt. Was consented and placed in the lithotomy position.  She was cleaned and preparred in the usual fashion.  Lidocain gel was inserted into the urethra and given time to take effect.  A 16 fr flexible cystoscope was then inserted through the urethra and into the bladder.  The urethra was wnl.  The bladder was without trabeculation.  No tumors, diverticulae, or stones.  Bilateral u/o's were effluxing clear urine.  The cystoscope was then withdrawn.  The pt. Tolerated the procedure well.    A/P:  48 year old female with normal cystoscopy  -F/U PRN    Thank you for allowing me to participate in the care of  Ms. Coni Newberry and I will keep you updated on her progress.    Chavez Vasquez MD

## 2023-09-26 DIAGNOSIS — E66.01 MORBID OBESITY (H): ICD-10-CM

## 2023-09-26 DIAGNOSIS — E11.9 TYPE 2 DIABETES MELLITUS TREATED WITHOUT INSULIN (H): Primary | ICD-10-CM

## 2023-09-26 DIAGNOSIS — E66.01 OBESITY, CLASS III, BMI 40-49.9 (MORBID OBESITY) (H): ICD-10-CM

## 2023-09-27 ENCOUNTER — TELEPHONE (OUTPATIENT)
Dept: INTERNAL MEDICINE | Facility: CLINIC | Age: 48
End: 2023-09-27
Payer: COMMERCIAL

## 2023-09-27 NOTE — TELEPHONE ENCOUNTER
Patient started Rybelsus about 30 days ago and was instructed to call with an update.  She states she is tolerating the medication well and is ready to have the dose increased.  She states her fasting morning blood sugars before Rybelsus were in the mid to high 200s and she now runs high 100s to low 200s.    Please send rx for next Rybelsus dose to [facility].  DEBBIE Villalobos R.N.

## 2023-09-27 NOTE — TELEPHONE ENCOUNTER
Called and spoke with patient to relay provider message. Patient will check with her pharmacy.    Thank you,  Ian Rlaph, Triage RN [facility]  10:19 AM 9/27/2023

## 2023-09-28 ENCOUNTER — ALLIED HEALTH/NURSE VISIT (OUTPATIENT)
Dept: INTERNAL MEDICINE | Facility: CLINIC | Age: 48
End: 2023-09-28
Payer: COMMERCIAL

## 2023-09-28 DIAGNOSIS — Z23 NEED FOR HEPATITIS A AND B VACCINATION: Primary | ICD-10-CM

## 2023-09-28 PROCEDURE — 99207 PR NO CHARGE NURSE ONLY: CPT

## 2023-09-28 PROCEDURE — 90636 HEP A/HEP B VACC ADULT IM: CPT

## 2023-09-28 PROCEDURE — 90471 IMMUNIZATION ADMIN: CPT

## 2023-10-09 DIAGNOSIS — M25.561 ACUTE PAIN OF RIGHT KNEE: ICD-10-CM

## 2023-10-09 RX ORDER — CYCLOBENZAPRINE HCL 10 MG
10 TABLET ORAL 3 TIMES DAILY PRN
Qty: 45 TABLET | Refills: 3 | OUTPATIENT
Start: 2023-10-09

## 2023-10-09 NOTE — TELEPHONE ENCOUNTER
Received fax from pharmacy requesting refill(s) for     cyclobenzaprine (FLEXERIL) 10 MG tablet    Date last filled 01.16.2023    Last Appt Date:05.12.2022    Next Appt scheduled: 0    Pharmacy:      [id number] - [place] - [address]

## 2023-10-23 ENCOUNTER — TELEPHONE (OUTPATIENT)
Dept: INTERNAL MEDICINE | Facility: CLINIC | Age: 48
End: 2023-10-23
Payer: COMMERCIAL

## 2023-10-23 DIAGNOSIS — E11.9 TYPE 2 DIABETES MELLITUS TREATED WITHOUT INSULIN (H): ICD-10-CM

## 2023-10-23 DIAGNOSIS — E66.01 MORBID OBESITY (H): ICD-10-CM

## 2023-10-23 DIAGNOSIS — E66.01 OBESITY, CLASS III, BMI 40-49.9 (MORBID OBESITY) (H): ICD-10-CM

## 2023-10-23 NOTE — TELEPHONE ENCOUNTER
Patient was increased to in medication on 09/26/2023  Semaglutide (RYBELSUS) 7 MG tablet 30 tablet 0 9/26/2023  No   Sig - Route: Take 1 tablet (7 mg) by mouth daily - Oral       Call placed to patient to see what blood sugars have been averaging and how much weight loss she is reporting. Called and left patient a voice mail message on October 23, 2023 10:44 AM to call back the clinic.    Thank you,  Ian Ralph, Triage RN [facility]  10:44 AM 10/23/2023        150.8

## 2023-10-23 NOTE — TELEPHONE ENCOUNTER
Medication Question or Refill    Contacts         Type Contact Phone/Fax    10/23/2023 07:19 AM CDT Phone (Incoming) Matti Newberry Beatriz (Self) [phone] (H)            What medication are you calling about (include dose and sig)?:   chastity    Preferred Pharmacy:  [facility] [address] [place] [address] [id number] [place] [address]  [address] [facility] [address]  Phone: [phone] Fax: [phone]      Controlled Substance Agreement on file:   CSA -- Patient Level:    CSA: None found at the patient level.       Who prescribed the medication?: PCp    Do you need a refill? Yes    When did you use the medication last? 10/23/2023    Patient offered an appointment? No    Do you have any questions or concerns?  Yes: Bs have been going down and lost some weight. Not sure if she wants to increase or leave as      Could we send this information to you in [facility] or would you prefer to receive a phone call?:   Patient would prefer a phone call   Okay to leave a detailed message?: Yes at Home number on file [phone] (home)

## 2023-10-23 NOTE — TELEPHONE ENCOUNTER
"Patient calls back to report blood sugars have been averaging \"upper 100's\" - around 180's to 190's. Patient reports she has lost about 20 pounds in about a month since Farida Increased her medication. Patient says she has 5 days of pills left, will need refill sent to [facility] if provider wants to keep current dose otherwise patient will need new script if provider would like to lower dose.     Please advise.    Thank you,  Ian Ralph, Triage RN [facility]  11:49 AM 10/23/2023     "

## 2023-10-25 NOTE — TELEPHONE ENCOUNTER
Spoke with patient.  Advised of provider's message below.    Reports she is willing to increase dose.    Please send prescription.

## 2023-10-25 NOTE — TELEPHONE ENCOUNTER
I would not lower dose unless she was not tolerating medication   I would consider increasing dose to the 14 mg daily if she is willing please let me know and I will send in prescription

## 2023-10-31 ENCOUNTER — OFFICE VISIT (OUTPATIENT)
Dept: URGENT CARE | Facility: URGENT CARE | Age: 48
End: 2023-10-31
Payer: COMMERCIAL

## 2023-10-31 VITALS
SYSTOLIC BLOOD PRESSURE: 134 MMHG | BODY MASS INDEX: 50.02 KG/M2 | DIASTOLIC BLOOD PRESSURE: 87 MMHG | HEIGHT: 64 IN | OXYGEN SATURATION: 100 % | WEIGHT: 293 LBS | HEART RATE: 104 BPM | TEMPERATURE: 97.1 F

## 2023-10-31 DIAGNOSIS — N94.9 VAGINAL DISCOMFORT: ICD-10-CM

## 2023-10-31 DIAGNOSIS — R30.0 DYSURIA: ICD-10-CM

## 2023-10-31 DIAGNOSIS — N30.00 ACUTE CYSTITIS WITHOUT HEMATURIA: Primary | ICD-10-CM

## 2023-10-31 LAB
ALBUMIN UR-MCNC: NEGATIVE MG/DL
APPEARANCE UR: CLEAR
BACTERIA #/AREA URNS HPF: ABNORMAL /HPF
BILIRUB UR QL STRIP: NEGATIVE
CLUE CELLS: ABNORMAL
COLOR UR AUTO: YELLOW
GLUCOSE UR STRIP-MCNC: NEGATIVE MG/DL
HGB UR QL STRIP: ABNORMAL
KETONES UR STRIP-MCNC: NEGATIVE MG/DL
LEUKOCYTE ESTERASE UR QL STRIP: ABNORMAL
NITRATE UR QL: NEGATIVE
PH UR STRIP: 6 [PH] (ref 5–7)
RBC #/AREA URNS AUTO: ABNORMAL /HPF
SP GR UR STRIP: 1.01 (ref 1–1.03)
SQUAMOUS #/AREA URNS AUTO: ABNORMAL /LPF
TRICHOMONAS, WET PREP: ABNORMAL
UROBILINOGEN UR STRIP-ACNC: 0.2 E.U./DL
WBC #/AREA URNS AUTO: ABNORMAL /HPF
WBC'S/HIGH POWER FIELD, WET PREP: ABNORMAL
YEAST, WET PREP: ABNORMAL

## 2023-10-31 PROCEDURE — 87210 SMEAR WET MOUNT SALINE/INK: CPT | Performed by: PHYSICIAN ASSISTANT

## 2023-10-31 PROCEDURE — 87086 URINE CULTURE/COLONY COUNT: CPT | Performed by: PHYSICIAN ASSISTANT

## 2023-10-31 PROCEDURE — 99213 OFFICE O/P EST LOW 20 MIN: CPT | Performed by: PHYSICIAN ASSISTANT

## 2023-10-31 PROCEDURE — 81001 URINALYSIS AUTO W/SCOPE: CPT | Performed by: PHYSICIAN ASSISTANT

## 2023-10-31 RX ORDER — NITROFURANTOIN 25; 75 MG/1; MG/1
100 CAPSULE ORAL 2 TIMES DAILY
Qty: 10 CAPSULE | Refills: 0 | Status: SHIPPED | OUTPATIENT
Start: 2023-10-31 | End: 2023-11-05

## 2023-10-31 ASSESSMENT — PAIN SCALES - GENERAL: PAINLEVEL: EXTREME PAIN (8)

## 2023-10-31 NOTE — LETTER
October 31, 2023      Coni Newberry  [address]  [facility] [address]        To Whom It May Concern:    Coni Newberry  was seen on 10/31/2023  Please excuse her absence from work today due to acute medical illness.         Sincerely,        Shi oM PA-C

## 2023-10-31 NOTE — PROGRESS NOTES
Assessment & Plan     Acute cystitis without hematuria  UA suggestive of infection today. Macrobid Rx. Urine culture is pending. Push fluids. We will communicate any changes to the antibiotic if need be based on the urine culture result. Follow up if any worsening symptoms. Patient agrees with the plan.     - nitroFURantoin macrocrystal-monohydrate (MACROBID) 100 MG capsule  Dispense: 10 capsule; Refill: 0    Vaginal discomfort  Wet prep is negative today.  - UA Macroscopic with reflex to Microscopic and Culture - Lab Collect  - Wet prep - lab collect  - Urine Microscopic Exam  - Urine Culture    Dysuria  - UA Macroscopic with reflex to Microscopic and Culture - Lab Collect  - Wet prep - lab collect  - Urine Microscopic Exam  - Urine Culture       Return in about 5 days (around 11/5/2023) for Symptoms failing to improve.    Shi Mo PA-C  [facility] URGENT CARE [facility]     Coni Brar is a 48 year old female who presents to clinic today for the following health issues:  Chief Complaint   Patient presents with    Urgent Care     Urinary urgency, burning and mild pain since last night.     HPI      UTI    Onset of symptoms was 1 day ago.  Course of illness is worsening  Severity moderate  Current and associated symptoms dysuria, frequency, vaginal discomfort  Treatment and measures tried Increase fluid intake  Predisposing factors include history of recurrent UTI's.  Patient is followed by urology.  Per chart review Cystoscopy a month ago 9/20/2023 within normal limit.  CT urogram 915/2023 within normal limit.  Patient denies rigors, flank pain, temperature > 101 degrees F., vomiting, hematuria, vaginal discharge        Review of Systems  Constitutional, HEENT, cardiovascular, pulmonary, GI, , musculoskeletal, neuro, skin, endocrine and psych systems are negative, except as otherwise noted.      Objective    /87 (BP Location: Other (Comment))   Pulse 104   Temp 97.1  F (36.2  C)  "(Oral)   Ht 1.626 m (5' 4\")   Wt 138.3 kg (305 lb)   SpO2 100%   BMI 52.35 kg/m    Physical Exam   GENERAL: healthy, alert and no distress  RESP: Normal breathing effort  ABDOMEN: soft, nontender, no hepatosplenomegaly, no masses and bowel sounds normal  MS: no gross musculoskeletal defects noted, no edema    Results for orders placed or performed in visit on 10/31/23 (from the past 24 hour(s))   UA Macroscopic with reflex to Microscopic and Culture - Lab Collect    Specimen: Urine, Midstream   Result Value Ref Range    Color Urine Yellow Colorless, Straw, Light Yellow, Yellow    Appearance Urine Clear Clear    Glucose Urine Negative Negative mg/dL    Bilirubin Urine Negative Negative    Ketones Urine Negative Negative mg/dL    Specific Gravity Urine 1.010 1.003 - 1.035    Blood Urine Moderate (A) Negative    pH Urine 6.0 5.0 - 7.0    Protein Albumin Urine Negative Negative mg/dL    Urobilinogen Urine 0.2 0.2, 1.0 E.U./dL    Nitrite Urine Negative Negative    Leukocyte Esterase Urine Moderate (A) Negative   Wet prep - lab collect    Specimen: Vagina; Swab   Result Value Ref Range    Trichomonas Absent Absent    Yeast Absent Absent    Clue Cells Absent Absent    WBCs/high power field 1+ (A) None   Urine Microscopic Exam   Result Value Ref Range    Bacteria Urine Few (A) None Seen /HPF    RBC Urine 5-10 (A) 0-2 /HPF /HPF    WBC Urine 25-50 (A) 0-5 /HPF /HPF    Squamous Epithelials Urine Few (A) None Seen /LPF         "

## 2023-11-02 LAB — BACTERIA UR CULT: NORMAL

## 2023-12-19 ENCOUNTER — E-VISIT (OUTPATIENT)
Dept: URGENT CARE | Facility: CLINIC | Age: 48
End: 2023-12-19
Payer: COMMERCIAL

## 2023-12-19 DIAGNOSIS — N39.0 ACUTE UTI (URINARY TRACT INFECTION): Primary | ICD-10-CM

## 2023-12-19 PROCEDURE — 99421 OL DIG E/M SVC 5-10 MIN: CPT | Performed by: EMERGENCY MEDICINE

## 2023-12-19 RX ORDER — NITROFURANTOIN 25; 75 MG/1; MG/1
100 CAPSULE ORAL 2 TIMES DAILY
Qty: 10 CAPSULE | Refills: 0 | Status: SHIPPED | OUTPATIENT
Start: 2023-12-19 | End: 2023-12-24

## 2023-12-19 NOTE — PATIENT INSTRUCTIONS
Dear Coni Newberry    After reviewing your responses, I've been able to diagnose you with a urinary tract infection, which is a common infection of the bladder with bacteria.  This is not a sexually transmitted infection, though urinating immediately after intercourse can help prevent infections.  Drinking lots of fluids is also helpful to clear your current infection and prevent the next one.      I have sent a prescription for antibiotics to your pharmacy to treat this infection.    It is important that you take all of your prescribed medication even if your symptoms are improving after a few doses.  Taking all of your medicine helps prevent the symptoms from returning.     If your symptoms worsen, you develop pain in your back or stomach, develop fevers, or are not improving in 5 days, please contact your primary care provider for an appointment or visit any of our convenient Walk-in or Urgent Care Centers to be seen, which can be found on our website here.    Thanks again for choosing us as your health care partner,    Johnathan Irvin MD  Urinary Tract Infection (UTI) in Women: Care Instructions  Overview     A urinary tract infection, or UTI, is a general term for an infection anywhere between the kidneys and the urethra (where urine comes out). Most UTIs are bladder infections. They often cause pain or burning when you urinate.  UTIs are caused by bacteria and can be cured with antibiotics. Be sure to complete your treatment so that the infection does not get worse.  Follow-up care is a key part of your treatment and safety. Be sure to make and go to all appointments, and call your doctor if you are having problems. It's also a good idea to know your test results and keep a list of the medicines you take.  How can you care for yourself at home?    Take your antibiotics as directed. Do not stop taking them just because you feel better. You need to take the full course of antibiotics.    Drink extra  "water and other fluids for the next day or two. This will help make the urine less concentrated and help wash out the bacteria that are causing the infection. (If you have kidney, heart, or liver disease and have to limit fluids, talk with your doctor before you increase the amount of fluids you drink.)    Avoid drinks that are carbonated or have caffeine. They can irritate the bladder.    Urinate often. Try to empty your bladder each time.    To relieve pain, take a hot bath or lay a heating pad set on low over your lower belly or genital area. Never go to sleep with a heating pad in place.  To prevent UTIs    Drink plenty of water each day. This helps you urinate often, which clears bacteria from your system. (If you have kidney, heart, or liver disease and have to limit fluids, talk with your doctor before you increase the amount of fluids you drink.)    Urinate when you need to.    If you are sexually active, urinate right after you have sex.    Change sanitary pads often.    Avoid douches, bubble baths, feminine hygiene sprays, and other feminine hygiene products that have deodorants.    After going to the bathroom, wipe from front to back.  When should you call for help?   Call your doctor now or seek immediate medical care if:      Symptoms such as fever, chills, nausea, or vomiting get worse or appear for the first time.       You have new pain in your back just below your rib cage. This is called flank pain.       There is new blood or pus in your urine.       You have any problems with your antibiotic medicine.   Watch closely for changes in your health, and be sure to contact your doctor if:      You are not getting better after taking an antibiotic for 2 days.       Your symptoms go away but then come back.   Where can you learn more?  Go to https://www.healthwise.net/patiented  Enter K848 in the search box to learn more about \"Urinary Tract Infection (UTI) in Women: Care Instructions.\"  Current as of: " February 28, 2023               Content Version: 13.8    2718-5611 [employer].   Care instructions adapted under license by your healthcare professional. If you have questions about a medical condition or this instruction, always ask your healthcare professional. [employer] disclaims any warranty or liability for your use of this information.

## 2023-12-20 ENCOUNTER — MYC MEDICAL ADVICE (OUTPATIENT)
Dept: INTERNAL MEDICINE | Facility: CLINIC | Age: 48
End: 2023-12-20
Payer: COMMERCIAL

## 2023-12-20 NOTE — TELEPHONE ENCOUNTER
[employer] message sent to patient informing her letter completed. Signed letter at [facility] triage RN desk if patient needs this sent anywhere.    Orquidea Patrick RN  [facility]

## 2023-12-20 NOTE — TELEPHONE ENCOUNTER
Patient had evisit yesterday.     Sent [employer] message to patient.    Ian Ralph, Triage RN [facility]  9:03 AM 12/20/2023

## 2023-12-20 NOTE — TELEPHONE ENCOUNTER
Please see patient's mychart message below.     She had evisit yesterday.     Please advise, thanks.    Ian Ralph, Triage RN [facility]  9:47 AM 12/20/2023

## 2023-12-20 NOTE — LETTER
December 20, 2023      Coni Newberry  [address]  [facility] [address]        To Whom It May Concern:    Coni Newberry was seen in our clinic. She needs to be absent from work today related to her health     Sincerely,        DANYELL Spencer CNP

## 2023-12-28 ENCOUNTER — TELEPHONE (OUTPATIENT)
Dept: ORTHOPEDICS | Facility: CLINIC | Age: 48
End: 2023-12-28

## 2023-12-28 NOTE — TELEPHONE ENCOUNTER
Reason for Call:  Form, our goal is to have forms completed with 7 days, however, some forms may require a visit or additional information.    Type of letter, form or note:   Disability Parking Permit      Who is the form from?: Patient    Where did the form come from: Patient or family brought in       Phone number of person requesting form: [phone]  Can we leave a detailed message on this number:  YES    Desired completion date of form: asap      How will form be returned?:  picked up in clinic by patient    Has the patient signed a consent form for release of information (may be included with form)? Not Applicable    Additional comments: Patient was last seen on 5/30/23.    Form was started and place in Provider Basket for provider review/ completion at [facility].    Please advise if patient needs to follow up in clinic prior to forms being completed.    Nan Rodriguez MBA, ATC

## 2024-01-04 ENCOUNTER — TELEPHONE (OUTPATIENT)
Dept: URGENT CARE | Facility: URGENT CARE | Age: 49
End: 2024-01-04

## 2024-01-04 ENCOUNTER — E-VISIT (OUTPATIENT)
Dept: URGENT CARE | Facility: CLINIC | Age: 49
End: 2024-01-04
Payer: COMMERCIAL

## 2024-01-04 DIAGNOSIS — R30.0 DYSURIA: Primary | ICD-10-CM

## 2024-01-04 DIAGNOSIS — B37.31 YEAST INFECTION OF THE VAGINA: Primary | ICD-10-CM

## 2024-01-04 DIAGNOSIS — N30.00 ACUTE CYSTITIS WITHOUT HEMATURIA: ICD-10-CM

## 2024-01-04 PROCEDURE — 99421 OL DIG E/M SVC 5-10 MIN: CPT | Performed by: PHYSICIAN ASSISTANT

## 2024-01-04 RX ORDER — CEPHALEXIN 500 MG/1
500 CAPSULE ORAL 2 TIMES DAILY
Qty: 14 CAPSULE | Refills: 0 | Status: SHIPPED | OUTPATIENT
Start: 2024-01-04 | End: 2024-01-11

## 2024-01-04 RX ORDER — FLUCONAZOLE 150 MG/1
150 TABLET ORAL ONCE
Qty: 1 TABLET | Refills: 0 | Status: SHIPPED | OUTPATIENT
Start: 2024-01-04 | End: 2024-01-04

## 2024-01-04 NOTE — PATIENT INSTRUCTIONS
Dear Coni Newberry,     After reviewing your responses, I would like you to come in for a urine test to make sure we treat you correctly. This urine test is to evaluate you for a possible urinary tract infection, and should be scheduled for today or tomorrow. Schedule a Lab Only appointment here.     Lab appointments are not available at most locations on the weekends. If no Lab Only appointment is available, you should be seen in any of our convenient Walk-in or Urgent Care Centers, which can be found on our website here.     You will receive instructions with your results in [employer] once they are available.     If your symptoms worsen, you develop pain in your back or stomach, develop fevers, or are not improving in 5 days, please contact your primary care provider for an appointment or visit a Walk-in or Urgent Care Center to be seen.     Thanks again for choosing us as your health care partner,     Max Mendoza PA-C

## 2024-01-11 NOTE — TELEPHONE ENCOUNTER
Called patient and let her know with the Holidays Dr. Yeo was out and the forms have not yet been signed. However, he is now back in office and will be back in tomorrow and can sign the forms. Patient was appreciative of this and stated her  can come pick them up in clinic next Wednesday or Thursday. Please call patient back to let her know the forms are filling out and ready to be picked up.    Forms are in providers basket.     Orquidea Evans, ATC, LAT

## 2024-01-17 ENCOUNTER — E-VISIT (OUTPATIENT)
Dept: URGENT CARE | Facility: URGENT CARE | Age: 49
End: 2024-01-17
Payer: COMMERCIAL

## 2024-01-17 DIAGNOSIS — R30.0 DYSURIA: Primary | ICD-10-CM

## 2024-01-17 PROCEDURE — 99421 OL DIG E/M SVC 5-10 MIN: CPT | Performed by: PHYSICIAN ASSISTANT

## 2024-01-17 NOTE — PATIENT INSTRUCTIONS
Dear Coni Nebwerry,     After reviewing your responses, I would like you to come in for a urine test to make sure we treat you correctly. This urine test is to evaluate you for a possible urinary tract infection, and should be scheduled for today or tomorrow. Schedule a Lab Only appointment here.     Lab appointments are not available at most locations on the weekends. If no Lab Only appointment is available, you should be seen in any of our convenient Walk-in or Urgent Care Centers, which can be found on our website here.     You will receive instructions with your results in [employer] once they are available.     If your symptoms worsen, you develop pain in your back or stomach, develop fevers, or are not improving in 5 days, please contact your primary care provider for an appointment or visit a Walk-in or Urgent Care Center to be seen.     Thanks again for choosing us as your health care partner,     Rai Lord PA-C

## 2024-01-18 ENCOUNTER — LAB (OUTPATIENT)
Dept: LAB | Facility: CLINIC | Age: 49
End: 2024-01-18
Payer: COMMERCIAL

## 2024-01-18 ENCOUNTER — TELEPHONE (OUTPATIENT)
Dept: INTERNAL MEDICINE | Facility: CLINIC | Age: 49
End: 2024-01-18

## 2024-01-18 DIAGNOSIS — Z00.00 ROUTINE GENERAL MEDICAL EXAMINATION AT A HEALTH CARE FACILITY: ICD-10-CM

## 2024-01-18 DIAGNOSIS — J45.30 MILD PERSISTENT ASTHMA WITHOUT COMPLICATION: ICD-10-CM

## 2024-01-18 DIAGNOSIS — E11.9 TYPE 2 DIABETES MELLITUS TREATED WITHOUT INSULIN (H): ICD-10-CM

## 2024-01-18 DIAGNOSIS — N76.0 VAGINITIS AND VULVOVAGINITIS: Primary | ICD-10-CM

## 2024-01-18 DIAGNOSIS — R30.0 DYSURIA: ICD-10-CM

## 2024-01-18 LAB
ALBUMIN UR-MCNC: 100 MG/DL
APPEARANCE UR: ABNORMAL
BACTERIA #/AREA URNS HPF: ABNORMAL /HPF
BASOPHILS # BLD AUTO: 0 10E3/UL (ref 0–0.2)
BASOPHILS NFR BLD AUTO: 0 %
BILIRUB UR QL STRIP: NEGATIVE
CLUE CELLS: PRESENT
COLOR UR AUTO: YELLOW
EOSINOPHIL # BLD AUTO: 0.8 10E3/UL (ref 0–0.7)
EOSINOPHIL NFR BLD AUTO: 7 %
ERYTHROCYTE [DISTWIDTH] IN BLOOD BY AUTOMATED COUNT: 14 % (ref 10–15)
GLUCOSE UR STRIP-MCNC: NEGATIVE MG/DL
HBA1C MFR BLD: 6.1 % (ref 0–5.6)
HCT VFR BLD AUTO: 39.3 % (ref 35–47)
HGB BLD-MCNC: 13.1 G/DL (ref 11.7–15.7)
HGB UR QL STRIP: ABNORMAL
IMM GRANULOCYTES # BLD: 0 10E3/UL
IMM GRANULOCYTES NFR BLD: 0 %
KETONES UR STRIP-MCNC: NEGATIVE MG/DL
LEUKOCYTE ESTERASE UR QL STRIP: ABNORMAL
LYMPHOCYTES # BLD AUTO: 1.9 10E3/UL (ref 0.8–5.3)
LYMPHOCYTES NFR BLD AUTO: 19 %
MCH RBC QN AUTO: 28.3 PG (ref 26.5–33)
MCHC RBC AUTO-ENTMCNC: 33.3 G/DL (ref 31.5–36.5)
MCV RBC AUTO: 85 FL (ref 78–100)
MONOCYTES # BLD AUTO: 0.6 10E3/UL (ref 0–1.3)
MONOCYTES NFR BLD AUTO: 5 %
NEUTROPHILS # BLD AUTO: 7 10E3/UL (ref 1.6–8.3)
NEUTROPHILS NFR BLD AUTO: 68 %
NITRATE UR QL: NEGATIVE
PH UR STRIP: 7 [PH] (ref 5–7)
PLATELET # BLD AUTO: 204 10E3/UL (ref 150–450)
RBC # BLD AUTO: 4.63 10E6/UL (ref 3.8–5.2)
RBC #/AREA URNS AUTO: ABNORMAL /HPF
SP GR UR STRIP: 1.02 (ref 1–1.03)
SQUAMOUS #/AREA URNS AUTO: ABNORMAL /LPF
TRANS CELLS #/AREA URNS HPF: ABNORMAL /HPF
TRICHOMONAS, WET PREP: ABNORMAL
UROBILINOGEN UR STRIP-ACNC: 0.2 E.U./DL
WBC # BLD AUTO: 10.3 10E3/UL (ref 4–11)
WBC #/AREA URNS AUTO: >100 /HPF
WBC CLUMPS #/AREA URNS HPF: PRESENT /HPF
WBC'S/HIGH POWER FIELD, WET PREP: ABNORMAL
YEAST, WET PREP: PRESENT

## 2024-01-18 PROCEDURE — 83036 HEMOGLOBIN GLYCOSYLATED A1C: CPT

## 2024-01-18 PROCEDURE — 80053 COMPREHEN METABOLIC PANEL: CPT

## 2024-01-18 PROCEDURE — 87210 SMEAR WET MOUNT SALINE/INK: CPT

## 2024-01-18 PROCEDURE — 80061 LIPID PANEL: CPT

## 2024-01-18 PROCEDURE — 81001 URINALYSIS AUTO W/SCOPE: CPT

## 2024-01-18 PROCEDURE — 87086 URINE CULTURE/COLONY COUNT: CPT

## 2024-01-18 PROCEDURE — 36415 COLL VENOUS BLD VENIPUNCTURE: CPT

## 2024-01-18 PROCEDURE — 85025 COMPLETE CBC W/AUTO DIFF WBC: CPT

## 2024-01-18 PROCEDURE — 84443 ASSAY THYROID STIM HORMONE: CPT

## 2024-01-18 RX ORDER — FLUCONAZOLE 150 MG/1
150 TABLET ORAL
Qty: 3 TABLET | Refills: 0 | Status: SHIPPED | OUTPATIENT
Start: 2024-01-18 | End: 2024-01-25

## 2024-01-18 RX ORDER — METRONIDAZOLE 500 MG/1
500 TABLET ORAL 2 TIMES DAILY
Qty: 14 TABLET | Refills: 0 | Status: SHIPPED | OUTPATIENT
Start: 2024-01-18 | End: 2024-01-25

## 2024-01-18 RX ORDER — CEFDINIR 300 MG/1
300 CAPSULE ORAL 2 TIMES DAILY
Qty: 20 CAPSULE | Refills: 0 | Status: SHIPPED | OUTPATIENT
Start: 2024-01-18 | End: 2024-01-28

## 2024-01-18 NOTE — TELEPHONE ENCOUNTER
General Call      Reason for Call:  Would like a rush on the medication for UTI and a doctor's note at least for for today, possibly tomorrow     What are your questions or concerns:  UTI    Date of last appointment with provider: 01/18/2024, Lab    Could we send this information to you in [employer] or would you prefer to receive a phone call?:   Patient would like to be contacted via [employer]

## 2024-01-19 ENCOUNTER — TELEPHONE (OUTPATIENT)
Dept: INTERNAL MEDICINE | Facility: CLINIC | Age: 49
End: 2024-01-19
Payer: COMMERCIAL

## 2024-01-19 LAB
ALBUMIN SERPL BCG-MCNC: 4.3 G/DL (ref 3.5–5.2)
ALP SERPL-CCNC: 76 U/L (ref 40–150)
ALT SERPL W P-5'-P-CCNC: 33 U/L (ref 0–50)
ANION GAP SERPL CALCULATED.3IONS-SCNC: 15 MMOL/L (ref 7–15)
AST SERPL W P-5'-P-CCNC: 29 U/L (ref 0–45)
BACTERIA UR CULT: NORMAL
BILIRUB SERPL-MCNC: 0.4 MG/DL
BUN SERPL-MCNC: 16.4 MG/DL (ref 6–20)
CALCIUM SERPL-MCNC: 9.7 MG/DL (ref 8.6–10)
CHLORIDE SERPL-SCNC: 100 MMOL/L (ref 98–107)
CHOLEST SERPL-MCNC: 148 MG/DL
CREAT SERPL-MCNC: 1.1 MG/DL (ref 0.51–0.95)
DEPRECATED HCO3 PLAS-SCNC: 25 MMOL/L (ref 22–29)
EGFRCR SERPLBLD CKD-EPI 2021: 62 ML/MIN/1.73M2
FASTING STATUS PATIENT QL REPORTED: YES
GLUCOSE SERPL-MCNC: 128 MG/DL (ref 70–99)
HDLC SERPL-MCNC: 49 MG/DL
LDLC SERPL CALC-MCNC: 75 MG/DL
NONHDLC SERPL-MCNC: 99 MG/DL
POTASSIUM SERPL-SCNC: 3.8 MMOL/L (ref 3.4–5.3)
PROT SERPL-MCNC: 7.6 G/DL (ref 6.4–8.3)
SODIUM SERPL-SCNC: 140 MMOL/L (ref 135–145)
TRIGL SERPL-MCNC: 119 MG/DL
TSH SERPL DL<=0.005 MIU/L-ACNC: 0.99 UIU/ML (ref 0.3–4.2)

## 2024-01-19 NOTE — TELEPHONE ENCOUNTER
Forms/Letter Request    Type of form/letter: OTHER: Excuse of absence for work. Pt was in for UTI on 1/18 and will need a letter faxed to her work, excusing her from missing work.       Do we have the form/letter: Yes: pcp would give letter    Who is the form from? N/a    Where did/will the form come from? N/a    When is form/letter needed by: as soon as possible    How would you like the form/letter returned: Fax : [phone]    Patient Notified form requests are processed in 5-7 business days:Yes    Could we send this information to you in [employer] or would you prefer to receive a phone call?:   Patient would like to be contacted via [employer]

## 2024-01-19 NOTE — LETTER
January 19, 2024      Coni Newberry  [address]  [place] [facility] [address]        To Whom It May Concern:    Coni Newberry  was seen on 1/18/2024.  Please excuse her  for that date due to illness.        Sincerely,        DANYELL Spencer CNP

## 2024-01-23 NOTE — TELEPHONE ENCOUNTER
Dr. Yeo completed and signed form today. Copy sent to scan. Original placed at  for patient to  at her convenience.    Return call to patient. LVM with detailed information regarding form.     Phone Number patient can be reached at:  Cell number on file:    Telephone Information:   Mobile [phone]     Can we leave a detailed message on this number:  YES    Nan Rodriguez MBA, ATC

## 2024-02-19 ENCOUNTER — MYC REFILL (OUTPATIENT)
Dept: INTERNAL MEDICINE | Facility: CLINIC | Age: 49
End: 2024-02-19
Payer: COMMERCIAL

## 2024-02-19 DIAGNOSIS — Z30.41 ENCOUNTER FOR SURVEILLANCE OF CONTRACEPTIVE PILLS: ICD-10-CM

## 2024-02-20 RX ORDER — ACETAMINOPHEN AND CODEINE PHOSPHATE 120; 12 MG/5ML; MG/5ML
0.35 SOLUTION ORAL DAILY
Qty: 84 TABLET | Refills: 3 | OUTPATIENT
Start: 2024-02-20

## 2024-02-20 RX ORDER — ACETAMINOPHEN AND CODEINE PHOSPHATE 120; 12 MG/5ML; MG/5ML
0.35 SOLUTION ORAL DAILY
Qty: 84 TABLET | Refills: 1 | Status: SHIPPED | OUTPATIENT
Start: 2024-02-20 | End: 2024-04-22 | Stop reason: ALTCHOICE

## 2024-02-20 NOTE — TELEPHONE ENCOUNTER
Patient calls    Lost Rx. States pharmacy needs new Rx.     6 month supply sent per previous order. Would still be due for new prescription in August.     RJ ESCOBAR RN on 2/20/2024 at 9:55 AM   [facility]

## 2024-02-26 ENCOUNTER — ALLIED HEALTH/NURSE VISIT (OUTPATIENT)
Dept: INTERNAL MEDICINE | Facility: CLINIC | Age: 49
End: 2024-02-26
Payer: COMMERCIAL

## 2024-02-26 DIAGNOSIS — Z23 NEED FOR VACCINATION WITH TWINRIX: Primary | ICD-10-CM

## 2024-02-26 PROCEDURE — 90471 IMMUNIZATION ADMIN: CPT

## 2024-02-26 PROCEDURE — 99207 PR NO CHARGE NURSE ONLY: CPT

## 2024-02-26 PROCEDURE — 90636 HEP A/HEP B VACC ADULT IM: CPT

## 2024-02-26 NOTE — NURSING NOTE
Prior to immunization administration, verified patients identity using patient s name and date of birth. Please see Immunization Activity for additional information.     Screening Questionnaire for Adult Immunization    Are you sick today?   No   Do you have allergies to medications, food, a vaccine component or latex?   Yes   Have you ever had a serious reaction after receiving a vaccination?   No   Do you have a long-term health problem with heart, lung, kidney, or metabolic disease (e.g., diabetes), asthma, a blood disorder, no spleen, complement component deficiency, a cochlear implant, or a spinal fluid leak?  Are you on long-term aspirin therapy?   Yes   Do you have cancer, leukemia, HIV/AIDS, or any other immune system problem?   No   Do you have a parent, brother, or sister with an immune system problem?   No   In the past 3 months, have you taken medications that affect  your immune system, such as prednisone, other steroids, or anticancer drugs; drugs for the treatment of rheumatoid arthritis, Crohn s disease, or psoriasis; or have you had radiation treatments?   No   Have you had a seizure, or a brain or other nervous system problem?   No   During the past year, have you received a transfusion of blood or blood    products, or been given immune (gamma) globulin or antiviral drug?   No   For women: Are you pregnant or is there a chance you could become       pregnant during the next month?   No   Have you received any vaccinations in the past 4 weeks?   No     Immunization questionnaire was positive for at least one answer.  Notified provider.    I have reviewed the following standing orders:     This patient is due and qualifies for the Hepatitis A & B vaccine.    Click here for HEP A & B STANDING ORDER    I have reviewed the vaccines inclusion and exclusion criteria; No concerns regarding eligibility.     Patient instructed to remain in clinic for 15 minutes afterwards, and to report any adverse  reactions.     Screening performed by Sophie Quinn LPN on 2/26/2024 at 4:12 PM.

## 2024-03-05 ENCOUNTER — E-VISIT (OUTPATIENT)
Dept: URGENT CARE | Facility: CLINIC | Age: 49
End: 2024-03-05
Payer: COMMERCIAL

## 2024-03-05 DIAGNOSIS — R35.0 URINARY FREQUENCY: Primary | ICD-10-CM

## 2024-03-05 PROCEDURE — 99207 PR NON-BILLABLE SERV PER CHARTING: CPT | Performed by: PREVENTIVE MEDICINE

## 2024-03-05 NOTE — PATIENT INSTRUCTIONS
Dear Coni Newberry,     After reviewing your responses, I would like you to come in for a urine test to make sure we treat you correctly. This urine test is to evaluate you for a possible urinary tract infection, and should be scheduled for today or tomorrow. Schedule a Lab Only appointment here.     Lab appointments are not available at most locations on the weekends. If no Lab Only appointment is available, you should be seen in any of our convenient Walk-in or Urgent Care Centers, which can be found on our website here.     You will receive instructions with your results in [employer] once they are available.     If your symptoms worsen, you develop pain in your back or stomach, develop fevers, or are not improving in 5 days, please contact your primary care provider for an appointment or visit a Walk-in or Urgent Care Center to be seen.     Thanks again for choosing us as your health care partner,     Adriel Rivera MD, MD  Urinary Tract Infection (UTI) in Women: Care Instructions  Overview     A urinary tract infection, or UTI, is a general term for an infection anywhere between the kidneys and the urethra (where urine comes out). Most UTIs are bladder infections. They often cause pain or burning when you urinate.  UTIs are caused by bacteria and can be cured with antibiotics. Be sure to complete your treatment so that the infection does not get worse.  Follow-up care is a key part of your treatment and safety. Be sure to make and go to all appointments, and call your doctor if you are having problems. It's also a good idea to know your test results and keep a list of the medicines you take.  How can you care for yourself at home?  Take your antibiotics as directed. Do not stop taking them just because you feel better. You need to take the full course of antibiotics.  Drink extra water and other fluids for the next day or two. This will help make the urine less concentrated and help wash  "out the bacteria that are causing the infection. (If you have kidney, heart, or liver disease and have to limit fluids, talk with your doctor before you increase the amount of fluids you drink.)  Avoid drinks that are carbonated or have caffeine. They can irritate the bladder.  Urinate often. Try to empty your bladder each time.  To relieve pain, take a hot bath or lay a heating pad set on low over your lower belly or genital area. Never go to sleep with a heating pad in place.  To prevent UTIs  Drink plenty of water each day. This helps you urinate often, which clears bacteria from your system. (If you have kidney, heart, or liver disease and have to limit fluids, talk with your doctor before you increase the amount of fluids you drink.)  Urinate when you need to.  If you are sexually active, urinate right after you have sex.  Change sanitary pads often.  Avoid douches, bubble baths, feminine hygiene sprays, and other feminine hygiene products that have deodorants.  After going to the bathroom, wipe from front to back.  When should you call for help?   Call your doctor now or seek immediate medical care if:    Symptoms such as fever, chills, nausea, or vomiting get worse or appear for the first time.     You have new pain in your back just below your rib cage. This is called flank pain.     There is new blood or pus in your urine.     You have any problems with your antibiotic medicine.   Watch closely for changes in your health, and be sure to contact your doctor if:    You are not getting better after taking an antibiotic for 2 days.     Your symptoms go away but then come back.   Where can you learn more?  Go to https://www.[employer].net/patiented  Enter K848 in the search box to learn more about \"Urinary Tract Infection (UTI) in Women: Care Instructions.\"  Current as of: February 28, 2023               Content Version: 13.8    4508-8019 [place].   Care instructions adapted under license by " your healthcare professional. If you have questions about a medical condition or this instruction, always ask your healthcare professional. [employer] disclaims any warranty or liability for your use of this information.

## 2024-03-06 ENCOUNTER — LAB (OUTPATIENT)
Dept: LAB | Facility: CLINIC | Age: 49
End: 2024-03-06
Payer: COMMERCIAL

## 2024-03-06 DIAGNOSIS — N30.00 ACUTE CYSTITIS WITHOUT HEMATURIA: Primary | ICD-10-CM

## 2024-03-06 DIAGNOSIS — R35.0 URINARY FREQUENCY: ICD-10-CM

## 2024-03-06 LAB
ALBUMIN UR-MCNC: 30 MG/DL
APPEARANCE UR: CLEAR
BACTERIA #/AREA URNS HPF: ABNORMAL /HPF
BILIRUB UR QL STRIP: NEGATIVE
COLOR UR AUTO: YELLOW
GLUCOSE UR STRIP-MCNC: NEGATIVE MG/DL
HGB UR QL STRIP: ABNORMAL
KETONES UR STRIP-MCNC: NEGATIVE MG/DL
LEUKOCYTE ESTERASE UR QL STRIP: ABNORMAL
MUCOUS THREADS #/AREA URNS LPF: PRESENT /LPF
NITRATE UR QL: NEGATIVE
PH UR STRIP: 6 [PH] (ref 5–7)
RBC #/AREA URNS AUTO: ABNORMAL /HPF
SP GR UR STRIP: 1.02 (ref 1–1.03)
SQUAMOUS #/AREA URNS AUTO: ABNORMAL /LPF
UROBILINOGEN UR STRIP-ACNC: 0.2 E.U./DL
WBC #/AREA URNS AUTO: ABNORMAL /HPF

## 2024-03-06 PROCEDURE — 87086 URINE CULTURE/COLONY COUNT: CPT

## 2024-03-06 PROCEDURE — 81001 URINALYSIS AUTO W/SCOPE: CPT

## 2024-03-06 RX ORDER — NITROFURANTOIN 25; 75 MG/1; MG/1
100 CAPSULE ORAL 2 TIMES DAILY
Qty: 10 CAPSULE | Refills: 0 | Status: SHIPPED | OUTPATIENT
Start: 2024-03-06 | End: 2024-03-11

## 2024-03-07 ENCOUNTER — OFFICE VISIT (OUTPATIENT)
Dept: INTERNAL MEDICINE | Facility: CLINIC | Age: 49
End: 2024-03-07
Payer: COMMERCIAL

## 2024-03-07 VITALS
TEMPERATURE: 98.9 F | RESPIRATION RATE: 18 BRPM | WEIGHT: 278 LBS | BODY MASS INDEX: 47.46 KG/M2 | HEIGHT: 64 IN | OXYGEN SATURATION: 100 % | DIASTOLIC BLOOD PRESSURE: 62 MMHG | HEART RATE: 114 BPM | SYSTOLIC BLOOD PRESSURE: 120 MMHG

## 2024-03-07 DIAGNOSIS — E11.9 TYPE 2 DIABETES MELLITUS TREATED WITHOUT INSULIN (H): ICD-10-CM

## 2024-03-07 DIAGNOSIS — N95.2 ATROPHIC VAGINITIS: Primary | ICD-10-CM

## 2024-03-07 DIAGNOSIS — J45.30 MILD PERSISTENT ASTHMA WITHOUT COMPLICATION: ICD-10-CM

## 2024-03-07 LAB — BACTERIA UR CULT: NORMAL

## 2024-03-07 PROCEDURE — 99214 OFFICE O/P EST MOD 30 MIN: CPT | Performed by: NURSE PRACTITIONER

## 2024-03-07 ASSESSMENT — ASTHMA QUESTIONNAIRES
QUESTION_2 LAST FOUR WEEKS HOW OFTEN HAVE YOU HAD SHORTNESS OF BREATH: NOT AT ALL
QUESTION_4 LAST FOUR WEEKS HOW OFTEN HAVE YOU USED YOUR RESCUE INHALER OR NEBULIZER MEDICATION (SUCH AS ALBUTEROL): ONCE A WEEK OR LESS
ACT_TOTALSCORE: 22
ACT_TOTALSCORE: 22
QUESTION_5 LAST FOUR WEEKS HOW WOULD YOU RATE YOUR ASTHMA CONTROL: SOMEWHAT CONTROLLED
QUESTION_1 LAST FOUR WEEKS HOW MUCH OF THE TIME DID YOUR ASTHMA KEEP YOU FROM GETTING AS MUCH DONE AT WORK, SCHOOL OR AT HOME: NONE OF THE TIME
QUESTION_3 LAST FOUR WEEKS HOW OFTEN DID YOUR ASTHMA SYMPTOMS (WHEEZING, COUGHING, SHORTNESS OF BREATH, CHEST TIGHTNESS OR PAIN) WAKE YOU UP AT NIGHT OR EARLIER THAN USUAL IN THE MORNING: NOT AT ALL

## 2024-03-07 NOTE — PROGRESS NOTES
"  Assessment & Plan     Atrophic vaginitis  Discussed  She has had multiple treatments for UTIs but all the cultures have been coming back with no growth or no abnormal growth.  She is seeing urology and there has not been any issues in her urinary system.  We did discuss dry vaginal area and trial of the Premarin cream and she is  willing to do it  - conjugated estrogens (PREMARIN) 0.625 MG/GM vaginal cream; Place 1 g vaginally twice a week    Type 2 diabetes mellitus treated without insulin (H)  A1c excellent.  She feels she is more hungry on the Rybelsus.  She is willing to try Ozempic again once she is done with the Rybelsus she has.  It does not appear that Mounjaro would be covered by her insurance.  She would like to be able to get off some of the oral medicine she is on like glipizide and metformin.  Her kidney function has decreased a little bit so I would not mind getting her off the metformin specifically or at least decreasing it.  When she tried Ozempic in the past she had some vision changes but it may have been because her glucose was so high at that point.  She is willing to try again.  She will call when she is down on the amount of Rybelsus she has and we will try  She has lost weight on the Rybelsus and she is very happy about that.  Using the Ozempic we can ramp that up a little bit higher which she would like to do    Mild persistent asthma without complication  Stable on current meds      15 minutes spent by me on the date of the encounter doing chart review, history and exam, documentation and further activities per the note      BMI  Estimated body mass index is 47.72 kg/m  as calculated from the following:    Height as of this encounter: 1.626 m (5' 4\").    Weight as of this encounter: 126.1 kg (278 lb).   Weight management plan: She is currently taking Rybelsus and has had significantly  weight loss-we may try Ozempic when she is done with her supply of Rybelsus to see if we can get a " "little bit better control of her appetite, better control of her diabetes, and more weight loss      Patient Instructions   PREMARIN VAGINAL CREAM   APPLY NIGHTLY FOR 5-7 DAYS   THEN TWICE WEEKLY ONGOING     ONCE YOU FINISH RYBELSUS YOU CAN TRY OZEMPIC AGAIN AND SEE HOW IT DOES      Subjective   Coni Brar is a 49 year old, presenting for the following health issues:  Diabetes        3/7/2024     4:11 PM   Additional Questions   Roomed by Sahra CALDERON     History of Present Illness       Diabetes:   She presents for follow up of diabetes.  She is checking home blood glucose one time daily.   She checks blood glucose before meals.  Blood glucose is never over 200 and never under 70. She is aware of hypoglycemia symptoms including dizziness.    She has no concerns regarding her diabetes at this time.  She is having excessive thirst and none of these symptoms. She is not experiencing numbness or burning in feet, excessive thirst, blurry vision, weight changes or redness, sores or blisters on feet.           Reason for visit:  To incress diet pill and find out why i keep getting utis    She eats 0-1 servings of fruits and vegetables daily.She consumes 0 sweetened beverage(s) daily.She exercises with enough effort to increase her heart rate 9 or less minutes per day.  She exercises with enough effort to increase her heart rate 3 or less days per week. She is missing 1 dose(s) of medications per week.     Wt Readings from Last 4 Encounters:   03/07/24 126.1 kg (278 lb)   10/31/23 138.3 kg (305 lb)   08/25/23 143.8 kg (317 lb)   08/23/23 144.7 kg (319 lb)             Review of Systems  Constitutional, neuro, ENT, endocrine, pulmonary, cardiac, gastrointestinal, genitourinary, musculoskeletal, integument and psychiatric systems are negative, except as otherwise noted.      Objective    /62   Pulse 114   Temp 98.9  F (37.2  C) (Oral)   Resp 18   Ht 1.626 m (5' 4\")   Wt 126.1 kg (278 lb)   LMP  (LMP Unknown)   SpO2 " 100%   BMI 47.72 kg/m    Body mass index is 47.72 kg/m .  Physical Exam   GENERAL: alert and no distress  RESP: lungs clear to auscultation - no rales, rhonchi or wheezes  CV: regular rate and rhythm, normal S1 S2, no S3 or S4, no murmur, click or rub, no peripheral edema  PSYCH: mentation appears normal, affect normal/bright    Reviewed labs done earlier this year        Signed Electronically by: DANYELL Spencer CNP

## 2024-03-07 NOTE — NURSING NOTE
"Chief Complaint   Patient presents with    Diabetes     initial /62   Pulse 114   Temp 98.9  F (37.2  C) (Oral)   Resp 18   Ht 1.626 m (5' 4\")   Wt 126.1 kg (278 lb)   LMP  (LMP Unknown)   SpO2 100%   BMI 47.72 kg/m   Estimated body mass index is 47.72 kg/m  as calculated from the following:    Height as of this encounter: 1.626 m (5' 4\").    Weight as of this encounter: 126.1 kg (278 lb)..  bp completed using cuff size large FOREARM  CIERA BAUTISTA LPN  "

## 2024-03-07 NOTE — PATIENT INSTRUCTIONS
PREMARIN VAGINAL CREAM   APPLY NIGHTLY FOR 5-7 DAYS   THEN TWICE WEEKLY ONGOING     ONCE YOU FINISH RYBELSUS YOU CAN TRY OZEMPIC AGAIN AND SEE HOW IT DOES

## 2024-03-15 ENCOUNTER — E-VISIT (OUTPATIENT)
Dept: URGENT CARE | Facility: CLINIC | Age: 49
End: 2024-03-15
Payer: COMMERCIAL

## 2024-03-15 DIAGNOSIS — J45.30 MILD PERSISTENT ASTHMA WITHOUT COMPLICATION: ICD-10-CM

## 2024-03-15 DIAGNOSIS — J45.901 EXACERBATION OF ASTHMA, UNSPECIFIED ASTHMA SEVERITY, UNSPECIFIED WHETHER PERSISTENT: Primary | ICD-10-CM

## 2024-03-15 PROCEDURE — 99207 PR NON-BILLABLE SERV PER CHARTING: CPT | Performed by: PHYSICIAN ASSISTANT

## 2024-03-15 NOTE — PATIENT INSTRUCTIONS
Dear Coni Newberry,    We are sorry you are not feeling well. Based on the responses you provided, it is recommended that you be seen in-person in urgent care so we can better evaluate your symptoms. Please click here to find the nearest urgent care location to you.   You will not be charged for this Visit. Thank you for trusting us with your care.    Yoko Sun PA-C

## 2024-03-18 RX ORDER — ALBUTEROL SULFATE 0.83 MG/ML
SOLUTION RESPIRATORY (INHALATION)
Qty: 150 ML | Refills: 0 | OUTPATIENT
Start: 2024-03-18

## 2024-03-19 ENCOUNTER — E-VISIT (OUTPATIENT)
Dept: INTERNAL MEDICINE | Facility: CLINIC | Age: 49
End: 2024-03-19
Payer: COMMERCIAL

## 2024-03-19 DIAGNOSIS — N95.1 MENOPAUSAL SYNDROME (HOT FLASHES): Primary | ICD-10-CM

## 2024-03-19 PROCEDURE — 99421 OL DIG E/M SVC 5-10 MIN: CPT | Performed by: NURSE PRACTITIONER

## 2024-04-16 ENCOUNTER — TELEPHONE (OUTPATIENT)
Dept: INTERNAL MEDICINE | Facility: CLINIC | Age: 49
End: 2024-04-16
Payer: COMMERCIAL

## 2024-04-16 DIAGNOSIS — E66.01 OBESITY, CLASS III, BMI 40-49.9 (MORBID OBESITY) (H): ICD-10-CM

## 2024-04-16 DIAGNOSIS — E66.01 MORBID OBESITY (H): Primary | ICD-10-CM

## 2024-04-16 DIAGNOSIS — E11.9 TYPE 2 DIABETES MELLITUS TREATED WITHOUT INSULIN (H): ICD-10-CM

## 2024-04-16 NOTE — TELEPHONE ENCOUNTER
Patient is calling because she is currently taking semaglutide 14mg tablets. She has about one week left but was told Farida would start her on the shots after that.

## 2024-04-22 ENCOUNTER — OFFICE VISIT (OUTPATIENT)
Dept: OBGYN | Facility: CLINIC | Age: 49
End: 2024-04-22
Payer: COMMERCIAL

## 2024-04-22 VITALS
DIASTOLIC BLOOD PRESSURE: 62 MMHG | WEIGHT: 271 LBS | BODY MASS INDEX: 46.26 KG/M2 | HEIGHT: 64 IN | SYSTOLIC BLOOD PRESSURE: 118 MMHG

## 2024-04-22 DIAGNOSIS — Z30.41 ORAL CONTRACEPTIVE PILL SURVEILLANCE: ICD-10-CM

## 2024-04-22 DIAGNOSIS — N95.2 ATROPHIC VAGINITIS: ICD-10-CM

## 2024-04-22 DIAGNOSIS — N95.1 MENOPAUSAL SYNDROME (HOT FLASHES): Primary | ICD-10-CM

## 2024-04-22 LAB
CLUE CELLS: ABNORMAL
TRICHOMONAS, WET PREP: ABNORMAL
WBC'S/HIGH POWER FIELD, WET PREP: ABNORMAL
YEAST, WET PREP: ABNORMAL

## 2024-04-22 PROCEDURE — 87491 CHLMYD TRACH DNA AMP PROBE: CPT | Performed by: OBSTETRICS & GYNECOLOGY

## 2024-04-22 PROCEDURE — 99213 OFFICE O/P EST LOW 20 MIN: CPT | Performed by: OBSTETRICS & GYNECOLOGY

## 2024-04-22 PROCEDURE — 87210 SMEAR WET MOUNT SALINE/INK: CPT | Performed by: OBSTETRICS & GYNECOLOGY

## 2024-04-22 PROCEDURE — 87591 N.GONORRHOEAE DNA AMP PROB: CPT | Performed by: OBSTETRICS & GYNECOLOGY

## 2024-04-22 RX ORDER — LEVONORGESTREL/ETHIN.ESTRADIOL 0.1-0.02MG
1 TABLET ORAL DAILY
Qty: 84 TABLET | Refills: 3 | Status: SHIPPED | OUTPATIENT
Start: 2024-04-22

## 2024-04-22 NOTE — NURSING NOTE
"Chief Complaint   Patient presents with    Menopausal Sx     Hot flashes and vaginal dryness       Initial /62 (BP Location: Right arm, Patient Position: Chair, Cuff Size: Adult Large)   Ht 1.626 m (5' 4\")   Wt 122.9 kg (271 lb)   LMP  (LMP Unknown)   Breastfeeding No   BMI 46.52 kg/m   Estimated body mass index is 46.52 kg/m  as calculated from the following:    Height as of this encounter: 1.626 m (5' 4\").    Weight as of this encounter: 122.9 kg (271 lb).  BP completed using cuff size: large    Questioned patient about current smoking habits.  Pt. has never smoked.          The following HM Due: NONE    Rosmery Amos CMA      "

## 2024-04-23 LAB
C TRACH DNA SPEC QL NAA+PROBE: NEGATIVE
N GONORRHOEA DNA SPEC QL NAA+PROBE: NEGATIVE

## 2024-04-23 NOTE — PROGRESS NOTES
ASSESSMENT & PLAN  Patient Instructions     1. Primary osteoarthritis of both knees      -Patient is following up for chronic bilateral knee pain due to arthritis  -Patient tolerated bilateral Synvisc 1 injections today without complications.  Patient was given postprocedure instructions  -Patient has lost approximately 50 pounds since her last visit taking an oral diabetic medication to help curb her appetite.  Patient is now starting Ozempic injections to help with further weight loss.    -Patient was encouraged to start healthier lifestyle choices for long-term sustained weight loss.  Patient was also encouraged to start walking and strength training exercises to help aid in further weight loss as well as build strength and support in her legs and knees  -Patient will follow-up when pain returns  -Call direct clinic number [[phone]] at any time with questions or concerns.    Albert Yeo MD [facility]  [facility]        -----    SUBJECTIVE:  Coni Newberry is a 49 year old female who is seen for bilateral knee pain. Last injection: Synvisc One on 5/30/23 provided ~ 6 months of relief.    Large Joint Injection/Arthocentesis: bilateral knee    Date/Time: 4/24/2024 4:10 PM    Performed by: Yeo, Albert, MD  Authorized by: Yeo, Albert, MD    Indications:  Pain and osteoarthritis  Needle Size:  22 G  Guidance: ultrasound    Approach:  Anterolateral  Location:  Knee  Laterality:  Bilateral      Medications (Right):  48 mg hylan 48 MG/6ML; 5 mL lidocaine 1 %  Medications (Left):  48 mg hylan 48 MG/6ML; 5 mL lidocaine 1 %  Outcome:  Tolerated well, no immediate complications  Procedure discussed: discussed risks, benefits, and alternatives    Consent Given by:  Patient  Timeout: timeout called immediately prior to procedure    Prep: patient was prepped and draped in usual sterile fashion     Ultrasound was used to ensure safe and accurate needle placement  and injection. Ultrasound images of the procedure were permanently stored.        Patient rates pain as 4/10 pre-procedure. Patient rates pain as 4/10 post-procedure.      Albert Yeo MD, [facility]

## 2024-04-24 ENCOUNTER — OFFICE VISIT (OUTPATIENT)
Dept: ORTHOPEDICS | Facility: CLINIC | Age: 49
End: 2024-04-24
Payer: COMMERCIAL

## 2024-04-24 DIAGNOSIS — E66.01 MORBID OBESITY (H): ICD-10-CM

## 2024-04-24 DIAGNOSIS — M17.0 PRIMARY OSTEOARTHRITIS OF BOTH KNEES: Primary | ICD-10-CM

## 2024-04-24 PROCEDURE — 20611 DRAIN/INJ JOINT/BURSA W/US: CPT | Mod: 50 | Performed by: FAMILY MEDICINE

## 2024-04-24 RX ORDER — LIDOCAINE HYDROCHLORIDE 10 MG/ML
5 INJECTION, SOLUTION INFILTRATION; PERINEURAL
Status: SHIPPED | OUTPATIENT
Start: 2024-04-24

## 2024-04-24 RX ADMIN — LIDOCAINE HYDROCHLORIDE 5 ML: 10 INJECTION, SOLUTION INFILTRATION; PERINEURAL at 16:10

## 2024-04-24 NOTE — LETTER
4/24/2024         RE: Coni Newberry  [address]  [facility] [address]        Dear Colleague,    Thank you for referring your patient, Coni Newberry, to the [facility]. Please see a copy of my visit note below.    ASSESSMENT & PLAN  Patient Instructions     1. Primary osteoarthritis of both knees      -Patient is following up for chronic bilateral knee pain due to arthritis  -Patient tolerated bilateral Synvisc 1 injections today without complications.  Patient was given postprocedure instructions  -Patient has lost approximately 50 pounds since her last visit taking an oral diabetic medication to help curb her appetite.  Patient is now starting Ozempic injections to help with further weight loss.    -Patient was encouraged to start healthier lifestyle choices for long-term sustained weight loss.  Patient was also encouraged to start walking and strength training exercises to help aid in further weight loss as well as build strength and support in her legs and knees  -Patient will follow-up when pain returns  -Call direct clinic number [[phone]] at any time with questions or concerns.    Albert Yeo MD [facility] Medicine  [facility]        -----    SUBJECTIVE:  Coni Newberry is a 49 year old female who is seen for bilateral knee pain. Last injection: Synvisc One on 5/30/23 provided ~ 6 months of relief.    Large Joint Injection/Arthocentesis: bilateral knee    Date/Time: 4/24/2024 4:10 PM    Performed by: Yeo, Albert, MD  Authorized by: Yeo, Albert, MD    Indications:  Pain and osteoarthritis  Needle Size:  22 G  Guidance: ultrasound    Approach:  Anterolateral  Location:  Knee  Laterality:  Bilateral      Medications (Right):  48 mg hylan 48 MG/6ML; 5 mL lidocaine 1 %  Medications (Left):  48 mg hylan 48 MG/6ML; 5 mL lidocaine 1 %  Outcome:  Tolerated well, no immediate  complications  Procedure discussed: discussed risks, benefits, and alternatives    Consent Given by:  Patient  Timeout: timeout called immediately prior to procedure    Prep: patient was prepped and draped in usual sterile fashion     Ultrasound was used to ensure safe and accurate needle placement and injection. Ultrasound images of the procedure were permanently stored.        Patient rates pain as 4/10 pre-procedure. Patient rates pain as 4/10 post-procedure.      Albert Yeo MD, [facility]      Again, thank you for allowing me to participate in the care of your patient.        Sincerely,        Albert Yeo, MD

## 2024-04-24 NOTE — PATIENT INSTRUCTIONS
1. Primary osteoarthritis of both knees      -Patient is following up for chronic bilateral knee pain due to arthritis  -Patient tolerated bilateral Synvisc 1 injections today without complications.  Patient was given postprocedure instructions  -Patient has lost approximately 50 pounds since her last visit taking an oral diabetic medication to help curb her appetite.  Patient is now starting Ozempic injections to help with further weight loss.    -Patient was encouraged to start healthier lifestyle choices for long-term sustained weight loss.  Patient was also encouraged to start walking and strength training exercises to help aid in further weight loss as well as build strength and support in her legs and knees  -Patient will follow-up when pain returns  -Call direct clinic number [[phone]] at any time with questions or concerns.    Albert Yeo MD [facility]  [facility]

## 2024-04-29 DIAGNOSIS — J45.30 MILD PERSISTENT ASTHMA WITHOUT COMPLICATION: Primary | ICD-10-CM

## 2024-04-30 RX ORDER — BECLOMETHASONE DIPROPIONATE HFA 80 UG/1
AEROSOL, METERED RESPIRATORY (INHALATION)
Qty: 11 G | Refills: 11 | Status: SHIPPED | OUTPATIENT
Start: 2024-04-30

## 2024-05-11 DIAGNOSIS — E11.9 TYPE 2 DIABETES MELLITUS TREATED WITHOUT INSULIN (H): ICD-10-CM

## 2024-05-11 DIAGNOSIS — E66.01 MORBID OBESITY (H): ICD-10-CM

## 2024-05-11 DIAGNOSIS — E66.01 OBESITY, CLASS III, BMI 40-49.9 (MORBID OBESITY) (H): ICD-10-CM

## 2024-05-13 RX ORDER — SEMAGLUTIDE 0.68 MG/ML
0.5 INJECTION, SOLUTION SUBCUTANEOUS
OUTPATIENT
Start: 2024-05-13

## 2024-06-07 DIAGNOSIS — E66.01 OBESITY, CLASS III, BMI 40-49.9 (MORBID OBESITY) (H): ICD-10-CM

## 2024-06-07 DIAGNOSIS — E11.9 TYPE 2 DIABETES MELLITUS TREATED WITHOUT INSULIN (H): ICD-10-CM

## 2024-06-07 DIAGNOSIS — E66.01 MORBID OBESITY (H): ICD-10-CM

## 2024-06-11 DIAGNOSIS — E66.01 MORBID OBESITY (H): ICD-10-CM

## 2024-06-11 DIAGNOSIS — E11.9 TYPE 2 DIABETES MELLITUS TREATED WITHOUT INSULIN (H): ICD-10-CM

## 2024-06-11 DIAGNOSIS — E66.01 OBESITY, CLASS III, BMI 40-49.9 (MORBID OBESITY) (H): ICD-10-CM

## 2024-06-11 RX ORDER — SEMAGLUTIDE 1.34 MG/ML
1 INJECTION, SOLUTION SUBCUTANEOUS
Refills: 0 | OUTPATIENT
Start: 2024-06-11

## 2024-06-11 RX ORDER — SEMAGLUTIDE 1.34 MG/ML
1 INJECTION, SOLUTION SUBCUTANEOUS
Qty: 3 ML | Refills: 0 | Status: SHIPPED | OUTPATIENT
Start: 2024-06-11 | End: 2024-07-18 | Stop reason: DRUGHIGH

## 2024-06-19 ENCOUNTER — TELEPHONE (OUTPATIENT)
Dept: INTERNAL MEDICINE | Facility: CLINIC | Age: 49
End: 2024-06-19
Payer: COMMERCIAL

## 2024-06-19 NOTE — TELEPHONE ENCOUNTER
S-(situation): patient calls to report low blood sugars in the morning and on the weekends when she is not working    B-(background): Patient is taking Ozempic 1 mg, metformin and glyburide for BS control     A-(assessment): Patient reports she will feel hot, sweaty and fatigue. When she checks her blood sugars they are typically in the 60's. Occurs usually in the morning after fasting while sleeping and on her days off because she eats less on these days. BS will go up after drinking/eating something. Last A1c 1/18/24, OV 3/7/24    R-(recommendations): Do patient's diabetic medications need to be altered? Routing to PCP for review.     OK to leave detailed message.     Summer RN 7:21 AM June 19, 2024   [facility]

## 2024-06-19 NOTE — TELEPHONE ENCOUNTER
Detailed message left on patient's voice mail (per her request) with primary care provider's recommendation below.

## 2024-07-07 DIAGNOSIS — E11.9 TYPE 2 DIABETES MELLITUS TREATED WITHOUT INSULIN (H): ICD-10-CM

## 2024-07-07 DIAGNOSIS — E66.01 MORBID OBESITY (H): ICD-10-CM

## 2024-07-07 DIAGNOSIS — E66.01 OBESITY, CLASS III, BMI 40-49.9 (MORBID OBESITY) (H): ICD-10-CM

## 2024-07-08 RX ORDER — SEMAGLUTIDE 1.34 MG/ML
1 INJECTION, SOLUTION SUBCUTANEOUS
OUTPATIENT
Start: 2024-07-08

## 2024-07-15 ENCOUNTER — APPOINTMENT (OUTPATIENT)
Dept: CT IMAGING | Facility: CLINIC | Age: 49
End: 2024-07-15
Attending: EMERGENCY MEDICINE
Payer: COMMERCIAL

## 2024-07-15 ENCOUNTER — HOSPITAL ENCOUNTER (EMERGENCY)
Facility: CLINIC | Age: 49
Discharge: HOME | End: 2024-07-15
Attending: EMERGENCY MEDICINE | Admitting: EMERGENCY MEDICINE
Payer: COMMERCIAL

## 2024-07-15 VITALS
OXYGEN SATURATION: 98 % | WEIGHT: 261.69 LBS | TEMPERATURE: 97.8 F | HEART RATE: 82 BPM | BODY MASS INDEX: 44.92 KG/M2 | DIASTOLIC BLOOD PRESSURE: 80 MMHG | SYSTOLIC BLOOD PRESSURE: 138 MMHG | RESPIRATION RATE: 18 BRPM

## 2024-07-15 DIAGNOSIS — R51.9 ACUTE NONINTRACTABLE HEADACHE, UNSPECIFIED HEADACHE TYPE: ICD-10-CM

## 2024-07-15 DIAGNOSIS — H92.02 OTALGIA, LEFT: ICD-10-CM

## 2024-07-15 DIAGNOSIS — I65.1 BASILAR ARTERY STENOSIS: ICD-10-CM

## 2024-07-15 DIAGNOSIS — R42 DIZZINESS: ICD-10-CM

## 2024-07-15 LAB
ANION GAP SERPL CALCULATED.3IONS-SCNC: 14 MMOL/L (ref 7–15)
ATRIAL RATE - MUSE: 83 BPM
BASOPHILS # BLD AUTO: 0.1 10E3/UL (ref 0–0.2)
BASOPHILS NFR BLD AUTO: 1 %
BUN SERPL-MCNC: 13.5 MG/DL (ref 6–20)
CALCIUM SERPL-MCNC: 8.9 MG/DL (ref 8.6–10)
CHLORIDE SERPL-SCNC: 100 MMOL/L (ref 98–107)
CREAT SERPL-MCNC: 1.13 MG/DL (ref 0.51–0.95)
DEPRECATED HCO3 PLAS-SCNC: 23 MMOL/L (ref 22–29)
DIASTOLIC BLOOD PRESSURE - MUSE: NORMAL MMHG
EGFRCR SERPLBLD CKD-EPI 2021: 59 ML/MIN/1.73M2
EOSINOPHIL # BLD AUTO: 0.6 10E3/UL (ref 0–0.7)
EOSINOPHIL NFR BLD AUTO: 7 %
ERYTHROCYTE [DISTWIDTH] IN BLOOD BY AUTOMATED COUNT: 12.8 % (ref 10–15)
GLUCOSE SERPL-MCNC: 123 MG/DL (ref 70–99)
HCT VFR BLD AUTO: 36.2 % (ref 35–47)
HGB BLD-MCNC: 12 G/DL (ref 11.7–15.7)
IMM GRANULOCYTES # BLD: 0 10E3/UL
IMM GRANULOCYTES NFR BLD: 0 %
INTERPRETATION ECG - MUSE: NORMAL
LYMPHOCYTES # BLD AUTO: 2.9 10E3/UL (ref 0.8–5.3)
LYMPHOCYTES NFR BLD AUTO: 30 %
MCH RBC QN AUTO: 28.1 PG (ref 26.5–33)
MCHC RBC AUTO-ENTMCNC: 33.1 G/DL (ref 31.5–36.5)
MCV RBC AUTO: 85 FL (ref 78–100)
MONOCYTES # BLD AUTO: 0.6 10E3/UL (ref 0–1.3)
MONOCYTES NFR BLD AUTO: 7 %
NEUTROPHILS # BLD AUTO: 5.6 10E3/UL (ref 1.6–8.3)
NEUTROPHILS NFR BLD AUTO: 56 %
NRBC # BLD AUTO: 0 10E3/UL
NRBC BLD AUTO-RTO: 0 /100
P AXIS - MUSE: 54 DEGREES
PLATELET # BLD AUTO: 220 10E3/UL (ref 150–450)
POTASSIUM SERPL-SCNC: 3.2 MMOL/L (ref 3.4–5.3)
PR INTERVAL - MUSE: 190 MS
QRS DURATION - MUSE: 84 MS
QT - MUSE: 384 MS
QTC - MUSE: 451 MS
R AXIS - MUSE: -14 DEGREES
RBC # BLD AUTO: 4.27 10E6/UL (ref 3.8–5.2)
SODIUM SERPL-SCNC: 137 MMOL/L (ref 135–145)
SYSTOLIC BLOOD PRESSURE - MUSE: NORMAL MMHG
T AXIS - MUSE: 85 DEGREES
VENTRICULAR RATE- MUSE: 83 BPM
WBC # BLD AUTO: 9.9 10E3/UL (ref 4–11)

## 2024-07-15 PROCEDURE — 96360 HYDRATION IV INFUSION INIT: CPT | Mod: 59

## 2024-07-15 PROCEDURE — 70496 CT ANGIOGRAPHY HEAD: CPT

## 2024-07-15 PROCEDURE — 250N000013 HC RX MED GY IP 250 OP 250 PS 637: Performed by: EMERGENCY MEDICINE

## 2024-07-15 PROCEDURE — 80048 BASIC METABOLIC PNL TOTAL CA: CPT | Performed by: EMERGENCY MEDICINE

## 2024-07-15 PROCEDURE — 36415 COLL VENOUS BLD VENIPUNCTURE: CPT | Performed by: EMERGENCY MEDICINE

## 2024-07-15 PROCEDURE — 85025 COMPLETE CBC W/AUTO DIFF WBC: CPT | Performed by: EMERGENCY MEDICINE

## 2024-07-15 PROCEDURE — 250N000009 HC RX 250: Performed by: EMERGENCY MEDICINE

## 2024-07-15 PROCEDURE — 99285 EMERGENCY DEPT VISIT HI MDM: CPT | Mod: 25

## 2024-07-15 PROCEDURE — 250N000011 HC RX IP 250 OP 636: Performed by: EMERGENCY MEDICINE

## 2024-07-15 PROCEDURE — 93005 ELECTROCARDIOGRAM TRACING: CPT

## 2024-07-15 PROCEDURE — 258N000003 HC RX IP 258 OP 636: Performed by: EMERGENCY MEDICINE

## 2024-07-15 PROCEDURE — 70450 CT HEAD/BRAIN W/O DYE: CPT | Mod: XU

## 2024-07-15 PROCEDURE — 93005 ELECTROCARDIOGRAM TRACING: CPT | Mod: RTG

## 2024-07-15 RX ORDER — POTASSIUM CHLORIDE 1500 MG/1
20 TABLET, EXTENDED RELEASE ORAL ONCE
Status: COMPLETED | OUTPATIENT
Start: 2024-07-15 | End: 2024-07-15

## 2024-07-15 RX ORDER — POTASSIUM CHLORIDE 1500 MG/1
40 TABLET, EXTENDED RELEASE ORAL ONCE
Status: DISCONTINUED | OUTPATIENT
Start: 2024-07-15 | End: 2024-07-15

## 2024-07-15 RX ORDER — MECLIZINE HYDROCHLORIDE 25 MG/1
25 TABLET ORAL ONCE
Status: COMPLETED | OUTPATIENT
Start: 2024-07-15 | End: 2024-07-15

## 2024-07-15 RX ORDER — IOPAMIDOL 755 MG/ML
500 INJECTION, SOLUTION INTRAVASCULAR ONCE
Status: COMPLETED | OUTPATIENT
Start: 2024-07-15 | End: 2024-07-15

## 2024-07-15 RX ORDER — ASPIRIN 81 MG/1
81 TABLET ORAL DAILY
Qty: 180 TABLET | Refills: 0 | Status: SHIPPED | OUTPATIENT
Start: 2024-07-15

## 2024-07-15 RX ORDER — MECLIZINE HYDROCHLORIDE 25 MG/1
25 TABLET ORAL EVERY 6 HOURS PRN
Qty: 30 TABLET | Refills: 1 | Status: SHIPPED | OUTPATIENT
Start: 2024-07-15 | End: 2024-07-18

## 2024-07-15 RX ADMIN — IOPAMIDOL 67 ML: 755 INJECTION, SOLUTION INTRAVENOUS at 04:35

## 2024-07-15 RX ADMIN — SODIUM CHLORIDE 80 ML: 9 INJECTION, SOLUTION INTRAVENOUS at 04:35

## 2024-07-15 RX ADMIN — POTASSIUM CHLORIDE 20 MEQ: 1500 TABLET, EXTENDED RELEASE ORAL at 05:30

## 2024-07-15 RX ADMIN — MECLIZINE HYDROCHLORIDE 25 MG: 25 TABLET ORAL at 04:05

## 2024-07-15 RX ADMIN — SODIUM CHLORIDE 1000 ML: 9 INJECTION, SOLUTION INTRAVENOUS at 04:19

## 2024-07-15 ASSESSMENT — ACTIVITIES OF DAILY LIVING (ADL)
ADLS_ACUITY_SCORE: 35
ADLS_ACUITY_SCORE: 37
ADLS_ACUITY_SCORE: 35

## 2024-07-15 ASSESSMENT — COLUMBIA-SUICIDE SEVERITY RATING SCALE - C-SSRS
1. IN THE PAST MONTH, HAVE YOU WISHED YOU WERE DEAD OR WISHED YOU COULD GO TO SLEEP AND NOT WAKE UP?: NO
6. HAVE YOU EVER DONE ANYTHING, STARTED TO DO ANYTHING, OR PREPARED TO DO ANYTHING TO END YOUR LIFE?: NO
2. HAVE YOU ACTUALLY HAD ANY THOUGHTS OF KILLING YOURSELF IN THE PAST MONTH?: NO

## 2024-07-15 NOTE — TELEPHONE ENCOUNTER
[facility] Appointment Request   Provider: Dr Puckett  Appointment Type: DXS  Reason for Visit: Labs A1C  Available Day and Time: afternoon-this is a reschedule from 7/16/24 appt.  Best Contact Number: [phone]    The practice will reach out to schedule your appointment within the next 2 business days.    Reason for call:  Other   Patient called regarding (reason for call):   Additional comments: pt requesting pain medication for her knee. Pt was offered 1st avail with Robert but wants to speak with nursing first.    Phone number to reach patient:  Home number on file [phone] (home)    Can we leave a detailed message on this number?  YES    Travel screening: Not Applicable         Kit DOLAN    [facility] [place]

## 2024-07-15 NOTE — ED TRIAGE NOTES
Pt to ER w c/o dizziness and L-sided otalgia that started around 1000. Denies visual changes. VSS, ABCs intact, A&Ox4

## 2024-07-15 NOTE — DISCHARGE INSTRUCTIONS
Discharge Instructions  Headache    You were seen today for a headache. Headaches may be caused by many different things such as muscle tension, sinus inflammation, anxiety and stress, having too little sleep, too much alcohol, some medical conditions or injury. You may have a migraine, which is caused by changes in the blood vessels in your head.  At this time your provider does not find that your headache is a sign of anything dangerous or life-threatening.  However, sometimes the signs of serious illness do not show up right away.      Generally, every Emergency Department visit should have a follow-up clinic visit with either a primary or a specialty clinic/provider. Please follow-up as instructed by your emergency provider today.    Return to the Emergency Department if:  You get a new fever of 100.4 F or higher.  Your headache gets much worse.  You get a stiff neck with your headache.  You get a new headache that is significantly different or worse than headaches you have had before.  You are vomiting (throwing up) and cannot keep food or water down.  You have blurry or double vision or other problems with your eyes.  You have a new weakness on one side of your body.  You have difficulty with balance which is new.  You or your family thinks you are confused.  You have a seizure.    What can I do to help myself?  Pain medications - You may take a pain medication such as Tylenol  (acetaminophen), Advil , Motrin  (ibuprofen) or Aleve  (naproxen).  Take a pain reliever as soon as you notice symptoms.  Starting medications as soon as you start to have symptoms may lessen the amount of pain you have.  Relaxing in a quiet, dark room may help.  Get enough sleep and eat meals regularly.  You may need to watch for certain foods or other things which may trigger your headaches.  Keeping a journal of your headaches and possible triggers may help you and your primary provider to identify things which you should avoid which  may be causing your headaches.  If you were given a prescription for medicine here today, be sure to read all of the information (including the package insert) that comes with your prescription.  This will include important information about the medicine, its side effects, and any warnings that you need to know about.  The pharmacist who fills the prescription can provide more information and answer questions you may have about the medicine.  If you have questions or concerns that the pharmacist cannot address, please call or return to the Emergency Department.   Remember that you can always come back to the Emergency Department if you are not able to see your regular provider in the amount of time listed above, if you get any new symptoms, or if there is anything that worries you.     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  It appears as though your anterior brain circulation has compensated.  It seems unlikely that this is the cause of your symptoms.  Stroke neurology did recommend a baby aspirin daily.  Please follow-up with your primary care provider for further guidance.

## 2024-07-15 NOTE — CONSULTS
[facility]    Stroke Telephone Note    I was called by Carol Joaquin on 07/15/24 regarding patient Coni Newberry. The patient is a 49 year old female with hx of Dm, HTN, HLD. She is coming with headache - holocephalic, this was followed by ear pain and vertiginous symptoms. No focal signs. 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  Headache has resolved now.     Start bASA  Continue Atorvastatin.  Return precautions: patient is to return immediately to the nearest ED for any stroke-like symptoms including weakness, change in vision, change in speech, numbness or tingling, difficulty walking or headache.   Follow up in stroke clinic.      Rajinder Mazariegos MD

## 2024-07-15 NOTE — ED PROVIDER NOTES
Emergency Department Note      History of Present Illness     Chief Complaint   Dizziness and Otalgia      HPI   Coni Newberry is a 49 year old female with a history of diabetes, hypertension and hyperlipidemia presents emergency department with left ear pain, headache and dizziness that started earlier today.  She notes she has been dizzy all day today, describing as a sensation of off-balance.  She notes that she has also had a diffuse mild headache and ear pain.  She notes she felt like there was cotton in her left ear although that sensation is cleared.  She denies any discharge.  She denies any ear trauma.  She denies head trauma.  She denies any nausea or vomiting.  No fever or chills.  She denies any vision changes.  She denies double vision.  She denies any new neck pain, noting that she has chronic back pain that is unchanged.  She denies any one-sided weakness, speech changes or confusion.  She notes she checked her blood pressure and her blood sugar at home and they were normal so she decided to come in for assessment.  She has not had symptoms like this before.  She notes she has had ear infections as a child and so thinks she might have an ear infection.    Independent Historian   None    Review of External Notes   I reviewed a clinic note from 4/22/2024.  Patient seen for menopausal symptoms at that time.    Past Medical History     Medical History and Problem List   Past Medical History:   Diagnosis Date    Diabetes (H)     Heart murmur     HTN (hypertension)     Hyperlipidaemia LDL goal <100     Mild persistent asthma 7/29/2014    Nummular eczema        Medications   albuterol (PROAIR HFA/PROVENTIL HFA/VENTOLIN HFA) 108 (90 Base) MCG/ACT inhaler  albuterol (PROVENTIL) (2.5 MG/3ML) 0.083% neb solution  atorvastatin (LIPITOR) 10 MG tablet  beclomethasone HFA (QVAR REDIHALER) 80 MCG/ACT inhaler  blood glucose (ONETOUCH ULTRA) test strip  blood glucose monitoring (ONE TOUCH ULTRA 2) meter  device kit  chlorthalidone (HYGROTON) 25 MG tablet  conjugated estrogens (PREMARIN) 0.625 MG/GM vaginal cream  cyclobenzaprine (FLEXERIL) 10 MG tablet  fexofenadine-pseudoePHEDrine (ALLEGRA-D ALLERGY & CONGESTION) 180-240 MG 24 hr tablet  Lancets Ultra Fine MISC  levonorgestrel-ethinyl estradiol (AVIANE) 0.1-20 MG-MCG tablet  lisinopril (ZESTRIL) 20 MG tablet  metFORMIN (GLUCOPHAGE) 500 MG tablet  montelukast (SINGULAIR) 10 MG tablet  nystatin (MYCOSTATIN) 310479 UNIT/GM external ointment  omeprazole (PRILOSEC) 20 MG DR capsule  order for DME  order for DME  semaglutide (OZEMPIC) 2 MG/3ML pen  Semaglutide (RYBELSUS) 14 MG tablet  Semaglutide, 1 MG/DOSE, (OZEMPIC, 1 MG/DOSE,) 4 MG/3ML pen  Semaglutide, 2 MG/DOSE, (OZEMPIC) 8 MG/3ML pen  triamcinolone (KENALOG) 0.1 % external ointment        Surgical History   Past Surgical History:   Procedure Laterality Date    CYSTECTOMY OVARIAN BENIGN Left 02/23/2016    Procedure: CYSTECTOMY OVARIAN BENIGN;  Surgeon: Marisela Lopez MD;  Location:  OR    CYSTOSCOPY N/A 01/03/2018    Procedure: CYSTOSCOPY;;  Surgeon: Lissy Maya DO;  Location: [facility] OR    DILATE CERVIX, HYSTEROSCOPY, ABLATE ENDOMETRIUM, COMBINED N/A 01/03/2018    Procedure: COMBINED DILATE CERVIX, HYSTEROSCOPY, ABLATE ENDOMETRIUM;  Hysteroscopy, cervical dilation, and cystoscopy;  Surgeon: Lissy Maya DO;  Location: [facility] OR - No curettage nor ablation done due to uterine perforation    NONE OF THE ABOVE CORE MEASURE DIAGNOSES      SALPINGO-OOPHORECTOMY, COMBINED Left 02/23/2016    Procedure: COMBINED SALPINGO-OOPHORECTOMY;  Surgeon: Marisela Lopez MD;  Location:  OR       Physical Exam     Patient Vitals for the past 24 hrs:   BP Temp Temp src Pulse Resp SpO2 Weight   07/15/24 0142 138/80 97.8  F (36.6  C) Temporal 90 18 99 % 118.7 kg (261 lb 11 oz)     Physical Exam  General: Large adult sitting upright  Eyes: PERRL, Conjunctive within normal limits.  EOMI.  No nystagmus.  ENT: TMs clear  bilaterally.  EACs clear bilaterally.  Moist mucous membranes, oropharynx clear.   Neck: No rigidity.  Nontender.  CV: Normal S1S2. Regular rate and rhythm  Resp: Normal respiratory effort.  MSK: No edema. Normal active range of motion.  Skin: Warm and dry. No rashes or lesions or ecchymoses on visible skin.  Neuro: Alert and oriented. Responds appropriately to all questions and commands. No focal findings appreciated. Normal muscle tone.  No facial asymmetry.  Speech is normal.    Psych: Normal mood and affect. Pleasant.     Diagnostics     Lab Results   Labs Ordered and Resulted from Time of ED Arrival to Time of ED Departure   BASIC METABOLIC PANEL - Abnormal       Result Value    Sodium 137      Potassium 3.2 (*)     Chloride 100      Carbon Dioxide (CO2) 23      Anion Gap 14      Urea Nitrogen 13.5      Creatinine 1.13 (*)     GFR Estimate 59 (*)     Calcium 8.9      Glucose 123 (*)    CBC WITH PLATELETS AND DIFFERENTIAL    WBC Count 9.9      RBC Count 4.27      Hemoglobin 12.0      Hematocrit 36.2      MCV 85      MCH 28.1      MCHC 33.1      RDW 12.8      Platelet Count 220      % Neutrophils 56      % Lymphocytes 30      % Monocytes 7      % Eosinophils 7      % Basophils 1      % Immature Granulocytes 0      NRBCs per 100 WBC 0      Absolute Neutrophils 5.6      Absolute Lymphocytes 2.9      Absolute Monocytes 0.6      Absolute Eosinophils 0.6      Absolute Basophils 0.1      Absolute Immature Granulocytes 0.0      Absolute NRBCs 0.0         Imaging   CTA Head Neck with Contrast   Final Result   IMPRESSION:    HEAD CT:   1.  No CT finding of a mass, hemorrhage or focal area suggestive of acute infarct.      HEAD CTA:    1.  Severe stenosis midportion basilar artery with a small basilar vertebral system due to bilateral fetal origins of the posterior cerebral arteries.   2.  No other discrete aneurysm, high flow vascular malformation, or stenosis visualized involving the arteries of the [place].       NECK CTA:   1.  Normal configuration of the great vessels off the aortic arch with no significant stenosis of their origins.   2.  No significant stenosis or irregularity involving the arteries of the neck by NASCET criteria.   3.  No radiographic evidence of dissection.      CT Head w/o Contrast   Final Result   IMPRESSION:    HEAD CT:   1.  No CT finding of a mass, hemorrhage or focal area suggestive of acute infarct.      HEAD CTA:    1.  Severe stenosis midportion basilar artery with a small basilar vertebral system due to bilateral fetal origins of the posterior cerebral arteries.   2.  No other discrete aneurysm, high flow vascular malformation, or stenosis visualized involving the arteries of the [place].      NECK CTA:   1.  Normal configuration of the great vessels off the aortic arch with no significant stenosis of their origins.   2.  No significant stenosis or irregularity involving the arteries of the neck by NASCET criteria.   3.  No radiographic evidence of dissection.          EKG     ECG results from 07/15/24   EKG 12 lead     Value    Systolic Blood Pressure     Diastolic Blood Pressure     Ventricular Rate 83    Atrial Rate 83    WI Interval 190    QRS Duration 84        QTc 451    P Axis 54    R AXIS -14    T Axis 85    Interpretation ECG      Sinus rhythm  Anterior infarct (cited on or before 21-AUG-2014)  Abnormal ECG  When compared with ECG of 03-JAN-2018 07:43,  Questionable change in initial forces of Anterior leads       Independent Interpretation   None    ED Course      Medications Administered   Medications   sodium chloride 0.9% BOLUS 1,000 mL (has no administration in time range)   meclizine (ANTIVERT) tablet 25 mg (has no administration in time range)     Discussion of Management   Stroke neurology consult Dr. Mazariegos who after review of imaging and discussion of symptoms recommended baby aspirin and discharge home. No current indication for MRI.    ED Course   Past  medical records, nursing notes, and vitals reviewed.  I performed an exam of the patient and obtained history, as documented above.   I reassessed the patient. She is feeling improved. She denies any new concerns     Optional/Additional Documentation  None    Medical Decision Making / Diagnosis       MDM   Coni Newberry is a 49 year old female who presents for evaluation of vertigo. The differential diagnosis of vertigo is broad and includes common etiologies such as menieres disease, labyrinthitis, benign positional vertigo, otitis media, etc.  More serious etiologies considered include central etiologies such as tumor, intracerebral bleed, dissection, ischemic cerebral vascular accident.  Due to her headache and ear pain in the setting of vertigo, brain and vessel imaging was obtained given concerns for mass or vascular aneurysm.  Basilar artery stenosis was noted.  I discussed this finding with stroke neurology who did not think this was likely the cause of her symptoms given the other findings on imaging.  Aspirin was recommended per stroke neurology.  Overall, given the reassuring imaging, the history, physical exam including detailed neurologic exam, and workup in the emergency room suggests a benign cause of vertigo today.  Patient feels improved after interventions noted above. Further outpatient management is indicated with vertigo medications.       Vertigo precautions given for home.       Disposition   The patient was discharged.     Diagnosis     ICD-10-CM    1. Dizziness  R42       2. Otalgia, left  H92.02       3. Acute nonintractable headache, unspecified headache type  R51.9       4. Basilar artery stenosis  I65.1            Discharge Medications   Discharge Medication List as of 7/15/2024  5:45 AM        START taking these medications    Details   aspirin 81 MG EC tablet Take 1 tablet (81 mg) by mouth daily, Disp-180 tablet, R-0, E-Prescribe      meclizine (ANTIVERT) 25 MG tablet Take 1  tablet (25 mg) by mouth every 6 hours as needed for dizziness, Disp-30 tablet, R-1, E-Prescribe               MD Emani Escalante Tracy Dianne, MD  07/17/24 4362

## 2024-07-16 ENCOUNTER — PATIENT OUTREACH (OUTPATIENT)
Dept: INTERNAL MEDICINE | Facility: CLINIC | Age: 49
End: 2024-07-16
Payer: COMMERCIAL

## 2024-07-16 NOTE — TELEPHONE ENCOUNTER
Transitions of Care Outreach  Chief Complaint   Patient presents with    Hospital F/U       Most Recent Admission Date: 7/15/2024   Most Recent Admission Diagnosis:      Most Recent Discharge Date: 7/15/2024   Most Recent Discharge Diagnosis: Dizziness - R42  Otalgia, left - H92.02  Acute nonintractable headache, unspecified headache type - R51.9  Basilar artery stenosis - I65.1     Transitions of Care Assessment    Discharge Assessment  How are you doing now that you are home?: Still a little dizzy,  How are your symptoms? (Red Flag symptoms escalate to triage hotline per guidelines): Improved  Do you know how to contact your clinic care team if you have future questions or changes to your health status? : Yes  Does the patient have their discharge instructions? : Yes  Does the patient have questions regarding their discharge instructions? : No  Were you started on any new medications or were there changes to any of your previous medications? : Yes (Start aspirin and meclizine)  Does the patient have all of their medications?: Yes  Do you have questions regarding any of your medications? : No  Do you have all of your needed medical supplies or equipment (DME)?  (i.e. oxygen tank, CPAP, cane, etc.): Yes    Follow up Plan     Discharge Follow-Up  Discharge follow up appointment scheduled in alignment with recommended follow up timeframe or Transitions of Risk Category? (Low = within 30 days; Moderate= within 14 days; High= within 7 days): Yes  Discharge Follow Up Appointment Date: 07/18/24  Discharge Follow Up Appointment Scheduled with?: Primary Care Provider    Future Appointments   Date Time Provider Department Center   7/18/2024  5:30 PM Farida Palencia APRN CNP [facility]       Outpatient Plan as outlined on AVS reviewed with patient.    For any urgent concerns, please contact our 24 hour nurse triage line: [phone] ([phone])       Luci Ralph RN    
Hipolito Powers(Attending)

## 2024-07-18 ENCOUNTER — VIRTUAL VISIT (OUTPATIENT)
Dept: INTERNAL MEDICINE | Facility: CLINIC | Age: 49
End: 2024-07-18
Payer: COMMERCIAL

## 2024-07-18 DIAGNOSIS — E87.6 LOW BLOOD POTASSIUM: ICD-10-CM

## 2024-07-18 DIAGNOSIS — E11.9 TYPE 2 DIABETES MELLITUS TREATED WITHOUT INSULIN (H): Primary | ICD-10-CM

## 2024-07-18 DIAGNOSIS — H81.13 BENIGN PAROXYSMAL POSITIONAL VERTIGO DUE TO BILATERAL VESTIBULAR DISORDER: ICD-10-CM

## 2024-07-18 DIAGNOSIS — N18.31 CHRONIC KIDNEY DISEASE, STAGE 3A (H): ICD-10-CM

## 2024-07-18 PROCEDURE — 99214 OFFICE O/P EST MOD 30 MIN: CPT | Mod: 95 | Performed by: NURSE PRACTITIONER

## 2024-07-18 RX ORDER — MECLIZINE HYDROCHLORIDE 25 MG/1
25 TABLET ORAL EVERY 6 HOURS PRN
Qty: 90 TABLET | Refills: 1 | Status: SHIPPED | OUTPATIENT
Start: 2024-07-18

## 2024-07-18 NOTE — PROGRESS NOTES
Coni Brar is a 49 year old who is being evaluated via a billable video visit.    How would you like to obtain your AVS? MyChart  If the video visit is dropped, the invitation should be resent by: Text to cell phone: [phone]  Will anyone else be joining your video visit? No      Assessment & Plan     Type 2 diabetes mellitus treated without insulin (H)  She started doing the 2 mg of the Ozempic and she is doing okay on that  Her potassium was a little bit low when that she was in the emergency room so we will recheck that and we do we will also do an A1c  She feels well on her current medicines  - Basic metabolic panel  (Ca, Cl, CO2, Creat, Gluc, K, Na, BUN); Future  - Hemoglobin A1c; Future    Benign paroxysmal positional vertigo due to bilateral vestibular disorder  She was in the ER with dizziness.  She woke up Sunday after a nap and the room was spinning.  It did not go away by later in the day so that she went to the emergency room.  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  It was reviewed by neurology and no additional interventions were recommended  .  Lab was okay except for potassium of 3.2  She would like to repeat the lab and an order was placed for that  Discussed meclizine which does help her feel better when she has a dizziness.  Did refill Rx for that  It is getting better over time, but still present.  I did put an order for vestibular rehab and if it persists.  I told her to make the appointment for the vestibular rehab and if not needed she could cancel it  - Physical Therapy  Referral; Future  - meclizine (ANTIVERT) 25 MG tablet; Take 1 tablet (25 mg) by mouth every 6 hours as needed for dizziness    Low blood potassium  Her potassium was low in the emergency room.  We will recheck it.  If she is persistently low we will consider decreasing her chlorthalidone or possibly adding a potassium supplement.  I  am thinking it is probably a transient level and will improve without any intervention  - Basic metabolic panel  (Ca, Cl, CO2, Creat, Gluc, K, Na, BUN); Future    Chronic kidney disease, stage 3a (H)  Will check with labs  - Basic metabolic panel  (Ca, Cl, CO2, Creat, Gluc, K, Na, BUN); Future        MED REC REQUIRED  Post Medication Reconciliation Status:  Discharge medications reconciled, continue medications without change    Patient Instructions   Vestibular rehab order     Meclizine if needed for dizziness     Lab for recheck potassium      Subjective   Coni Brar is a 49 year old, presenting for the following health issues:  ED follow up        7/18/2024     2:31 PM   Additional Questions   Roomed by Sahra CALDERON     hospitals     ED/UC Followup:    Facility:  [facility]  Date of visit: 7-15-24  Reason for visit: dizziness,otalgia  Current Status: pt states the dizziness is better but feels like motion sickness and head feeling fuzzy. Potassium was also low.        Review of Systems  Constitutional, neuro, ENT, endocrine, pulmonary, cardiac, gastrointestinal, genitourinary, musculoskeletal, integument and psychiatric systems are negative, except as otherwise noted.      Objective           Vitals:  No vitals were obtained today due to virtual visit.    Physical Exam   GENERAL: alert and no distress  EYES: Eyes grossly normal to inspection.  No discharge or erythema, or obvious scleral/conjunctival abnormalities.  RESP: No audible wheeze, cough, or visible cyanosis.    SKIN: Visible skin clear. No significant rash, abnormal pigmentation or lesions.  NEURO: Cranial nerves grossly intact.  Mentation and speech appropriate for age.  PSYCH: Appropriate affect, tone, and pace of words    Future lab      Video-Visit Details    Type of service:  Video Visit   Originating Location (pt. Location): Home    Distant Location (provider location):  On-site  Platform used for Video Visit: Terri  Signed Electronically by: Farida Valle  DANYELL Palencia CNP

## 2024-07-18 NOTE — NURSING NOTE
"Chief Complaint   Patient presents with    ER F/U     initial LMP  (LMP Unknown)  Estimated body mass index is 44.92 kg/m  as calculated from the following:    Height as of 4/22/24: 1.626 m (5' 4\").    Weight as of 7/15/24: 118.7 kg (261 lb 11 oz)..  bp completed using cuff size NA (Not Taken)  CIERA BAUTISTA LPN  "

## 2024-07-20 ENCOUNTER — E-VISIT (OUTPATIENT)
Dept: URGENT CARE | Facility: CLINIC | Age: 49
End: 2024-07-20
Payer: COMMERCIAL

## 2024-07-20 ENCOUNTER — TELEPHONE (OUTPATIENT)
Dept: URGENT CARE | Facility: URGENT CARE | Age: 49
End: 2024-07-20

## 2024-07-20 ENCOUNTER — LAB (OUTPATIENT)
Dept: LAB | Facility: CLINIC | Age: 49
End: 2024-07-20
Payer: COMMERCIAL

## 2024-07-20 DIAGNOSIS — R30.0 DYSURIA: ICD-10-CM

## 2024-07-20 DIAGNOSIS — N30.00 ACUTE CYSTITIS WITHOUT HEMATURIA: Primary | ICD-10-CM

## 2024-07-20 DIAGNOSIS — E87.6 LOW BLOOD POTASSIUM: ICD-10-CM

## 2024-07-20 DIAGNOSIS — E11.9 TYPE 2 DIABETES MELLITUS TREATED WITHOUT INSULIN (H): ICD-10-CM

## 2024-07-20 DIAGNOSIS — N18.31 CHRONIC KIDNEY DISEASE, STAGE 3A (H): Primary | ICD-10-CM

## 2024-07-20 DIAGNOSIS — R30.0 DYSURIA: Primary | ICD-10-CM

## 2024-07-20 LAB
ALBUMIN UR-MCNC: NEGATIVE MG/DL
APPEARANCE UR: CLEAR
BACTERIA #/AREA URNS HPF: ABNORMAL /HPF
BILIRUB UR QL STRIP: NEGATIVE
CLUE CELLS: ABNORMAL
COLOR UR AUTO: YELLOW
GLUCOSE UR STRIP-MCNC: NEGATIVE MG/DL
HBA1C MFR BLD: 5.8 % (ref 0–5.6)
HGB UR QL STRIP: ABNORMAL
KETONES UR STRIP-MCNC: ABNORMAL MG/DL
LEUKOCYTE ESTERASE UR QL STRIP: ABNORMAL
NITRATE UR QL: NEGATIVE
PH UR STRIP: 7 [PH] (ref 5–7)
RBC #/AREA URNS AUTO: ABNORMAL /HPF
SP GR UR STRIP: 1.01 (ref 1–1.03)
SQUAMOUS #/AREA URNS AUTO: ABNORMAL /LPF
TRICHOMONAS, WET PREP: ABNORMAL
UROBILINOGEN UR STRIP-ACNC: 0.2 E.U./DL
WBC #/AREA URNS AUTO: ABNORMAL /HPF
WBC'S/HIGH POWER FIELD, WET PREP: ABNORMAL
YEAST, WET PREP: ABNORMAL

## 2024-07-20 PROCEDURE — 83036 HEMOGLOBIN GLYCOSYLATED A1C: CPT | Performed by: NURSE PRACTITIONER

## 2024-07-20 PROCEDURE — 87086 URINE CULTURE/COLONY COUNT: CPT

## 2024-07-20 PROCEDURE — 82570 ASSAY OF URINE CREATININE: CPT | Performed by: NURSE PRACTITIONER

## 2024-07-20 PROCEDURE — 36415 COLL VENOUS BLD VENIPUNCTURE: CPT | Performed by: NURSE PRACTITIONER

## 2024-07-20 PROCEDURE — 82043 UR ALBUMIN QUANTITATIVE: CPT | Performed by: NURSE PRACTITIONER

## 2024-07-20 PROCEDURE — 99421 OL DIG E/M SVC 5-10 MIN: CPT | Performed by: PREVENTIVE MEDICINE

## 2024-07-20 PROCEDURE — 80048 BASIC METABOLIC PNL TOTAL CA: CPT | Performed by: NURSE PRACTITIONER

## 2024-07-20 PROCEDURE — 81001 URINALYSIS AUTO W/SCOPE: CPT

## 2024-07-20 PROCEDURE — 87491 CHLMYD TRACH DNA AMP PROBE: CPT

## 2024-07-20 PROCEDURE — 87210 SMEAR WET MOUNT SALINE/INK: CPT

## 2024-07-20 PROCEDURE — 87591 N.GONORRHOEAE DNA AMP PROB: CPT

## 2024-07-20 RX ORDER — NITROFURANTOIN 25; 75 MG/1; MG/1
100 CAPSULE ORAL 2 TIMES DAILY
Qty: 14 CAPSULE | Refills: 0 | Status: SHIPPED | OUTPATIENT
Start: 2024-07-20 | End: 2024-07-27

## 2024-07-20 NOTE — PATIENT INSTRUCTIONS
Dear Coni Newberry,     After reviewing your responses, I would like you to come in for a urine test to make sure we treat you correctly. This urine test is to evaluate you for a possible urinary tract infection, and should be scheduled for today or tomorrow. Schedule a Lab Only appointment here.     Lab appointments are not available at most locations on the weekends. If no Lab Only appointment is available, you should be seen in any of our convenient Walk-in or Urgent Care Centers, which can be found on our website here.     You will receive instructions with your results in [employer] once they are available.     If your symptoms worsen, you develop pain in your back or stomach, develop fevers, or are not improving in 5 days, please contact your primary care provider for an appointment or visit a Walk-in or Urgent Care Center to be seen.     Thanks again for choosing us as your health care partner,     Adriel Rivera MD, MD

## 2024-07-21 LAB
ANION GAP SERPL CALCULATED.3IONS-SCNC: 17 MMOL/L (ref 7–15)
BUN SERPL-MCNC: 15.4 MG/DL (ref 6–20)
CALCIUM SERPL-MCNC: 9.3 MG/DL (ref 8.8–10.4)
CHLORIDE SERPL-SCNC: 100 MMOL/L (ref 98–107)
CREAT SERPL-MCNC: 1 MG/DL (ref 0.51–0.95)
CREAT UR-MCNC: 211 MG/DL
EGFRCR SERPLBLD CKD-EPI 2021: 69 ML/MIN/1.73M2
GLUCOSE SERPL-MCNC: 127 MG/DL (ref 70–99)
HCO3 SERPL-SCNC: 22 MMOL/L (ref 22–29)
MICROALBUMIN UR-MCNC: <12 MG/L
MICROALBUMIN/CREAT UR: NORMAL MG/G{CREAT}
POTASSIUM SERPL-SCNC: 3.8 MMOL/L (ref 3.4–5.3)
SODIUM SERPL-SCNC: 139 MMOL/L (ref 135–145)

## 2024-07-22 LAB
BACTERIA UR CULT: NORMAL
C TRACH DNA SPEC QL NAA+PROBE: NEGATIVE
N GONORRHOEA DNA SPEC QL NAA+PROBE: NEGATIVE

## 2024-07-26 ENCOUNTER — PATIENT OUTREACH (OUTPATIENT)
Dept: CARE COORDINATION | Facility: CLINIC | Age: 49
End: 2024-07-26
Payer: COMMERCIAL

## 2024-08-01 ENCOUNTER — TRANSFERRED RECORDS (OUTPATIENT)
Dept: HEALTH INFORMATION MANAGEMENT | Facility: CLINIC | Age: 49
End: 2024-08-01
Payer: COMMERCIAL

## 2024-08-09 ENCOUNTER — THERAPY VISIT (OUTPATIENT)
Dept: PHYSICAL THERAPY | Facility: CLINIC | Age: 49
End: 2024-08-09
Attending: NURSE PRACTITIONER
Payer: COMMERCIAL

## 2024-08-09 ENCOUNTER — PATIENT OUTREACH (OUTPATIENT)
Dept: CARE COORDINATION | Facility: CLINIC | Age: 49
End: 2024-08-09
Payer: COMMERCIAL

## 2024-08-09 DIAGNOSIS — H81.13 BENIGN PAROXYSMAL POSITIONAL VERTIGO DUE TO BILATERAL VESTIBULAR DISORDER: ICD-10-CM

## 2024-08-09 PROCEDURE — 97161 PT EVAL LOW COMPLEX 20 MIN: CPT | Mod: GP | Performed by: PHYSICAL THERAPIST

## 2024-08-09 PROCEDURE — 95992 CANALITH REPOSITIONING PROC: CPT | Mod: GP | Performed by: PHYSICAL THERAPIST

## 2024-08-09 NOTE — PROGRESS NOTES
PHYSICAL THERAPY EVALUATION  Type of Visit: Evaluation    Orders:   Benign paroxysmal positional vertigo due to bilateral vestibular disorder [H81.13]  07/18/24  Farida Palencia APRN CNP    Chart Review:  Onset on sxs 7/14/24.   ED visit 7/15/24.   Follow-up with PCP 7/18 and referred to PT .  She woke up Sunday after a nap and the room was spinning.  It did not go away by later in the day so that she went to the emergency room.  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  It was reviewed by neurology and no additional interventions were recommended.  Lab was okay except for potassium of 3.2 She would like to repeat the lab and an order was placed for that. Discussed meclizine which does help her feel better when she has a dizziness.  Did refill Rx for that.       Fall Risk Screen:  Fall screen completed by: PT  Have you fallen 2 or more times in the past year?: Yes  Have you fallen and had an injury in the past year?: No  Timed Up and Go score (seconds): see fall screen comments below  Is patient a fall risk?: Yes; Department fall risk interventions implemented    Subjective     VESTIBULAR EVALUATION  Pt written:  Description of symptoms: Constant dizziness; Off balance; Light-headedness  Dizzy attacks:   Start: Last sunday   Last attack: This afternoon   Frequency of occurrences: Like every 6 hours so have to keep taking the pill doctor gave me   Length of attack: It hasnt really gone away its just gets smaller in intensity  Difficulty hearing: Both ears  Noise in ears? No    Alleviates symptoms: Taking the pills trying to rest  Worsens symptoms: Moving to fast  Activities that bring on symptoms: Other  Noticed it when i got out of bed last sunday morning thought i got up too fast but then the feeling never went away and had to go to er after feeling like that for over 12 hours    Pt verbal:  Reports a hit to her head before onset  of dizziness.  PMH: HTN, DM2. Reports hx of dizziness lasting 2 weeks.  Describes dizziness as vertigo that lasted 12 hrs when she went to ED last weekend.   Now sxs come and got when getting out of bed . Spinning lases a few seconds at a time.  Last felt vertigo last weekend.  Took Meclizine lastnight to help a sxs of 'pressure in head'  Denies hx of HA/migraines.  Balance: Getting better, initially was falling.   Aural fullness: reports 'popping' in ears R and L sides - happens daily for years and cleans ears with warm water in shower.         Presenting condition or subjective complaint: Dizziness.  Date of onset: 07/14/24    Relevant medical history: Diabetes; Dizziness; High blood pressure; Overweight   Dates & types of surgery: Only had left ovary removed a couple of years ago    Prior diagnostic imaging/testing results: Other Cat scan at ER last monday morning   Prior therapy history for the same diagnosis, illness or injury: No      Living Environment  Social support: With a significant other or spouse   Type of home: [facility]; 2-story   Stairs to enter the home: Yes 10 Is there a railing: Yes     Ramp: No   Stairs inside the home: No       Help at home: None  Equipment owned: [employer] Cane     Employment: Yes Call center  Hobbies/Interests: Reading goung to movies and concerts with friends    Patient goals for therapy: 'Not to have to take anit dizzy pulls all the time . Not to fill like my head is all cotton ball like not to have ears poping a lot'       Objective      GAIT:   Over short distances indoors without AD: maintains reciprocal gait and good speed without path deviations or instability.             Infrared Goggle Exam Vestibular Suppressant in Last 24 Hours? Yes  Exam Completed With: Infrared goggles   Spontaneous Nystagmus Negative   Gaze Evoked Nystagmus Negative   Head Shake Horizontal Nystagmus Negative   Positional Testing    Supine Head-Hanging Test     Left Right   Thornton-Hallpike Upbeating  L torsional subtle short duration, associated veritgo Negative   Sidelying Test     [facility] Supine Roll Test     [facility] Forward Roll Test     [place] and [place] Test -  Sitting Erect    [place] and [place] Test - Seated, Head Bent 60 Degrees Forward    [place] and Lean Test - Seated, Head Bent Backwards       BPPV Canal(s): L Posterior  BPPV Type: Canalithasis    Assessment & Plan   CLINICAL IMPRESSIONS  Medical Diagnosis: Benign paroxysmal positional vertigo due to bilateral vestibular disorder (H81.13)    Treatment Diagnosis: (+) s/s BPPV   Impression/Assessment: Patient is a 49 year old female reporting positional vertigo.  The following significant findings have been identified: Dizziness. These impairments interfere with their ability to perform self care tasks, recreational activities, household mobility, and community mobility as compared to previous level of function.     Clinical Decision Making (Complexity):  Clinical Presentation: Stable/Uncomplicated  Clinical Presentation Rationale: based on medical and personal factors listed in PT evaluation  Clinical Decision Making (Complexity): Low complexity    PLAN OF CARE  Treatment Interventions:  Interventions: Canalith Repositioning    Long Term Goals     PT Goal 1  Goal Identifier: BPPV - Positional Changes  Goal Description: Pt will demonstrate (-) s/s of BPPV throughout positional testing of all canals without limitations in bed mobility and transfers d/t dizziness  Rationale: to maximize safety and independence with performance of ADLs and functional tasks;to maximize safety and independence within the home;to maximize safety and independence with self cares  Target Date: 09/25/24      Frequency of Treatment: 1x/week  Duration of Treatment: 6 weeks    Education Assessment:   Learner/Method: Patient;Demonstration;Pictures/Video;Reading;Listening  Education Comments: Results of assessment and PT POC; ANPT handout on BPPV and education provided. ANATOMY and PHYSIOLOGY of  vestibular system referencing poster of inner ear. DEFINITION of BPPV: Mechanical problem in the inner ear with free floating otoconia that can cause vertigo with position changes. PREVALENCE: most common cause of dizziness associated with change in head position. ASSOCIATIONS: age, falls, OP, DM2, HTN, seasonally, post surgical when intubated, Meniere s disease, Migraines, Neuronitis/Labyrinthitis. HEREDITARY: Blood relatives are 5x more likely to develop BPPV. PROVOKING POSITIONS. TREATMENT: positional maneuvers. IMPORTANCE OF TREATMENT: inc fall risk. COMMON POST TREATMENT SYMPTOMS: residual dizziness and postural instability. POST TREATMENT INSTRUCTIONS:  Do not look up/down for a few hours   Don't lay down until bed tonight.  Sleeping recommendations: Sleep with head supported with extra pillow as preferred. TYPICAL RECOVERY TIME: 3-5 sessions. RESULTS OF TREATMENT: it can be fixed. RECURRENCE RATE: 15% recurrence rate/year and age can increase this. 50% within 5 years. Only 24% recurrence in the same canal. Nothing prevents recurrence of BPPV. Importance of not limiting movement - moving head and body to maintain a healthy vestibular system.    Risks and benefits of evaluation/treatment have been explained.   Patient/Family/caregiver agrees with Plan of Care.     Evaluation Time:     PT Eval, Low Complexity Minutes (13279): 30       Signing Clinician: Leslie Cortez PT

## 2024-08-13 DIAGNOSIS — R10.13 ABDOMINAL PAIN, EPIGASTRIC: ICD-10-CM

## 2024-08-14 ENCOUNTER — LAB (OUTPATIENT)
Dept: LAB | Facility: CLINIC | Age: 49
End: 2024-08-14
Payer: COMMERCIAL

## 2024-08-14 ENCOUNTER — E-VISIT (OUTPATIENT)
Dept: URGENT CARE | Facility: CLINIC | Age: 49
End: 2024-08-14
Payer: COMMERCIAL

## 2024-08-14 DIAGNOSIS — N30.01 ACUTE CYSTITIS WITH HEMATURIA: Primary | ICD-10-CM

## 2024-08-14 DIAGNOSIS — R30.0 DIFFICULT OR PAINFUL URINATION: ICD-10-CM

## 2024-08-14 LAB
ALBUMIN UR-MCNC: 30 MG/DL
APPEARANCE UR: ABNORMAL
BACTERIA #/AREA URNS HPF: ABNORMAL /HPF
BILIRUB UR QL STRIP: NEGATIVE
COLOR UR AUTO: YELLOW
GLUCOSE UR STRIP-MCNC: NEGATIVE MG/DL
HGB UR QL STRIP: ABNORMAL
KETONES UR STRIP-MCNC: NEGATIVE MG/DL
LEUKOCYTE ESTERASE UR QL STRIP: ABNORMAL
NITRATE UR QL: NEGATIVE
PH UR STRIP: 6.5 [PH] (ref 5–7)
RBC #/AREA URNS AUTO: ABNORMAL /HPF
SP GR UR STRIP: 1.02 (ref 1–1.03)
SQUAMOUS #/AREA URNS AUTO: ABNORMAL /LPF
UROBILINOGEN UR STRIP-ACNC: 0.2 E.U./DL
WBC #/AREA URNS AUTO: ABNORMAL /HPF
WBC CLUMPS #/AREA URNS HPF: PRESENT /HPF

## 2024-08-14 PROCEDURE — 99421 OL DIG E/M SVC 5-10 MIN: CPT | Performed by: NURSE PRACTITIONER

## 2024-08-14 PROCEDURE — 81001 URINALYSIS AUTO W/SCOPE: CPT

## 2024-08-14 PROCEDURE — 87086 URINE CULTURE/COLONY COUNT: CPT

## 2024-08-14 RX ORDER — SULFAMETHOXAZOLE/TRIMETHOPRIM 800-160 MG
1 TABLET ORAL 2 TIMES DAILY
Qty: 6 TABLET | Refills: 0 | Status: SHIPPED | OUTPATIENT
Start: 2024-08-14 | End: 2024-08-17

## 2024-08-14 NOTE — PATIENT INSTRUCTIONS
Dear Coni Newberry,     After reviewing your responses, I would like you to come in for a urine test to make sure we treat you correctly. This urine test is to evaluate you for a possible urinary tract infection, and should be scheduled for today or tomorrow. Schedule a Lab Only appointment here.     Lab appointments are not available at most locations on the weekends. If no Lab Only appointment is available, you should be seen in any of our convenient Walk-in or Urgent Care Centers, which can be found on our website here.     You will receive instructions with your results in [place] once they are available.     If your symptoms worsen, you develop pain in your back or stomach, develop fevers, or are not improving in 5 days, please contact your primary care provider for an appointment or visit a Walk-in or Urgent Care Center to be seen.     Thanks again for choosing us as your health care partner,     Monique Goodman, CNP

## 2024-08-16 LAB — BACTERIA UR CULT: NORMAL

## 2024-08-26 ENCOUNTER — THERAPY VISIT (OUTPATIENT)
Dept: PHYSICAL THERAPY | Facility: CLINIC | Age: 49
End: 2024-08-26
Attending: NURSE PRACTITIONER
Payer: COMMERCIAL

## 2024-08-26 DIAGNOSIS — H81.13 BENIGN PAROXYSMAL POSITIONAL VERTIGO DUE TO BILATERAL VESTIBULAR DISORDER: Primary | ICD-10-CM

## 2024-08-26 PROCEDURE — 95992 CANALITH REPOSITIONING PROC: CPT | Mod: GP | Performed by: PHYSICAL THERAPIST

## 2024-08-30 DIAGNOSIS — J45.30 MILD PERSISTENT ASTHMA WITHOUT COMPLICATION: ICD-10-CM

## 2024-08-30 DIAGNOSIS — B37.2 YEAST INFECTION OF THE SKIN: ICD-10-CM

## 2024-08-30 RX ORDER — ALBUTEROL SULFATE 90 UG/1
2 AEROSOL, METERED RESPIRATORY (INHALATION) EVERY 6 HOURS
Qty: 27 G | Refills: 0 | Status: SHIPPED | OUTPATIENT
Start: 2024-08-30

## 2024-08-30 RX ORDER — ALBUTEROL SULFATE 0.83 MG/ML
SOLUTION RESPIRATORY (INHALATION)
Qty: 150 ML | Refills: 0 | Status: SHIPPED | OUTPATIENT
Start: 2024-08-30

## 2024-09-04 RX ORDER — NYSTATIN 100000 U/G
OINTMENT TOPICAL
Qty: 30 G | Refills: 2 | Status: SHIPPED | OUTPATIENT
Start: 2024-09-04

## 2024-09-09 ENCOUNTER — THERAPY VISIT (OUTPATIENT)
Dept: PHYSICAL THERAPY | Facility: CLINIC | Age: 49
End: 2024-09-09
Attending: NURSE PRACTITIONER
Payer: COMMERCIAL

## 2024-09-09 DIAGNOSIS — H81.13 BENIGN PAROXYSMAL POSITIONAL VERTIGO DUE TO BILATERAL VESTIBULAR DISORDER: Primary | ICD-10-CM

## 2024-09-09 PROCEDURE — 95992 CANALITH REPOSITIONING PROC: CPT | Mod: GP | Performed by: PHYSICAL THERAPIST

## 2024-09-23 DIAGNOSIS — I10 ESSENTIAL HYPERTENSION WITH GOAL BLOOD PRESSURE LESS THAN 140/90: ICD-10-CM

## 2024-09-23 DIAGNOSIS — E78.5 HYPERLIPIDEMIA WITH TARGET LDL LESS THAN 70: ICD-10-CM

## 2024-09-23 RX ORDER — LISINOPRIL 20 MG/1
20 TABLET ORAL DAILY
Qty: 90 TABLET | Refills: 0 | Status: SHIPPED | OUTPATIENT
Start: 2024-09-23

## 2024-09-23 RX ORDER — ATORVASTATIN CALCIUM 10 MG/1
10 TABLET, FILM COATED ORAL DAILY
Qty: 90 TABLET | Refills: 0 | Status: SHIPPED | OUTPATIENT
Start: 2024-09-23

## 2024-10-01 DIAGNOSIS — I10 ESSENTIAL HYPERTENSION WITH GOAL BLOOD PRESSURE LESS THAN 140/90: ICD-10-CM

## 2024-10-01 RX ORDER — CHLORTHALIDONE 25 MG/1
25 TABLET ORAL DAILY
Qty: 90 TABLET | Refills: 2 | Status: SHIPPED | OUTPATIENT
Start: 2024-10-01

## 2024-10-08 DIAGNOSIS — E11.9 TYPE 2 DIABETES MELLITUS WITHOUT COMPLICATION, WITHOUT LONG-TERM CURRENT USE OF INSULIN (H): ICD-10-CM

## 2024-10-08 DIAGNOSIS — J45.30 MILD PERSISTENT ASTHMA WITHOUT COMPLICATION: Primary | ICD-10-CM

## 2024-10-08 RX ORDER — MONTELUKAST SODIUM 10 MG/1
1 TABLET ORAL AT BEDTIME
Qty: 90 TABLET | Refills: 3 | Status: SHIPPED | OUTPATIENT
Start: 2024-10-08

## 2024-11-13 DIAGNOSIS — L30.0 NUMMULAR ECZEMA: ICD-10-CM

## 2024-11-14 RX ORDER — TRIAMCINOLONE ACETONIDE 1 MG/G
OINTMENT TOPICAL
Qty: 80 G | Refills: 1 | Status: SHIPPED | OUTPATIENT
Start: 2024-11-14

## 2024-11-14 RX ORDER — ASPIRIN 81 MG/1
81 TABLET ORAL DAILY
Qty: 180 TABLET | Refills: 1 | Status: SHIPPED | OUTPATIENT
Start: 2024-11-14

## 2024-11-15 DIAGNOSIS — J30.9 ALLERGIC RHINITIS, UNSPECIFIED SEASONALITY, UNSPECIFIED TRIGGER: ICD-10-CM

## 2024-11-15 RX ORDER — FEXOFENADINE HYDROCHLORIDE AND PSEUDOEPHEDRINE HYDROCHLORIDE 180; 240 MG/1; MG/1
1 TABLET, FILM COATED, EXTENDED RELEASE ORAL DAILY
Qty: 90 TABLET | Refills: 3 | Status: SHIPPED | OUTPATIENT
Start: 2024-11-15

## 2024-11-18 ENCOUNTER — TELEPHONE (OUTPATIENT)
Dept: INTERNAL MEDICINE | Facility: CLINIC | Age: 49
End: 2024-11-18
Payer: COMMERCIAL

## 2024-11-18 DIAGNOSIS — J45.30 MILD PERSISTENT ASTHMA WITHOUT COMPLICATION: Primary | ICD-10-CM

## 2024-11-18 NOTE — TELEPHONE ENCOUNTER
Last DME order dated 2/20/23 was for [employer] medical equipment.  [place] location [phone].    DME order pended.    Please advise, thanks.

## 2024-11-18 NOTE — TELEPHONE ENCOUNTER
Order/Referral Request    Who is requesting: Patient     Orders being requested: needs a new nebluizer    Reason service is needed/diagnosis: ASTHMA    When are orders needed by: ASAP    Has this been discussed with Provider: N/A    Does patient have a preference on a Group/Provider/Facility? No    Does patient have an appointment scheduled?: No    Where to send orders:  Patient [facility] and does not know the name    Could we send this information to you in [facility] or would you prefer to receive a phone call?:   No preference   Okay to leave a detailed message?: Yes at Home number on file [phone] (home)

## 2024-11-24 ENCOUNTER — HEALTH MAINTENANCE LETTER (OUTPATIENT)
Age: 49
End: 2024-11-24

## 2024-12-18 ENCOUNTER — PATIENT OUTREACH (OUTPATIENT)
Dept: INTERNAL MEDICINE | Facility: CLINIC | Age: 49
End: 2024-12-18
Payer: COMMERCIAL

## 2024-12-18 NOTE — TELEPHONE ENCOUNTER
Patient Quality Outreach    Patient is due for the following:   Diabetes -  A1C- due in January  Hypertension -  BP check- due in January      Topic Date Due    Diptheria Tetanus Pertussis (DTAP/TDAP/TD) Vaccine (2 - Td or Tdap) 07/29/2024       Action(s) Taken:   Patient has upcoming appointment, these items will be addressed at that time.    Type of outreach:    Chart review performed, no outreach needed.    Questions for provider review:    None           Sophie Quinn LPN  Chart routed to none.

## 2024-12-19 DIAGNOSIS — I10 ESSENTIAL HYPERTENSION WITH GOAL BLOOD PRESSURE LESS THAN 140/90: ICD-10-CM

## 2024-12-19 DIAGNOSIS — E78.5 HYPERLIPIDEMIA WITH TARGET LDL LESS THAN 70: ICD-10-CM

## 2024-12-19 RX ORDER — ATORVASTATIN CALCIUM 10 MG/1
10 TABLET, FILM COATED ORAL DAILY
Qty: 90 TABLET | Refills: 0 | Status: SHIPPED | OUTPATIENT
Start: 2024-12-19

## 2024-12-19 RX ORDER — LISINOPRIL 20 MG/1
20 TABLET ORAL DAILY
Qty: 90 TABLET | Refills: 0 | Status: SHIPPED | OUTPATIENT
Start: 2024-12-19

## 2025-01-02 ENCOUNTER — ORDERS ONLY (AUTO-RELEASED) (OUTPATIENT)
Dept: INTERNAL MEDICINE | Facility: CLINIC | Age: 50
End: 2025-01-02

## 2025-01-02 ENCOUNTER — OFFICE VISIT (OUTPATIENT)
Dept: INTERNAL MEDICINE | Facility: CLINIC | Age: 50
End: 2025-01-02
Payer: COMMERCIAL

## 2025-01-02 VITALS
SYSTOLIC BLOOD PRESSURE: 118 MMHG | RESPIRATION RATE: 20 BRPM | BODY MASS INDEX: 42 KG/M2 | HEART RATE: 90 BPM | WEIGHT: 246 LBS | HEIGHT: 64 IN | TEMPERATURE: 96.8 F | DIASTOLIC BLOOD PRESSURE: 81 MMHG | OXYGEN SATURATION: 99 %

## 2025-01-02 DIAGNOSIS — N95.2 ATROPHIC VAGINITIS: ICD-10-CM

## 2025-01-02 DIAGNOSIS — H81.13 BENIGN PAROXYSMAL POSITIONAL VERTIGO DUE TO BILATERAL VESTIBULAR DISORDER: ICD-10-CM

## 2025-01-02 DIAGNOSIS — Z12.31 ENCOUNTER FOR SCREENING MAMMOGRAM FOR BREAST CANCER: ICD-10-CM

## 2025-01-02 DIAGNOSIS — Z12.11 SPECIAL SCREENING FOR MALIGNANT NEOPLASMS, COLON: ICD-10-CM

## 2025-01-02 DIAGNOSIS — J45.30 MILD PERSISTENT ASTHMA WITHOUT COMPLICATION: ICD-10-CM

## 2025-01-02 DIAGNOSIS — E11.9 TYPE 2 DIABETES MELLITUS TREATED WITHOUT INSULIN (H): Primary | ICD-10-CM

## 2025-01-02 DIAGNOSIS — E78.5 HYPERLIPIDEMIA WITH TARGET LDL LESS THAN 70: ICD-10-CM

## 2025-01-02 DIAGNOSIS — B37.2 YEAST INFECTION OF THE SKIN: ICD-10-CM

## 2025-01-02 DIAGNOSIS — F43.21 GRIEF: ICD-10-CM

## 2025-01-02 DIAGNOSIS — I10 ESSENTIAL HYPERTENSION WITH GOAL BLOOD PRESSURE LESS THAN 140/90: ICD-10-CM

## 2025-01-02 DIAGNOSIS — E11.9 TYPE 2 DIABETES MELLITUS WITHOUT COMPLICATION, WITHOUT LONG-TERM CURRENT USE OF INSULIN (H): ICD-10-CM

## 2025-01-02 DIAGNOSIS — L30.0 NUMMULAR ECZEMA: ICD-10-CM

## 2025-01-02 LAB
BASOPHILS # BLD AUTO: 0.1 10E3/UL (ref 0–0.2)
BASOPHILS NFR BLD AUTO: 1 %
EOSINOPHIL # BLD AUTO: 0.6 10E3/UL (ref 0–0.7)
EOSINOPHIL NFR BLD AUTO: 5 %
ERYTHROCYTE [DISTWIDTH] IN BLOOD BY AUTOMATED COUNT: 12.5 % (ref 10–15)
EST. AVERAGE GLUCOSE BLD GHB EST-MCNC: 117 MG/DL
HBA1C MFR BLD: 5.7 % (ref 0–5.6)
HCT VFR BLD AUTO: 41 % (ref 35–47)
HGB BLD-MCNC: 14.1 G/DL (ref 11.7–15.7)
IMM GRANULOCYTES # BLD: 0 10E3/UL
IMM GRANULOCYTES NFR BLD: 0 %
LYMPHOCYTES # BLD AUTO: 3.1 10E3/UL (ref 0.8–5.3)
LYMPHOCYTES NFR BLD AUTO: 27 %
MCH RBC QN AUTO: 28.8 PG (ref 26.5–33)
MCHC RBC AUTO-ENTMCNC: 34.4 G/DL (ref 31.5–36.5)
MCV RBC AUTO: 84 FL (ref 78–100)
MONOCYTES # BLD AUTO: 0.7 10E3/UL (ref 0–1.3)
MONOCYTES NFR BLD AUTO: 6 %
NEUTROPHILS # BLD AUTO: 7.1 10E3/UL (ref 1.6–8.3)
NEUTROPHILS NFR BLD AUTO: 61 %
PLATELET # BLD AUTO: 275 10E3/UL (ref 150–450)
RBC # BLD AUTO: 4.89 10E6/UL (ref 3.8–5.2)
WBC # BLD AUTO: 11.6 10E3/UL (ref 4–11)

## 2025-01-02 RX ORDER — LANCETS 28 GAUGE
1 EACH MISCELLANEOUS
Qty: 300 EACH | Refills: 3 | Status: SHIPPED | OUTPATIENT
Start: 2025-01-02

## 2025-01-02 RX ORDER — ALBUTEROL SULFATE 90 UG/1
2 INHALANT RESPIRATORY (INHALATION) EVERY 4 HOURS PRN
Qty: 54 G | Refills: 3 | Status: SHIPPED | OUTPATIENT
Start: 2025-01-02

## 2025-01-02 RX ORDER — NYSTATIN 100000 U/G
OINTMENT TOPICAL
Qty: 30 G | Refills: 11 | Status: SHIPPED | OUTPATIENT
Start: 2025-01-02

## 2025-01-02 RX ORDER — TRIAMCINOLONE ACETONIDE 1 MG/G
OINTMENT TOPICAL
Qty: 80 G | Refills: 11 | Status: SHIPPED | OUTPATIENT
Start: 2025-01-02

## 2025-01-02 RX ORDER — MECLIZINE HYDROCHLORIDE 25 MG/1
25 TABLET ORAL EVERY 6 HOURS PRN
Qty: 90 TABLET | Refills: 1 | Status: SHIPPED | OUTPATIENT
Start: 2025-01-02

## 2025-01-02 RX ORDER — BECLOMETHASONE DIPROPIONATE HFA 80 UG/1
AEROSOL, METERED RESPIRATORY (INHALATION)
Qty: 11 G | Refills: 11 | Status: SHIPPED | OUTPATIENT
Start: 2025-01-02

## 2025-01-02 RX ORDER — ALBUTEROL SULFATE 0.83 MG/ML
SOLUTION RESPIRATORY (INHALATION)
Qty: 150 ML | Refills: 11 | Status: SHIPPED | OUTPATIENT
Start: 2025-01-02

## 2025-01-02 RX ORDER — ATORVASTATIN CALCIUM 10 MG/1
10 TABLET, FILM COATED ORAL DAILY
Qty: 90 TABLET | Refills: 3 | Status: SHIPPED | OUTPATIENT
Start: 2025-01-02

## 2025-01-02 RX ORDER — LISINOPRIL 20 MG/1
20 TABLET ORAL DAILY
Qty: 90 TABLET | Refills: 3 | Status: SHIPPED | OUTPATIENT
Start: 2025-01-02

## 2025-01-02 ASSESSMENT — ASTHMA QUESTIONNAIRES
QUESTION_4 LAST FOUR WEEKS HOW OFTEN HAVE YOU USED YOUR RESCUE INHALER OR NEBULIZER MEDICATION (SUCH AS ALBUTEROL): ONCE A WEEK OR LESS
QUESTION_1 LAST FOUR WEEKS HOW MUCH OF THE TIME DID YOUR ASTHMA KEEP YOU FROM GETTING AS MUCH DONE AT WORK, SCHOOL OR AT HOME: A LITTLE OF THE TIME
QUESTION_5 LAST FOUR WEEKS HOW WOULD YOU RATE YOUR ASTHMA CONTROL: WELL CONTROLLED
ACT_TOTALSCORE: 20
QUESTION_2 LAST FOUR WEEKS HOW OFTEN HAVE YOU HAD SHORTNESS OF BREATH: ONCE OR TWICE A WEEK
QUESTION_3 LAST FOUR WEEKS HOW OFTEN DID YOUR ASTHMA SYMPTOMS (WHEEZING, COUGHING, SHORTNESS OF BREATH, CHEST TIGHTNESS OR PAIN) WAKE YOU UP AT NIGHT OR EARLIER THAN USUAL IN THE MORNING: ONCE OR TWICE
ACT_TOTALSCORE: 20

## 2025-01-02 NOTE — PROGRESS NOTES
Assessment & Plan     Type 2 diabetes mellitus treated without insulin (H)  Lab Results   Component Value Date    A1C 5.7 01/02/2025    A1C 5.8 07/20/2024    A1C 6.1 01/18/2024    A1C 8.5 07/14/2023    A1C 6.8 05/15/2022    A1C 7.8 08/24/2020    A1C 7.3 02/22/2020    A1C 7.4 06/29/2019    A1C 6.7 11/15/2018    A1C 6.0 12/29/2017     Doing well with diabetes since she was added on with Ozempic.  She has also lost about 70 pounds which makes a difference  - blood glucose (ONETOUCH ULTRA) test strip; Use to test blood sugars 1 times daily or as directed.  - Lancets Ultra Fine MISC; 1 each 6 times daily.  - lisinopril (ZESTRIL) 20 MG tablet; Take 1 tablet (20 mg) by mouth daily.  - metFORMIN (GLUCOPHAGE) 500 MG tablet; TAKE 2 & 1/2 (TWO & ONE-HALF) TABLETS BY MOUTH TWICE DAILY WITH MEALS  - Comprehensive metabolic panel (BMP + Alb, Alk Phos, ALT, AST, Total. Bili, TP); Future  - TSH with free T4 reflex; Future  - CBC with platelets and differential; Future  - Hemoglobin A1c; Future  - Lipid panel reflex to direct LDL Non-fasting; Future  - Comprehensive metabolic panel (BMP + Alb, Alk Phos, ALT, AST, Total. Bili, TP)  - TSH with free T4 reflex  - CBC with platelets and differential  - Hemoglobin A1c  - Lipid panel reflex to direct LDL Non-fasting    Mild persistent asthma without complication  Stable with current medicines but the change in weather often makes her asthma worse  - albuterol (PROAIR HFA/PROVENTIL HFA/VENTOLIN HFA) 108 (90 Base) MCG/ACT inhaler; Inhale 2 puffs into the lungs every 4 hours as needed for shortness of breath, wheezing or cough.  - albuterol (PROVENTIL) (2.5 MG/3ML) 0.083% neb solution; USE 1 VIAL IN NEBULIZER EVERY 6 HOURS AS NEEDED FOR SHORTNESS OF BREATH FOR WHEEZING  - beclomethasone HFA (QVAR REDIHALER) 80 MCG/ACT inhaler; Inhale 2 puffs by mouth twice daily    Hyperlipidemia with target LDL less than 70  Tolerating medication  - atorvastatin (LIPITOR) 10 MG tablet; Take 1 tablet (10  mg) by mouth daily.    Atrophic vaginitis  Uses as needed twice weekly  - conjugated estrogens (PREMARIN) 0.625 MG/GM vaginal cream; Place 1 g vaginally twice a week.    Essential hypertension with goal blood pressure less than 140/90  In good range  - lisinopril (ZESTRIL) 20 MG tablet; Take 1 tablet (20 mg) by mouth daily.  - Comprehensive metabolic panel (BMP + Alb, Alk Phos, ALT, AST, Total. Bili, TP); Future  - TSH with free T4 reflex; Future  - CBC with platelets and differential; Future  - Comprehensive metabolic panel (BMP + Alb, Alk Phos, ALT, AST, Total. Bili, TP)  - TSH with free T4 reflex  - CBC with platelets and differential    Benign paroxysmal positional vertigo due to bilateral vestibular disorder  Uses as needed  - meclizine (ANTIVERT) 25 MG tablet; Take 1 tablet (25 mg) by mouth every 6 hours as needed for dizziness.    Type 2 diabetes mellitus without complication, without long-term current use of insulin (H)    - blood glucose (ONETOUCH ULTRA) test strip; Use to test blood sugars 1 times daily or as directed.  - Lancets Ultra Fine MISC; 1 each 6 times daily.  - lisinopril (ZESTRIL) 20 MG tablet; Take 1 tablet (20 mg) by mouth daily.  - metFORMIN (GLUCOPHAGE) 500 MG tablet; TAKE 2 & 1/2 (TWO & ONE-HALF) TABLETS BY MOUTH TWICE DAILY WITH MEALS  - Comprehensive metabolic panel (BMP + Alb, Alk Phos, ALT, AST, Total. Bili, TP); Future  - TSH with free T4 reflex; Future  - CBC with platelets and differential; Future  - Hemoglobin A1c; Future  - Comprehensive metabolic panel (BMP + Alb, Alk Phos, ALT, AST, Total. Bili, TP)  - TSH with free T4 reflex  - CBC with platelets and differential  - Hemoglobin A1c    Yeast infection of the skin  Uses as needed  - nystatin (MYCOSTATIN) 983284 UNIT/GM external ointment; APPLY OINTMENT TOPICALLY TWICE DAILY AS NEEDED TO SKIN FOLD RASH    Nummular eczema  Uses as needed  - triamcinolone (KENALOG) 0.1 % external ointment; APPLY OINTMENT SPARINGLY TO AFFECTED AREA  "THREE TIMES DAILY AS NEEDED FOR ECZEMA RASH Can be multiple areas on body where she has eczema outbreak    Encounter for screening mammogram for breast cancer    - MA Screen Bilateral w/Jared; Future    Special screening for malignant neoplasms, colon  Willing to do.  Last was done in 2022  - COLOGUARD(EXACT SCIENCES); Future    Grief  Lost her father and did not have time to grieve because she had to do other things but now asked to see psychiatry  - Adult Mental Health  Referral; Future          BMI  Estimated body mass index is 42.23 kg/m  as calculated from the following:    Height as of this encounter: 1.626 m (5' 4\").    Weight as of this encounter: 111.6 kg (246 lb).         Patient Instructions   Lab in [address]    Medication refilled     Mental health referral     Subjective   Coni Brar is a 49 year old, presenting for the following health issues:    Requesting mental health referral for grief. Father passed last year.    Refill Request        1/2/2025     3:45 PM   Additional Questions   Roomed by DAYNA Quinn LPN   Accompanied by self         1/2/2025     3:45 PM   Patient Reported Additional Medications   Patient reports taking the following new medications none     HPI     Flexeril at bedtime for back and leg pain     Cologuard due     Mammogram due     Grief related to father's passing.  Has not had time to deal with the grief but would like to see psychiatry.  She requests us        Review of Systems  Constitutional, neuro, ENT, endocrine, pulmonary, cardiac, gastrointestinal, genitourinary, musculoskeletal, integument and psychiatric systems are negative, except as otherwise noted.      Objective    /81   Pulse 90   Temp 96.8  F (36  C) (Oral)   Resp 20   Ht 1.626 m (5' 4\")   Wt 111.6 kg (246 lb)   LMP 12/30/2024   SpO2 99%   Breastfeeding No   BMI 42.23 kg/m    Body mass index is 42.23 kg/m .  Physical Exam   GENERAL: alert and no distress  RESP: lungs clear to auscultation - no " rales, rhonchi or wheezes  CV: regular rate and rhythm, normal S1 S2, no S3 or S4, no murmur, click or rub, no peripheral edema  ABDOMEN: soft, nontender, no hepatosplenomegaly, no masses and bowel sounds normal  PSYCH: mentation appears normal, affect normal/bright    Labs        Signed Electronically by: DANYELL Spencer CNP

## 2025-01-02 NOTE — NURSING NOTE
Prior to immunization administration, verified patients identity using patient s name and date of birth. Please see Immunization Activity for additional information.     Screening Questionnaire for Adult Immunization    Are you sick today?   No   Do you have allergies to medications, food, a vaccine component or latex?   No   Have you ever had a serious reaction after receiving a vaccination?   No   Do you have a long-term health problem with heart, lung, kidney, or metabolic disease (e.g., diabetes), asthma, a blood disorder, no spleen, complement component deficiency, a cochlear implant, or a spinal fluid leak?  Are you on long-term aspirin therapy?   Yes   Do you have cancer, leukemia, HIV/AIDS, or any other immune system problem?   No   Do you have a parent, brother, or sister with an immune system problem?   No   In the past 3 months, have you taken medications that affect  your immune system, such as prednisone, other steroids, or anticancer drugs; drugs for the treatment of rheumatoid arthritis, Crohn s disease, or psoriasis; or have you had radiation treatments?   No   Have you had a seizure, or a brain or other nervous system problem?   No   During the past year, have you received a transfusion of blood or blood    products, or been given immune (gamma) globulin or antiviral drug?   No   For women: Are you pregnant or is there a chance you could become       pregnant during the next month?   No   Have you received any vaccinations in the past 4 weeks?   No     Immunization questionnaire was positive for at least one answer.  Notified RAUL Palencia CNP.      Patient instructed to remain in clinic for 15 minutes afterwards, and to report any adverse reactions.     Screening performed by Sophie Quinn LPN on 1/2/2025 at 4:02 PM.

## 2025-01-05 DIAGNOSIS — E11.9 TYPE 2 DIABETES MELLITUS TREATED WITHOUT INSULIN (H): Primary | ICD-10-CM

## 2025-01-09 ENCOUNTER — PATIENT OUTREACH (OUTPATIENT)
Dept: CARE COORDINATION | Facility: CLINIC | Age: 50
End: 2025-01-09
Payer: COMMERCIAL

## 2025-01-18 ENCOUNTER — E-VISIT (OUTPATIENT)
Dept: URGENT CARE | Facility: CLINIC | Age: 50
End: 2025-01-18
Payer: COMMERCIAL

## 2025-01-18 DIAGNOSIS — N39.0 ACUTE UTI (URINARY TRACT INFECTION): Primary | ICD-10-CM

## 2025-01-18 RX ORDER — NITROFURANTOIN 25; 75 MG/1; MG/1
100 CAPSULE ORAL 2 TIMES DAILY
Qty: 10 CAPSULE | Refills: 0 | Status: SHIPPED | OUTPATIENT
Start: 2025-01-18 | End: 2025-01-23

## 2025-01-18 NOTE — PATIENT INSTRUCTIONS
Dear Coni Newberry    After reviewing your responses, I've been able to diagnose you with a urinary tract infection, which is a common infection of the bladder with bacteria.  This is not a sexually transmitted infection, though urinating immediately after intercourse can help prevent infections.  Drinking lots of fluids is also helpful to clear your current infection and prevent the next one.      I have sent a prescription for antibiotics to your pharmacy to treat this infection.    It is important that you take all of your prescribed medication even if your symptoms are improving after a few doses.  Taking all of your medicine helps prevent the symptoms from returning.     If your symptoms worsen, you develop pain in your back or stomach, develop fevers, or are not improving in 5 days, please contact your primary care provider for an appointment or visit any of our convenient Walk-in or Urgent Care Centers to be seen, which can be found on our website here.    Thanks again for choosing us as your health care partner,    DANYELL Duong CNP  Urinary Tract Infection (UTI) in Women: Care Instructions  Overview     A urinary tract infection (UTI) is an infection caused by bacteria. It can happen anywhere in the urinary tract. A UTI can happen in the:  Kidneys.  Ureters, the tubes that connect the kidneys to the bladder.  Bladder.  Urethra, where the urine comes out.  Most UTIs are bladder infections. They often cause pain or burning when you urinate.  Most UTIs can be cured with antibiotics. If you are prescribed antibiotics, be sure to complete your treatment so that the infection does not get worse.  Follow-up care is a key part of your treatment and safety. Be sure to make and go to all appointments, and call your doctor if you are having problems. It's also a good idea to know your test results and keep a list of the medicines you take.  How can you care for yourself at home?  Take your  antibiotics as directed. Do not stop taking them just because you feel better. You need to take the full course of antibiotics.  Drink extra water and other fluids for the next day or two. This will help make the urine less concentrated and help wash out the bacteria that are causing the infection. (If you have kidney, heart, or liver disease and have to limit fluids, talk with your doctor before you increase the amount of fluids you drink.)  Avoid drinks that are carbonated or have caffeine. They can irritate the bladder.  Urinate often. Try to empty your bladder each time.  To relieve pain, take a hot bath or lay a heating pad set on low over your lower belly or genital area. Never go to sleep with a heating pad in place.  To prevent UTIs  Drink plenty of water each day. This helps you urinate often, which clears bacteria from your system. (If you have kidney, heart, or liver disease and have to limit fluids, talk with your doctor before you increase the amount of fluids you drink.)  Urinate when you need to.  If you are sexually active, urinate right after you have sex.  Change sanitary pads often.  Avoid douches, bubble baths, feminine hygiene sprays, and other feminine hygiene products that have deodorants.  After going to the bathroom, wipe from front to back.  When should you call for help?   Call your doctor now or seek immediate medical care if:    You have new or worse fever, chills, nausea, or vomiting.     You have new pain in your back just below your rib cage. This is called flank pain.     There is new blood or pus in your urine.     You have any problems with your antibiotic medicine.   Watch closely for changes in your health, and be sure to contact your doctor if:    You are not getting better after taking an antibiotic for 2 days.     Your symptoms go away but then come back.   Where can you learn more?  Go to https://www.healthwise.net/patiented  Enter K848 in the search box to learn more about  "\"Urinary Tract Infection (UTI) in Women: Care Instructions.\"  Current as of: April 30, 2024  Content Version: 14.3    2024 [employer].   Care instructions adapted under license by your healthcare professional. If you have questions about a medical condition or this instruction, always ask your healthcare professional. [employer] disclaims any warranty or liability for your use of this information.    "

## 2025-02-26 LAB — NONINV COLON CA DNA+OCC BLD SCRN STL QL: NEGATIVE

## 2025-03-10 ENCOUNTER — LAB (OUTPATIENT)
Dept: LAB | Facility: CLINIC | Age: 50
End: 2025-03-10
Payer: COMMERCIAL

## 2025-03-10 ENCOUNTER — OFFICE VISIT (OUTPATIENT)
Dept: ORTHOPEDICS | Facility: CLINIC | Age: 50
End: 2025-03-10
Payer: COMMERCIAL

## 2025-03-10 DIAGNOSIS — E11.9 TYPE 2 DIABETES MELLITUS TREATED WITHOUT INSULIN (H): ICD-10-CM

## 2025-03-10 DIAGNOSIS — M17.0 PRIMARY OSTEOARTHRITIS OF BOTH KNEES: Primary | ICD-10-CM

## 2025-03-10 LAB
EST. AVERAGE GLUCOSE BLD GHB EST-MCNC: 117 MG/DL
HBA1C MFR BLD: 5.7 % (ref 0–5.6)

## 2025-03-10 PROCEDURE — 36415 COLL VENOUS BLD VENIPUNCTURE: CPT

## 2025-03-10 PROCEDURE — 20611 DRAIN/INJ JOINT/BURSA W/US: CPT | Mod: 50 | Performed by: FAMILY MEDICINE

## 2025-03-10 PROCEDURE — 83036 HEMOGLOBIN GLYCOSYLATED A1C: CPT

## 2025-03-10 PROCEDURE — 80048 BASIC METABOLIC PNL TOTAL CA: CPT

## 2025-03-10 RX ORDER — LIDOCAINE HYDROCHLORIDE 10 MG/ML
5 INJECTION, SOLUTION INFILTRATION; PERINEURAL
Status: COMPLETED | OUTPATIENT
Start: 2025-03-10 | End: 2025-03-10

## 2025-03-10 RX ADMIN — LIDOCAINE HYDROCHLORIDE 5 ML: 10 INJECTION, SOLUTION INFILTRATION; PERINEURAL at 13:24

## 2025-03-10 NOTE — PROGRESS NOTES
ASSESSMENT & PLAN  Patient Instructions     1. Primary osteoarthritis of both knees      -Patient is following up for chronic bilateral knee pain due to arthritis  -Patient tolerated bilateral Durolane injections today without complications.  Patient was given postprocedure instruction  -Patient will follow-up when pain returns  -Call direct clinic number [[phone]] at any time with questions or concerns.    Albert Yeo MD [facility]  [facility]        -----    SUBJECTIVE:  Coni Newberry is a 50 year old female who is seen for bilateral knee Durolane injections. Patient was last seen on 04/24/2024 and had bilateral knee Synvisc One injections. Patient reports relief lasting ~ 10 months.        Large Joint Injection/Arthocentesis: bilateral knee    Date/Time: 3/10/2025 1:24 PM    Performed by: Yeo, Albert, MD  Authorized by: Yeo, Albert, MD    Indications:  Pain and osteoarthritis  Needle Size:  22 G  Guidance: ultrasound    Approach:  Superolateral  Location:  Knee  Laterality:  Bilateral      Medications (Right):  5 mL lidocaine 1 %; 60 mg sodium hyaluronate 60 MG/3ML  Medications (Left):  5 mL lidocaine 1 %; 60 mg sodium hyaluronate 60 MG/3ML  Outcome:  Tolerated well, no immediate complications  Procedure discussed: discussed risks, benefits, and alternatives    Consent Given by:  Patient  Timeout: timeout called immediately prior to procedure    Prep: patient was prepped and draped in usual sterile fashion     Ultrasound was used to ensure safe and accurate needle placement and injection. Ultrasound images of the procedure were permanently stored.          Albert Yeo MD, [facility]

## 2025-03-10 NOTE — PATIENT INSTRUCTIONS
1. Primary osteoarthritis of both knees      -Patient is following up for chronic bilateral knee pain due to arthritis  -Patient tolerated bilateral Durolane injections today without complications.  Patient was given postprocedure instruction  -Patient will follow-up when pain returns  -Call direct clinic number [[phone]] at any time with questions or concerns.    Albert Yeo MD CAQSM  [facility]  [facility]

## 2025-03-10 NOTE — LETTER
3/10/2025      Coni Newberry  [address]  [facility] [facility] [address]      Dear Colleague,    Thank you for referring your patient, Coni Newberry, to the [facility] [place]. Please see a copy of my visit note below.    ASSESSMENT & PLAN  Patient Instructions     1. Primary osteoarthritis of both knees      -Patient is following up for chronic bilateral knee pain due to arthritis  -Patient tolerated bilateral Durolane injections today without complications.  Patient was given postprocedure instruction  -Patient will follow-up when pain returns  -Call direct clinic number [[phone]] at any time with questions or concerns.    Albert Yeo MD [facility]  [facility]        -----    SUBJECTIVE:  Coni Newberry is a 50 year old female who is seen for bilateral knee Durolane injections. Patient was last seen on 04/24/2024 and had bilateral knee Synvisc One injections. Patient reports relief lasting ~ 10 months.        Large Joint Injection/Arthocentesis: bilateral knee    Date/Time: 3/10/2025 1:24 PM    Performed by: Yeo, Albert, MD  Authorized by: Yeo, Albert, MD    Indications:  Pain and osteoarthritis  Needle Size:  22 G  Guidance: ultrasound    Approach:  Superolateral  Location:  Knee  Laterality:  Bilateral      Medications (Right):  5 mL lidocaine 1 %; 60 mg sodium hyaluronate 60 MG/3ML  Medications (Left):  5 mL lidocaine 1 %; 60 mg sodium hyaluronate 60 MG/3ML  Outcome:  Tolerated well, no immediate complications  Procedure discussed: discussed risks, benefits, and alternatives    Consent Given by:  Patient  Timeout: timeout called immediately prior to procedure    Prep: patient was prepped and draped in usual sterile fashion     Ultrasound was used to ensure safe and accurate needle placement and injection. Ultrasound images of the procedure were permanently stored.          Albert Yeo MD,  [facility]      Again, thank you for allowing me to participate in the care of your patient.        Sincerely,        Albert Yeo, MD    Electronically signed

## 2025-03-12 ENCOUNTER — TELEPHONE (OUTPATIENT)
Dept: INTERNAL MEDICINE | Facility: CLINIC | Age: 50
End: 2025-03-12
Payer: COMMERCIAL

## 2025-03-12 DIAGNOSIS — E11.9 TYPE 2 DIABETES MELLITUS WITHOUT COMPLICATION, WITHOUT LONG-TERM CURRENT USE OF INSULIN (H): ICD-10-CM

## 2025-03-12 DIAGNOSIS — E11.9 TYPE 2 DIABETES MELLITUS TREATED WITHOUT INSULIN (H): ICD-10-CM

## 2025-03-12 LAB
ANION GAP SERPL CALCULATED.3IONS-SCNC: 14 MMOL/L (ref 7–15)
BUN SERPL-MCNC: 16.5 MG/DL (ref 6–20)
CALCIUM SERPL-MCNC: 9.9 MG/DL (ref 8.8–10.4)
CHLORIDE SERPL-SCNC: 96 MMOL/L (ref 98–107)
CREAT SERPL-MCNC: 1.03 MG/DL (ref 0.51–0.95)
EGFRCR SERPLBLD CKD-EPI 2021: 66 ML/MIN/1.73M2
GLUCOSE SERPL-MCNC: 79 MG/DL (ref 70–99)
HCO3 SERPL-SCNC: 24 MMOL/L (ref 22–29)
POTASSIUM SERPL-SCNC: 3.5 MMOL/L (ref 3.4–5.3)
SODIUM SERPL-SCNC: 134 MMOL/L (ref 135–145)

## 2025-03-12 NOTE — TELEPHONE ENCOUNTER
----- Message from Farida Palencia sent at 3/12/2025  2:42 PM CDT -----  Can decrease metformin to 1 tab twice daily   Repeat lab in 3-4 month

## 2025-03-12 NOTE — TELEPHONE ENCOUNTER
FYI - Status Update    Who is Calling: patient    Update: Pt is returning call to clinic. Pt is at [facility] at the moment so its okay to leave a detailed message.     Does caller want a call/response back: Yes     Could we send this information to you in [employer] or would you prefer to receive a phone call?:   Patient would prefer a phone call   Okay to leave a detailed message?: Yes at Cell number on file:    Telephone Information:   Mobile [phone]

## 2025-03-14 ENCOUNTER — TELEPHONE (OUTPATIENT)
Dept: INTERNAL MEDICINE | Facility: CLINIC | Age: 50
End: 2025-03-14
Payer: COMMERCIAL

## 2025-03-14 DIAGNOSIS — E11.9 TYPE 2 DIABETES MELLITUS TREATED WITHOUT INSULIN (H): ICD-10-CM

## 2025-03-14 DIAGNOSIS — E66.01 OBESITY, CLASS III, BMI 40-49.9 (MORBID OBESITY) (H): ICD-10-CM

## 2025-03-14 DIAGNOSIS — E66.01 MORBID OBESITY (H): ICD-10-CM

## 2025-03-17 RX ORDER — SEMAGLUTIDE 2.68 MG/ML
2 INJECTION, SOLUTION SUBCUTANEOUS
Refills: 3 | OUTPATIENT
Start: 2025-03-17

## 2025-03-18 NOTE — TELEPHONE ENCOUNTER
PA team states prescription is no longer active. Please send new prescription if appropriate. Patient has new insurance since this med was prescribed.  Patient states she has been on med for about 1 year    Chelita Hansen         Name: OZEMPIC 8 MG/3 ML (2 MG/DOSE)]   EI    3/17/25 11:03 AM   An error occurred while processing the e-prescribing message.    The refill or change request for this medication was refused, but the message was not sent electronically to the requested pharmacy. Call in the refusal to the pharmacy.    Code: 601 -  unable to process  Prescription no longer active

## 2025-03-20 DIAGNOSIS — N95.2 ATROPHIC VAGINITIS: ICD-10-CM

## 2025-03-20 DIAGNOSIS — N95.1 MENOPAUSAL SYNDROME (HOT FLASHES): ICD-10-CM

## 2025-03-20 DIAGNOSIS — Z30.41 ORAL CONTRACEPTIVE PILL SURVEILLANCE: ICD-10-CM

## 2025-03-20 RX ORDER — LEVONORGESTREL/ETHIN.ESTRADIOL 0.1-0.02MG
1 TABLET ORAL DAILY
Qty: 84 TABLET | Refills: 0 | Status: SHIPPED | OUTPATIENT
Start: 2025-03-20

## 2025-03-20 NOTE — TELEPHONE ENCOUNTER
Requested Prescriptions   Pending Prescriptions Disp Refills    levonorgestrel-ethinyl estradiol (AVIANE) 0.1-20 MG-MCG tablet 84 tablet 3     Sig: Take 1 tablet by mouth daily.       Contraceptives Protocol Passed - 3/20/2025  2:04 PM        Passed - Patient is not a current smoker if age is 35 or older        Passed - Medication is active on med list and the sig matches. RN to manually verify dose and sig if red X/fail.     If the protocol passes (green check), you do not need to verify med dose and sig.    A prescription matches if they are the same clinical intention.    For Example: once daily and every morning are the same.    The protocol can not identify upper and lower case letters as matching and will fail.     For Example: Take 1 tablet (50 mg) by mouth daily     TAKE 1 TABLET (50 MG) BY MOUTH DAILY    For all fails (red x), verify dose and sig.    If the refill does match what is on file, the RN can still proceed to approve the refill request.       If they do not match, route to the appropriate provider.             Passed - Recent (12 mo) or future (90 days) visit within the authorizing provider's specialty     The patient must have completed an in-person or virtual visit within the past 12 months or has a future visit scheduled within the next 90 days with the authorizing provider s specialty.  Urgent care and e-visits do not qualify as an office visit for this protocol.          Passed - Medication indicated for associated diagnosis     Medication is associated with one or more of the following diagnoses:  Contraception  Acne  Dysmenorrhea  Menorrhagia  Amenorrhea  PCOS  Premenstrual Dysphoric Disorder  Irregular menses  Endometriosis  Contraceptive counseling  Finding of menstrual bleeding  Education about oral contraception  Uses contraception  Initial prescription of oral contraception  Oral contraception-no problem  Oral contraceptive repeat          Passed - No active pregnancy on record         Passed - No positive pregnancy test in past 12 months           Pt due for appt in April. Mychart sent.     Short term refill approved.     TAURUS Stringer

## 2025-03-20 NOTE — TELEPHONE ENCOUNTER
levonorgestrel-ethinyl estradiol (AVIANE) 0.1-20 MG-MCG tablet        Last Written Prescription Date:  04/22/2025  Last Fill Quantity: 84,  # refills: 3   Last office visit: 4/22/2024 ; last virtual visit: Visit date not found with prescribing provider:  last visit was Dr Kenney on 4/22/24   Future Office Visit:  NONE    Inés Wagner LPN on 3/20/2025 at 2:00 PM

## 2025-06-01 DIAGNOSIS — M62.838 MUSCLE SPASM: ICD-10-CM

## 2025-06-01 RX ORDER — CYCLOBENZAPRINE HCL 10 MG
10 TABLET ORAL
Qty: 30 TABLET | Refills: 4 | Status: SHIPPED | OUTPATIENT
Start: 2025-06-01

## 2025-06-08 DIAGNOSIS — I10 ESSENTIAL HYPERTENSION WITH GOAL BLOOD PRESSURE LESS THAN 140/90: ICD-10-CM

## 2025-06-09 RX ORDER — CHLORTHALIDONE 25 MG/1
25 TABLET ORAL DAILY
Qty: 90 TABLET | Refills: 1 | Status: SHIPPED | OUTPATIENT
Start: 2025-06-09

## 2025-06-12 DIAGNOSIS — N95.2 ATROPHIC VAGINITIS: ICD-10-CM

## 2025-06-12 DIAGNOSIS — Z30.41 ORAL CONTRACEPTIVE PILL SURVEILLANCE: ICD-10-CM

## 2025-06-12 DIAGNOSIS — N95.1 MENOPAUSAL SYNDROME (HOT FLASHES): ICD-10-CM

## 2025-06-12 RX ORDER — LEVONORGESTREL/ETHIN.ESTRADIOL 0.1-0.02MG
1 TABLET ORAL DAILY
Qty: 84 TABLET | Refills: 0 | OUTPATIENT
Start: 2025-06-12

## 2025-06-12 NOTE — TELEPHONE ENCOUNTER
levonorgestrel-ethinyl estradiol (AVIANE) 0.1-20 MG-MCG tablet     Last Written Prescription Date:  03/20/2025  Last Fill Quantity: 84,  # refills: 0   Last office visit: 5/2/2025 seen by ADRIANO Landry ; last virtual visit: Visit date not found with prescribing provider:  Dr Kenney.   Future Office Visit:  none

## 2025-06-12 NOTE — TELEPHONE ENCOUNTER
OCP prescription changed on OV on 5/2/25.     Pt no longer taking this medication.     Anabella Wasserman RN  [facility]

## 2025-06-17 DIAGNOSIS — I10 ESSENTIAL HYPERTENSION WITH GOAL BLOOD PRESSURE LESS THAN 140/90: ICD-10-CM

## 2025-06-17 NOTE — TELEPHONE ENCOUNTER
Medication Question or Refill        What medication are you calling about (include dose and sig)?: chlorthalidone 25 MG    Preferred Pharmacy:     [address],   [place] [address] [phone]      Controlled Substance Agreement on file:   CSA -- Patient Level:    CSA: None found at the patient level.       Who prescribed the medication?: Farida Palencia    Do you need a refill? Yes    When did you use the medication last? 4 days out    Patient offered an appointment? No    Do you have any questions or concerns?  Yes: patient is out of this med      Could we send this information to you in [employer] or would you prefer to receive a phone call?:   Patient would prefer a phone call   Okay to leave a detailed message?: Yes at Cell number on file:    Telephone Information:   Mobile [phone]

## 2025-06-18 ENCOUNTER — TELEPHONE (OUTPATIENT)
Dept: INTERNAL MEDICINE | Facility: CLINIC | Age: 50
End: 2025-06-18
Payer: COMMERCIAL

## 2025-06-18 DIAGNOSIS — E66.01 MORBID OBESITY (H): ICD-10-CM

## 2025-06-18 DIAGNOSIS — E11.9 TYPE 2 DIABETES MELLITUS TREATED WITHOUT INSULIN (H): ICD-10-CM

## 2025-06-18 DIAGNOSIS — E66.813 OBESITY, CLASS III, BMI 40-49.9 (MORBID OBESITY) (H): ICD-10-CM

## 2025-06-18 RX ORDER — CHLORTHALIDONE 25 MG/1
25 TABLET ORAL DAILY
Qty: 90 TABLET | Refills: 1 | Status: SHIPPED | OUTPATIENT
Start: 2025-06-18

## 2025-06-18 NOTE — TELEPHONE ENCOUNTER
Fax from [facility] pharmacy states pt needs a NEW PA for Ozempic. 2 mg inj pen     Pt has new health insurance and is now using new pharmacy as well.     Will route to PA team    [phone]    [phone]

## 2025-06-19 NOTE — TELEPHONE ENCOUNTER
PA Initiation    Medication: OZEMPIC (2 MG/DOSE) 8 MG/3ML SC SOPN  Insurance Company: [employer] - Phone [phone] Fax [phone]  Pharmacy Filling the Rx: [id number] [address]  Filling Pharmacy Phone:    Filling Pharmacy Fax:    Start Date: 6/19/2025

## 2025-06-23 NOTE — TELEPHONE ENCOUNTER
Prior Authorization Approval    Medication: OZEMPIC (2 MG/DOSE) 8 MG/3ML SC SOPN  Authorization Effective Date: 5/23/2025  Authorization Expiration Date: 6/22/2026  Approved Dose/Quantity:   Reference #:     Insurance Company: [employer] - Phone [phone] Fax [phone]  Expected CoPay: $    CoPay Card Available:      Financial Assistance Needed:   Which Pharmacy is filling the prescription: [id number] [place], [place] - [address]  Pharmacy Notified: Y  Patient Notified: Instructed pharmacy to notify patient once order is ready.

## 2025-07-03 ENCOUNTER — E-VISIT (OUTPATIENT)
Dept: URGENT CARE | Facility: CLINIC | Age: 50
End: 2025-07-03
Payer: COMMERCIAL

## 2025-07-03 DIAGNOSIS — B37.31 YEAST INFECTION OF THE VAGINA: ICD-10-CM

## 2025-07-03 DIAGNOSIS — N39.0 ACUTE UTI (URINARY TRACT INFECTION): Primary | ICD-10-CM

## 2025-07-03 RX ORDER — FLUCONAZOLE 150 MG/1
150 TABLET ORAL
Qty: 3 TABLET | Refills: 0 | Status: SHIPPED | OUTPATIENT
Start: 2025-07-03 | End: 2025-07-10

## 2025-07-03 RX ORDER — NITROFURANTOIN 25; 75 MG/1; MG/1
100 CAPSULE ORAL 2 TIMES DAILY
Qty: 10 CAPSULE | Refills: 0 | Status: SHIPPED | OUTPATIENT
Start: 2025-07-03 | End: 2025-07-08

## 2025-07-03 NOTE — PATIENT INSTRUCTIONS
Dear Coni Newberry    After reviewing your responses, I've been able to diagnose you with a urinary tract infection, which is a common infection of the bladder with bacteria.  This is not a sexually transmitted infection, though urinating immediately after intercourse can help prevent infections.  Drinking lots of fluids is also helpful to clear your current infection and prevent the next one.      I have sent a prescription for antibiotics to your pharmacy to treat this infection.    It is important that you take all of your prescribed medication even if your symptoms are improving after a few doses.  Taking all of your medicine helps prevent the symptoms from returning.     If your symptoms worsen, you develop pain in your back or stomach, develop fevers, or are not improving in 5 days, please contact your primary care provider for an appointment or visit any of our convenient Walk-in or Urgent Care Centers to be seen, which can be found on our website here.    Thanks again for choosing us as your health care partner,    Monique Flaskerud, CNP  Female Urinary Tract Infection (UTI): Care Instructions  Overview    A urinary tract infection (UTI) is an infection caused by bacteria. It can happen anywhere in the urinary tract. A UTI can happen in the:  Kidneys.  Ureters, the tubes that connect the kidneys to the bladder.  Bladder.  Urethra, where the urine comes out.  Most UTIs are bladder infections. They often cause pain or burning when you urinate.  Most UTIs can be cured with antibiotics. If you are prescribed antibiotics, be sure to complete your treatment so that the infection does not get worse.  Follow-up care is a key part of your treatment and safety. Be sure to make and go to all appointments, and call your doctor if you are having problems. It's also a good idea to know your test results and keep a list of the medicines you take.  How can you care for yourself at home?  Take your antibiotics as  directed. Do not stop taking them just because you feel better. You need to take the full course of antibiotics.  Drink extra water and other fluids for the next day or two. This will help make the urine less concentrated and help wash out the bacteria that are causing the infection. (If you have kidney, heart, or liver disease and have to limit fluids, talk with your doctor before you increase the amount of fluids you drink.)  Avoid drinks that are carbonated or have caffeine. They can irritate the bladder.  Urinate often. Try to empty your bladder each time.  To relieve pain, try taking a warm bath in plain water or laying a heating pad set on low over your lower belly or genital area. Never go to sleep with a heating pad in place.  Avoid bubble baths and hygiene sprays, powders, or perfumes in the vagina or on the vulva. Do not douche. These things can irritate the urethra and may make symptoms worse.  To help prevent UTIs  Drink plenty of water each day. This helps you urinate often, which clears bacteria from your system. (If you have kidney, heart, or liver disease and have to limit fluids, talk with your doctor before you increase the amount of fluids you drink.)  Urinate when you need to.  If you are sexually active, urinate right after you have sex.  Change sanitary pads often.  After going to the bathroom, wipe from front to back.  When should you call for help?  Contact your doctor now or seek immediate medical care if:  Symptoms such as a fever, chills, nausea, or vomiting get worse or happen for the first time.  You have new pain in your back just below your rib cage. This is called flank pain.  There is new blood or pus in your urine.  You are not able to take or keep down your antibiotics.  Watch closely for changes in your health, and be sure to contact your doctor if:  You are not getting better after taking an antibiotic for 2 days.  Your symptoms go away but then come back.  Where can you learn  "more?  Go to https://www.[employer].net/patiented  Enter K848 in the search box to learn more about \"Female Urinary Tract Infection (UTI): Care Instructions.\"  Current as of: April 30, 2024  Content Version: 14.5 2024-2025 [employer].   Care instructions adapted under license by your healthcare professional. If you have questions about a medical condition or this instruction, always ask your healthcare professional. [employer] disclaims any warranty or liability for your use of this information.    "

## 2025-08-02 DIAGNOSIS — J45.30 MILD PERSISTENT ASTHMA WITHOUT COMPLICATION: ICD-10-CM

## 2025-08-04 RX ORDER — ALBUTEROL SULFATE 90 UG/1
2 INHALANT RESPIRATORY (INHALATION) EVERY 4 HOURS PRN
Qty: 20.1 G | Refills: 0 | Status: SHIPPED | OUTPATIENT
Start: 2025-08-04

## 2025-08-18 ENCOUNTER — PATIENT OUTREACH (OUTPATIENT)
Dept: CARE COORDINATION | Facility: CLINIC | Age: 50
End: 2025-08-18
Payer: COMMERCIAL

## 2025-09-03 DIAGNOSIS — L30.0 NUMMULAR ECZEMA: ICD-10-CM

## 2025-09-03 RX ORDER — ASPIRIN 81 MG/1
81 TABLET, COATED ORAL DAILY
Qty: 90 TABLET | Refills: 0 | Status: SHIPPED | OUTPATIENT
Start: 2025-09-03

## (undated) DEVICE — ENDO SEAL SCOPE SELF SEAL 1.2MM 26153EAU

## (undated) DEVICE — DILATOR PROBE UTERINE OS CANAL FINDER 260-610

## (undated) DEVICE — GLOVE PROTEXIS BLUE W/NEU-THERA 7.0  2D73EB70

## (undated) DEVICE — SOL NACL 0.9% IRRIG 3000ML BAG 07972-08

## (undated) DEVICE — SOL NACL 0.9% IRRIG 3000ML BAG 2B7477

## (undated) DEVICE — SOL WATER IRRIG 1000ML BOTTLE 2F7114

## (undated) DEVICE — CATH INTERMITTENT CLEAN-CATH FEMALE 14FR 6" VINYL LF 420614

## (undated) DEVICE — GLOVE PROTEXIS W/NEU-THERA 6.0  2D73TE60

## (undated) DEVICE — BAG CLEAR TRASH 1.3M 39X33" P4040C

## (undated) DEVICE — PAD CHUX UNDERPAD 30X36" P3036C

## (undated) DEVICE — GLOVE PROTEXIS POWDER FREE 6.5 ORTHOPEDIC 2D73ET65

## (undated) DEVICE — LINEN FULL SHEET 5511

## (undated) DEVICE — LINEN HALF SHEET 5512

## (undated) DEVICE — TUBING IRRIG TUR Y TYPE 96" LF 6543-01

## (undated) DEVICE — PAD PERI INDIV WRAP 11" 2022A

## (undated) DEVICE — PACK MINOR LITHOTOMY RIDGES

## (undated) RX ORDER — LIDOCAINE HYDROCHLORIDE 10 MG/ML
INJECTION, SOLUTION EPIDURAL; INFILTRATION; INTRACAUDAL; PERINEURAL
Status: DISPENSED
Start: 2018-01-03

## (undated) RX ORDER — PROPOFOL 10 MG/ML
INJECTION, EMULSION INTRAVENOUS
Status: DISPENSED
Start: 2018-01-03

## (undated) RX ORDER — HYDROCODONE BITARTRATE AND ACETAMINOPHEN 5; 325 MG/1; MG/1
TABLET ORAL
Status: DISPENSED
Start: 2018-01-03

## (undated) RX ORDER — DEXAMETHASONE SODIUM PHOSPHATE 4 MG/ML
INJECTION, SOLUTION INTRA-ARTICULAR; INTRALESIONAL; INTRAMUSCULAR; INTRAVENOUS; SOFT TISSUE
Status: DISPENSED
Start: 2018-01-03

## (undated) RX ORDER — ONDANSETRON 2 MG/ML
INJECTION INTRAMUSCULAR; INTRAVENOUS
Status: DISPENSED
Start: 2018-01-03

## (undated) RX ORDER — FENTANYL CITRATE 50 UG/ML
INJECTION, SOLUTION INTRAMUSCULAR; INTRAVENOUS
Status: DISPENSED
Start: 2018-01-03

## (undated) RX ORDER — GLYCOPYRROLATE 0.2 MG/ML
INJECTION INTRAMUSCULAR; INTRAVENOUS
Status: DISPENSED
Start: 2018-01-03